# Patient Record
Sex: FEMALE | Race: WHITE | NOT HISPANIC OR LATINO | Employment: OTHER | URBAN - NONMETROPOLITAN AREA
[De-identification: names, ages, dates, MRNs, and addresses within clinical notes are randomized per-mention and may not be internally consistent; named-entity substitution may affect disease eponyms.]

---

## 2020-10-27 ENCOUNTER — HOSPITAL ENCOUNTER (OUTPATIENT)
Dept: GENERAL RADIOLOGY | Facility: HOSPITAL | Age: 85
Discharge: HOME | End: 2020-10-27

## 2020-10-27 ENCOUNTER — OFFICE VISIT (OUTPATIENT)
Dept: NEUROSURGERY | Facility: CLINIC | Age: 85
End: 2020-10-27

## 2020-10-27 ENCOUNTER — TELEPHONE (OUTPATIENT)
Dept: NEUROSURGERY | Facility: CLINIC | Age: 85
End: 2020-10-27

## 2020-10-27 VITALS
SYSTOLIC BLOOD PRESSURE: 144 MMHG | DIASTOLIC BLOOD PRESSURE: 72 MMHG | BODY MASS INDEX: 21.66 KG/M2 | HEIGHT: 65 IN | WEIGHT: 130 LBS

## 2020-10-27 DIAGNOSIS — G89.29 CHRONIC LOW BACK PAIN, UNSPECIFIED BACK PAIN LATERALITY, UNSPECIFIED WHETHER SCIATICA PRESENT: ICD-10-CM

## 2020-10-27 DIAGNOSIS — Z78.9 NON-SMOKER: ICD-10-CM

## 2020-10-27 DIAGNOSIS — G89.29 CHRONIC LOW BACK PAIN, UNSPECIFIED BACK PAIN LATERALITY, UNSPECIFIED WHETHER SCIATICA PRESENT: Primary | ICD-10-CM

## 2020-10-27 DIAGNOSIS — M54.6 PAIN IN THORACIC SPINE: ICD-10-CM

## 2020-10-27 DIAGNOSIS — M54.50 CHRONIC LOW BACK PAIN, UNSPECIFIED BACK PAIN LATERALITY, UNSPECIFIED WHETHER SCIATICA PRESENT: Primary | ICD-10-CM

## 2020-10-27 DIAGNOSIS — M54.50 CHRONIC LOW BACK PAIN, UNSPECIFIED BACK PAIN LATERALITY, UNSPECIFIED WHETHER SCIATICA PRESENT: ICD-10-CM

## 2020-10-27 PROCEDURE — 99204 OFFICE O/P NEW MOD 45 MIN: CPT | Performed by: NURSE PRACTITIONER

## 2020-10-27 PROCEDURE — 72110 X-RAY EXAM L-2 SPINE 4/>VWS: CPT

## 2020-10-27 PROCEDURE — 72070 X-RAY EXAM THORAC SPINE 2VWS: CPT

## 2020-10-27 RX ORDER — IBUPROFEN 200 MG
200 TABLET ORAL EVERY 6 HOURS PRN
COMMUNITY

## 2020-10-27 NOTE — PATIENT INSTRUCTIONS
Advance Care Planning and Advance Directives     You make decisions on a daily basis - decisions about where you want to live, your career, your home, your life. Perhaps one of the most important decisions you face is your choice for future medical care. Take time to talk with your family and your healthcare team and start planning today.  Advance Care Planning is a process that can help you:  · Understand possible future healthcare decisions in light of your own experiences  · Reflect on those decision in light of your goals and values  · Discuss your decisions with those closest to you and the healthcare professionals that care for you  · Make a plan by creating a document that reflects your wishes    Surrogate Decision Maker  In the event of a medical emergency, which has left you unable to communicate or to make your own decisions, you would need someone to make decisions for you.  It is important to discuss your preferences for medical treatment with this person while you are in good health.     Qualities of a surrogate decision maker:  • Willing to take on this role and responsibility  • Knows what you want for future medical care  • Willing to follow your wishes even if they don't agree with them  • Able to make difficult medical decisions under stressful circumstances    Advance Directives  These are legal documents you can create that will guide your healthcare team and decision maker(s) when needed. These documents can be stored in the electronic medical record.    · Living Will - a legal document to guide your care if you have a terminal condition or a serious illness and are unable to communicate. States vary by statute in document names/types, but most forms may include one or more of the following:        -  Directions regarding life-prolonging treatments        -  Directions regarding artificially provided nutrition/hydration        -  Choosing a healthcare decision maker        -  Direction  regarding organ/tissue donation    · Durable Power of  for Healthcare - this document names an -in-fact to make medical decisions for you, but it may also allow this person to make personal and financial decisions for you. Please seek the advice of an  if you need this type of document.    **Advance Directives are not required and no one may discriminate against you if you do not sign one.    Medical Orders  Many states allow specific forms/orders signed by your physician to record your wishes for medical treatment in your current state of health. This form, signed in personal communication with your physician, addresses resuscitation and other medical interventions that you may or may not want.      For more information or to schedule a time with a [facility] Advance Care Planning Facilitator contact: [email or web address] or call [phone] and someone will contact you directly.

## 2020-10-27 NOTE — TELEPHONE ENCOUNTER
It does look like there may be some compression fractures in her lumbar spine.  I am going to recommend her go for a CT scan of the thoracic and lumbar spine as well as a nuclear med bone scan.  They can all be done on the same day if she would like.  I will put the orders in if you will contact     Called and spoke with patient's son Phillip and gave him the x-ray results and explained that CT scans and a nuclear bone scan will be scheduled and that scheduling will be calling him, he understood.

## 2020-10-27 NOTE — PROGRESS NOTES
Chief complaint:   Chief Complaint   Patient presents with   • Back Pain     Deja has been referred here today for low back pain, her son is with her and they are thinking she may have a compression fracture but she has had no recent imaging of any kind.  She has had compression fractures in the past and was treated by Dr. Oral Baird.        Subjective     HPI: This is a 87-year-old female patient who comes in with a complaint of back pain.  She was referred by Phillip Elizabeth NP.  She says the pain in her back is been going on for several weeks but it has gotten worse in the last 3 weeks.  The pain in her back is constant it is better with laying down and worse with turning and walking.  She has no lower extremity pain.  Denies any bowel or bladder incontinence.  She has not done any recent physical therapy, chiropractic care, or pain management injections.  Rates her pain on a scale 0-10 at a 10.  She says it does interfere with actives of daily living.  She is right-hand dominant.  She is retired.  Is .  Denies any tobacco, alcohol, or illicit drug use.  She does take ibuprofen to help with the pain.  She does relate to having previous kyphoplasty done by Dr. Baird in 2009.  She does present in a wheelchair today.  She says that she does walk in her house with a walker    Review of Systems   Constitutional: Positive for activity change.   Musculoskeletal: Positive for back pain and gait problem.   All other systems reviewed and are negative.       Past Medical History:   Diagnosis Date   • Low back pain      Past Surgical History:   Procedure Laterality Date   • DILATATION AND CURETTAGE     • KYPHOPLASTY  2009    Dr. Oral Baird    • LEG SURGERY Right     fracture    • THYROID CYST EXCISION       Family History   Problem Relation Age of Onset   • Heart disease Mother    • Heart disease Father    • Cancer Brother    • Heart disease Brother      Social History     Tobacco Use   • Smoking status:  "Never Smoker   • Smokeless tobacco: Never Used   Substance Use Topics   • Alcohol use: Never     Frequency: Never   • Drug use: Never     (Not in a hospital admission)    Allergies:  Patient has no known allergies.    Objective      Vital Signs  /72 (BP Location: Right arm, Patient Position: Lying)   Ht 165.1 cm (65\")   Wt 59 kg (130 lb)   BMI 21.63 kg/m²     Physical Exam  Constitutional:       Appearance: Normal appearance. She is well-developed.   HENT:      Head: Normocephalic.   Eyes:      General: Lids are normal.      Conjunctiva/sclera: Conjunctivae normal.      Pupils: Pupils are equal, round, and reactive to light.   Neck:      Musculoskeletal: Normal range of motion.   Cardiovascular:      Rate and Rhythm: Normal rate and regular rhythm.   Pulmonary:      Effort: Pulmonary effort is normal.      Breath sounds: Normal breath sounds.   Musculoskeletal: Normal range of motion.      Lumbar back: She exhibits pain.   Skin:     General: Skin is warm.   Neurological:      Mental Status: She is alert and oriented to person, place, and time.      GCS: GCS eye subscore is 4. GCS verbal subscore is 5. GCS motor subscore is 6.      Cranial Nerves: No cranial nerve deficit.      Sensory: No sensory deficit.      Gait: Gait abnormal.      Deep Tendon Reflexes: Reflexes are normal and symmetric. Reflexes normal.      Comments: Able to ambulate the patient however she does require some assistance to stabilize her gait   Psychiatric:         Speech: Speech normal.         Behavior: Behavior normal.         Thought Content: Thought content normal.         Neurologic Exam     Mental Status   Oriented to person, place, and time.   Speech: speech is normal     Cranial Nerves     CN III, IV, VI   Pupils are equal, round, and reactive to light.      Results Review: No new imaging        Assessment/Plan: Going to send the patient for set of x-rays of the thoracic and lumbar spine to rule out any new compression " fracture.  The patient's complaint is across her low back so it does sound like a new compression fracture.  Depending on the results of the x-rays will determine if we need to have any further imaging to check the acuity of the fracture.  We will follow-up with her once the imaging is completed.  I will plan on seeing her back in the office in 4 weeks for reevaluation  I advised the patient to call and return sooner for new or worsening complaints of weakness, paresthesias, gait disturbances, or any additional concerns.  Treatment options discussed in detail with [place] and the patient voiced understanding.  Ms. Elizabeth agrees with this plan of care.     Patient is a nonsmoker  The patient's BMI is Body mass index is 21.63 kg/m². BMI is within normal parameters. No follow-up required.  Advance Care Planning   ACP discussion was held with the patient during this visit. Patient does not have an advance directive, information provided.      Diagnoses and all orders for this visit:    1. Chronic low back pain, unspecified back pain laterality, unspecified whether sciatica present (Primary)  -     XR Spine Lumbar Complete 4+VW; Future    2. Pain in thoracic spine  -     XR Spine Thoracic 2 View    3. Non-smoker    4. Body mass index (BMI) of 21.0 to 21.9 in adult          I discussed the patients findings and my recommendations with patient    Matty Elkins, ROGELIO  10/27/20  09:36 CDT

## 2020-11-05 ENCOUNTER — HOSPITAL ENCOUNTER (OUTPATIENT)
Dept: NUCLEAR MEDICINE | Facility: HOSPITAL | Age: 85
Discharge: HOME | End: 2020-11-05

## 2020-11-05 ENCOUNTER — HOSPITAL ENCOUNTER (OUTPATIENT)
Dept: CT IMAGING | Facility: HOSPITAL | Age: 85
Discharge: HOME | End: 2020-11-05
Admitting: NURSE PRACTITIONER

## 2020-11-05 DIAGNOSIS — M54.6 PAIN IN THORACIC SPINE: ICD-10-CM

## 2020-11-05 DIAGNOSIS — G89.29 CHRONIC LOW BACK PAIN, UNSPECIFIED BACK PAIN LATERALITY, UNSPECIFIED WHETHER SCIATICA PRESENT: ICD-10-CM

## 2020-11-05 DIAGNOSIS — M54.50 CHRONIC LOW BACK PAIN, UNSPECIFIED BACK PAIN LATERALITY, UNSPECIFIED WHETHER SCIATICA PRESENT: ICD-10-CM

## 2020-11-05 PROCEDURE — 72131 CT LUMBAR SPINE W/O DYE: CPT

## 2020-11-05 PROCEDURE — 0 TECHNETIUM OXIDRONATE KIT: Performed by: NURSE PRACTITIONER

## 2020-11-05 PROCEDURE — 78306 BONE IMAGING WHOLE BODY: CPT

## 2020-11-05 PROCEDURE — 72128 CT CHEST SPINE W/O DYE: CPT

## 2020-11-05 PROCEDURE — A9561 TC99M OXIDRONATE: HCPCS | Performed by: NURSE PRACTITIONER

## 2020-11-05 RX ADMIN — TECHNETIUM TC 99M OXIDRONATE 1 DOSE: 3.15 INJECTION, POWDER, LYOPHILIZED, FOR SOLUTION INTRAVENOUS at 09:01

## 2020-11-10 ENCOUNTER — OFFICE VISIT (OUTPATIENT)
Dept: NEUROSURGERY | Facility: CLINIC | Age: 85
End: 2020-11-10

## 2020-11-10 ENCOUNTER — PREP FOR SURGERY (OUTPATIENT)
Dept: OTHER | Facility: HOSPITAL | Age: 85
End: 2020-11-10

## 2020-11-10 VITALS
SYSTOLIC BLOOD PRESSURE: 138 MMHG | DIASTOLIC BLOOD PRESSURE: 82 MMHG | WEIGHT: 130 LBS | HEIGHT: 65 IN | BODY MASS INDEX: 21.66 KG/M2

## 2020-11-10 DIAGNOSIS — S32.010S CLOSED COMPRESSION FRACTURE OF L1 LUMBAR VERTEBRA, SEQUELA: Primary | ICD-10-CM

## 2020-11-10 DIAGNOSIS — Z78.9 NON-SMOKER: ICD-10-CM

## 2020-11-10 PROCEDURE — 99214 OFFICE O/P EST MOD 30 MIN: CPT | Performed by: NURSE PRACTITIONER

## 2020-11-10 RX ORDER — BUPIVACAINE HCL/0.9 % NACL/PF 0.1 %
2 PLASTIC BAG, INJECTION (ML) EPIDURAL ONCE
Status: CANCELLED | OUTPATIENT
Start: 2020-11-10 | End: 2020-11-10

## 2020-11-10 NOTE — PROGRESS NOTES
Chief complaint:   Chief Complaint   Patient presents with   • Back Pain     [place] returns for results and discussion of CT scans and bone scan.        Subjective     HPI: This is a 87-year-old female patient who comes in with a complaint of back pain.  She was referred by Phillip Elizabeth NP.  She says the pain in her back is been going on for several weeks but it has gotten worse in the last 3 weeks.  The pain in her back is constant it is better with laying down and worse with turning and walking.  She has no lower extremity pain.  Denies any bowel or bladder incontinence.  She has not done any recent physical therapy, chiropractic care, or pain management injections.  Rates her pain on a scale 0-10 at a 10.  She says it does interfere with actives of daily living.  She is right-hand dominant.  She is retired.  Is .  Denies any tobacco, alcohol, or illicit drug use.  She does take ibuprofen to help with the pain.  She does relate to having previous kyphoplasty done by Dr. Baird in 2009.  She does present in a wheelchair today.  She says that she does walk in her house with a walker.  She states that today she does think that she may be a little bit better but is still having a significant amount of pain and is still limited in her activities because of the pain    Review of Systems   Constitutional: Positive for activity change.   Musculoskeletal: Positive for back pain and gait problem.   All other systems reviewed and are negative.       Past Medical History:   Diagnosis Date   • Low back pain      Past Surgical History:   Procedure Laterality Date   • DILATATION AND CURETTAGE     • KYPHOPLASTY  2009    Dr. Oral Baird    • LEG SURGERY Right     fracture    • THYROID CYST EXCISION       Family History   Problem Relation Age of Onset   • Heart disease Mother    • Heart disease Father    • Cancer Brother    • Heart disease Brother      Social History     Tobacco Use   • Smoking status: Never Smoker   "  • Smokeless tobacco: Never Used   Substance Use Topics   • Alcohol use: Never     Frequency: Never   • Drug use: Never     (Not in a hospital admission)    Allergies:  Patient has no known allergies.    Objective      Vital Signs  /82 (BP Location: Right arm, Patient Position: Sitting)   Ht 165.1 cm (65\")   Wt 59 kg (130 lb)   BMI 21.63 kg/m²     Physical Exam  Constitutional:       Appearance: Normal appearance. She is well-developed.   HENT:      Head: Normocephalic.   Eyes:      General: Lids are normal.      Conjunctiva/sclera: Conjunctivae normal.      Pupils: Pupils are equal, round, and reactive to light.   Neck:      Musculoskeletal: Normal range of motion.   Cardiovascular:      Rate and Rhythm: Normal rate and regular rhythm.   Pulmonary:      Effort: Pulmonary effort is normal.      Breath sounds: Normal breath sounds.   Musculoskeletal: Normal range of motion.      Lumbar back: She exhibits pain.   Skin:     General: Skin is warm.   Neurological:      Mental Status: She is alert and oriented to person, place, and time.      GCS: GCS eye subscore is 4. GCS verbal subscore is 5. GCS motor subscore is 6.      Cranial Nerves: No cranial nerve deficit.      Sensory: No sensory deficit.      Gait: Gait abnormal.      Deep Tendon Reflexes: Reflexes are normal and symmetric. Reflexes normal.      Comments: Able to ambulate the patient however she does require some assistance to stabilize her gait   Psychiatric:         Speech: Speech normal.         Behavior: Behavior normal.         Thought Content: Thought content normal.         Neurologic Exam     Mental Status   Oriented to person, place, and time.   Speech: speech is normal     Cranial Nerves     CN III, IV, VI   Pupils are equal, round, and reactive to light.      Results Review: Nuclear med bone study shows the patient does have an acute fracture at L1.  Per the radiologist it is not felt that there is any fractures from L2-L5 based off the " "nuclear med bone scan              Assessment/Plan: Tanya did review the imaging and did come in and discussed this with the patient.  The patient does have an acute L1 fracture.  It is felt that the patient would benefit by going to the operating room for a kyphoplasty at L1.  Dr. Martínez did go over the risks and benefits of the procedure with the patient.  Their questions and concerns were addressed.  The patient will need to be cleared by her primary care doctor.  She will also have to undergo Covid-19 and if she did test positive this would delay her surgery.  The acknowledged understanding of this.  Their questions and concerns were addressed.Patient is a nonsmoker  The patient's Body mass index is 21.63 kg/m².. BMI is within normal parameters. No follow-up required.  Advance Care Planning   ACP discussion was held with the patient during this visit. Patient does not have an advance directive, information provided.      Diagnoses and all orders for this visit:    1. Closed compression fracture of L1 lumbar vertebra, sequela (Primary)  -     Ambulatory Referral to Family Practice    2. Non-smoker    3. Body mass index (BMI) of 21.0 to 21.9 in adult          I discussed the patients findings and my recommendations with patient    Matty NADER Elkins, APRN  11/10/20  09:46 CST  Attending addendum: The patient has what appears to be an acute compression fracture at L1 based on CAT scan and nuclear medicine imaging.  She also has a notable scoliotic deformity.  Think would be reasonable at this point to proceed with kyphoplasty at L1 was very clear with her that given the amount of degeneration and scoliosis that she has it is possible that she may continue to have back pain and to some degree after \"kyphoplasty although I do think that kyphoplasty will benefit her.  The risks and benefits were discussed at length which include but are not limited to infection, bleeding, paralysis, spinal fluid leak, bowel bladder " continence, extravasation of kyphoplasty cement resulting in neurocompression, pulmonary embolism, stroke, coma, and death.

## 2020-11-10 NOTE — PATIENT INSTRUCTIONS
Advance Care Planning and Advance Directives     You make decisions on a daily basis - decisions about where you want to live, your career, your home, your life. Perhaps one of the most important decisions you face is your choice for future medical care. Take time to talk with your family and your healthcare team and start planning today.  Advance Care Planning is a process that can help you:  · Understand possible future healthcare decisions in light of your own experiences  · Reflect on those decision in light of your goals and values  · Discuss your decisions with those closest to you and the healthcare professionals that care for you  · Make a plan by creating a document that reflects your wishes    Surrogate Decision Maker  In the event of a medical emergency, which has left you unable to communicate or to make your own decisions, you would need someone to make decisions for you.  It is important to discuss your preferences for medical treatment with this person while you are in good health.     Qualities of a surrogate decision maker:  • Willing to take on this role and responsibility  • Knows what you want for future medical care  • Willing to follow your wishes even if they don't agree with them  • Able to make difficult medical decisions under stressful circumstances    Advance Directives  These are legal documents you can create that will guide your healthcare team and decision maker(s) when needed. These documents can be stored in the electronic medical record.    · Living Will - a legal document to guide your care if you have a terminal condition or a serious illness and are unable to communicate. States vary by statute in document names/types, but most forms may include one or more of the following:        -  Directions regarding life-prolonging treatments        -  Directions regarding artificially provided nutrition/hydration        -  Choosing a healthcare decision maker        -  Direction  regarding organ/tissue donation    · Durable Power of  for Healthcare - this document names an -in-fact to make medical decisions for you, but it may also allow this person to make personal and financial decisions for you. Please seek the advice of an  if you need this type of document.    **Advance Directives are not required and no one may discriminate against you if you do not sign one.    Medical Orders  Many states allow specific forms/orders signed by your physician to record your wishes for medical treatment in your current state of health. This form, signed in personal communication with your physician, addresses resuscitation and other medical interventions that you may or may not want.      For more information or to schedule a time with a [facility] Advance Care Planning Facilitator contact: [email or web address] or call [phone] and someone will contact you directly.

## 2020-11-18 ENCOUNTER — TELEPHONE (OUTPATIENT)
Dept: NEUROSURGERY | Facility: CLINIC | Age: 85
End: 2020-11-18

## 2020-11-18 NOTE — TELEPHONE ENCOUNTER
LAURA FOR PT SON AJE.     PT SURGERY SCHEDULED FOR 12/4/20 ARRIVE @ 5AM- 7 AM START    PREWORK TESTING 11/20/20 @ 9:45AM @  [facility]    PCP CLEARANCE WITH PAN TORRES, ROGELIO 11/23/20 @ 9:15 AM     LEFT MY DIRECT NUMBER FOR JAE TO CALL BACK WITH QUESTIONS CONCERNS [phone]    MAILED LTR.

## 2020-11-18 NOTE — TELEPHONE ENCOUNTER
SURGERY: 12/4/20 @ 5AM - 7AM   PT SAW PCP FOR CLEARANCE AND PREWORK TESTING (SON SCHEDULED ON HIS OWN)     FAXED REQUEST TO [facility] OFFICE ASKING THEY FAX US LABS AND CLEARANCE NOTE.     NOTIFIED SON OF SURGERY DATE AND TIME. VOICED UNDERSTANDING

## 2020-11-20 ENCOUNTER — APPOINTMENT (OUTPATIENT)
Dept: PREADMISSION TESTING | Facility: HOSPITAL | Age: 85
End: 2020-11-20

## 2020-11-27 ENCOUNTER — TRANSCRIBE ORDERS (OUTPATIENT)
Dept: ADMINISTRATIVE | Facility: HOSPITAL | Age: 85
End: 2020-11-27

## 2020-11-27 DIAGNOSIS — Z11.59 SCREENING FOR VIRAL DISEASE: Primary | ICD-10-CM

## 2020-11-30 ENCOUNTER — TELEPHONE (OUTPATIENT)
Dept: NEUROSURGERY | Facility: CLINIC | Age: 85
End: 2020-11-30

## 2020-11-30 NOTE — TELEPHONE ENCOUNTER
PATIENT'S SON IS CALLING TO MAKE SURE ALL OF PATIENT'S CHECK MARKS ARE COMPLETED PRIOR TO SURGERY.  HE WOULD LIKE SOMEONE TO CALL HIM BACK TODAY TO MAKE SURE THEY ARE COMPLETING ALL TEST AND REQUEST PRIOR TO SURGERY.  PLEASE CALL BACK TODAY. PATIENT IS COMING FROM [facility] AND HAS TO LAY FLAT IN THE VAN WHEN TRAVELING.  SON DOES NOT WANT TO HAVE TO MAKE ANOTHER TRIP.      [phone]

## 2020-11-30 NOTE — TELEPHONE ENCOUNTER
I have contacted the patient's son and he just wanted to make sure there was nothing else for his mom to do before her surgery on Friday.  She is scheduled for COVID testing tomorrow which is all that is left except we need copies of her prework testing and clearance note from [facility] office.  He is going to contact them today and have them fax it to me.  He states they were told she is cleared to proceed but will make sure we get that documentation so we can put it in her chart.    christi lee CMA

## 2020-12-01 ENCOUNTER — LAB (OUTPATIENT)
Dept: LAB | Facility: HOSPITAL | Age: 85
End: 2020-12-01

## 2020-12-01 PROCEDURE — U0003 INFECTIOUS AGENT DETECTION BY NUCLEIC ACID (DNA OR RNA); SEVERE ACUTE RESPIRATORY SYNDROME CORONAVIRUS 2 (SARS-COV-2) (CORONAVIRUS DISEASE [COVID-19]), AMPLIFIED PROBE TECHNIQUE, MAKING USE OF HIGH THROUGHPUT TECHNOLOGIES AS DESCRIBED BY CMS-2020-01-R: HCPCS | Performed by: NEUROLOGICAL SURGERY

## 2020-12-01 PROCEDURE — C9803 HOPD COVID-19 SPEC COLLECT: HCPCS | Performed by: NEUROLOGICAL SURGERY

## 2020-12-02 LAB
COVID LABCORP PRIORITY: NORMAL
SARS-COV-2 RNA RESP QL NAA+PROBE: NOT DETECTED

## 2020-12-03 RX ORDER — POTASSIUM CHLORIDE 750 MG/1
10 CAPSULE, EXTENDED RELEASE ORAL DAILY
COMMUNITY

## 2020-12-04 ENCOUNTER — ANESTHESIA (OUTPATIENT)
Dept: PERIOP | Facility: HOSPITAL | Age: 85
End: 2020-12-04

## 2020-12-04 ENCOUNTER — ANESTHESIA EVENT (OUTPATIENT)
Dept: PERIOP | Facility: HOSPITAL | Age: 85
End: 2020-12-04

## 2020-12-04 ENCOUNTER — HOSPITAL ENCOUNTER (OUTPATIENT)
Facility: HOSPITAL | Age: 85
Setting detail: HOSPITAL OUTPATIENT SURGERY
Discharge: HOME | End: 2020-12-04
Attending: NEUROLOGICAL SURGERY | Admitting: NEUROLOGICAL SURGERY

## 2020-12-04 ENCOUNTER — APPOINTMENT (OUTPATIENT)
Dept: GENERAL RADIOLOGY | Facility: HOSPITAL | Age: 85
End: 2020-12-04

## 2020-12-04 VITALS
BODY MASS INDEX: 21.94 KG/M2 | RESPIRATION RATE: 14 BRPM | HEIGHT: 64 IN | DIASTOLIC BLOOD PRESSURE: 87 MMHG | TEMPERATURE: 97.9 F | OXYGEN SATURATION: 99 % | SYSTOLIC BLOOD PRESSURE: 152 MMHG | HEART RATE: 53 BPM | WEIGHT: 128.53 LBS

## 2020-12-04 DIAGNOSIS — S32.010S CLOSED COMPRESSION FRACTURE OF L1 LUMBAR VERTEBRA, SEQUELA: ICD-10-CM

## 2020-12-04 LAB
ABO GROUP BLD: NORMAL
BLD GP AB SCN SERPL QL: NEGATIVE
QT INTERVAL: 472 MS
QTC INTERVAL: 463 MS
RH BLD: POSITIVE
T&S EXPIRATION DATE: NORMAL

## 2020-12-04 PROCEDURE — 93005 ELECTROCARDIOGRAM TRACING: CPT | Performed by: NEUROLOGICAL SURGERY

## 2020-12-04 PROCEDURE — 72100 X-RAY EXAM L-S SPINE 2/3 VWS: CPT

## 2020-12-04 PROCEDURE — 22514 PERQ VERTEBRAL AUGMENTATION: CPT | Performed by: NEUROLOGICAL SURGERY

## 2020-12-04 PROCEDURE — 76000 FLUOROSCOPY <1 HR PHYS/QHP: CPT

## 2020-12-04 PROCEDURE — 25010000002 FENTANYL CITRATE (PF) 100 MCG/2ML SOLUTION: Performed by: ANESTHESIOLOGY

## 2020-12-04 PROCEDURE — 86900 BLOOD TYPING SEROLOGIC ABO: CPT | Performed by: NURSE PRACTITIONER

## 2020-12-04 PROCEDURE — 86850 RBC ANTIBODY SCREEN: CPT | Performed by: NURSE PRACTITIONER

## 2020-12-04 PROCEDURE — 25010000002 ONDANSETRON PER 1 MG: Performed by: NURSE ANESTHETIST, CERTIFIED REGISTERED

## 2020-12-04 PROCEDURE — 25010000002 ONDANSETRON PER 1 MG: Performed by: ANESTHESIOLOGY

## 2020-12-04 PROCEDURE — 86901 BLOOD TYPING SEROLOGIC RH(D): CPT | Performed by: NURSE PRACTITIONER

## 2020-12-04 PROCEDURE — 25010000002 FENTANYL CITRATE (PF) 100 MCG/2ML SOLUTION: Performed by: NURSE ANESTHETIST, CERTIFIED REGISTERED

## 2020-12-04 PROCEDURE — C1713 ANCHOR/SCREW BN/BN,TIS/BN: HCPCS | Performed by: NEUROLOGICAL SURGERY

## 2020-12-04 PROCEDURE — 88311 DECALCIFY TISSUE: CPT | Performed by: NEUROLOGICAL SURGERY

## 2020-12-04 PROCEDURE — 25010000002 PROPOFOL 10 MG/ML EMULSION: Performed by: NURSE ANESTHETIST, CERTIFIED REGISTERED

## 2020-12-04 PROCEDURE — 25010000002 DEXAMETHASONE PER 1 MG: Performed by: ANESTHESIOLOGY

## 2020-12-04 PROCEDURE — 0 IOPAMIDOL 41 % SOLUTION: Performed by: NEUROLOGICAL SURGERY

## 2020-12-04 PROCEDURE — 25010000002 CEFAZOLIN PER 500 MG: Performed by: NURSE PRACTITIONER

## 2020-12-04 PROCEDURE — 93010 ELECTROCARDIOGRAM REPORT: CPT | Performed by: INTERNAL MEDICINE

## 2020-12-04 PROCEDURE — 88307 TISSUE EXAM BY PATHOLOGIST: CPT | Performed by: NEUROLOGICAL SURGERY

## 2020-12-04 DEVICE — BONE CEMENT C01B HV-R WITH MIXER  US
Type: IMPLANTABLE DEVICE | Status: FUNCTIONAL
Brand: KYPHON® HV-R® BONE CEMENT AND KYPHON® MIXER PACK

## 2020-12-04 RX ORDER — SODIUM CHLORIDE, SODIUM LACTATE, POTASSIUM CHLORIDE, CALCIUM CHLORIDE 600; 310; 30; 20 MG/100ML; MG/100ML; MG/100ML; MG/100ML
100 INJECTION, SOLUTION INTRAVENOUS CONTINUOUS
Status: DISCONTINUED | OUTPATIENT
Start: 2020-12-04 | End: 2020-12-04 | Stop reason: HOSPADM

## 2020-12-04 RX ORDER — NALOXONE HCL 0.4 MG/ML
0.4 VIAL (ML) INJECTION AS NEEDED
Status: DISCONTINUED | OUTPATIENT
Start: 2020-12-04 | End: 2020-12-04 | Stop reason: HOSPADM

## 2020-12-04 RX ORDER — LIDOCAINE HYDROCHLORIDE 10 MG/ML
0.5 INJECTION, SOLUTION EPIDURAL; INFILTRATION; INTRACAUDAL; PERINEURAL ONCE AS NEEDED
Status: DISCONTINUED | OUTPATIENT
Start: 2020-12-04 | End: 2020-12-04 | Stop reason: HOSPADM

## 2020-12-04 RX ORDER — ACETAMINOPHEN 500 MG
1000 TABLET ORAL ONCE
Status: COMPLETED | OUTPATIENT
Start: 2020-12-04 | End: 2020-12-04

## 2020-12-04 RX ORDER — OXYCODONE AND ACETAMINOPHEN 10; 325 MG/1; MG/1
1 TABLET ORAL ONCE AS NEEDED
Status: DISCONTINUED | OUTPATIENT
Start: 2020-12-04 | End: 2020-12-04 | Stop reason: HOSPADM

## 2020-12-04 RX ORDER — MAGNESIUM HYDROXIDE 1200 MG/15ML
LIQUID ORAL AS NEEDED
Status: DISCONTINUED | OUTPATIENT
Start: 2020-12-04 | End: 2020-12-04 | Stop reason: HOSPADM

## 2020-12-04 RX ORDER — LABETALOL HYDROCHLORIDE 5 MG/ML
5 INJECTION, SOLUTION INTRAVENOUS
Status: DISCONTINUED | OUTPATIENT
Start: 2020-12-04 | End: 2020-12-04 | Stop reason: HOSPADM

## 2020-12-04 RX ORDER — BUPIVACAINE HCL/0.9 % NACL/PF 0.1 %
2 PLASTIC BAG, INJECTION (ML) EPIDURAL ONCE
Status: COMPLETED | OUTPATIENT
Start: 2020-12-04 | End: 2020-12-04

## 2020-12-04 RX ORDER — PROPOFOL 10 MG/ML
VIAL (ML) INTRAVENOUS AS NEEDED
Status: DISCONTINUED | OUTPATIENT
Start: 2020-12-04 | End: 2020-12-04 | Stop reason: SURG

## 2020-12-04 RX ORDER — SODIUM CHLORIDE 0.9 % (FLUSH) 0.9 %
3-10 SYRINGE (ML) INJECTION AS NEEDED
Status: DISCONTINUED | OUTPATIENT
Start: 2020-12-04 | End: 2020-12-04 | Stop reason: HOSPADM

## 2020-12-04 RX ORDER — ONDANSETRON 2 MG/ML
4 INJECTION INTRAMUSCULAR; INTRAVENOUS ONCE AS NEEDED
Status: COMPLETED | OUTPATIENT
Start: 2020-12-04 | End: 2020-12-04

## 2020-12-04 RX ORDER — ROCURONIUM BROMIDE 10 MG/ML
INJECTION, SOLUTION INTRAVENOUS AS NEEDED
Status: DISCONTINUED | OUTPATIENT
Start: 2020-12-04 | End: 2020-12-04 | Stop reason: SURG

## 2020-12-04 RX ORDER — FENTANYL CITRATE 50 UG/ML
25 INJECTION, SOLUTION INTRAMUSCULAR; INTRAVENOUS
Status: COMPLETED | OUTPATIENT
Start: 2020-12-04 | End: 2020-12-04

## 2020-12-04 RX ORDER — DEXAMETHASONE SODIUM PHOSPHATE 4 MG/ML
4 INJECTION, SOLUTION INTRA-ARTICULAR; INTRALESIONAL; INTRAMUSCULAR; INTRAVENOUS; SOFT TISSUE ONCE AS NEEDED
Status: COMPLETED | OUTPATIENT
Start: 2020-12-04 | End: 2020-12-04

## 2020-12-04 RX ORDER — SODIUM CHLORIDE 0.9 % (FLUSH) 0.9 %
3 SYRINGE (ML) INJECTION EVERY 12 HOURS SCHEDULED
Status: DISCONTINUED | OUTPATIENT
Start: 2020-12-04 | End: 2020-12-04 | Stop reason: HOSPADM

## 2020-12-04 RX ORDER — FENTANYL CITRATE 50 UG/ML
INJECTION, SOLUTION INTRAMUSCULAR; INTRAVENOUS AS NEEDED
Status: DISCONTINUED | OUTPATIENT
Start: 2020-12-04 | End: 2020-12-04 | Stop reason: SURG

## 2020-12-04 RX ORDER — SODIUM CHLORIDE 0.9 % (FLUSH) 0.9 %
3 SYRINGE (ML) INJECTION AS NEEDED
Status: DISCONTINUED | OUTPATIENT
Start: 2020-12-04 | End: 2020-12-04 | Stop reason: HOSPADM

## 2020-12-04 RX ORDER — SODIUM CHLORIDE, SODIUM LACTATE, POTASSIUM CHLORIDE, CALCIUM CHLORIDE 600; 310; 30; 20 MG/100ML; MG/100ML; MG/100ML; MG/100ML
1000 INJECTION, SOLUTION INTRAVENOUS CONTINUOUS
Status: DISCONTINUED | OUTPATIENT
Start: 2020-12-04 | End: 2020-12-04 | Stop reason: HOSPADM

## 2020-12-04 RX ORDER — FLUMAZENIL 0.1 MG/ML
0.2 INJECTION INTRAVENOUS AS NEEDED
Status: DISCONTINUED | OUTPATIENT
Start: 2020-12-04 | End: 2020-12-04 | Stop reason: HOSPADM

## 2020-12-04 RX ORDER — ONDANSETRON 2 MG/ML
INJECTION INTRAMUSCULAR; INTRAVENOUS AS NEEDED
Status: DISCONTINUED | OUTPATIENT
Start: 2020-12-04 | End: 2020-12-04 | Stop reason: SURG

## 2020-12-04 RX ORDER — DIPHENOXYLATE HYDROCHLORIDE AND ATROPINE SULFATE 2.5; .025 MG/1; MG/1
1 TABLET ORAL DAILY
COMMUNITY

## 2020-12-04 RX ORDER — NEOSTIGMINE METHYLSULFATE 5 MG/5 ML
SYRINGE (ML) INTRAVENOUS AS NEEDED
Status: DISCONTINUED | OUTPATIENT
Start: 2020-12-04 | End: 2020-12-04 | Stop reason: SURG

## 2020-12-04 RX ADMIN — ONDANSETRON HYDROCHLORIDE 4 MG: 2 SOLUTION INTRAMUSCULAR; INTRAVENOUS at 07:46

## 2020-12-04 RX ADMIN — SODIUM CHLORIDE, POTASSIUM CHLORIDE, SODIUM LACTATE AND CALCIUM CHLORIDE 100 ML/HR: 600; 310; 30; 20 INJECTION, SOLUTION INTRAVENOUS at 08:53

## 2020-12-04 RX ADMIN — ACETAMINOPHEN 1000 MG: 500 TABLET, FILM COATED ORAL at 07:02

## 2020-12-04 RX ADMIN — SODIUM CHLORIDE, POTASSIUM CHLORIDE, SODIUM LACTATE AND CALCIUM CHLORIDE 1000 ML: 600; 310; 30; 20 INJECTION, SOLUTION INTRAVENOUS at 06:14

## 2020-12-04 RX ADMIN — Medication 2 G: at 07:14

## 2020-12-04 RX ADMIN — PROPOFOL 100 MG: 10 INJECTION, EMULSION INTRAVENOUS at 07:07

## 2020-12-04 RX ADMIN — FENTANYL CITRATE 25 MCG: 50 INJECTION INTRAMUSCULAR; INTRAVENOUS at 08:20

## 2020-12-04 RX ADMIN — Medication 3 MG: at 07:46

## 2020-12-04 RX ADMIN — FENTANYL CITRATE 25 MCG: 50 INJECTION INTRAMUSCULAR; INTRAVENOUS at 08:30

## 2020-12-04 RX ADMIN — FENTANYL CITRATE 25 MCG: 50 INJECTION INTRAMUSCULAR; INTRAVENOUS at 08:15

## 2020-12-04 RX ADMIN — FENTANYL CITRATE 25 MCG: 50 INJECTION INTRAMUSCULAR; INTRAVENOUS at 08:45

## 2020-12-04 RX ADMIN — DEXAMETHASONE SODIUM PHOSPHATE 4 MG: 4 INJECTION, SOLUTION INTRA-ARTICULAR; INTRALESIONAL; INTRAMUSCULAR; INTRAVENOUS; SOFT TISSUE at 07:02

## 2020-12-04 RX ADMIN — GLYCOPYRROLATE 0.4 MG: 0.2 INJECTION, SOLUTION INTRAMUSCULAR; INTRAVENOUS at 07:46

## 2020-12-04 RX ADMIN — ROCURONIUM BROMIDE 20 MG: 10 INJECTION INTRAVENOUS at 07:07

## 2020-12-04 RX ADMIN — FENTANYL CITRATE 50 MCG: 50 INJECTION, SOLUTION INTRAMUSCULAR; INTRAVENOUS at 07:07

## 2020-12-04 RX ADMIN — LIDOCAINE HYDROCHLORIDE 60 MG: 20 INJECTION, SOLUTION INTRAVENOUS at 07:07

## 2020-12-04 RX ADMIN — ONDANSETRON HYDROCHLORIDE 4 MG: 2 SOLUTION INTRAMUSCULAR; INTRAVENOUS at 08:20

## 2020-12-04 NOTE — ANESTHESIA POSTPROCEDURE EVALUATION
"Patient: Deja Elizabeth    Procedure Summary     Date: 12/04/20 Room / Location:  [facility] OR [address] [place] [address] [facility] OR    Anesthesia Start: 0704 Anesthesia Stop: 0758    Procedure: KYPHOPLASTY WITH BIOPSY of L1 with 2 c-arms (N/A Spine Lumbar) Diagnosis:       Closed compression fracture of L1 lumbar vertebra, sequela      (Closed compression fracture of L1 lumbar vertebra, sequela [S32.010S])    Surgeon: Albino Martínez MD Provider: Mike Alcantara CRNA    Anesthesia Type: general ASA Status: 2          Anesthesia Type: general    Vitals  Vitals Value Taken Time   /70 12/04/20 0915   Temp 97.9 °F (36.6 °C) 12/04/20 0915   Pulse 47 12/04/20 0915   Resp 14 12/04/20 0915   SpO2 94 % 12/04/20 0915           Post Anesthesia Care and Evaluation    Patient location during evaluation: PACU  Patient participation: complete - patient participated  Level of consciousness: awake and alert  Pain management: adequate  Airway patency: patent  Anesthetic complications: No anesthetic complications    Cardiovascular status: acceptable  Respiratory status: acceptable  Hydration status: acceptable    Comments: Blood pressure 146/79, pulse 50, temperature 97.9 °F (36.6 °C), temperature source Temporal, resp. rate 14, height 162 cm (63.78\"), weight 58.3 kg (128 lb 8.5 oz), SpO2 96 %.    Pt discharged from PACU based on brady score >8  No anesthesia care post op    "

## 2020-12-04 NOTE — DISCHARGE INSTRUCTIONS

## 2020-12-04 NOTE — ANESTHESIA PREPROCEDURE EVALUATION
Anesthesia Evaluation     Patient summary reviewed   history of anesthetic complications: PONV prolonged sedation  NPO Solid Status: > 8 hours  NPO Liquid Status: > 8 hours           Airway   Mallampati: I  TM distance: >3 FB  Neck ROM: full  Dental          Pulmonary    (-) asthma, sleep apnea, not a smoker  Cardiovascular     ECG reviewed    (-) hypertension, past MI, CAD, dysrhythmias, angina, cardiac stents, hyperlipidemia      Neuro/Psych  (+) TIA,     (-) seizures, CVA  GI/Hepatic/Renal/Endo    (-) liver disease, no renal disease, diabetes    Musculoskeletal     (+) back pain,   Abdominal    Substance History      OB/GYN          Other   arthritis,                      Anesthesia Plan    ASA 2     general     intravenous induction     Anesthetic plan, all risks, benefits, and alternatives have been provided, discussed and informed consent has been obtained with: patient.

## 2020-12-04 NOTE — OP NOTE
Procedure Note  Preop Diagnosis: Closed compression fracture of L1 lumbar vertebra, sequela [S32.010S]    Post-Op Diagnosis Codes:     * Closed compression fracture of L1 lumbar vertebra, sequela [S32.010S]     Procedure Name: L1 Kyphoplasty and vertebral body biopsy    Indications:  A CT of the L1 revealed findings of compression fracture of L1.  The patient now presents for L1 kyphoplasty and vertebral biopsy after discussing therapeutic alternatives.          Surgeon: Albino Martínez MD     Assistants: none    Anesthesia: General endotracheal anesthesia    ASA Class: 3    Procedure Details   After obtaining informed consent, having the risks and benefits of the procedure explained including but not limited to infection, bleeding, paralysis, spinal fluid leak, bowel bladder incontinence, extravasation of kyphoplasty cement resulting in neurocompression, pulmonary embolism, stroke, coma, and death, the patient was brought to the operating room.  The patient was given general anesthesia via an endotracheal tube. The patient was flipped prone onto a Guy axis table.  Portable fluoroscopy was used to localize level of L1 . Preplanned incisions of the left and right of midline were then marked with an indelible marker.  The patient was then prepped and draped in a standard sterile fashion.  The preplanned incisions were infiltrated with Marcaine and epinephrine.  A 10 blade scalpel was used to make an incision at the dermis and epidermis.  Jamshidi needles were then advanced to the pedicles at the identified levels on the left and the right under direct fluoroscopic guidance.  The inner cannula was removed.  Hand drill was then used to drill channel through the fractured vertebral bodies from the left and the right under direct fluoroscopic guidance.  Kyphoplasty balloons were then inserted from the left side and the right side under direct fluoroscopic guidance into the vertebral bodies.  The balloons were  inflated and deflated and then removed.  And then several syringes of kyphoplasty cement were injected into the fractured vertebral bodies under direct fluoroscopic guidance from the left and the right.  The cement was allowed to harden.  The Jamshidi needles were removed.  The wounds were then irrigated with an antibiotic solution and closed with Mastisol and Steri-Strips.  All sponge, needle and instrument counts were correct at the end of the procedure.  The patient was extubated in stable condition and returned to recovery room with about 10 mL of blood loss.        Findings:  Pathologic osteoporotic compression fracture L1        Estimated Blood Loss:  10           Drains: none           Total IV Fluids: ml           Specimens:            Implants:   Implant Name Type Inv. Item Serial No.  Lot No. LRB No. Used Action   KT MIXER KYPHON W/CMT BONE KYPHX HV R - [id number] Implant KT MIXER KYPHON W/CMT BONE KYPHX HV R  [employer] [id number] N/A 1 Implanted              Complications:  none           Disposition: PACU - hemodynamically stable.           Condition: stable        Albino Martínez MD

## 2020-12-04 NOTE — ANESTHESIA PROCEDURE NOTES
Airway  Urgency: elective    Date/Time: 12/4/2020 7:08 AM  Airway not difficult    General Information and Staff    Patient location during procedure: OR    Indications and Patient Condition  Indications for airway management: airway protection    Preoxygenated: yes  MILS maintained throughout  Mask difficulty assessment: 1 - vent by mask    Final Airway Details  Final airway type: endotracheal airway      Successful airway: ETT  Cuffed: yes   Successful intubation technique: direct laryngoscopy  Facilitating devices/methods: intubating stylet  Endotracheal tube insertion site: oral  Blade: Winkler  Blade size: 3  ETT size (mm): 7.0  Cormack-Lehane Classification: grade I - full view of glottis  Placement verified by: chest auscultation   Measured from: lips  ETT/EBT  to lips (cm): 21  Number of attempts at approach: 1  Assessment: lips, teeth, and gum same as pre-op and atraumatic intubation

## 2020-12-07 LAB
LAB AP CASE REPORT: NORMAL
PATH REPORT.FINAL DX SPEC: NORMAL
PATH REPORT.GROSS SPEC: NORMAL

## 2020-12-16 ENCOUNTER — TELEPHONE (OUTPATIENT)
Dept: NEUROSURGERY | Facility: CLINIC | Age: 85
End: 2020-12-16

## 2020-12-16 NOTE — TELEPHONE ENCOUNTER
Patient's son called stating his mom continues to have increasing pain even after her kyphoplasty on 12/4/2020.  She wants to be seen sooner b/c she wants to have another surgery to fix more broken bones (she has not had any imaging to verify whether or not she even has any new fractures).    I explained to the son that I would have to see what Matty or Dr Martínez suggested.  He states she has an appt w/Matty on 12/28/20 but doesn't think she can wait that long.    christi lee CMA    Will forward to Matty to review and give recommendation

## 2020-12-17 ENCOUNTER — HOSPITAL ENCOUNTER (OUTPATIENT)
Dept: GENERAL RADIOLOGY | Facility: HOSPITAL | Age: 85
Discharge: HOME | End: 2020-12-17

## 2020-12-17 ENCOUNTER — TELEPHONE (OUTPATIENT)
Dept: NEUROSURGERY | Facility: CLINIC | Age: 85
End: 2020-12-17

## 2020-12-17 DIAGNOSIS — M54.50 ACUTE MIDLINE LOW BACK PAIN WITHOUT SCIATICA: ICD-10-CM

## 2020-12-17 DIAGNOSIS — M54.6 PAIN IN THORACIC SPINE: ICD-10-CM

## 2020-12-17 DIAGNOSIS — M54.6 PAIN IN THORACIC SPINE: Primary | ICD-10-CM

## 2020-12-17 PROCEDURE — 72070 X-RAY EXAM THORAC SPINE 2VWS: CPT

## 2020-12-17 PROCEDURE — 72100 X-RAY EXAM L-S SPINE 2/3 VWS: CPT

## 2020-12-17 NOTE — TELEPHONE ENCOUNTER
Zhen Starr: needs to have lumbar & thoracic spine xrays today to rule out a new fracture.    I have notified the patient's son and he is going to bring her to the [facility] for those this morning.  He is going to call the office and let us know when she has had those completed so they will know if they need to do anything else before they head back home.      christi lee CMA

## 2020-12-17 NOTE — TELEPHONE ENCOUNTER
Patient's son had called yesterday stating patient was having increasing pain and thought she might have another fracture.  Per Matty: patient needs to have xrays     I notified the patient's son, joana, and he was to bring her to [facility] for xrays today.  They didn't get here until 330 and then Matty had already gone for the day.  The patient and her son came up to the office and I explained that per the report it didn't say anything about a fracture; however, Matty would have to look at the images tomorrow & said he would call them.  Patient's son expressed understanding.      christi lee CMA

## 2020-12-18 ENCOUNTER — TELEPHONE (OUTPATIENT)
Dept: NEUROSURGERY | Facility: CLINIC | Age: 85
End: 2020-12-18

## 2020-12-18 DIAGNOSIS — M54.50 ACUTE MIDLINE LOW BACK PAIN WITHOUT SCIATICA: ICD-10-CM

## 2020-12-18 DIAGNOSIS — M54.6 PAIN IN THORACIC SPINE: Primary | ICD-10-CM

## 2020-12-18 NOTE — TELEPHONE ENCOUNTER
Called and spoke to patient's son Phillip.  He states that his mother is still having quite a bit of pain after the kyphoplasty procedure.  He says that she may have had some improvement but in general she is still having a lot of pain.  X-rays did not show a new compression fracture however her bone quality is very poor and there was difficulty in fully evaluating her spine.  I am to send her for a CT scan of her thoracic and lumbar spine for better evaluation and we will follow-up with the patient after that is complete

## 2020-12-28 ENCOUNTER — HOSPITAL ENCOUNTER (OUTPATIENT)
Dept: CT IMAGING | Facility: HOSPITAL | Age: 85
Discharge: HOME | End: 2020-12-28
Admitting: NURSE PRACTITIONER

## 2020-12-28 ENCOUNTER — OFFICE VISIT (OUTPATIENT)
Dept: NEUROSURGERY | Facility: CLINIC | Age: 85
End: 2020-12-28

## 2020-12-28 VITALS — BODY MASS INDEX: 21.85 KG/M2 | WEIGHT: 128 LBS | HEIGHT: 64 IN

## 2020-12-28 DIAGNOSIS — M54.6 PAIN IN THORACIC SPINE: ICD-10-CM

## 2020-12-28 DIAGNOSIS — Z78.9 NON-SMOKER: ICD-10-CM

## 2020-12-28 DIAGNOSIS — S22.080G COMPRESSION FRACTURE OF T11 VERTEBRA WITH DELAYED HEALING: Primary | ICD-10-CM

## 2020-12-28 DIAGNOSIS — M54.50 ACUTE MIDLINE LOW BACK PAIN WITHOUT SCIATICA: ICD-10-CM

## 2020-12-28 PROCEDURE — 72131 CT LUMBAR SPINE W/O DYE: CPT

## 2020-12-28 PROCEDURE — 99213 OFFICE O/P EST LOW 20 MIN: CPT | Performed by: NURSE PRACTITIONER

## 2020-12-28 PROCEDURE — 72128 CT CHEST SPINE W/O DYE: CPT

## 2020-12-28 RX ORDER — CYCLOBENZAPRINE HCL 5 MG
5 TABLET ORAL 2 TIMES DAILY PRN
Qty: 60 TABLET | Refills: 1 | Status: SHIPPED | OUTPATIENT
Start: 2020-12-28

## 2020-12-28 NOTE — PROGRESS NOTES
"  Chief complaint:   Chief Complaint   Patient presents with   • Back Pain     patient is here for follow up for continued back pain after a kyhoplasty (L1 on 12/5/2020); patient had xrays that were negative so she had a CT scan today @ [facility].   • Left Ankle Pain/swelling     patient complains of left ankle pain with weight wearing; she is worried she has an infection in the ankle.        Subjective     HPI: This is a 87 y.o. female patient who went to the operating room on 12/4/2020 for a KYPHOPLASTY WITH BIOPSY of L1 with 2 c-arms. The patient is here in follow up today for postoperative visit.  Patient says that she continues to have pain in her back.  She did have imaging of her spine and is here in follow-up today.  She says the pain is across in her low back and feels like a spasm.  She says that she has not noticed any significant improvement from the pain that she was having prior to her surgery.  She does relate that she does have chronic low back pain.  Denies any pain radiating into her legs.  Denies any bowel or bladder incontinence.  Rates her pain on a scale of 0-10 at a 7.  She says it does interfere with actives of daily living.  She presents in a wheelchair today.        Review of Systems   Musculoskeletal: Positive for arthralgias and back pain.   Neurological: Negative.          Objective      Vital Signs  Ht 162 cm (63.78\")   Wt 58.1 kg (128 lb)   LMP  (LMP Unknown)   BMI 22.12 kg/m²     Physical Exam  Constitutional:       Appearance: She is well-developed.   HENT:      Head: Normocephalic.   Eyes:      Pupils: Pupils are equal, round, and reactive to light.   Neck:      Musculoskeletal: Normal range of motion.   Pulmonary:      Effort: Pulmonary effort is normal.   Musculoskeletal: Normal range of motion.      Lumbar back: She exhibits pain and spasm.   Skin:     General: Skin is warm.      Comments: Scab noted on left lateral ankle   Neurological:      Mental Status: She is alert and oriented " to person, place, and time.      GCS: GCS eye subscore is 4. GCS verbal subscore is 5. GCS motor subscore is 6.      Cranial Nerves: No cranial nerve deficit.      Sensory: No sensory deficit.      Gait: Gait normal.      Deep Tendon Reflexes: Reflexes are normal and symmetric.   Psychiatric:         Speech: Speech normal.         Behavior: Behavior normal.         Thought Content: Thought content normal.       Incisions clean dry and intact    Neurologic Exam     Mental Status   Oriented to person, place, and time.   Speech: speech is normal     Cranial Nerves     CN III, IV, VI   Pupils are equal, round, and reactive to light.      Results Review: CT scan of the thoracic spine done here at [facility] on December 28, 2020 shows the patient does have an acute T11 compression deformity in comparison to previous imaging.  No new fracture in the lumbar spine .          Assessment/Plan: The patient does have a fracture of T11 however her pain complaints seem to be below her L1 incisions and across her low back.  The patient also does deal with chronic low back pain.  I Maegan start her on Flexeril and see if this will help with the spasm pain that she is describing and then we will follow-up with her in 4 weeks to see how she is doing if she is not making any progress we can consider getting a new nuclear med bone scan as well as possibly having home health go out to the patient's house.  She was told to call us if she had any further problems or concerns.  Patient also does have a scab on her left ankle on the lateral side.  This does appear to be healing without any significant erythema.  No drainage.  I did tell her that she would need to follow-up with her primary care doctor in regards to this.  There did not appear to be any concern to get her to wound care at this time    Patient is a nonsmoker  The patient's Body mass index is 22.12 kg/m².. BMI is above normal parameters. Recommendations include:  educational material and nutrition counseling    Diagnoses and all orders for this visit:    1. Compression fracture of T11 vertebra with delayed healing (Primary)    2. BMI 22.0-22.9, adult    3. Non-smoker    Other orders  -     cyclobenzaprine (FLEXERIL) 5 MG tablet; Take 1 tablet by mouth 2 (Two) Times a Day As Needed for Muscle Spasms.  Dispense: 60 tablet; Refill: 1        I discussed the patients findings and my recommendations with patient  Matty Elkins, ROGELIO  12/28/20  10:36 CST

## 2021-11-06 ENCOUNTER — HOSPITAL ENCOUNTER (INPATIENT)
Age: 86
LOS: 2 days | Discharge: NURSING, REHAB OR LONG-TERM CARE | DRG: 065 | End: 2021-11-08
Attending: EMERGENCY MEDICINE | Admitting: INTERNAL MEDICINE
Payer: MEDICARE

## 2021-11-06 ENCOUNTER — APPOINTMENT (OUTPATIENT)
Dept: CT IMAGING | Age: 86
DRG: 065 | End: 2021-11-06
Payer: MEDICARE

## 2021-11-06 ENCOUNTER — APPOINTMENT (OUTPATIENT)
Dept: MRI IMAGING | Age: 86
DRG: 065 | End: 2021-11-06
Payer: MEDICARE

## 2021-11-06 ENCOUNTER — APPOINTMENT (OUTPATIENT)
Dept: GENERAL RADIOLOGY | Age: 86
DRG: 065 | End: 2021-11-06
Payer: MEDICARE

## 2021-11-06 DIAGNOSIS — M62.81 ACUTE RIGHT-SIDED MUSCLE WEAKNESS: ICD-10-CM

## 2021-11-06 DIAGNOSIS — I63.9 ACUTE CVA (CEREBROVASCULAR ACCIDENT) (HCC): Primary | ICD-10-CM

## 2021-11-06 DIAGNOSIS — Z86.73 HISTORY OF CEREBROVASCULAR ACCIDENT (CVA) IN ADULTHOOD: ICD-10-CM

## 2021-11-06 PROBLEM — R47.01 APHASIA: Status: ACTIVE | Noted: 2021-11-06

## 2021-11-06 PROBLEM — R41.82 ALTERED MENTAL STATUS: Status: ACTIVE | Noted: 2021-11-06

## 2021-11-06 PROBLEM — Z66 DNR (DO NOT RESUSCITATE): Status: ACTIVE | Noted: 2021-11-06

## 2021-11-06 PROBLEM — E78.49 OTHER HYPERLIPIDEMIA: Status: ACTIVE | Noted: 2021-11-06

## 2021-11-06 PROBLEM — G81.90 HEMIPLEGIA AFFECTING DOMINANT SIDE (HCC): Status: ACTIVE | Noted: 2021-11-06

## 2021-11-06 LAB
ALBUMIN SERPL-MCNC: 3.5 G/DL (ref 3.5–5.2)
ALP BLD-CCNC: 102 U/L (ref 35–104)
ALT SERPL-CCNC: 20 U/L (ref 5–33)
ANION GAP SERPL CALCULATED.3IONS-SCNC: 4 MMOL/L (ref 7–19)
AST SERPL-CCNC: 26 U/L (ref 5–32)
BASOPHILS ABSOLUTE: 0 K/UL (ref 0–0.2)
BASOPHILS RELATIVE PERCENT: 0.8 % (ref 0–1)
BILIRUB SERPL-MCNC: 0.8 MG/DL (ref 0.2–1.2)
BUN BLDV-MCNC: 18 MG/DL (ref 8–23)
CALCIUM SERPL-MCNC: 10.6 MG/DL (ref 8.8–10.2)
CHLORIDE BLD-SCNC: 106 MMOL/L (ref 98–111)
CHOLESTEROL, TOTAL: 98 MG/DL (ref 160–199)
CO2: 29 MMOL/L (ref 22–29)
CREAT SERPL-MCNC: 0.5 MG/DL (ref 0.5–0.9)
EOSINOPHILS ABSOLUTE: 0.2 K/UL (ref 0–0.6)
EOSINOPHILS RELATIVE PERCENT: 3.6 % (ref 0–5)
GFR AFRICAN AMERICAN: >59
GFR NON-AFRICAN AMERICAN: >60
GLUCOSE BLD-MCNC: 70 MG/DL (ref 70–99)
GLUCOSE BLD-MCNC: 77 MG/DL (ref 70–99)
GLUCOSE BLD-MCNC: 80 MG/DL (ref 70–99)
GLUCOSE BLD-MCNC: 88 MG/DL (ref 74–109)
HBA1C MFR BLD: 5.1 % (ref 4–6)
HCT VFR BLD CALC: 44.1 % (ref 37–47)
HDLC SERPL-MCNC: 46 MG/DL (ref 65–121)
HEMOGLOBIN: 13.8 G/DL (ref 12–16)
IMMATURE GRANULOCYTES #: 0 K/UL
INR BLD: 1.01 (ref 0.88–1.18)
LDL CHOLESTEROL CALCULATED: 36 MG/DL
LV EF: 58 %
LVEF MODALITY: NORMAL
LYMPHOCYTES ABSOLUTE: 1.5 K/UL (ref 1.1–4.5)
LYMPHOCYTES RELATIVE PERCENT: 29.7 % (ref 20–40)
MCH RBC QN AUTO: 29.6 PG (ref 27–31)
MCHC RBC AUTO-ENTMCNC: 31.3 G/DL (ref 33–37)
MCV RBC AUTO: 94.6 FL (ref 81–99)
MONOCYTES ABSOLUTE: 0.4 K/UL (ref 0–0.9)
MONOCYTES RELATIVE PERCENT: 7.4 % (ref 0–10)
NEUTROPHILS ABSOLUTE: 2.9 K/UL (ref 1.5–7.5)
NEUTROPHILS RELATIVE PERCENT: 58.1 % (ref 50–65)
PDW BLD-RTO: 14.4 % (ref 11.5–14.5)
PERFORMED ON: NORMAL
PLATELET # BLD: 149 K/UL (ref 130–400)
PMV BLD AUTO: 9.5 FL (ref 9.4–12.3)
POTASSIUM REFLEX MAGNESIUM: 4.3 MMOL/L (ref 3.5–5)
PROTHROMBIN TIME: 13.5 SEC (ref 12–14.6)
RBC # BLD: 4.66 M/UL (ref 4.2–5.4)
REASON FOR REJECTION: NORMAL
REJECTED TEST: NORMAL
SARS-COV-2, NAAT: NOT DETECTED
SODIUM BLD-SCNC: 139 MMOL/L (ref 136–145)
T4 FREE: 1.15 NG/DL (ref 0.93–1.7)
TOTAL CK: 42 U/L (ref 26–192)
TOTAL PROTEIN: 5.8 G/DL (ref 6.6–8.7)
TRIGL SERPL-MCNC: 78 MG/DL (ref 0–149)
TROPONIN: <0.01 NG/ML (ref 0–0.03)
TSH REFLEX FT4: 5.55 UIU/ML (ref 0.35–5.5)
WBC # BLD: 5 K/UL (ref 4.8–10.8)

## 2021-11-06 PROCEDURE — 70498 CT ANGIOGRAPHY NECK: CPT

## 2021-11-06 PROCEDURE — 70450 CT HEAD/BRAIN W/O DYE: CPT

## 2021-11-06 PROCEDURE — 92522 EVALUATE SPEECH PRODUCTION: CPT

## 2021-11-06 PROCEDURE — 6370000000 HC RX 637 (ALT 250 FOR IP): Performed by: PSYCHIATRY & NEUROLOGY

## 2021-11-06 PROCEDURE — 83036 HEMOGLOBIN GLYCOSYLATED A1C: CPT

## 2021-11-06 PROCEDURE — 92610 EVALUATE SWALLOWING FUNCTION: CPT

## 2021-11-06 PROCEDURE — 93005 ELECTROCARDIOGRAM TRACING: CPT | Performed by: EMERGENCY MEDICINE

## 2021-11-06 PROCEDURE — 85610 PROTHROMBIN TIME: CPT

## 2021-11-06 PROCEDURE — 99284 EMERGENCY DEPT VISIT MOD MDM: CPT

## 2021-11-06 PROCEDURE — 71045 X-RAY EXAM CHEST 1 VIEW: CPT

## 2021-11-06 PROCEDURE — 80053 COMPREHEN METABOLIC PANEL: CPT

## 2021-11-06 PROCEDURE — 6360000004 HC RX CONTRAST MEDICATION: Performed by: EMERGENCY MEDICINE

## 2021-11-06 PROCEDURE — 84439 ASSAY OF FREE THYROXINE: CPT

## 2021-11-06 PROCEDURE — 1210000000 HC MED SURG R&B

## 2021-11-06 PROCEDURE — 93306 TTE W/DOPPLER COMPLETE: CPT

## 2021-11-06 PROCEDURE — 70551 MRI BRAIN STEM W/O DYE: CPT

## 2021-11-06 PROCEDURE — 6360000002 HC RX W HCPCS: Performed by: INTERNAL MEDICINE

## 2021-11-06 PROCEDURE — 2500000003 HC RX 250 WO HCPCS: Performed by: EMERGENCY MEDICINE

## 2021-11-06 PROCEDURE — 82947 ASSAY GLUCOSE BLOOD QUANT: CPT

## 2021-11-06 PROCEDURE — 87635 SARS-COV-2 COVID-19 AMP PRB: CPT

## 2021-11-06 PROCEDURE — 82550 ASSAY OF CK (CPK): CPT

## 2021-11-06 PROCEDURE — 93005 ELECTROCARDIOGRAM TRACING: CPT | Performed by: INTERNAL MEDICINE

## 2021-11-06 PROCEDURE — 85025 COMPLETE CBC W/AUTO DIFF WBC: CPT

## 2021-11-06 PROCEDURE — 70496 CT ANGIOGRAPHY HEAD: CPT

## 2021-11-06 PROCEDURE — 84484 ASSAY OF TROPONIN QUANT: CPT

## 2021-11-06 PROCEDURE — 36415 COLL VENOUS BLD VENIPUNCTURE: CPT

## 2021-11-06 PROCEDURE — 99223 1ST HOSP IP/OBS HIGH 75: CPT | Performed by: PSYCHIATRY & NEUROLOGY

## 2021-11-06 PROCEDURE — 84443 ASSAY THYROID STIM HORMONE: CPT

## 2021-11-06 PROCEDURE — 80061 LIPID PANEL: CPT

## 2021-11-06 RX ORDER — CLOPIDOGREL BISULFATE 75 MG/1
75 TABLET ORAL DAILY
Status: DISCONTINUED | OUTPATIENT
Start: 2021-11-06 | End: 2021-11-06

## 2021-11-06 RX ORDER — POTASSIUM CHLORIDE 750 MG/1
20 CAPSULE, EXTENDED RELEASE ORAL DAILY
Status: ON HOLD | COMMUNITY
End: 2021-11-29 | Stop reason: HOSPADM

## 2021-11-06 RX ORDER — ASPIRIN 300 MG/1
300 SUPPOSITORY RECTAL DAILY
Status: DISCONTINUED | OUTPATIENT
Start: 2021-11-06 | End: 2021-11-07

## 2021-11-06 RX ORDER — ACETAMINOPHEN 650 MG/1
650 SUPPOSITORY RECTAL EVERY 6 HOURS PRN
Status: DISCONTINUED | OUTPATIENT
Start: 2021-11-06 | End: 2021-11-08 | Stop reason: HOSPADM

## 2021-11-06 RX ORDER — DEXTROSE MONOHYDRATE 25 G/50ML
12.5 INJECTION, SOLUTION INTRAVENOUS ONCE
Status: COMPLETED | OUTPATIENT
Start: 2021-11-06 | End: 2021-11-06

## 2021-11-06 RX ORDER — ATORVASTATIN CALCIUM 40 MG/1
20 TABLET, FILM COATED ORAL DAILY
Status: DISCONTINUED | OUTPATIENT
Start: 2021-11-06 | End: 2021-11-08 | Stop reason: HOSPADM

## 2021-11-06 RX ORDER — CLOPIDOGREL BISULFATE 75 MG/1
75 TABLET ORAL DAILY
Status: ON HOLD | COMMUNITY
End: 2021-11-29 | Stop reason: HOSPADM

## 2021-11-06 RX ORDER — NIACINAMIDE 500 MG
220 TABLET, EXTENDED RELEASE ORAL DAILY
COMMUNITY

## 2021-11-06 RX ORDER — ATORVASTATIN CALCIUM 20 MG/1
20 TABLET, FILM COATED ORAL DAILY
COMMUNITY

## 2021-11-06 RX ORDER — ACETAMINOPHEN 325 MG/1
650 TABLET ORAL EVERY 6 HOURS PRN
Status: DISCONTINUED | OUTPATIENT
Start: 2021-11-06 | End: 2021-11-08 | Stop reason: HOSPADM

## 2021-11-06 RX ORDER — BACILLUS COAGULANS/INULIN 1B-250 MG
1 CAPSULE ORAL
COMMUNITY

## 2021-11-06 RX ADMIN — ENOXAPARIN SODIUM 40 MG: 40 INJECTION SUBCUTANEOUS at 13:16

## 2021-11-06 RX ADMIN — DEXTROSE MONOHYDRATE 12.5 G: 25 INJECTION, SOLUTION INTRAVENOUS at 04:49

## 2021-11-06 RX ADMIN — IOPAMIDOL 90 ML: 755 INJECTION, SOLUTION INTRAVENOUS at 04:31

## 2021-11-06 RX ADMIN — ASPIRIN 300 MG: 300 SUPPOSITORY RECTAL at 18:03

## 2021-11-06 NOTE — ED NOTES
Bed: 09  Expected date:   Expected time:   Means of arrival: [facility]  Comments:  Ems: stroke alert     John Phillips RN  11/06/21 1085

## 2021-11-06 NOTE — ED PROVIDER NOTES
[facility] EMERGENCY DEPT  EMERGENCY DEPARTMENT ENCOUNTER      Pt Name: Vane Tena  MRN: [id number]  [place] 9/1/1933  Date of evaluation: 11/6/2021  Provider: Zach Pittman MD    CHIEF COMPLAINT       Chief Complaint   Patient presents with    Extremity Weakness         HISTORY OF PRESENT ILLNESS   (Location/Symptom, Timing/Onset,Context/Setting, Quality, Duration, Modifying Factors, Severity)  Note limiting factors. Vane Tena is a 80 y.o. female who presents to the emergency department as a stroke alert from [facility]. They report patient got up and went to the restroom at [address] Around 6 patient's son heard a thud and found her laying On the bed. He assisted her back into the bed and went to check on her again just before EMS was called. At that time she was found to be confused with abnormal speech and with right-sided weakness. They report patient was hospitalized at [facility] in September of this year with concern for stroke and ultimately discharged possibly with a TIA. Patient noted to have significant right-sided weakness by EMS. HPI    NursingNotes were reviewed. REVIEW OF SYSTEMS    (2-9 systems for level 4, 10 or more for level 5)     Review of Systems   Unable to perform ROS: Mental status change       A complete review of systems was performed and is negative except as noted above in the HPI. PAST MEDICAL HISTORY     Past Medical History:   Diagnosis Date    Hyperlipidemia     Stroke [facility])          SURGICAL HISTORY       Past Surgical History:   Procedure Laterality Date    LEG SURGERY           CURRENT MEDICATIONS       Previous Medications    ATORVASTATIN (LIPITOR) 20 MG TABLET    Take 20 mg by mouth daily    CLOPIDOGREL (PLAVIX) 75 MG TABLET    Take 75 mg by mouth daily       ALLERGIES     Penicillins    FAMILY HISTORY     No family history on file.        SOCIAL HISTORY       Social History     Socioeconomic History    Marital status: question correctly  LOC Commands (1c. ): Performs neither task correctly  Best Gaze (2. ): Normal  Visual (3. ): No visual loss  Facial Palsy (4. ): (!) Complete paralysis of one or both sides  Motor Arm, Left (5a. ): Some effort against gravity  Motor Arm, Right (5b. ): No effort against gravity, limb falls  Motor Leg, Left (6a. ): Some effort against gravity  Motor Leg, Right (6b. ): No effort against gravity, limb falls  Limb Ataxia (7. ): Absent  Sensory (8. ): Normal  Best Language (9. ): Mute  Dysarthria (10. ): Severe, unintelligible slurring or mute  Extinction and Inattention (11): No abnormality  Total: 24Glasgow Coma Scale  Eye Opening: To pain  Best Verbal Response: None  Best Motor Response: Flexion to pain  Debbie Coma Scale Score: 6        PHYSICAL EXAM    (up to 7 for level 4, 8 or more for level 5)     ED Triage Vitals [11/06/21 0404]   BP Temp Temp src Pulse Resp SpO2 Height Weight   (!) 143/90 98 °F (36.7 °C) -- 66 22 95 % 5' 3\" (1.6 m) 120 lb (54.4 kg)       Physical Exam  Vitals and nursing note reviewed. Constitutional:       General: She is not in acute distress. Appearance: She is well-developed. She is ill-appearing. She is not toxic-appearing or diaphoretic. Comments: Thin, frail   HENT:      Head: Normocephalic and atraumatic. Eyes:      General: No scleral icterus. Right eye: No discharge. Left eye: No discharge. Pupils: Pupils are equal, round, and reactive to light. Cardiovascular:      Rate and Rhythm: Normal rate and regular rhythm. Pulses: Normal pulses. Heart sounds: Normal heart sounds. Pulmonary:      Effort: Pulmonary effort is normal. No respiratory distress. Breath sounds: Normal breath sounds. No stridor. No wheezing or rhonchi. Abdominal:      General: There is no distension. Musculoskeletal:         General: No deformity. Normal range of motion. Cervical back: Normal range of motion.       Right lower leg: No (90 mLs IntraVENous Given 11/6/21 0431)       EMERGENCY DEPARTMENT COURSE and DIFFERENTIALDIAGNOSIS/MDM:   Vitals:    Vitals:    11/06/21 0404 11/06/21 0500   BP: (!) 143/90 (!) 142/72   Pulse: 66 62   Resp: 22 20   Temp: 98 °F (36.7 °C)    SpO2: 95% 97%   Weight: 120 lb (54.4 kg)    Height: 5' 3\" (1.6 m)        MDM  Number of Diagnoses or Management Options  Acute CVA (cerebrovascular accident) [facility]): new and requires workup  Acute right-sided muscle weakness: new and requires workup  History of cerebrovascular accident (CVA) in adulthood: established and worsening     Amount and/or Complexity of Data Reviewed  Clinical lab tests: ordered and reviewed  Tests in the radiology section of CPT®: ordered and reviewed  Tests in the medicine section of CPT®: reviewed and ordered  Obtain history from someone other than the patient: yes  Review and summarize past medical records: yes  Discuss the patient with other providers: yes  Independent visualization of images, tracings, or specimens: yes    Risk of Complications, Morbidity, and/or Mortality  Presenting problems: high  Diagnostic procedures: moderate  Management options: high    Critical Care  Total time providing critical care: 30-74 minutes      ED Course as of 11/06/21 0538   Sat Nov 06, 2021   0427 EKG shows rate of 52. Appears to represent a sinus rhythm though there are P waves for most QRS complexes, possibly with PACs. There is a left anterior fascicular block. No findings of acute ischemia or infarction. [SUSANNE]   6000 Nurse discussed with patient's son who reports she has had 4 prior strokes. She takes Plavix and atorvastatin. He reports patient is a DNR/DNI but would want TPA if criteria were met. Patient unfortunately outside of window for TPA. [SUSANNE]   3723 CT HEAD:    No acute hemorrhage, hydrocephalus, or mass effect. No large acute territorial infarct. No hyperdense vessels. Multifocal old infarcts.     Radiologist: Cristian Gibson MD [SUSANNE]   9135

## 2021-11-06 NOTE — PROGRESS NOTES
Post midnight admission    71-year-old female with history of 3 previous CVAs, hyperlipidemia presented to hospital for right-sided weakness found to have another acute CVA. Neurology on board. Patient failed swallow evaluation. DNR/DNI. Palliative care. Consideration for hospice. We will have further discussion with family. Supportive management.

## 2021-11-06 NOTE — H&P
[address]    Patient: Ld Ng   : 1933   MRN: [id number]  Code Status: DNR-CC  PCP: Thony Connell  Date of Service: 2021    Chief Complaint:   Extremity weakness    History of Present Illness:   45-year-old female with past medical history as listed below presented to ER as a stroke alert. Patient is mildly alert during my interview and examination however she is unable to provide any history. History therefore provided per medical record. Patient's son states she got up to use the restroom at 2230 last night. At around [address] [facility], son heard a thud and found patient on the floor. He assisted patient back into bed and called EMS. He reports her being confused with abnormal speech and right-sided weakness. History of multiple prior strokes. No further history available. Review of Systems:   Review of systems not obtained due to patient factors. Past Medical History:     Past Medical History:   Diagnosis Date    Hyperlipidemia     Stroke [facility])          Past Surgical History:     Past Surgical History:   Procedure Laterality Date    LEG SURGERY          Family History:   No family history on file. Social History:     Social History     Socioeconomic History    Marital status:       Spouse name: Not on file    Number of children: Not on file    Years of education: Not on file    Highest education level: Not on file   Occupational History    Not on file   Tobacco Use    Smoking status: Not on file    Smokeless tobacco: Not on file   Substance and Sexual Activity    Alcohol use: Not on file    Drug use: Not on file    Sexual activity: Not on file   Other Topics Concern    Not on file   Social History Narrative    Not on file     Social Determinants of Health     Financial Resource Strain:     Difficulty of Paying Living Expenses: Not on file   Food Insecurity:     Worried About Running Out of Food in the Last Year: Not on file    [address] in the Last Year: Not on file   Transportation Needs:     Lack of Transportation (Medical): Not on file    Lack of Transportation (Non-Medical): Not on file   Physical Activity:     Days of Exercise per Week: Not on file    Minutes of Exercise per Session: Not on file   Stress:     Feeling of Stress : Not on file   Social Connections:     Frequency of Communication with Friends and Family: Not on file    Frequency of Social Gatherings with Friends and Family: Not on file    Attends Denominational Services: Not on file    Active Member of [address] [place] [address] [employer] or Organizations: Not on file    Attends Club or Organization Meetings: Not on file    Marital Status: Not on file   Intimate Partner Violence:     Fear of Current or Ex-Partner: Not on file    Emotionally Abused: Not on file    Physically Abused: Not on file    Sexually Abused: Not on file   Housing Stability:     Unable to Pay for Housing in the Last Year: Not on file    Number of [place] in the Last Year: Not on file    Unstable Housing in the Last Year: Not on file       Prior to Admission Medications:   Not in a hospital admission. Allergies:      Allergies   Allergen Reactions    Penicillins          Physical Exam:   BP (!) 150/76   Pulse (!) 32   Temp 98 °F (36.7 °C)   Resp 18   Ht 5' 3\" (1.6 m)   Wt 120 lb (54.4 kg)   SpO2 98%   BMI 21.26 kg/m²     General: no acute distress  HEENT: normocephalic, 3 mm pupils reactive  Neck: supple, symmetrical, trachea midline   Lungs: clear to auscultation bilaterally   Cardiovascular: s1 and s2 normal  Abdomen: soft, positive bowel sounds, nondistended, nontender  Extremities: no edema  Neuro: mildly alert, unable to cooperate with exam     Recent Results (from the past 72 hour(s))   CBC Auto Differential    Collection Time: 11/06/21  4:05 AM   Result Value Ref Range    WBC 5.0 4.8 - 10.8 K/uL    RBC 4.66 4.20 - 5.40 M/uL    Hemoglobin 13.8 12.0 - 16.0 g/dL    Hematocrit 44.1 37.0 - 47.0 %    MCV 94.6 81.0 - 99.0 fL    MCH 29.6 27.0 - 31.0 pg    MCHC 31.3 (L) 33.0 - 37.0 g/dL    RDW 14.4 11.5 - 14.5 %    Platelets 041 430 - 839 K/uL    MPV 9.5 9.4 - 12.3 fL    Neutrophils % 58.1 50.0 - 65.0 %    Lymphocytes % 29.7 20.0 - 40.0 %    Monocytes % 7.4 0.0 - 10.0 %    Eosinophils % 3.6 0.0 - 5.0 %    Basophils % 0.8 0.0 - 1.0 %    Neutrophils Absolute 2.9 1.5 - 7.5 K/uL    Immature Granulocytes # 0.0 K/uL    Lymphocytes Absolute 1.5 1.1 - 4.5 K/uL    Monocytes Absolute 0.40 0.00 - 0.90 K/uL    Eosinophils Absolute 0.20 0.00 - 0.60 K/uL    Basophils Absolute 0.00 0.00 - 0.20 K/uL   Comprehensive Metabolic Panel w/ Reflex to MG    Collection Time: 11/06/21  4:05 AM   Result Value Ref Range    Sodium 139 136 - 145 mmol/L    Potassium reflex Magnesium 4.3 3.5 - 5.0 mmol/L    Chloride 106 98 - 111 mmol/L    CO2 29 22 - 29 mmol/L    Anion Gap 4 (L) 7 - 19 mmol/L    Glucose 88 74 - 109 mg/dL    BUN 18 8 - 23 mg/dL    CREATININE 0.5 0.5 - 0.9 mg/dL    GFR Non-African American >60 >60    GFR African American >59 >59    Calcium 10.6 (H) 8.8 - 10.2 mg/dL    Total Protein 5.8 (L) 6.6 - 8.7 g/dL    Albumin 3.5 3.5 - 5.2 g/dL    Total Bilirubin 0.8 0.2 - 1.2 mg/dL    Alkaline Phosphatase 102 35 - 104 U/L    ALT 20 5 - 33 U/L    AST 26 5 - 32 U/L   Troponin    Collection Time: 11/06/21  4:05 AM   Result Value Ref Range    Troponin <0.01 0.00 - 0.03 ng/mL   POCT Glucose    Collection Time: 11/06/21  4:07 AM   Result Value Ref Range    POC Glucose 70 70 - 99 mg/dl    Performed on [employer]    SPECIMEN REJECTION    Collection Time: 11/06/21  4:18 AM   Result Value Ref Range    Rejected Test pt     Reason for Rejection see below    Protime-INR    Collection Time: 11/06/21  4:24 AM   Result Value Ref Range    Protime 13.5 12.0 - 14.6 sec    INR 1.01 0.88 - 1.18   COVID-19, Rapid    Collection Time: 11/06/21  4:52 AM    Specimen: Nasopharyngeal Swab   Result Value Ref Range    SARS-CoV-2, NAAT Not Detected Not Detected   POCT Glucose    Collection Time: 11/06/21  6:11 AM   Result Value Ref Range    POC Glucose 80 70 - 99 mg/dl    Performed on [employer]        No intake/output data recorded. No results found.     Assessment and Plan:   Acute ischemic CVA  Multiple prior strokes  Neurology following  Deemed not a candidate for TPA  CTA head/neck pending  Further imaging per neurology  TTE with bubble study  FLP/HbA1c/TSH  Neurochecks  PT/OT/SLP  Meds per neurology    DVT prophylaxis  Lovenox    ERP states patient is DNR status    Joslyn Weaver MD  11/6/2021 6:25 AM

## 2021-11-06 NOTE — CONSULTS
[id number] [facility] Neurology Consult        Patient:   Isrrael Valencia  MR#:    [id number]  Account Number:                   [id number]      Room:    [address] [place] [address]   YOB: 1933  Date of Progress Note: 11/6/2021  Time of Note                           3:05 PM  Attending Physician:  Caro Crespo MD  Consulting Physician:   Karlie More M.D.        [address]:  [facility]        HISTORY OF PRESENT ILLNESS:   This 80 y.o. female with a past medical history significant for 3 strokes since March 2021 is seen for evaluation of right-sided weakness and aphasia. The patient's son indicates that in March 2021 she had an episode where she had difficulty with her speech she did not seek medical attention. She had another episode where in April 2021 she had difficulty with the right leg. Both of these improved. In September 2021 she had issues with walking and using her hand such as entering numbers on the phone. She had difficulty understanding the digits. Her son made her go to the hospital and apparently there she was told that she had several strokes previously in addition to the stroke she was admitted with. She was placed on Plavix and discharged. The patient's son indicates that she was in her usual state of health when she went to bed around 11 PM the night prior to admission. He lives next door but around 2:30 AM he had her walk to the bathroom but did not hear return to the bedroom. He came in and found her on the floor with difficulty speaking and right-sided weakness. He indicates that at she was quite upset with her previous stroke and described is as being in \"hell\".     REVIEW OF SYSTEMS:  Unable to obtain due to mental status    Past Medical History:      Diagnosis Date    Arthritis     Hx of blood clots     Hyperlipidemia     Stroke [facility])        Past Surgical History:      Procedure Laterality Date    LEG SURGERY         Medications in Hospital:      Current Facility-Administered Medications:     atorvastatin (LIPITOR) tablet 20 mg, 20 mg, Oral, Daily, Nohemi Calle MD    clopidogrel (PLAVIX) tablet 75 mg, 75 mg, Oral, Daily, Nohemi Calle MD    enoxaparin (LOVENOX) injection 40 mg, 40 mg, SubCUTAneous, Daily, Nohemi Calle MD, 40 mg at 11/06/21 1316    acetaminophen (TYLENOL) tablet 650 mg, 650 mg, Oral, Q6H PRN **OR** acetaminophen (TYLENOL) suppository 650 mg, 650 mg, Rectal, Q6H PRN, Nohemi Calle MD    influenza quadrivalent split vaccine (FLUZONE;FLUARIX;FLULAVAL;AFLURIA) injection 0.5 mL, 0.5 mL, IntraMUSCular, Prior to discharge, Nohemi Calle MD    Allergies:  Penicillins    Social History:   TOBACCO:   reports that she has never smoked. She has never used smokeless tobacco.  ETOH:   has no history on file for alcohol use. Family History:   No family history on file. Physical Exam:    Vitals: /74   Pulse (!) 45   Temp 98.1 °F (36.7 °C)   Resp 16   Ht 5' 3\" (1.6 m)   Wt 120 lb (54.4 kg)   SpO2 98%   BMI 21.26 kg/m²     Constitutional - well developed, well nourished. Eyes - conjunctiva normal.  Pupils not tested  Ear, nose, throat -hearing unable to testfinger rub No scars, masses, or lesions over external nose or ears, no atrophy of tongue  Neck-symmetric, no masses noted, no jugular vein distension  Respiration- chest wall appears symmetric, good expansion,   normal effort without use of accessory muscles  Musculoskeletal - no significant wasting of muscles noted, no bony deformities  Extremities-no clubbing, cyanosis or edema  Skin - warm, dry, and intact. No rash, erythema, or pallor.   Psychiatric - mood, affect, and behavior unable to test    Neurological exam  Eyes closed moving lips, not responding, nonverbal, not following commands  Attention and concentration impaired  Recent and remote memory unable to test  Speech nonverbal  Unable to test language of fund of knowledge    Cranial Nerve Exam   CN II- Visual fields unable to test  CN III, IV,VI-EOMI, eyes closed conjugate eye movements  CN V-sensation unreliable  CN VII-right lower facial droop  CN VIII-Hearing unable to test  CN IX and X- Palate not tested  CN XI-not test shoulder shrug  CN XII-Tongue unable to test    Motor Exam  When moving left arm passively against gravity she is able to move it  When moving the right arm passively it drops quickly  Unable to assess movement of legs    Sensory Exam  Sensation unreliable    Reflexes   Not tested    Tremors- no tremors in hands or head noted    Gait  Not tested    Coordination  Finger to nose-unable to perform        CBC:   Recent Labs     11/06/21 0405   WBC 5.0   HGB 13.8          BMP:    Recent Labs     11/06/21 0405      K 4.3      CO2 29   BUN 18   CREATININE 0.5   GLUCOSE 88       Hepatic:   Recent Labs     11/06/21 0405   AST 26   ALT 20   BILITOT 0.8   ALKPHOS 102       Lipids:   Recent Labs     11/06/21 0405   CHOL 98*   HDL 46*       INR:   Recent Labs     11/06/21  0424   INR 1.01           Assessment and Plan     Acute ischemic stroke in the setting of probably 3 previous strokes the last 1 year-suspect embolic event  Left MCA infarct+/-multifocal infarcts  Had been placed on Plavix and statin following this  Not a candidate for TPA due to being out of the 3-hour window for TPA at the time of evaluation -no evidence of thrombus noted on CTA of the head and neck for extraction    Exam  Eyes closed nonverbal moving lips  Not able to follow commands  Right lower facial droop  When moving left arm passively against gravity she is able to move it antigravity  When moving the right arm passively it drops quickly    CT of the head no acute changes-Multiple focal regions of   encephalomalacia from old infarction-right inferior cerebellar hemisphere, right frontal parietal region, left parietal occipital lobe, right posterior parietal region.-Suggest cardioembolic events most likely    CTA of the head and neck unremarkable    Currently n.p.o. Check MRI of the brain/2D echocardiogram  DC Plavix-aspirin per rectum    DVT prophylaxis-Lovenox    DNR  Patient's son does not think she would want a feeding tube    Will need to determine how aggressive son wants to be  If plan is to proceed with more aggressive care,would need full anticoagulation at some point  Will reassess after imaging    Poor prognosis-consider hospice    PT/OT/speech    Supportive care      Please feel free to call with any questions   [phone] (cell phone)    Dr Bob Amos certified in Neurology  Board Certified in Clinical Neurophysiology  Fellowship Trained in [facility] Neurophysiology    EMR Dragon/transcription disclaimer:Significant part of this  encounter note is electronic transcription/translation of spoken language to printed text. The electronic translation of spoken language may be erroneous, or at times, nonsensical words or phrases may be inadvertently transcribed.  Although I have reviewed the note for such errors, some may still exist.

## 2021-11-06 NOTE — PROGRESS NOTES
Speech Language Pathology  Facility/Department: [facility] SURG SERVICES   CLINICAL BEDSIDE SWALLOW EVALUATION  SPEECH PRODUCTION EVALUATION   NAME: Pa Murillo  : 1933  MRN: [id number]    ADMISSION DATE: 2021  ADMITTING DIAGNOSIS: has Acute ischemic stroke [facility]) on their problem list.    Date of Eval: 2021  Evaluating Therapist: Miguel Ángel Novoa SLP    Current Diet level:  NPO    Reason for Referral  Pa Murillo was referred for a bedside swallow evaluation to assess the efficiency of her swallow function, identify signs and symptoms of aspiration and make recommendations regarding safe dietary consistencies, effective compensatory strategies, and safe eating environment. Impression  Assessed patient's swallowing function. Patient exhibited sluggish, moderately decreased laryngeal elevation for swallow airway protection. No outward S/S penetration/aspiration was noted with any ice chip trial or puree consistency trial presented during evaluation this date. Did not observe swallow function with any additional consistency secondary to noted lethargy within short period of time. At this time, would recommend continuation NPO status. Essential meds crushed in pudding/applesauce. If patient receives mouth care prior to intake, okay for ice chips and swabs thin H2O for comfort. Will continue to follow. Thank you for this consult. Treatment Plan  Requires SLP Intervention: Yes     Recommended Diet and Intervention  Diet Solids Recommendation: NPO  Liquid Consistency Recommendation: NPO  Recommended Form of Meds: Meds crushed in puree as able  Therapeutic Interventions: Patient/Family education; Therapeutic PO trials with SLP     Treatment/Goals  Timeframe for Short-term Goals: 1x/day for 3 days   Goal 1: Patient NPO. Goal 2: Patient staff will follow swallow safety recommendations. Goal 3: Patient will receive daily oral care, via staff, to decrease bacteria from the oral cavity.    Goal 4: Re-assessment of swallow function for potential PO intake. Goal 5: Monitor speech production. Goal 6: Cognitive-linguistic eval      General  Chart Reviewed: Yes  Behavior/Cognition: Patient appeared lethargic within short period of time. O2 Device: None (Room air)  Communication Observation: (Assessed patient's speech production. Patient exhibited decreased volume of speech, decreased labial movements, and slow, decreased lingual movements during verbalizations. SLP ranked functional intelligibility of speech for unfamiliar listeners at 80-90% in utterances without background noise present.)  Follows Directions: Simple   Patient Positioning: Upright in bed  Consistencies Administered: Ice chips;Dysphagia Pureed (Dysphagia I)     Oral Motor Examination   Labial ROM: (Decreased, bilaterally, during labial retraction trials and labial protrusion trials.)  Labial Strength: (Decreased during labial compression trials.)  Labial Coordination: (Slowed movements were noted.)  Lingual ROM: (Decreased during lingual extension trials with no full point achieved; decreased during lingual elevation trials without use of accessory jaw movement; decreased movements noted bilaterally.)  Lingual Strength: (Decreased)  Lingual Coordination: (Slowed movements were noted.)     Assessed patient's swallowing function with the following observations noted:     Oral Phase  Mastication: Ice chips (Patient exhibited decreased vertical jaw movement at the front of the mouth during oral prep of ice chip trials presented by SLP.)  Suspected Premature Bolus Loss: Ice chips;Puree (Patient exhibited slow oral transit of ice chip trials and puree consistency trials all presented by SLP.)  Oral Phase - Comment: Min puree consistency residue was noted post swallows; residue cleared from the mouth with additional dry swallows.     Pharyngeal Phase  Suspected Swallow Delay: Ice chips;Puree (Suspect secondary to oral transit times.)  Laryngeal Elevation: (Patient exhibited sluggish, moderately decreased laryngeal elevation for swallow airway protection.)  Pharyngeal Phase - Comment: No outward S/S penetration/aspiration was noted with any ice chip trial or puree consistency trial presented during assessment this date. Did not observe swallow function with any additional consistency secondary to noted lethargy within short period of time. At this time, would recommend NPO status. Essential meds crushed in pudding/applesauce. If patient receives mouth care prior to intake, okay for ice chips and swabs thin H2O for comfort. Will continue to follow.     Electronically signed by ULISSES Saab on 11/6/2021 at 1:55 PM

## 2021-11-06 NOTE — ED NOTES
Spoke to pts son on the phone. Pt son states that pt had a stroke in 2008. Pt had Stroke on 9/28/2021, March 2021, and April 2021. Pts son states she had a fall on Thursday. Per son pt wants to be a DNR and does not want to be intubated.       Rosa Maria Guerin RN  11/06/21 4039

## 2021-11-06 NOTE — ED NOTES
DR Shabbir Magana for code stroke @ [facility]  11/06/21 5169

## 2021-11-06 NOTE — ED NOTES
Son called back stated that he thinks the s/x started around 3am nurse notified     Berenice Hernandez  11/06/21 5363

## 2021-11-07 LAB
ALBUMIN SERPL-MCNC: 3.5 G/DL (ref 3.5–5.2)
ALP BLD-CCNC: 103 U/L (ref 35–104)
ALT SERPL-CCNC: 16 U/L (ref 5–33)
ANION GAP SERPL CALCULATED.3IONS-SCNC: 12 MMOL/L (ref 7–19)
AST SERPL-CCNC: 24 U/L (ref 5–32)
BASOPHILS ABSOLUTE: 0.1 K/UL (ref 0–0.2)
BASOPHILS RELATIVE PERCENT: 1 % (ref 0–1)
BILIRUB SERPL-MCNC: 1.2 MG/DL (ref 0.2–1.2)
BUN BLDV-MCNC: 17 MG/DL (ref 8–23)
CALCIUM SERPL-MCNC: 10 MG/DL (ref 8.8–10.2)
CHLORIDE BLD-SCNC: 109 MMOL/L (ref 98–111)
CO2: 20 MMOL/L (ref 22–29)
CREAT SERPL-MCNC: 0.4 MG/DL (ref 0.5–0.9)
EKG P AXIS: NORMAL DEGREES
EKG P-R INTERVAL: NORMAL MS
EKG Q-T INTERVAL: 490 MS
EKG QRS DURATION: 94 MS
EKG QTC CALCULATION (BAZETT): 482 MS
EKG T AXIS: 21 DEGREES
EOSINOPHILS ABSOLUTE: 0.2 K/UL (ref 0–0.6)
EOSINOPHILS RELATIVE PERCENT: 3.4 % (ref 0–5)
GFR AFRICAN AMERICAN: >59
GFR NON-AFRICAN AMERICAN: >60
GLUCOSE BLD-MCNC: 60 MG/DL (ref 70–99)
GLUCOSE BLD-MCNC: 68 MG/DL (ref 70–99)
GLUCOSE BLD-MCNC: 69 MG/DL (ref 74–109)
GLUCOSE BLD-MCNC: 80 MG/DL (ref 70–99)
GLUCOSE BLD-MCNC: 83 MG/DL (ref 70–99)
GLUCOSE BLD-MCNC: 86 MG/DL (ref 70–99)
HCT VFR BLD CALC: 49.4 % (ref 37–47)
HEMOGLOBIN: 14.3 G/DL (ref 12–16)
HYPOCHROMIA: ABNORMAL
IMMATURE GRANULOCYTES #: 0 K/UL
LYMPHOCYTES ABSOLUTE: 1.6 K/UL (ref 1.1–4.5)
LYMPHOCYTES RELATIVE PERCENT: 32.5 % (ref 20–40)
MACROCYTES: ABNORMAL
MAGNESIUM: 2.3 MG/DL (ref 1.6–2.4)
MCH RBC QN AUTO: 28.9 PG (ref 27–31)
MCHC RBC AUTO-ENTMCNC: 28.9 G/DL (ref 33–37)
MCV RBC AUTO: 99.8 FL (ref 81–99)
MONOCYTES ABSOLUTE: 0.5 K/UL (ref 0–0.9)
MONOCYTES RELATIVE PERCENT: 9.5 % (ref 0–10)
NEUTROPHILS ABSOLUTE: 2.7 K/UL (ref 1.5–7.5)
NEUTROPHILS RELATIVE PERCENT: 53.4 % (ref 50–65)
OVALOCYTES: ABNORMAL
PDW BLD-RTO: 14.5 % (ref 11.5–14.5)
PERFORMED ON: ABNORMAL
PERFORMED ON: ABNORMAL
PERFORMED ON: NORMAL
PLATELET # BLD: 135 K/UL (ref 130–400)
PLATELET SLIDE REVIEW: ABNORMAL
PMV BLD AUTO: 9.7 FL (ref 9.4–12.3)
POLYCHROMASIA: ABNORMAL
POTASSIUM SERPL-SCNC: 4.3 MMOL/L (ref 3.5–5)
RBC # BLD: 4.95 M/UL (ref 4.2–5.4)
SODIUM BLD-SCNC: 141 MMOL/L (ref 136–145)
TOTAL PROTEIN: 5.3 G/DL (ref 6.6–8.7)
WBC # BLD: 5 K/UL (ref 4.8–10.8)

## 2021-11-07 PROCEDURE — 83735 ASSAY OF MAGNESIUM: CPT

## 2021-11-07 PROCEDURE — 6370000000 HC RX 637 (ALT 250 FOR IP): Performed by: PSYCHIATRY & NEUROLOGY

## 2021-11-07 PROCEDURE — 1210000000 HC MED SURG R&B

## 2021-11-07 PROCEDURE — 93010 ELECTROCARDIOGRAM REPORT: CPT | Performed by: INTERNAL MEDICINE

## 2021-11-07 PROCEDURE — 80053 COMPREHEN METABOLIC PANEL: CPT

## 2021-11-07 PROCEDURE — 2580000003 HC RX 258: Performed by: INTERNAL MEDICINE

## 2021-11-07 PROCEDURE — 99233 SBSQ HOSP IP/OBS HIGH 50: CPT | Performed by: PSYCHIATRY & NEUROLOGY

## 2021-11-07 PROCEDURE — 82947 ASSAY GLUCOSE BLOOD QUANT: CPT

## 2021-11-07 PROCEDURE — 36415 COLL VENOUS BLD VENIPUNCTURE: CPT

## 2021-11-07 PROCEDURE — 6360000002 HC RX W HCPCS: Performed by: PSYCHIATRY & NEUROLOGY

## 2021-11-07 PROCEDURE — 99222 1ST HOSP IP/OBS MODERATE 55: CPT | Performed by: INTERNAL MEDICINE

## 2021-11-07 PROCEDURE — 97162 PT EVAL MOD COMPLEX 30 MIN: CPT

## 2021-11-07 PROCEDURE — 6360000002 HC RX W HCPCS: Performed by: INTERNAL MEDICINE

## 2021-11-07 PROCEDURE — 97530 THERAPEUTIC ACTIVITIES: CPT

## 2021-11-07 PROCEDURE — 2500000003 HC RX 250 WO HCPCS: Performed by: INTERNAL MEDICINE

## 2021-11-07 PROCEDURE — 85025 COMPLETE CBC W/AUTO DIFF WBC: CPT

## 2021-11-07 RX ORDER — WARFARIN SODIUM 5 MG/1
5 TABLET ORAL EVERY EVENING
Status: DISCONTINUED | OUTPATIENT
Start: 2021-11-07 | End: 2021-11-08

## 2021-11-07 RX ORDER — DEXTROSE MONOHYDRATE 25 G/50ML
12.5 INJECTION, SOLUTION INTRAVENOUS PRN
Status: DISCONTINUED | OUTPATIENT
Start: 2021-11-07 | End: 2021-11-08 | Stop reason: HOSPADM

## 2021-11-07 RX ORDER — NICOTINE POLACRILEX 4 MG
15 LOZENGE BUCCAL PRN
Status: DISCONTINUED | OUTPATIENT
Start: 2021-11-07 | End: 2021-11-08 | Stop reason: HOSPADM

## 2021-11-07 RX ORDER — DEXTROSE MONOHYDRATE 50 MG/ML
100 INJECTION, SOLUTION INTRAVENOUS PRN
Status: DISCONTINUED | OUTPATIENT
Start: 2021-11-07 | End: 2021-11-08 | Stop reason: HOSPADM

## 2021-11-07 RX ADMIN — ENOXAPARIN SODIUM 50 MG: 60 INJECTION SUBCUTANEOUS at 20:44

## 2021-11-07 RX ADMIN — ASPIRIN 300 MG: 300 SUPPOSITORY RECTAL at 09:09

## 2021-11-07 RX ADMIN — DEXTROSE MONOHYDRATE 12.5 G: 25 INJECTION, SOLUTION INTRAVENOUS at 16:51

## 2021-11-07 RX ADMIN — WARFARIN SODIUM 5 MG: 5 TABLET ORAL at 18:07

## 2021-11-07 RX ADMIN — DEXTROSE MONOHYDRATE 12.5 G: 25 INJECTION, SOLUTION INTRAVENOUS at 09:10

## 2021-11-07 RX ADMIN — DEXTROSE MONOHYDRATE 100 ML/HR: 50 INJECTION, SOLUTION INTRAVENOUS at 16:52

## 2021-11-07 RX ADMIN — DEXTROSE MONOHYDRATE 100 ML/HR: 50 INJECTION, SOLUTION INTRAVENOUS at 09:20

## 2021-11-07 RX ADMIN — ENOXAPARIN SODIUM 40 MG: 40 INJECTION SUBCUTANEOUS at 09:09

## 2021-11-07 ASSESSMENT — ENCOUNTER SYMPTOMS
EYE DISCHARGE: 0
SHORTNESS OF BREATH: 0
COUGH: 0
BLOOD IN STOOL: 0
CONSTIPATION: 0
VOMITING: 0
DIARRHEA: 0
BACK PAIN: 0
ABDOMINAL DISTENTION: 0
WHEEZING: 0

## 2021-11-07 NOTE — PROGRESS NOTES
Physical Therapy    Facility/Department: [facility] SURG SERVICES  Initial Assessment    NAME: Antonio Calles  : 1933  MRN: [id number]    Date of Service: 2021    Discharge Recommendations:  Continue to assess pending progress, 24 hour supervision or assist, Patient would benefit from continued therapy after discharge        Assessment   Body structures, Functions, Activity limitations: Decreased functional mobility ; Decreased ROM; Decreased strength; Decreased safe awareness; Decreased cognition; Decreased endurance; Decreased posture; Decreased balance; Decreased coordination  Assessment: Pt. will benefit from cont. PT during acute care stay to decrease impairments. Pt. a fall risk due to falls prior to admission and now with heavy R sided lean in sitting and standing with poor coordination RU/LE. Pt. unsafe to sit in chair at this time due to poor static balance, confusion and poor safety awareness. Pt. will need 24 hr care upon d/c from [facility]. Safest way to begin transfering pt is with SS. Pt. needs to work on static sitting balance initially. Treatment Diagnosis: impaired gait and mobility  Prognosis: Guarded  Decision Making: Medium Complexity  PT Education: Goals; PT Role; Transfer Training; Functional Mobility Training; General Safety; Equipment  Patient Education: pt needs reinforcement in all areas due to confusion; pt instructed in use of call light for A  Barriers to Learning: confusion  REQUIRES PT FOLLOW UP: Yes  Activity Tolerance  Activity Tolerance: Patient limited by fatigue; Patient limited by cognitive status; Patient limited by endurance       Patient Diagnosis(es): The primary encounter diagnosis was Acute CVA (cerebrovascular accident) ([facility] [address] Diagnoses of Acute right-sided muscle weakness and History of cerebrovascular accident (CVA) in adulthood were also pertinent to this visit. has a past medical history of Arthritis, Hx of blood clots, Hyperlipidemia, and Stroke ([address]    has a past surgical history that includes Leg Surgery. Restrictions  Restrictions/Precautions  Restrictions/Precautions: Fall Risk  Required Braces or Orthoses?: No  Vision/Hearing  Vision: Impaired (pt seems to have difficulty making eye contact and focusing)  Hearing: Within functional limits     Subjective  General  Chart Reviewed: Yes  Patient assessed for rehabilitation services?: Yes  Additional Pertinent Hx: pt with fall at home getting back into bed after toileting, hx of CVAs  Response To Previous Treatment: Not applicable  Family / Caregiver Present: Yes  Referring Practitioner: Jaida Munoz MD  Referral Date : 11/06/21  Diagnosis: Acute ischemic stroke, L MCA infarct  Follows Commands: Impaired  Other (Comment): needs simple v. cues  General Comment  Comments: RN, Annia Rojas, mau PT. Subjective  Subjective: Pt. states she just does not feel good. Does not give specifics. Asking me if she can eat breakfast, but was notified that she was still having her swallowing tested. Pain Screening  Patient Currently in Pain: No  Vital Signs  Patient Currently in Pain: No  Oxygen Therapy  O2 Device: None (Room air)  Pre Treatment Pain Screening  Intervention List: Patient able to continue with treatment    Orientation  Orientation  Overall Orientation Status: Impaired  Orientation Level: Oriented to person; Disoriented to situation; Disoriented to time; Disoriented to place  Social/Functional History  Social/Functional History  Lives With: Son  Type of Home: House  Home Equipment: Rolling walker  Additional Comments: pt is a poor historian, confused and unable to give reliable prior level of function  Cognition   Cognition  Overall Cognitive Status: Exceptions  Arousal/Alertness: Appropriate responses to stimuli  Following Commands: Follows one step commands with repetition;  Follows one step commands with increased time  Attention Span: Difficulty attending to directions  Memory: Decreased recall of precautions; Decreased recall of recent events  Safety Judgement: Decreased awareness of need for assistance; Decreased awareness of need for safety  Problem Solving: Decreased awareness of errors; Assistance required to identify errors made; Assistance required to generate solutions; Assistance required to implement solutions; Assistance required to correct errors made  Insights: Decreased awareness of deficits  Initiation: Requires cues for all  Sequencing: Requires cues for all    Objective     Observation/Palpation  Posture: Poor  Observation: pt seated on EOB between rails upon PT entry to room    AROM RLE (degrees)  RLE AROM: WFL  AROM LLE (degrees)  LLE AROM : WFL  Strength RLE  Strength RLE: Exception  Comment: grossly 3+/5  Strength LLE  Strength LLE: Exception  Comment: grossly 4-/5  Motor Control  Gross Motor?: Exceptions  Comments: poor coordination RU/LE     Bed mobility  Supine to Sit: Unable to assess (pt already seated EOB, but likely performed transfer indep)  Sit to Supine: Moderate assistance  Scooting: Minimal assistance  Transfers  Sit to Stand: Moderate Assistance (pt stood twice at EOB using B rails, heavy R sided lean)  Stand to sit: Minimal Assistance  Comment: SS brought to pt's room. Pt. Mod A to stand, but again, heavy R lateral lean. Pt. needed multiple v. cues for hand and foot placement on SS and would reposition herself to an unsafe hand or foot placement requiring A to reposition by PT multiple times. Pt. unsafe to sit in chair at this time due to poor static balance, confusion and poor safety awareness.   Ambulation  Ambulation?: No (too unsteady with static sitting and standing balance to attempt walking)     Balance  Posture: Poor  Sitting - Static: Poor; +  Sitting - Dynamic: Poor; +  Standing - Static: Poor; -  Standing - Dynamic: Poor; -  Exercises  Hip Flexion: x10  Knee Long Arc Quad: x10  Ankle Pumps: x10     Plan   Plan  Times per week: 3-7  Times per day: Daily  Plan weeks: 2  Current

## 2021-11-07 NOTE — PROGRESS NOTES
[facility]        Hospitalist Progress Note  11/7/2021 3:11 PM  Subjective:   Admit Date: 11/6/2021  PCP: Zina Douglass    Chief Complaint: Right-sided weakness    Subjective: Patient seen and examined at bedside. More awake than yesterday. Wants to rest.  Denies chest pain or shortness of breath. Cumulative Hospital History:   22-year-old female with history of 3 previous CVAs, hyperlipidemia presented to hospital for right-sided weakness found to have acute CVA. Neurology on board. Patient failed initial swallow evaluation. DNR/DNI. Patient noted to have bilateral CVAs and PFO on TTE with cardiology consulted. Patient placed on Lovenox with plan to bridge to Coumadin. ROS: Unable to obtain based on mental status.     Diet NPO    Intake/Output Summary (Last 24 hours) at 11/7/2021 1511  Last data filed at 11/7/2021 1351  Gross per 24 hour   Intake 0 ml   Output --   Net 0 ml     Medications:   dextrose 100 mL/hr (11/07/21 0920)     Current Facility-Administered Medications   Medication Dose Route Frequency Provider Last Rate Last Admin    glucose (GLUTOSE) 40 % oral gel 15 g  15 g Oral PRN Karon Garcia MD        dextrose 50 % IV solution  12.5 g IntraVENous PRN Karon Garcia MD   12.5 g at 11/07/21 0910    glucagon (rDNA) injection 1 mg  1 mg IntraMUSCular PRN Karon Garcia MD        dextrose 5 % solution  100 mL/hr IntraVENous PRN Karon Garcia  mL/hr at 11/07/21 0920 100 mL/hr at 11/07/21 0920    warfarin (COUMADIN) tablet 5 mg  5 mg Oral QPM Terry Lange MD        enoxaparin (LOVENOX) injection 50 mg  1 mg/kg SubCUTAneous Q12H Terry Lange MD        warfarin (COUMADIN) daily dosing (placeholder)   Other RX Liliana Pierre MD        atorvastatin (LIPITOR) tablet 20 mg  20 mg Oral Daily Gilbert Slaughter MD        acetaminophen (TYLENOL) tablet 650 mg  650 mg Oral Q6H PRN Gilbert Slaughter MD        Or    acetaminophen (TYLENOL) suppository 650 mg  650 mg Rectal Q6H for possible KOURTNEY/PFO closure. Telemetry. Pharmacy dose Coumadin. Bridged with Lovenox. PT/OT/SLP as tolerated. Currently n.p.o. after failing swallow evaluation.     Advance Directive: DNR-CC    DVT prophylaxis: Lovenox bridge to Coumadin    Discharge planning: TBD      Signed:  Best Parry MD 11/7/2021 3:11 PM  Rounding Hospitalist

## 2021-11-07 NOTE — PROGRESS NOTES
Clinical Pharmacy Note    Vane Tena is a 80 y.o. female for whom pharmacy has been asked to manage warfarin therapy. Reason for Admission: stroke    Consulting Physician: Jarett  Warfarin dose prior to admission: new  Warfarin indication: stroke  Target INR range: 2-3   Outpatient warfarin provider    Past Medical History:   Diagnosis Date    Arthritis     Hx of blood clots     Hyperlipidemia     Stroke [facility])                 Recent Labs     11/06/21  0424   INR 1.01     Recent Labs     11/06/21  0405 11/07/21  0236   HGB 13.8 14.3   HCT 44.1 49.4*    135       Current warfarin drug-drug interactions: atorvastatin, apap, enoxaparin        Date INR Warfarin Dose   11/7/21 1.01 5 mg                                     Daily PT/INR until stable within therapeutic range. Thank you for the consult.      Electronically signed by Kolton [facility] on 11/7/2021 at 2:32 PM

## 2021-11-07 NOTE — PROGRESS NOTES
Patient:   Antonio Calles  MR#:    [id number]   Room:    7560/157-24   YOB: 1933  Date of Progress Note: 11/7/2021  Time of Note                           12:29 PM  Consulting Physician:   April Chavira M.D. Attending Physician:  Zahira Isaacs MD     Chief complaint Stroke/AMS/aphasia    S:This 80 y.o. female with a past medical history significant for 3 strokes since March 2021 is seen for evaluation of right-sided weakness and aphasia. The patient's son indicates that in March 2021 she had an episode where she had difficulty with her speech she did not seek medical attention. She had another episode where in April 2021 she had difficulty with the right leg. Both of these improved. In September 2021 she had issues with walking and using her hand such as entering numbers on the phone. She had difficulty understanding the digits. Her son made her go to the hospital and apparently there she was told that she had several strokes previously in addition to the stroke she was admitted with. She was placed on Plavix and discharged. The patient's son indicates that she was in her usual state of health when she went to bed around 11 PM the night prior to admission. He lives next door but around 2:30 AM he had her walk to the bathroom but did not hear return to the bedroom. He came in and found her on the floor with difficulty speaking and right-sided weakness. Much more awake this and able to communicate.     REVIEW OF SYSTEMS:  Limited due to mental status    Past Medical History:      Diagnosis Date    Arthritis     Hx of blood clots     Hyperlipidemia     Stroke [facility])        Past Surgical History:      Procedure Laterality Date    LEG SURGERY         Medications in Hospital:      Current Facility-Administered Medications:     glucose (GLUTOSE) 40 % oral gel 15 g, 15 g, Oral, PRN, Zahira Isaacs MD    dextrose 50 % IV solution, 12.5 g, IntraVENous, PRN, Zahira Isaacs MD, 12.5 g at 11/07/21 0910    glucagon (rDNA) injection 1 mg, 1 mg, IntraMUSCular, PRN, Ebony Juarez MD    dextrose 5 % solution, 100 mL/hr, IntraVENous, PRN, Ebony Juarez MD, Last Rate: 100 mL/hr at 11/07/21 0920, 100 mL/hr at 11/07/21 0920    warfarin (COUMADIN) tablet 5 mg, 5 mg, Oral, QPM, Nasima Chaudhari MD    enoxaparin (LOVENOX) injection 50 mg, 1 mg/kg, SubCUTAneous, Q12H, Nasima Chaudhari MD    atorvastatin (LIPITOR) tablet 20 mg, 20 mg, Oral, Daily, Lisette Ridley MD    acetaminophen (TYLENOL) tablet 650 mg, 650 mg, Oral, Q6H PRN **OR** acetaminophen (TYLENOL) suppository 650 mg, 650 mg, Rectal, Q6H PRN, Lisette Ridley MD    influenza quadrivalent split vaccine (FLUZONE;FLUARIX;FLULAVAL;AFLURIA) injection 0.5 mL, 0.5 mL, IntraMUSCular, Prior to discharge, Lisette Ridley MD    Allergies:  Penicillins    Social History:   TOBACCO:   reports that she has never smoked. She has never used smokeless tobacco.  ETOH:   has no history on file for alcohol use. Family History:   No family history on file. PHYSICAL EXAM:  BP (!) 153/76   Pulse (!) 49   Temp 97.2 °F (36.2 °C) (Temporal)   Resp 16   Ht 5' 3\" (1.6 m)   Wt 120 lb (54.4 kg)   SpO2 91%   BMI 21.26 kg/m²     Constitutional - well developed, well nourished. Eyes - conjunctiva normal.   Ear, nose, throat - No scars, masses, or lesions over external nose or ears, no atrophy of tongue  Neck-symmetric, no masses noted, no jugular vein distension  Respiration- chest wall appears symmetric, good expansion,   normal effort without use of accessory muscles  Musculoskeletal - no significant wasting of muscles noted, no bony deformities  Extremities-no clubbing, cyanosis or edema  Skin - warm, dry, and intact. No rash, erythema, or pallor.   Psychiatric - mood, affect, and behavior appear slow     Neurological exam  Awake, lethargic, fluent slow not speaking much oriented to self follows some basic commands  Attention and concentration appear impaired  Recent and remote memory impaired  Speech with dysarthria       Cranial Nerve Exam     CN III, IV,VI-EOMI, No nystagmus, conjugate eye movements, no ptosis    CN VII- + facial assymetry       Motor Exam  antigravity throughout upper extremities bilaterally      Tremors- no tremors in hands or head noted     Gait  Not tested      Nursing/pcp notes, imaging,labs and vitals reviewed. PT,OT and/or speech notes reviewed    Lab Results   Component Value Date    WBC 5.0 11/07/2021    HGB 14.3 11/07/2021    HCT 49.4 (H) 11/07/2021    MCV 99.8 (H) 11/07/2021     11/07/2021     Lab Results   Component Value Date     11/07/2021    K 4.3 11/07/2021     11/07/2021    CO2 20 (L) 11/07/2021    BUN 17 11/07/2021    CREATININE 0.4 (L) 11/07/2021    GLUCOSE 69 (L) 11/07/2021    CALCIUM 10.0 11/07/2021    PROT 5.3 (L) 11/07/2021    LABALBU 3.5 11/07/2021    BILITOT 1.2 11/07/2021    ALKPHOS 103 11/07/2021    AST 24 11/07/2021    ALT 16 11/07/2021    LABGLOM >60 11/07/2021    GFRAA >59 11/07/2021     Lab Results   Component Value Date    INR 1.01 11/06/2021    PROTIME 13.5 11/06/2021       MRI BRAIN WO CONTRAST [[id number]]    Resulted: 11/06/21 1740    Updated: 11/06/21 1742    Narrative:     MRI BRAIN WO CONTRAST   11/6/2021 6:36 PM   History: Stroke symptoms. Right-sided weakness and aphasia. Moderate diffuse atrophy. Prominent small vessel disease disease throughout the periventricular   white matter. Old ischemic changes in the posterior right frontal lobe, right   parietal lobe, left occipital lobe, and right cerebellum. There is an acute 12 x 7 mm lacunar infarct within the posterior   medial left thalamus. No hemorrhage. Clear paranasal sinuses. Impression:     1. Moderate atrophy and diffuse small vessel disease. 2. Multifocal old ischemic change in both cerebral hemispheres and   within the right side of the cerebellum. 3. Probable by 7 mm acute lacunar infarct within the left thalamus.  No hemorrhage. Signed by Dr Raza Chacko         ECHO Complete 2D W Doppler W Color [[id number]]    Collected: 11/06/21 0828    Updated: 11/06/21 1619    Narrative:     Transthoracic Echocardiography Report (TTE)      Demographics        Patient Name   Wilver Sanchez of Study           11/06/2021        MRN            [id number]        Gender                  Female        Date of Birth  09/01/1933    Room Number             [id number]        Age            88 year(s)        Height:        63 inches     Referring Physician     Will Lyle        Weight:        120 pounds    Sonographer             Anil Webb RDCS Cam Lava        BSA:           1.56 m^2      Interpreting Physician  Killian Horvath MD        BMI:           21.26 kg/m^2       Procedure     Type of Study        TTE procedure:ECHO NO CONTRAST WITH DOP/COLR.       Study Location: Echo Lab   Technical Quality: Limited visualization due to poor acoustical window. Patient Status: Inpatient     Contrast Medium: Bubble Study. BP: 150/76 mmHg     Indications:CVA.      Conclusions        Summary    LV is normal in size with normal systolic function. LV ejection fraction    estimated 55 to 60%.  Diastolic function indeterminant.    RV appears normal in size with preserved RV systolic function.    Moderate to severe left atrial enlargement.    Moderate right atrial enlargement.    Bubble study with evidence of small right-to-left shunting with Valsalva    consistent with possible ASD/PFO.    Aortic valve is mildly thickened and trileaflet with normal leaflet    mobility. No stenosis. Mild aortic regurgitation.    Mitral valve is mildly thickened with normal leaflet mobility. Trace to    mild mitral regurgitation. No stenosis.    Trace to mild tricuspid regurgitation.     RVSP estimated at 35 to 40 mmHg.    No significant pericardial effusion.        Signature        ----------------------------------------------------------------    Electronically signed by Killian Horvath MD(Interpreting physician)   Sergei Miles 11/06/2021 04:19 PM    ----------------------------------------------------------------        RECORD REVIEW: Previous medical records, medications were reviewed at today's visit    IMPRESSION:   Acute ischemic stroke in the setting of probably 3 previous strokes the last 1 year-Much initial improved    MRI with acute infarct left thalamus-chronic infarcts in right frontal, right parietal, left occipital and right cerebellum-personally reviewed    Multifocal infarcts in bilateral hemispheres in anterior and posterior circulation consistent with cardioembolic events  Patient son indicates patient's coverage for medications is poor  Therefore, will start Coumadin bridge with  Lovenox  Pharmacy to dose Coumadin  Daily PT/INR    Had been placed on Plavix and statin following initial stroke  Not a candidate for TPA due to being out of the 3-hour window for TPA at the time of evaluation -no evidence of thrombus noted on CTA of the head and neck for extraction     Initial exam  Eyes closed nonverbal moving lips  Not able to follow commands  Right lower facial droop  When moving left arm passively against gravity she is able to move it antigravity  When moving the right arm passively it drops quickly        CTA of the head and neck unremarkable     Currently n.p.o.     2D echocardiogram-normal left ventricular function-ejection fraction 55 to 60%, moderate to severe left atrial enlargement-positive bubble study cardiology consult for possible PFO closure  EKG-possible atrial flutter     DVT prophylaxis-Lovenox/Coumadin     DNR  Patient's son does not think she would want a feeding tube      PT/OT/speech    Rehab consult     Supportive care        CALL WITH ANY QUESTIONS  [phone] CELL  Dr Wilson Serrato

## 2021-11-08 ENCOUNTER — HOSPITAL ENCOUNTER (INPATIENT)
Age: 86
LOS: 21 days | Discharge: NURSING, REHAB OR LONG-TERM CARE | DRG: 056 | End: 2021-11-29
Attending: PSYCHIATRY & NEUROLOGY | Admitting: PSYCHIATRY & NEUROLOGY
Payer: MEDICARE

## 2021-11-08 VITALS
HEART RATE: 50 BPM | WEIGHT: 120 LBS | TEMPERATURE: 97.7 F | SYSTOLIC BLOOD PRESSURE: 136 MMHG | BODY MASS INDEX: 21.26 KG/M2 | HEIGHT: 63 IN | RESPIRATION RATE: 18 BRPM | DIASTOLIC BLOOD PRESSURE: 71 MMHG | OXYGEN SATURATION: 99 %

## 2021-11-08 DIAGNOSIS — I63.9 ACUTE ISCHEMIC STROKE (HCC): Primary | ICD-10-CM

## 2021-11-08 PROBLEM — Z51.5 PALLIATIVE CARE PATIENT: Status: ACTIVE | Noted: 2021-11-08

## 2021-11-08 LAB
ALBUMIN SERPL-MCNC: 3.5 G/DL (ref 3.5–5.2)
ALP BLD-CCNC: 104 U/L (ref 35–104)
ALT SERPL-CCNC: 15 U/L (ref 5–33)
ANION GAP SERPL CALCULATED.3IONS-SCNC: 10 MMOL/L (ref 7–19)
AST SERPL-CCNC: 19 U/L (ref 5–32)
BASOPHILS ABSOLUTE: 0 K/UL (ref 0–0.2)
BASOPHILS RELATIVE PERCENT: 0.7 % (ref 0–1)
BILIRUB SERPL-MCNC: 1.6 MG/DL (ref 0.2–1.2)
BUN BLDV-MCNC: 18 MG/DL (ref 8–23)
CALCIUM SERPL-MCNC: 10.3 MG/DL (ref 8.8–10.2)
CHLORIDE BLD-SCNC: 106 MMOL/L (ref 98–111)
CO2: 23 MMOL/L (ref 22–29)
CREAT SERPL-MCNC: 0.5 MG/DL (ref 0.5–0.9)
EKG P AXIS: NORMAL DEGREES
EKG P-R INTERVAL: NORMAL MS
EKG Q-T INTERVAL: 530 MS
EKG QRS DURATION: 100 MS
EKG QTC CALCULATION (BAZETT): 504 MS
EKG T AXIS: 50 DEGREES
EOSINOPHILS ABSOLUTE: 0.1 K/UL (ref 0–0.6)
EOSINOPHILS RELATIVE PERCENT: 2.7 % (ref 0–5)
GFR AFRICAN AMERICAN: >59
GFR NON-AFRICAN AMERICAN: >60
GLUCOSE BLD-MCNC: 133 MG/DL (ref 70–99)
GLUCOSE BLD-MCNC: 156 MG/DL (ref 70–99)
GLUCOSE BLD-MCNC: 90 MG/DL (ref 70–99)
GLUCOSE BLD-MCNC: 91 MG/DL (ref 74–109)
GLUCOSE BLD-MCNC: 99 MG/DL (ref 70–99)
HCT VFR BLD CALC: 46.8 % (ref 37–47)
HEMOGLOBIN: 14.3 G/DL (ref 12–16)
IMMATURE GRANULOCYTES #: 0 K/UL
INR BLD: 1.04 (ref 0.88–1.18)
LYMPHOCYTES ABSOLUTE: 1.4 K/UL (ref 1.1–4.5)
LYMPHOCYTES RELATIVE PERCENT: 31.1 % (ref 20–40)
MAGNESIUM: 2.1 MG/DL (ref 1.6–2.4)
MCH RBC QN AUTO: 29.4 PG (ref 27–31)
MCHC RBC AUTO-ENTMCNC: 30.6 G/DL (ref 33–37)
MCV RBC AUTO: 96.1 FL (ref 81–99)
MONOCYTES ABSOLUTE: 0.3 K/UL (ref 0–0.9)
MONOCYTES RELATIVE PERCENT: 6.8 % (ref 0–10)
NEUTROPHILS ABSOLUTE: 2.6 K/UL (ref 1.5–7.5)
NEUTROPHILS RELATIVE PERCENT: 58.5 % (ref 50–65)
PDW BLD-RTO: 14.5 % (ref 11.5–14.5)
PERFORMED ON: ABNORMAL
PERFORMED ON: ABNORMAL
PERFORMED ON: NORMAL
PERFORMED ON: NORMAL
PLATELET # BLD: 156 K/UL (ref 130–400)
PMV BLD AUTO: 9.6 FL (ref 9.4–12.3)
POTASSIUM SERPL-SCNC: 3.9 MMOL/L (ref 3.5–5)
PROTHROMBIN TIME: 13.8 SEC (ref 12–14.6)
RBC # BLD: 4.87 M/UL (ref 4.2–5.4)
SODIUM BLD-SCNC: 139 MMOL/L (ref 136–145)
TOTAL PROTEIN: 5.6 G/DL (ref 6.6–8.7)
WBC # BLD: 4.4 K/UL (ref 4.8–10.8)

## 2021-11-08 PROCEDURE — 80053 COMPREHEN METABOLIC PANEL: CPT

## 2021-11-08 PROCEDURE — 6370000000 HC RX 637 (ALT 250 FOR IP): Performed by: PSYCHIATRY & NEUROLOGY

## 2021-11-08 PROCEDURE — 92526 ORAL FUNCTION THERAPY: CPT

## 2021-11-08 PROCEDURE — 85610 PROTHROMBIN TIME: CPT

## 2021-11-08 PROCEDURE — 83735 ASSAY OF MAGNESIUM: CPT

## 2021-11-08 PROCEDURE — 85025 COMPLETE CBC W/AUTO DIFF WBC: CPT

## 2021-11-08 PROCEDURE — 82947 ASSAY GLUCOSE BLOOD QUANT: CPT

## 2021-11-08 PROCEDURE — 97530 THERAPEUTIC ACTIVITIES: CPT

## 2021-11-08 PROCEDURE — 6360000002 HC RX W HCPCS: Performed by: PSYCHIATRY & NEUROLOGY

## 2021-11-08 PROCEDURE — 6370000000 HC RX 637 (ALT 250 FOR IP): Performed by: INTERNAL MEDICINE

## 2021-11-08 PROCEDURE — 6360000002 HC RX W HCPCS: Performed by: NURSE PRACTITIONER

## 2021-11-08 PROCEDURE — 99222 1ST HOSP IP/OBS MODERATE 55: CPT | Performed by: PHYSICIAN ASSISTANT

## 2021-11-08 PROCEDURE — 6370000000 HC RX 637 (ALT 250 FOR IP): Performed by: PHYSICIAN ASSISTANT

## 2021-11-08 PROCEDURE — 97535 SELF CARE MNGMENT TRAINING: CPT

## 2021-11-08 PROCEDURE — 93010 ELECTROCARDIOGRAM REPORT: CPT | Performed by: INTERNAL MEDICINE

## 2021-11-08 PROCEDURE — 92507 TX SP LANG VOICE COMM INDIV: CPT

## 2021-11-08 PROCEDURE — 99233 SBSQ HOSP IP/OBS HIGH 50: CPT | Performed by: PSYCHIATRY & NEUROLOGY

## 2021-11-08 PROCEDURE — 1180000000 HC REHAB R&B

## 2021-11-08 PROCEDURE — 97166 OT EVAL MOD COMPLEX 45 MIN: CPT

## 2021-11-08 RX ORDER — POLYETHYLENE GLYCOL 3350 17 G/17G
17 POWDER, FOR SOLUTION ORAL DAILY PRN
Status: DISCONTINUED | OUTPATIENT
Start: 2021-11-08 | End: 2021-11-29 | Stop reason: HOSPADM

## 2021-11-08 RX ORDER — DOCUSATE SODIUM 100 MG/1
100 CAPSULE, LIQUID FILLED ORAL DAILY
Status: CANCELLED | OUTPATIENT
Start: 2021-11-09

## 2021-11-08 RX ORDER — DEXTROSE MONOHYDRATE 50 MG/ML
100 INJECTION, SOLUTION INTRAVENOUS PRN
Status: CANCELLED | OUTPATIENT
Start: 2021-11-08

## 2021-11-08 RX ORDER — ATORVASTATIN CALCIUM 20 MG/1
20 TABLET, FILM COATED ORAL DAILY
Status: DISCONTINUED | OUTPATIENT
Start: 2021-11-09 | End: 2021-11-29 | Stop reason: HOSPADM

## 2021-11-08 RX ORDER — ACETAMINOPHEN 325 MG/1
650 TABLET ORAL EVERY 6 HOURS PRN
Status: CANCELLED | OUTPATIENT
Start: 2021-11-08

## 2021-11-08 RX ORDER — WARFARIN SODIUM 5 MG/1
5 TABLET ORAL
Status: CANCELLED | OUTPATIENT
Start: 2021-11-08 | End: 2021-11-09

## 2021-11-08 RX ORDER — DOCUSATE SODIUM 100 MG/1
100 CAPSULE, LIQUID FILLED ORAL DAILY
Status: DISCONTINUED | OUTPATIENT
Start: 2021-11-09 | End: 2021-11-29 | Stop reason: HOSPADM

## 2021-11-08 RX ORDER — DEXTROSE MONOHYDRATE 25 G/50ML
12.5 INJECTION, SOLUTION INTRAVENOUS PRN
Status: DISCONTINUED | OUTPATIENT
Start: 2021-11-08 | End: 2021-11-29 | Stop reason: HOSPADM

## 2021-11-08 RX ORDER — NICOTINE POLACRILEX 4 MG
15 LOZENGE BUCCAL PRN
Status: CANCELLED | OUTPATIENT
Start: 2021-11-08

## 2021-11-08 RX ORDER — POLYETHYLENE GLYCOL 3350 17 G/17G
17 POWDER, FOR SOLUTION ORAL DAILY PRN
Status: DISCONTINUED | OUTPATIENT
Start: 2021-11-08 | End: 2021-11-08 | Stop reason: HOSPADM

## 2021-11-08 RX ORDER — ACETAMINOPHEN 325 MG/1
650 TABLET ORAL EVERY 6 HOURS PRN
Status: DISCONTINUED | OUTPATIENT
Start: 2021-11-08 | End: 2021-11-29 | Stop reason: HOSPADM

## 2021-11-08 RX ORDER — DEXTROSE MONOHYDRATE 25 G/50ML
12.5 INJECTION, SOLUTION INTRAVENOUS PRN
Status: CANCELLED | OUTPATIENT
Start: 2021-11-08

## 2021-11-08 RX ORDER — NICOTINE POLACRILEX 4 MG
15 LOZENGE BUCCAL PRN
Status: DISCONTINUED | OUTPATIENT
Start: 2021-11-08 | End: 2021-11-29 | Stop reason: HOSPADM

## 2021-11-08 RX ORDER — ACETAMINOPHEN 650 MG/1
650 SUPPOSITORY RECTAL EVERY 6 HOURS PRN
Status: CANCELLED | OUTPATIENT
Start: 2021-11-08

## 2021-11-08 RX ORDER — WARFARIN SODIUM 5 MG/1
5 TABLET ORAL
Status: DISCONTINUED | OUTPATIENT
Start: 2021-11-08 | End: 2021-11-08

## 2021-11-08 RX ORDER — WARFARIN SODIUM 5 MG/1
5 TABLET ORAL
Status: DISCONTINUED | OUTPATIENT
Start: 2021-11-08 | End: 2021-11-08 | Stop reason: ALTCHOICE

## 2021-11-08 RX ORDER — DEXTROSE MONOHYDRATE 50 MG/ML
100 INJECTION, SOLUTION INTRAVENOUS PRN
Status: DISCONTINUED | OUTPATIENT
Start: 2021-11-08 | End: 2021-11-29 | Stop reason: HOSPADM

## 2021-11-08 RX ORDER — ATORVASTATIN CALCIUM 20 MG/1
20 TABLET, FILM COATED ORAL DAILY
Status: CANCELLED | OUTPATIENT
Start: 2021-11-08

## 2021-11-08 RX ORDER — DOCUSATE SODIUM 100 MG/1
100 CAPSULE, LIQUID FILLED ORAL DAILY
Status: DISCONTINUED | OUTPATIENT
Start: 2021-11-08 | End: 2021-11-08 | Stop reason: HOSPADM

## 2021-11-08 RX ORDER — ACETAMINOPHEN 650 MG/1
650 SUPPOSITORY RECTAL EVERY 6 HOURS PRN
Status: DISCONTINUED | OUTPATIENT
Start: 2021-11-08 | End: 2021-11-29 | Stop reason: HOSPADM

## 2021-11-08 RX ORDER — POLYETHYLENE GLYCOL 3350 17 G/17G
17 POWDER, FOR SOLUTION ORAL DAILY PRN
Status: CANCELLED | OUTPATIENT
Start: 2021-11-08

## 2021-11-08 RX ADMIN — ENOXAPARIN SODIUM 50 MG: 60 INJECTION SUBCUTANEOUS at 07:40

## 2021-11-08 RX ADMIN — ENOXAPARIN SODIUM 60 MG: 100 INJECTION SUBCUTANEOUS at 22:31

## 2021-11-08 RX ADMIN — ATORVASTATIN CALCIUM 20 MG: 40 TABLET, FILM COATED ORAL at 07:41

## 2021-11-08 RX ADMIN — WARFARIN SODIUM 5 MG: 5 TABLET ORAL at 17:12

## 2021-11-08 RX ADMIN — DOCUSATE SODIUM 100 MG: 100 CAPSULE ORAL at 12:39

## 2021-11-08 ASSESSMENT — PAIN SCALES - WONG BAKER: WONGBAKER_NUMERICALRESPONSE: 0

## 2021-11-08 NOTE — PROGRESS NOTES
Patient:   Christiano Aguilar  MR#:    [id number]   Room:    006/440-41   YOB: 1933  Date of Progress Note: 11/8/2021  Time of Note                           12:16 PM  Consulting Physician:   Erma Abdi M.D. Attending Physician:  Christa Avalos MD     Chief complaint Stroke/AMS/aphasia    S:This 80 y.o. female with a past medical history significant for 3 strokes since March 2021 is seen for evaluation of right-sided weakness and aphasia. The patient's son indicates that in March 2021 she had an episode where she had difficulty with her speech she did not seek medical attention. She had another episode where in April 2021 she had difficulty with the right leg. Both of these improved. In September 2021 she had issues with walking and using her hand such as entering numbers on the phone. She had difficulty understanding the digits. Her son made her go to the hospital and apparently there she was told that she had several strokes previously in addition to the stroke she was admitted with. She was placed on Plavix and discharged. The patient's son indicates that she was in her usual state of health when she went to bed around 11 PM the night prior to admission. He lives next door but around 2:30 AM he had her walk to the bathroom but did not hear return to the bedroom. He came in and found her on the floor with difficulty speaking and right-sided weakness. Much more alert. Feels better.      REVIEW OF SYSTEMS:  Limited due to mental status    Past Medical History:      Diagnosis Date    Arthritis     Hx of blood clots     Hyperlipidemia     Palliative care patient 11/08/2021    Stroke [facility])        Past Surgical History:      Procedure Laterality Date    LEG SURGERY         Medications in Hospital:      Current Facility-Administered Medications:     docusate sodium (COLACE) capsule 100 mg, 100 mg, Oral, Daily, Tamra Mccarthy PA-C    polyethylene glycol (GLYCOLAX) packet 17 g, 17 g, Oral, Daily PRN, Kerry Moses PA-C    glucose (GLUTOSE) 40 % oral gel 15 g, 15 g, Oral, PRN, Manolo Fuentes MD    dextrose 50 % IV solution, 12.5 g, IntraVENous, PRN, Manolo Fuentes MD, 12.5 g at 11/07/21 1651    glucagon (rDNA) injection 1 mg, 1 mg, IntraMUSCular, PRN, Manolo Fuentes MD    dextrose 5 % solution, 100 mL/hr, IntraVENous, PRN, Manolo Fuentes MD, Last Rate: 100 mL/hr at 11/07/21 1652, 100 mL/hr at 11/07/21 1652    warfarin (COUMADIN) tablet 5 mg, 5 mg, Oral, QPM, Ameya Hastings MD, 5 mg at 11/07/21 1807    enoxaparin (LOVENOX) injection 50 mg, 1 mg/kg, SubCUTAneous, Q12H, Ameya aHstings MD, 50 mg at 11/08/21 0740    warfarin (COUMADIN) daily dosing (placeholder), , Other, RX Placeholder, Ameya Hastings MD    atorvastatin (LIPITOR) tablet 20 mg, 20 mg, Oral, Daily, Virginia Mcgrath MD, 20 mg at 11/08/21 0741    acetaminophen (TYLENOL) tablet 650 mg, 650 mg, Oral, Q6H PRN **OR** acetaminophen (TYLENOL) suppository 650 mg, 650 mg, Rectal, Q6H PRN, Virginia Mcgrath MD    influenza quadrivalent split vaccine (FLUZONE;FLUARIX;FLULAVAL;AFLURIA) injection 0.5 mL, 0.5 mL, IntraMUSCular, Prior to discharge, Virginia Mcgrath MD    Allergies:  Penicillins    Social History:   TOBACCO:   reports that she has never smoked. She has never used smokeless tobacco.  ETOH:   has no history on file for alcohol use. Family History:   No family history on file. PHYSICAL EXAM:  BP (!) 143/77   Pulse 52   Temp 97.5 °F (36.4 °C) (Temporal)   Resp 16   Ht 5' 3\" (1.6 m)   Wt 120 lb (54.4 kg)   SpO2 90%   BMI 21.26 kg/m²     Constitutional - well developed, well nourished.    Eyes - conjunctiva normal.   Ear, nose, throat - No scars, masses, or lesions over external nose or ears, no atrophy of tongue  Neck-symmetric, no masses noted, no jugular vein distension  Respiration- chest wall appears symmetric, good expansion,   normal effort without use of accessory muscles  Musculoskeletal - no significant wasting of muscles noted, no bony deformities  Extremities-no clubbing, cyanosis or edema  Skin - warm, dry, and intact. No rash, erythema, or pallor. Psychiatric - mood, affect, and behavior appear slow     Neurological exam  Awake, lethargic, fluent slow not speaking much oriented to self follows some basic commands  Attention and concentration appear impaired  Recent and remote memory impaired  Speech with dysarthria       Cranial Nerve Exam     CN III, IV,VI-EOMI, No nystagmus, conjugate eye movements, no ptosis    CN VII- + facial assymetry       Motor Exam  antigravity throughout upper extremities bilaterally      Tremors- no tremors in hands or head noted     Gait  Not tested      Nursing/pcp notes, imaging,labs and vitals reviewed. PT,OT and/or speech notes reviewed    Lab Results   Component Value Date    WBC 4.4 (L) 11/08/2021    HGB 14.3 11/08/2021    HCT 46.8 11/08/2021    MCV 96.1 11/08/2021     11/08/2021     Lab Results   Component Value Date     11/08/2021    K 3.9 11/08/2021     11/08/2021    CO2 23 11/08/2021    BUN 18 11/08/2021    CREATININE 0.5 11/08/2021    GLUCOSE 91 11/08/2021    CALCIUM 10.3 (H) 11/08/2021    PROT 5.6 (L) 11/08/2021    LABALBU 3.5 11/08/2021    BILITOT 1.6 (H) 11/08/2021    ALKPHOS 104 11/08/2021    AST 19 11/08/2021    ALT 15 11/08/2021    LABGLOM >60 11/08/2021    GFRAA >59 11/08/2021     Lab Results   Component Value Date    INR 1.04 11/08/2021    INR 1.01 11/06/2021    PROTIME 13.8 11/08/2021    PROTIME 13.5 11/06/2021       MRI BRAIN WO CONTRAST [[id number]]    Resulted: 11/06/21 1740    Updated: 11/06/21 1742    Narrative:     MRI BRAIN WO CONTRAST   11/6/2021 6:36 PM   History: Stroke symptoms. Right-sided weakness and aphasia. Moderate diffuse atrophy. Prominent small vessel disease disease throughout the periventricular   white matter.    Old ischemic changes in the posterior right frontal lobe, right   parietal lobe, left occipital lobe, and right cerebellum. There is an acute 12 x 7 mm lacunar infarct within the posterior   medial left thalamus. No hemorrhage. Clear paranasal sinuses. Impression:     1. Moderate atrophy and diffuse small vessel disease. 2. Multifocal old ischemic change in both cerebral hemispheres and   within the right side of the cerebellum. 3. Probable by 7 mm acute lacunar infarct within the left thalamus. No   hemorrhage. Signed by Dr Linn Rhodes         ECHO Complete 2D W Doppler W Color [[id number]]    Collected: 11/06/21 0828    Updated: 11/06/21 1619    Narrative:     Transthoracic Echocardiography Report (TTE)      Demographics        Patient Name   Nidia Arenas of Study           11/06/2021        MRN            [id number]        Gender                  Female        Date of Birth  09/01/1933    Room Number             [facility]0534        Age            88 year(s)        Height:        63 inches     Referring Physician     Miranda Hugo        Weight:        120 pounds    Sonographer             Lanre Mccormack [facility] Lisa Carpenter        BSA:           1.56 m^2      Interpreting Physician  Killian Horvath MD        BMI:           21.26 kg/m^2       Procedure     Type of Study        TTE procedure:ECHO NO CONTRAST WITH DOP/COLR.       Study Location: Echo Lab   Technical Quality: Limited visualization due to poor acoustical window. Patient Status: Inpatient     Contrast Medium: Bubble Study. BP: 150/76 mmHg     Indications:CVA.      Conclusions        Summary    LV is normal in size with normal systolic function. LV ejection fraction    estimated 55 to 60%.     Diastolic function indeterminant.    RV appears normal in size with preserved RV systolic function.    Moderate to severe left atrial enlargement.    Moderate right atrial enlargement.    Bubble study with evidence of small right-to-left shunting with Valsalva    consistent with possible ASD/PFO.    Aortic valve is mildly thickened and trileaflet with normal leaflet  mobility. No stenosis. Mild aortic regurgitation.    Mitral valve is mildly thickened with normal leaflet mobility. Trace to    mild mitral regurgitation. No stenosis.    Trace to mild tricuspid regurgitation.     RVSP estimated at 35 to 40 mmHg.    No significant pericardial effusion.        Signature        ----------------------------------------------------------------    Electronically signed by Milo Horvath MD(Interpreting physician)   Shahbaz Jara 11/06/2021 04:19 PM    ----------------------------------------------------------------        RECORD REVIEW: Previous medical records, medications were reviewed at today's visit    IMPRESSION:   Acute ischemic stroke in the setting of probably 3 previous strokes the last 1 year-Much improved    MRI with acute infarct left thalamus-chronic infarcts in right frontal, right parietal, left occipital and right cerebellum-personally reviewed    Multifocal infarcts in bilateral hemispheres in anterior and posterior circulation consistent with cardioembolic events  Patient son indicates patient's coverage for medications is poor   to check on cost of Eliquis/Xarelto  Will start Coumadin bridge with  [employer]  Pharmacy to dose Coumadin  Daily PT/INR    Had been placed on Plavix and statin following initial stroke  Not a candidate for TPA due to being out of the 3-hour window for TPA at the time of evaluation -no evidence of thrombus noted on CTA of the head and neck for extraction     Initial exam  Eyes closed nonverbal moving lips  Not able to follow commands  Right lower facial droop  When moving left arm passively against gravity she is able to move it antigravity  When moving the right arm passively it drops quickly        CTA of the head and neck unremarkable       2D echocardiogram-normal left ventricular function-ejection fraction 55 to 60%, moderate to severe left atrial enlargement-positive bubble study cardiology consult for possible PFO closure-however at this time the son does not wish to proceed with any aggressive measures   Appreciate Dr. Rohit Jane help     DVT prophylaxis-Lovenox/Coumadin     DNR  Patient's son does not think she would want a feeding tube      PT/OT/speech    Accepted by inpatient rehab    Supportive care    Okay to DC from neuro standpoint      [id number]

## 2021-11-08 NOTE — CONSULTS
[facility]  Cardiology Consult      Requesting MD:  Payton Van MD   Admit Status:  Inpatient [101]       History obtained from:   ? Patient  ? Other (specify):     PROBLEM LIST:    Patient Active Problem List    Diagnosis Date Noted    Acute ischemic stroke ([facility] [address] 11/06/2021     Priority: Low    History of stroke 11/06/2021     Priority: Low    Aphasia 11/06/2021     Priority: Low    Altered mental status 11/06/2021     Priority: Low    Hemiplegia affecting dominant side ([facility] [address] 11/06/2021     Priority: Low    Other hyperlipidemia 11/06/2021     Priority: Low    DNR (do not resuscitate) 11/06/2021     Priority: Low     1. Multiple bilateral CVAs with encephalomalacia, moderate atrophy with possible new left thalamic lacunar infarct, possibly cardioembolic in etiology. 2.  Normal LV dysfunction with possible PFO/ASD with positive bubble study. 3.  Frail status with limited mobility, right-sided weakness, DNR/DNI. PRESENTATION: Deidra Cortes is a 80y.o. year old female who presents with right-sided weakness with multiple recent CVAs March 2021 in April 2021 documented. Was on Plavix at that time. Now being started on Coumadin with Lovenox bridging. Neurology asked for cardiology evaluation for possible consideration of PFO closure. REVIEW OF SYSTEMS:  Review of Systems   Constitutional: Negative for activity change, fatigue and fever. HENT: Negative for ear pain, hearing loss and tinnitus. Eyes: Negative for discharge and visual disturbance. Respiratory: Negative for cough, shortness of breath and wheezing. Cardiovascular: Negative for chest pain, palpitations and leg swelling. Gastrointestinal: Negative for abdominal distention, blood in stool, constipation, diarrhea and vomiting. Endocrine: Negative for cold intolerance, heat intolerance, polydipsia and polyuria. Genitourinary: Negative for dysuria and hematuria.    Musculoskeletal: Negative for arthralgias, back pain and myalgias. Skin: Negative for pallor and rash. Neurological: Positive for weakness. Negative for seizures, syncope and headaches. Psychiatric/Behavioral: Negative for behavioral problems and dysphoric mood. Past Medical History:      Diagnosis Date    Arthritis     Hx of blood clots     Hyperlipidemia     Stroke [facility])        Past Surgical History:      Procedure Laterality Date    LEG SURGERY         Allergies:  Penicillins    Past Social History:  Social History     Socioeconomic History    Marital status:      Spouse name: Not on file    Number of children: Not on file    Years of education: Not on file    Highest education level: Not on file   Occupational History    Not on file   Tobacco Use    Smoking status: Never Smoker    Smokeless tobacco: Never Used   Substance and Sexual Activity    Alcohol use: Not on file    Drug use: Not on file    Sexual activity: Not on file   Other Topics Concern    Not on file   Social History Narrative    Not on file     Social Determinants of Health     Financial Resource Strain:     Difficulty of Paying Living Expenses: Not on file   Food Insecurity:     Worried About Running Out of Food in the Last Year: Not on file    Magali of Food in the Last Year: Not on file   Transportation Needs:     Lack of Transportation (Medical): Not on file    Lack of Transportation (Non-Medical):  Not on file   Physical Activity:     Days of Exercise per Week: Not on file    Minutes of Exercise per Session: Not on file   Stress:     Feeling of Stress : Not on file   Social Connections:     Frequency of Communication with Friends and Family: Not on file    Frequency of Social Gatherings with Friends and Family: Not on file    Attends [employer] Services: Not on file    Active Member of Clubs or Organizations: Not on file    Attends Club or Organization Meetings: Not on file    Marital Status: Not on file   Intimate Partner frail but able to converse. Oriented. HENT:      Mouth/Throat:      Pharynx: No oropharyngeal exudate. Eyes:      General: No scleral icterus. Right eye: No discharge. Left eye: No discharge. Neck:      Thyroid: No thyromegaly. Vascular: No JVD. Cardiovascular:      Rate and Rhythm: Normal rate and regular rhythm. No extrasystoles are present. Heart sounds: Normal heart sounds, S1 normal and S2 normal. No murmur heard. No systolic murmur is present. No diastolic murmur is present. No friction rub. No gallop. No S3 or S4 sounds. Comments: No JVD  No edema  No systolic or diastolic murmurs appreciated  Pulmonary:      Effort: Pulmonary effort is normal. No respiratory distress. Breath sounds: Normal breath sounds. No wheezing or rales. Chest:      Chest wall: No tenderness. Abdominal:      General: Bowel sounds are normal. There is no distension. Palpations: Abdomen is soft. There is no mass. Tenderness: There is no abdominal tenderness. There is no guarding or rebound. Hernia: No hernia is present. Comments: No palpable organomegaly   Musculoskeletal:         General: Normal range of motion. Skin:     General: Skin is warm. Coloration: Skin is not pale. Findings: No rash. Neurological:      Mental Status: She is alert. Comments: Slight right-sided neglect with weakness more of right lower extremity  Oriented to place and person.            Labs:  Recent Labs     11/06/21  0405 11/07/21  0236   WBC 5.0 5.0   HGB 13.8 14.3    135       Recent Labs     11/06/21  0405 11/07/21  0236    141   K 4.3 4.3    109   CO2 29 20*   BUN 18 17   CREATININE 0.5 0.4*   LABGLOM >60 >60   MG  --  2.3   CALCIUM 10.6* 10.0       CK, CKMB, Troponin: @LABRCNT (CKTOTAL:3, CKMB:3, TROPONINI:3)@    Last 3 BNP:          IMAGING:  ECHO Complete 2D W Doppler W Color    Result Date: 11/6/2021  Transthoracic Echocardiography Report (TTE) Demographics   Patient Name   Ratna Phipps  Date of Study           11/06/2021   MRN            [id number]        Gender                  Female   Date of Birth  09/01/1933    Room Number             [facility]-7650   Age            80 year(s)   Height:        63 inches     Referring Physician     Last Colón   Weight:        120 pounds    Sonographer             Marina Velarde, SUSAN Matute   BSA:           1.56 m^2      Interpreting Physician  Jase Horvath MD   BMI:           21.26 kg/m^2  Procedure Type of Study   TTE procedure:ECHO NO CONTRAST WITH DOP/COLR. Study Location: Echo Lab Technical Quality: Limited visualization due to poor acoustical window. Patient Status: Inpatient Contrast Medium: Bubble Study. BP: 150/76 mmHg Indications:CVA. Conclusions   Summary  LV is normal in size with normal systolic function. LV ejection fraction  estimated 55 to 60%. Diastolic function indeterminant. RV appears normal in size with preserved RV systolic function. Moderate to severe left atrial enlargement. Moderate right atrial enlargement. Bubble study with evidence of small right-to-left shunting with Valsalva  consistent with possible ASD/PFO. Aortic valve is mildly thickened and trileaflet with normal leaflet  mobility. No stenosis. Mild aortic regurgitation. Mitral valve is mildly thickened with normal leaflet mobility. Trace to  mild mitral regurgitation. No stenosis. Trace to mild tricuspid regurgitation. RVSP estimated at 35 to 40 mmHg. No significant pericardial effusion. Signature   ----------------------------------------------------------------  Electronically signed by Jase Horvath MD(Interpreting physician)  on 11/06/2021 04:19 PM  ----------------------------------------------------------------  M-Mode Measurements (cm)   LVIDd: 4.18 cm  IVSd: 0.99 cm                          AO Root Dimension: 1.9 cm  LVPWd: 0.89 cm                         LA: 4.5 cm  Rt. Vent.  Dimension: 3.56 cm           LVOT: 2.2 cm  % Ejection Fraction: 55 %  Doppler Measurements:   AV Peak Velocity:101 cm/s            MV Peak E-Wave: 94.7 cm/s  AV Peak Gradient: 4.08 mmHg          MV Peak A-Wave: 42.4 cm/s  AV Mean Gradient: 3 mmHg             MV E/A Ratio: 2.23 %  AV Area (Continuity):3.24 cm^2       MV Peak Gradient: 3.59 mmHg  TR Velocity:273 cm/s  TR Gradient:29.81 mmHg  Estimated RAP:8 mmHg  RVSP:38 mmHg      CT HEAD WO CONTRAST    Result Date: 11/6/2021  EXAMINATION: CT HEAD WO CONTRAST 11/6/2021 8:20 AM HISTORY: CT BRAIN without contrast 11/6/2021 4:28 AM HISTORY: Code stroke COMPARISON: None DLP: 828 mGy cm TECHNIQUE: Serial axial tomographic images of the brain were obtained without the use of intravenous contrast. FINDINGS: The midline structures are nondisplaced. There is moderate cerebral and cerebellar volume loss, with an associated increase in the prominence of the ventricles and sulci. The basilar cisterns are normal in size and configuration. There is no evidence of intracranial hemorrhage or mass-effect. There are multiple focal regions of encephalomalacia from old infarction. There is old infarction noted in the right inferior cerebellar hemisphere, right frontal parietal region. Left parietal occipital lobe. Right posterior parietal region. There is no associated hemorrhage. . There are no abnormal extra-axial fluid collections. There is no evidence of tonsillar herniation. The included orbits and their contents are unremarkable. The visualized paranasal sinuses, mastoid air cells and middle ear cavities are clear. The visualized osseous structures and overlying soft tissues of the skull and face are intact. Multiple regions of encephalomalacia from old infarctions. No acute intracranial abnormalities identified. Changes of aging are noted.  Signed by Dr Brandy Woodward    XR CHEST PORTABLE    Result Date: 11/6/2021  EXAMINATION: XR CHEST PORTABLE 11/6/2021 8:01 AM HISTORY: XR CHEST PORTABLE 11/6/2021 4:17 AM HISTORY: Code stroke carotid artery or vertebral arteries. Signed by Dr Indira Casiano    Result Date: 11/6/2021  EXAMINATION: CTA HEAD W CONTRAST 11/6/2021 8:33 AM HISTORY: CTA HEAD W CONTRAST 11/6/2021 4:30 AM HISTORY: Code stroke COMPARISON: None DLP: 288 mGy cm TECHNIQUE: Precontrast tomographic images of the brain were obtained. Following the uneventful administration of Isovue contrast, serial helical tomographic images of the brain were obtained following angiogram protocol. Multiplanar MIP and 3D reconstructions were also obtained. FINDINGS: Angiogram: . Bilateral intracranial portions of the ICA's demonstrate no evidence of aneurysm, dissection, vessel cutoff, or flow-limiting stenosis. Posteriorly, the visualized vertebral arteries and basilar arteries demonstrate no aneurysm, dissection, vessel cutoff, or flow-limiting stenosis. . Other findings: The osseous calvarium is intact. The surrounding soft tissues are unremarkable. The paranasal sinuses and bilateral mastoid air cells are clear. The imaged portions of the bilateral globes and orbits are unremarkable. Impression: 1. Negative CT angiogram at the level of the [place]. These images initially reviewed by stat read at 4:44 AM. Signed by Dr Sherry Treviño    [facility] [address] [facility].    Result Date: 11/6/2021  MRI BRAIN WO CONTRAST 11/6/2021 6:36 PM History: Stroke symptoms. Right-sided weakness and aphasia. Moderate diffuse atrophy. Prominent small vessel disease disease throughout the periventricular white matter. Old ischemic changes in the posterior right frontal lobe, right parietal lobe, left occipital lobe, and right cerebellum. There is an acute 12 x 7 mm lacunar infarct within the posterior medial left thalamus. No hemorrhage. Clear paranasal sinuses. 1. Moderate atrophy and diffuse small vessel disease. 2. Multifocal old ischemic change in both cerebral hemispheres and within the right side of the cerebellum.  3. Probable by 7 mm

## 2021-11-08 NOTE — CARE COORDINATION
The [address] at [facility]  Notification of Admission Decision      [x] Patient has been accepted for admit to [facility] on : 11/8/21 Room 826      Please write discharge orders and summary prior to discharge. [] Patient acceptance to Rehab pending the following :    [] Eval in progress       [] Patient determined to be ineligible for services at [facility] because : We recommend you consider        Thank you for your referral, we appreciate you. If you have any questions, please feel   free to contact me at [phone].     Electronically Signed by Cara Reyna, Admissions Coordinator 11/8/2021 11:26 AM

## 2021-11-08 NOTE — CONSULTS
Palliative Care Consult Note    11/8/2021 11:26 AM  Subjective:  Admit Date: 11/6/2021  PCP: Vista Rhymes    Date of Service: 11/8/2021    Reason for Consultation:  Goals of Care, Code Status, Family Support     History Obtained From: EMR/Patient and their Family    History Of Present Illness: The patient is a 80 y.o. female with PMH HLD, hx blood clots, concern for CVA/TIA 2008, 03/2021, 04/2021 and 09/2021 who presented to [address] [place] [address] ED on 11/06/2021 for concern of stroke. The patient went to the restroom around 1030 pm, her son heard a sound and found patient laying on the ground. He assisted her to bed and called EMS. She was then noted to have abnormal speech and right sided weakness. Patient was admitted to Hospitalist service for acute ischemic CVA outside the window for TPA. Neurology was consulted. CTA head/neck unremarkable. MRI with acute infarct left thalamus with chronic infarcts right frontal, right parietal, left occipital and right cerebellum. Concern for cardioembolic event. Lovenox/coumadin recommended. Echocardiogram revealed EF 55-60% w/ moderate to severe left atrial enlargement with positive bubble study. Cardiology consult placed for consideration of PFO closure, however, patient and family do not wish to pursue any invasive intervention. Patient's family feels she would not want a feeding tube and she has been placed on a dysphagia diet. Palliative care was consulted for goals of care, code status discussion, family support and symptom management. Past Medical History:        Diagnosis Date    Arthritis     Hx of blood clots     Hyperlipidemia     Palliative care patient 11/08/2021    Stroke [facility])      Past Surgical History:        Procedure Laterality Date    LEG SURGERY       Home Medications:  Prior to Admission medications    Medication Sig Start Date End Date Taking?  Authorizing Provider   atorvastatin (LIPITOR) 20 MG tablet Take 20 mg by mouth daily   Yes Historical Provider, MD clopidogrel (PLAVIX) 75 MG tablet Take 75 mg by mouth daily   Yes Historical Provider, MD   potassium chloride (MICRO-K) 10 MEQ extended release capsule Take 20 mEq by mouth daily   Yes Historical Provider, MD   Digestive Enzymes CAPS Take 220 mg by mouth daily   Yes Historical Provider, MD   Bacillus Coagulans-Inulin (PROBIOTIC) 1-250 BILLION-MG CAPS Take 1 capsule by mouth   Yes Historical Provider, MD     Allergies:    Penicillins    Social History:    The patient currently lives at home  Tobacco:   reports that she has never smoked. She has never used smokeless tobacco.  Alcohol:   has no history on file for alcohol use. Illicit Drugs: none    Family History:  No family history on file. Review of Systems:   Constitutional / general:  Denies fever / chills / sweats +fatigue  Head:  Denies headache / neck stiffness / trauma / visual change  Eyes:  Denies blurry vision / acute visual change or loss / itching / redness  ENT: Denies sore throat / hoarseness / nasal drainage / ear pain  CV:  Denies chest pain / palpitations/ orthopnea   Respiratory:  Denies cough / shortness of breath / sputum / hemoptysis  GI: Denies nausea / vomiting / abdominal pain / diarrhea /+constipation  :  Denies dysuria / hesitancy / urgency / hematuria +urinary incontinence  Neuro: Denies paralysis / syncope / seizure / dysphagia / headache / paresthesias  Musculoskeletal: +muscle weakness /joint stiffness / pain  Vascular: Denies edema / claudication / varicosities  Heme / endocrine: Denies easy bruising / bleeding / excessive sweating / heat or cold intolerance  Psychiatric: Denies depression / anxiety / insomnia / mood changes  Skin:  Denies new rashes / lesions / skin hair or nail changes    14 point review of systems is negative except as specifically addressed above.     Physical Examination:  BP (!) 143/77   Pulse 52   Temp 97.5 °F (36.4 °C) (Temporal)   Resp 16   Ht 5' 3\" (1.6 m)   Wt 120 lb (54.4 kg)   SpO2 90%   BMI 21.26 kg/m²   General appearance: alert, appears stated age, cooperative and no distress  Head: Normocephalic, without obvious abnormality, atraumatic  Eyes: conjunctivae/corneas clear. PERRL, EOM's intact. Ears: normal external ears and nose, throat without exudate  Neck: no adenopathy, no carotid bruit, no JVD, supple, symmetrical, trachea midline  Lungs: clear to auscultation bilaterally,no rales or wheezes   Heart: regular rate and rhythm, S1, S2 normal, no murmur  Abdomen:soft, non-tender; non-distended, normal bowel sounds no masses, no organomegaly  Extremities:No lower extremity edema,  No erythema, no tenderness to palpation  Skin: Pale, warm, dry  Lymphatic: No palpable lymph node enlargment  Neurologic: Alert and oriented X 3, generalized weakness-worse on right side. Answers questions appropriately and follows simple commands   Psychiatric: Alert and oriented, thought content appropriate, normal insight, mood appropriate    Diagnostic Data:  CBC:  Recent Labs     11/06/21 0405 11/07/21 0236 11/08/21 0315   WBC 5.0 5.0 4.4*   HGB 13.8 14.3 14.3   HCT 44.1 49.4* 46.8    135 156     BMP:  Recent Labs     11/06/21  0405 11/07/21 0236 11/08/21 0315    141 139   K 4.3 4.3 3.9    109 106   CO2 29 20* 23   BUN 18 17 18   CREATININE 0.5 0.4* 0.5   CALCIUM 10.6* 10.0 10.3*     Recent Labs     11/06/21 0405 11/07/21 0236 11/08/21 0315   AST 26 24 19   ALT 20 16 15   BILITOT 0.8 1.2 1.6*   ALKPHOS 102 103 104     Coag Panel:   Recent Labs     11/06/21  0424 11/08/21 0315   INR 1.01 1.04   PROTIME 13.5 13.8     Cardiac Enzymes:   Recent Labs     11/06/21 0405   CKTOTAL 43   TROPONINI <0.01   A1C:   Recent Labs     11/06/21 0405   LABA1C 5.1     ECHO Complete 2D W Doppler W Color  Summary  LV is normal in size with normal systolic function. LV ejection fraction  estimated 55 to 60%. Diastolic function indeterminant. RV appears normal in size with preserved RV systolic function. Moderate to severe left atrial enlargement. Moderate right atrial enlargement. Bubble study with evidence of small right-to-left shunting with Valsalva  consistent with possible ASD/PFO. Aortic valve is mildly thickened and trileaflet with normal leaflet  mobility. No stenosis. Mild aortic regurgitation. Mitral valve is mildly thickened with normal leaflet mobility. Trace to  mild mitral regurgitation. No stenosis. Trace to mild tricuspid regurgitation. RVSP estimated at 35 to 40 mmHg. No significant pericardial effusion. Signature  Electronically signed by Basilio Horvath MD(Interpreting physician)  on 2021 04:19 PM     CT HEAD WO CONTRAST  Multiple regions of encephalomalacia from old infarctions. No acute intracranial abnormalities identified. Changes of aging are noted. Signed by Dr Dali Hinds  1. No radiographic evidence of acute cardiopulmonary process. Signed by Dr Dangelo Ryder criteria was utilized to evaluate the stenosis in the internal carotid arteries. There is no significant stenosis or dissection in the right internal carotid artery, left internal carotid artery or vertebral arteries. Signed by Dr Mueller Poa. Negative CT angiogram at the level of the [place]. These images initially reviewed by stat read at 4:44 AM. Signed by Dr Macy Griffin  1. Moderate atrophy and diffuse small vessel disease. 2. Multifocal old ischemic change in both cerebral hemispheres and within the right side of the cerebellum. 3. Probable by 7 mm acute lacunar infarct within the left thalamus. No hemorrhage.  Signed by Dr Ayla Rosenberg    Palliative Performance Scale:  [x] 40% Mainly in bed  Extensive disease  Mainly assistance  Normal/reduced intake  LOC full/confusion    Palliative Review of Advance Directives:     Surrogate Decision Maker:Yes-Angel Herrmann of :Yes-Angel Archer    Advanced Directives/Living Waqar Lafleur: Yes      Information Sharing:  Patient's awareness of illness:  [] Terminal [] Life-Threatening [x] Serious [] Non life-threatening [] Not serious   [] Not discussed    Family awareness of illness:   [] Terminal [] Life-Threatening [x] Serious [] Nonlife-threatening [] Not serious   [] Not discussed    Assessment/Plan:  Principal Problem:    Acute ischemic stroke ([facility] [address]  Active Problems:    History of stroke    Aphasia    Altered mental status    Hemiplegia affecting dominant side ([address]    Other hyperlipidemia    DNR (do not resuscitate)    Palliative care patient  Resolved Problems:    * No resolved hospital problems. *     Visit Summary:  Chart reviewed, patient discussed with nursing staff. Reviewed health issues, work up and treatment plan as well as factors that lead to hospitalization. I saw patient at bedside with her son, Lauren Ignacio, present in room. This morning she is awake, able to participate in conversation and follow commands. She remains weak but her son feels this is overall improved. They have declined further invasive work up. She has been initiated on anticoagulation. SLP has followed and dysphagia diet recommended. Patient did tolerate applesauce this morning. Patient and son confirm she would not ever want a feeding tube. They are hopeful for rehab placement following this hospitalization. Will continue to follow. Support/comfort offered    Recommendations:     1. Palliative Care-GOC DC to SNF for rehab. Code status: DNR. Patient/son would not want alternative means of nutrition if dysphagia were to worsen  2. Acute ischemic stroke-Neurology following, Coumadin/Lovenox bridge  3. PFO-Cardiology consulted, patient/family decline invasive intervention  4.  Dysphagia-SLP following, diet ordered, patient/family decline tube feeding in event of worsening dysphagia    Thank you for consulting palliative care and allowing us to participate in the care of the patient.       CounselingTopics: Goals of care, Code Status, Disease process education, pt/family support    Time Spent Counseling > 50%:  YES                                   Total Time Spent with patient/family counseling, workup/treatment review, counseling and placement of orders/preparation of this note: 55 minutes    Electronically signed by Ramone Cisneros PA-C on 11/8/2021 at 11:26 AM    (Please note that portions of this note were completed with a voice recognition program.  Efforts were made to edit the dictations but occasionally words are mis-transcribed.)

## 2021-11-08 NOTE — PROGRESS NOTES
Occupational Therapy Initial Assessment  Date: 2021   Patient Name: Anthony Reaves  MRN: [id number]     : 1933    Date of Service: 2021    Discharge Recommendations:  Patient would benefit from continued therapy after discharge, 24 hour supervision or assist (Pt's son states he would like for her to return to SNF she was at for prior-CVA. )       Assessment   Assessment: OT evaluation completed and tx initiated. Pt may benefit from continued skilled services to address functional deficits presented from new and prior neurological injury. Pt may progress with further tx. Currently, pt is at a high risk for falls and would require assistance for safety and personal care. OT agrees with family's desired D/C location. OT Education: Precautions; Family Education; Equipment; ADL Adaptive Strategies; Transfer Training; Orientation  Patient Education: Son verbalized understanding;  REQUIRES OT FOLLOW UP: Yes  Activity Tolerance  Activity Tolerance: Patient Tolerated treatment well  Activity Tolerance: Pt has deficits related to cognition, but is fully participatory to tx. Safety Devices  Safety Devices in place: Yes  Type of devices: Nurse notified; Left in bed; Call light within reach; Bed alarm in place (nsg notified about PureWick placement)           Patient Diagnosis(es): The primary encounter diagnosis was Acute CVA (cerebrovascular accident) ([address] Diagnoses of Acute right-sided muscle weakness and History of cerebrovascular accident (CVA) in adulthood were also pertinent to this visit. has a past medical history of Arthritis, Hx of blood clots, Hyperlipidemia, Palliative care patient, and Stroke ([address] has a past surgical history that includes Leg Surgery.            Restrictions  Restrictions/Precautions  Restrictions/Precautions: Fall Risk  Required Braces or Orthoses?: No    Subjective   General  Chart Reviewed: Yes  Patient assessed for rehabilitation services?: Yes  Additional Pertinent Hx: CVA; palliative care pt  Family / Caregiver Present: Yes (Son)  Diagnosis: Acute ischemic CVA; aphasia; AMS; hemiplegia affecting dominant side  Patient Currently in Pain: No  Pain Assessment  Pain Assessment: Faces  Nguyen-Benoit Pain Rating: No hurt  Vital Signs  Temp: 97.5 °F (36.4 °C)  Temp Source: Temporal  Pulse: 52  Heart Rate Source: Monitor  Resp: 16  BP: (!) 143/77  BP Location: Right upper arm  MAP (mmHg): 99  Patient Position: Supine  Level of Consciousness: Responds to Voice (1)  Patient Currently in Pain: No  Oxygen Therapy  SpO2: 90 %  O2 Device: None (Room air)    Social/Functional History  Social/Functional History  Lives With:  (Son lives next door, not with pt)  Type of Home: House  Home Equipment: Rolling walker  ADL Assistance: Needs assistance (Son stated he helped with dressing)  Ambulation Assistance: Independent (used r/w)  Transfer Assistance: Independent  Additional Comments: PLOF/environmental info collected from son in room; he states he would like for her to D/C to a facility for rehab at D/C. Pt has [facility] at [facility] for past CVA. Objective   Vision: Impaired (pt seems to have difficulty making eye contact and focusing. (Still observed on 11/08/21))  Hearing: Within functional limits    Orientation  Overall Orientation Status: Impaired  Orientation Level: Disoriented to place; Disoriented to time; Disoriented to situation (Difficulty with communication; would nod \"yes\" or \"no\" occasionally.)     Balance  Sitting Balance: Supervision (supervision - CGA (CGA during dynamic sitting balance); slight R-sided lateral lean)  Standing Balance: Contact guard assistance (CGA - min A (in JAMIE STEDY))  Standing Balance  Activity: CGA with safety bar on JAMIE JEROMEDY; intermittent desire to stay standing (2 min). Slight R-sided lean noticed. Functional Mobility  Functional Mobility Comments: Not attempted; recommending JAMIE JEROMEDY at this time.   Toilet Transfers  Toilet Transfers Comments: Currently bedlevel for toileting; incontinent episode in standing (pt unaware); suggesting use of JAMIE STEDY for BSC use (and min A for sitting balance on it)  ADL  Feeding: Independent; Setup; Verbal cueing  Grooming: Minimal assistance  UE Bathing: Minimal assistance; Moderate assistance  LE Bathing: Maximum assistance  UE Dressing: Moderate assistance  LE Dressing: Maximum assistance  Toileting: Dependent/Total        Bed mobility  Supine to Sit: Minimal assistance  Sit to Supine: Moderate assistance  Scooting: Minimal assistance (used pad to help pull pt)  Transfers  Sit to stand: Minimal assistance; 2 Person assistance  Stand to sit: Minimal assistance; 2 Person assistance  Transfer Comments: Utilized JAMIE STEDY; min A x 2 vs mod A x1     Cognition  Overall Cognitive Status: Exceptions  Arousal/Alertness: Delayed responses to stimuli  Following Commands: Follows one step commands with repetition;  Follows one step commands with increased time  Attention Span: Difficulty attending to directions  Memory: Decreased recall of precautions; Decreased recall of recent events  Safety Judgement: Decreased awareness of need for assistance; Decreased awareness of need for safety  Problem Solving: Decreased awareness of errors; Assistance required to identify errors made; Assistance required to correct errors made  Insights: Not aware of deficits  Initiation: Requires cues for all  Sequencing: Requires cues for all  Perception  Overall Perceptual Status:  (Unable to fully determine if some CVA-sided issues are due to cognition or underlying neuro deficit)              LUE AROM (degrees)  LUE AROM : WFL  RUE AROM (degrees)  RUE AROM : WFL  LUE Strength  L Hand General: 4+/5  LUE Strength Comment: Grossly 4/5  RUE Strength  R Hand General: 4+/5  RUE Strength Comment: Grossly 3+/5              Included Treatment  Tx consisted of: bed mobility; dressing; family education; pt re-orientation; transfer training (with DME use); activity tolerance/balance challenges; and toileting skills. (Treatment time: 30 min)        Plan   Plan  Times per week: 3-5  Current Treatment Recommendations: Strengthening, Pain Management, Positioning, ROM, Safety Education & Training, Balance Training, Patient/Caregiver Education & Training, Self-Care / ADL, Cognitive/Perceptual Training, Functional Mobility Training, Neuromuscular Re-education, Equipment Evaluation, Education, & procurement, Home Management Training, Endurance Training, Cognitive Reorientation    Goals  Short term goals  Time Frame for Short term goals: 1 week  Short term goal 1: Perform ADL task with min A seated at EOB for 5 min. Short term goal 2: Complete transfers with CGA and DME. Short term goal 3: Maintain balance during dynamic standing components of ADLs with CGA. Short term goal 4: Pt/family will verbalize/demo: AE/DME options; recommended therapeutic activities; energy conservation techniques; and fall prevention strategies. Long term goals  Long term goal 1: Progress as tolerated.                PALAK Davis/L  Electronically signed by Dacia CID/L on 11/8/2021 at 11:06 AM.

## 2021-11-08 NOTE — PROGRESS NOTES
Clinical Pharmacy Note    Warfarin consult follow-up    Recent Labs     11/08/21  0315   INR 1.04     Recent Labs     11/06/21  0405 11/07/21  0236 11/08/21  0315   HGB 13.8 14.3 14.3   HCT 44.1 49.4* 46.8    135 156       Significant Drug-Drug Interactions:  New warfarin drug-drug interactions: none  Discontinued drug-drug interactions: none    Date INR Warfarin Dose   11/06/21 1.01 ---   11/07/21 --- 5 mg    11/08/21 1.04  5 mg                                              Notes: Will give warfarin 5 mg once this evening. Continue LMWH bridge. Daily PT/INR until stable within therapeutic range.      Electronically signed by DINORA Zuluaga [facility] on 11/8/2021 at 1:16 PM

## 2021-11-08 NOTE — CARE COORDINATION
Date / Time of Evaluation:   11/8/2021    2:31 PM  Assessment Completed by:   Lydia Alexis RN, BSN      Patient Name:   Giorgio Andersen  MRN:   [id number]  YOB: 1933    Patient Admission Status:   Inpatient [101]    Patient Contact Information:    [place] [address]  [phone] (home)   Telephone Information:   Mobile [phone]     Above information verified? [x]   Yes  []   No    (Best Practice:   Have patient/caregiver verify above address and phone number by stating out loud their current address and reachable phone number. Initial Assessment Completed at bedside with:      []   Patient  []   Family/Caregiver/Guardian   [x]   Other:      Current PCP:    Ck Neither    PCP verified? [x]   Yes  []   No    Emergency Contacts:    Extended Emergency Contact Information  Primary Emergency Contact: Kristen Garcia  Address: [address] [place] [address] Phone: [phone]  Relation: Child  Secondary Emergency Contact: DULCECLARA  [place] Phone: [phone]  Relation: Grandchild    Advance Directives:    Does Ms. Addison Hagen have an advance directive in her electronic medical record? [x]   Yes  []   No    Code Status:   DNR-CC      Have you been vaccinated for COVID-19 (SARS-CoV-2)? []   Yes  [x]   No                   If so, when?     Which :         []   Pfizer-BioNTConnesta  []   Moderna  []   [employer]  []   Other:       Do you have any of the following unmet social needs that would keep you from returning home safely:    []   Yes  [x]   No                    Unmet Social Needs:           []   Living Situation/Housing  []   Food  []   Stroke Education   []   Utilities  []   Personal Safety  []   Financial Strain  []   Employment  []   Mental Health  []   Substance Abuse  []   Transportation Barriers    Additional Unmet Social Needs Notes:    NA    Financial:    Payor: MEDICARE / Plan: MEDICARE PART A AND B / Product Type: *No Product type* /     Pre-Cert required for SNF:     []   Yes  [x]   No    Have Long Term Care Insurance:      []   Yes  [x]   No      Pharmacy:  No Pharmacies Listed  Potential assistance purchasing medications? []   Yes  []   No   Unclear at this time. Awaiting to speak with family    ADLS:       Support System:   family    Current Home Environment:       Steps:       []   Yes  [x]   No    If yes, how many? Plans to RETURN to current housing:     []   Yes  [x]   No    Barriers to RETURNING to current housing:  R sided weakness, working with PT/OT     Currently ACTIVE with Home Health CARE:      []   Yes  [x]   No    Home Health Care Agency:   [facility] Provider:   TOD    Transition Plan:  Inpatient Acute Rehab    Transportation PLAN for Discharge:  Staff    Patient Deficits:    [x]   Yes   []   No    If yes:    [x]   Confusion/Memory  [x]   Visual  []   Motor/Sensory         [x]   Right arm         [x]   Right leg         []   Left arm         []   Left leg  []   Language/Speech         []   Aphasia         []   Dysarthria         []   Swallow    NIH Stroke Scale  Interval: Reassessment  Level of Consciousness (1a. ): Not alert, requires repeated/painful stimulation or obtunded  LOC Questions (1b. ): Answers both correctly  LOC Commands (1c. ): Performs one task correctly  Best Gaze (2. ): Normal  Visual (3. ): No visual loss  Facial Palsy (4. ): (!) Minor paralysis  Motor Arm, Left (5a. ): Some effort against gravity  Motor Arm, Right (5b. ): Some effort against gravity  Motor Leg, Left (6a. ): Some effort against gravity  Motor Leg, Right (6b. ): Some effort against gravity  Limb Ataxia (7. ): Absent  Sensory (8. ): Normal  Best Language (9. ): No aphasia  Dysarthria (10. ): Normal  Extinction and Inattention (11): No abnormality  Total: 11    Debbie Coma Scale  Eye Opening:  To speech  Best Verbal Response: Oriented  Best Motor Response: Obeys commands  [place] Coma Scale Score: 14    Patient Deficit Notes:     R sided weakness, working with PT/OT, Inpatient acute rehab upon DC    Additional CM/SW Notes:   Patient/family has agreed for Inpatient Acute Rehab when medically stable. Will continue to follow. Gave the Stroke booklet w/pamphlets to patient, at bedside. No family present. Will view with family. Chante Hernandez and/or her family were provided with choice of provider:    [x]   Yes   []   No        [x]   Stroke education booklet reviewed and given to patient/family/caregiver/guardian. All questions answered all questions answered appropriately and efficiently per family.       Bev Figueroa RN, BSN  [address] Management  Phone:  [phone]    Fax: [phone]    Electronically signed by Bev Figueroa RN, BSN on 11/8/2021 at 2:59 PM

## 2021-11-08 NOTE — PROGRESS NOTES
Physical Therapy  Name: Tamia Natarajan  MRN:  [id number]  Date of service:  11/8/2021 11/08/21 1008   Restrictions/Precautions   Restrictions/Precautions Fall Risk   Required Braces or Orthoses? No   General   Chart Reviewed Yes   Response To Previous Treatment Not applicable   Family / Caregiver Present Yes   Subjective   Subjective Pt. in bed when arrived with purewick out of place, bedding and gown soiled. Pt caregiver states he would like to have her seen by speech again because she has not had any food by mouth in 3 days due to failing the swallow test.    Pain Screening   Patient Currently in Pain No   Intervention List Patient able to continue with treatment   Bed Mobility   Rolling Contact guard assistance   Supine to Sit Moderate assistance   Sit to Supine Moderate assistance   Scooting Minimal assistance  (used pad to help pull pt)   Transfers   Sit to Stand Minimal Assistance   Stand to sit Minimal Assistance  (patient stood in [place] )   Ambulation   Ambulation? No   Balance   Posture Fair   Sitting - Static Fair   Sitting - Dynamic Poor; +  (patient slight LOB to the left and back after prolonged sit)   Standing - Static Fair  (in [place] )   Patient Goals    Patient goals  not stated   Short term goals   Time Frame for Short term goals 2 wks   Short term goal 1 supine to sit indep   Short term goal 2 sit to stand indep   Short term goal 3 amb. 100' with RW SBA   Short term goal 4 bed to chair SBA   Conditions Requiring Skilled Therapeutic Intervention   Body structures, Functions, Activity limitations Decreased functional mobility ; Decreased ROM; Decreased strength; Decreased safe awareness; Decreased cognition; Decreased endurance; Decreased posture; Decreased balance; Decreased coordination   Assessment Treatment performed by Justen Amin, [employer] student with supervision by Gavi Almanza PTA.  Pt. will benefit from continued physical therapy to increase strength, balance, and coordination to

## 2021-11-08 NOTE — DISCHARGE SUMMARY
[place], [place], [address]    DEPARTMENT OF HOSPITALIST MEDICINE      DISCHARGE SUMMARY:      PATIENT NAME:  Basilio Monge  :  1933  MRN:  [id number]    Admission Date:   2021  4:03 AM Attending: Adry Alonzo MD   Discharge Date:   2021              PCP: Jia Dominguez  Length of Stay: 2 days     Chief Complaint on Admission:   Chief Complaint   Patient presents with    Extremity Weakness       Consultants:     IP CONSULT TO PHARMACY  PHARMACY TO CHANGE BASE FLUIDS  IP CONSULT TO NEUROLOGY  IP CONSULT TO PALLIATIVE CARE  PHARMACY TO DOSE WARFARIN  IP CONSULT TO REHAB/TCU ADMISSION COORDINATOR  IP CONSULT TO CARDIOLOGY  IP CONSULT TO SOCIAL WORK  IP CONSULT TO SOCIAL WORK  IP CONSULT TO PHARMACY  PHARMACY TO CHANGE BASE FLUIDS  PHARMACY TO DOSE WARFARIN       Discharge Problem List:   Principal Problem:    Acute ischemic stroke ([facility]  Active Problems:    History of stroke    Aphasia    Altered mental status    Hemiplegia affecting dominant side ([facility]    Other hyperlipidemia    DNR (do not resuscitate)    Palliative care patient  Resolved Problems:    * No resolved hospital problems. *         Last dated Assessment and Plan . .. 2021:      [address]  COURSE  AND  TREATMENT:  80-year-old female with history of 3 previous CVAs, hyperlipidemia presented to hospital for right-sided weakness found to have acute CVA.  Neurology on board. Librado Trinidad failed initial swallow evaluation.  DNR/DNI. Patient noted to have bilateral CVAs and PFO on TTE with cardiology consulted. Patient placed on Lovenox with plan to bridge to Coumadin.  She will be discharged and readmitted to rehab today    OBJECTIVE:  /71   Pulse 50   Temp 97.7 °F (36.5 °C) (Temporal)   Resp 18   Ht 5' 3\" (1.6 m)   Wt 120 lb (54.4 kg)   SpO2 99%   BMI 21.26 kg/m²       Heart: RRR   Lungs: Bilateral fair air entry   Abdomen: Soft, non-tender   Extremities: No edema   Neurologic: Alert and oriented   Skin: Warm and dry          Laboratory Data:  Recent Labs     11/06/21 0405 11/07/21 0236 11/08/21 0315   WBC 5.0 5.0 4.4*   HGB 13.8 14.3 14.3    135 156     Recent Labs     11/06/21 0405 11/07/21 0236 11/08/21 0315    141 139   K 4.3 4.3 3.9    109 106   CO2 29 20* 23   BUN 18 17 18   CREATININE 0.5 0.4* 0.5   GLUCOSE 88 69* 91     Recent Labs     11/06/21 0405 11/07/21 0236 11/08/21 0315   AST 26 24 19   ALT 20 16 15   BILITOT 0.8 1.2 1.6*   ALKPHOS 102 103 104     Troponin T:   Recent Labs     11/06/21 0405   TROPONINI <0.01     Pro-BNP: No results for input(s): BNP in the last 72 hours. INR:   Recent Labs     11/06/21 0424 11/08/21 0315   INR 1.01 1.04     UA:No results for input(s): NITRITE, COLORU, PHUR, LABCAST, WBCUA, RBCUA, MUCUS, TRICHOMONAS, YEAST, BACTERIA, CLARITYU, SPECGRAV, LEUKOCYTESUR, UROBILINOGEN, BILIRUBINUR, BLOODU, GLUCOSEU, AMORPHOUS in the last 72 hours. Invalid input(s): Jennyfermanas Ortiz  A1C:   Recent Labs     11/06/21 0405   LABA1C 5.1     ABG:No results for input(s): PHART, FIH3FGU, PO2ART, QXR1RFB, BEART, HGBAE, Q0QTPYEK, CARBOXHGBART in the last 72 hours. Impressions of imaging performed in 48 hours before discharge:    MRI BRAIN WO CONTRAST    Result Date: 11/6/2021  1. Moderate atrophy and diffuse small vessel disease. 2. Multifocal old ischemic change in both cerebral hemispheres and within the right side of the cerebellum. 3. Probable by 7 mm acute lacunar infarct within the left thalamus. No hemorrhage. Signed by Dr Mi College:  To be addressed by the accepting attending physician. Further medical management to be taken over and patient medications to be addressed by the accepting attending physician.       Condition on Discharge: stable  Discharge Disposition: Acute Rehab    Recommended Follow Up:  Terry Lange MD  [address]  [phone]    Call  As needed    Followup Appointments Scheduled at Time of Discharge: Follow up with PCP after released from Rehab    Discharge Instructions:   Please see the discharge paperwork. Patient was seen at bedside today, and the examination shows improvement since yesterday. Detailed discharge directions delivered to the patient by myself and our nursing staff, who verbalizes understanding and is very happy and satisfied with the plan. Patient has been advised to continue all medications as prescribed and advised, and f/u with PCP within 1 week. Patient is stable from medical standpoint to be discharged. Total time spent during patient evaluation and assessment, discussion with the nurse/family, addressing discharge medications/scripts and coordination of care for safe discharge was in excess of 35 minutes.       Signed Electronically:    WILSON Woodard CNP  3:13 PM 11/8/2021

## 2021-11-08 NOTE — PROGRESS NOTES
Speech Language Pathology  Facility/Department: [facility] SURG SERVICES  SWALLOW THERAPY  SPEECH THERAPY     NAME: Kan Patel  : 1933  MRN: [id number]    ADMISSION DATE: 2021  ADMITTING DIAGNOSIS: has Acute ischemic stroke ([facility] [address] History of stroke; Aphasia; Altered mental status; Hemiplegia affecting dominant side ([address] Other hyperlipidemia; DNR (do not resuscitate); and Palliative care patient on their problem list.    Date of Treat: 2021  Evaluating Therapist: ULISSES Villa    Current Diet level:  NPO    Reason for Referral  Kan Patel was referred for a bedside swallow evaluation to assess the efficiency of her swallow function, identify signs and symptoms of aspiration and make recommendations regarding safe dietary consistencies, effective compensatory strategies, and safe eating environment. Impression  Re-assessed patient's swallowing function. Patient exhibited decreased oral prep of more solid consistencies, inconsistently fast oral transit and suspected swallow delay with thin liquids, and sluggish, mild-moderately decreased laryngeal elevation for swallow airway protection. Even so, no outward S/S penetration/aspiration was noted with any ice chip trial, puree consistency trial, regular solid consistency trial, mildly thick/nectar thick liquid trial, or thin H2O trial presented during treatment session this date. At this time, would trial soft and bite sized consistency with mildly thick/nectar thick liquids. Recommend meds whole in pudding/applesauce. If patient receives mouth care prior to intake, okay for ice chips and small sips thin H2O IN BETWEEN MEALS for comfort.     Treatment Plan  Requires SLP Intervention:  Yes     Recommended Diet and Intervention  Diet Solids Recommendation: Soft and bite sized  Liquid Consistency Recommendation: Mildly thick (nectar)  Recommended Form of Meds: Meds whole in puree as able  Therapeutic Interventions: Patient/Family education; Therapeutic PO trials with SLP     Treatment/Goals  Timeframe for Short-term Goals: 1x/day for 3 days   Goal 1: Patient will tolerate soft and bite sized consistency and mildly thick/nectar thick liquids with min S/S penetration/aspiration during PO intake. Goal 2: Patient staff will follow swallow safety recommendations to decrease risk of penetration/aspiration during PO intake. Goal 3: Patient will receive daily oral care, via staff, to decrease bacteria from the oral cavity. Goal 4: Re-assessment of swallow function for potential diet upgrade. Goal 5: Monitor speech production. Goal 6: Cognitive-linguistic eval      General  Chart Reviewed: Yes  Behavior/Cognition: Alert; Cooperative  O2 Device: None (Room air)  Communication Observation: (Re-assessed patient's speech production. Patient exhibited decreased volume of speech and slow, decreased lingual movements during verbalizations. SLP ranked functional intelligibility of speech for unfamiliar listeners at % in utterances with background noise present.)  Follows Directions: Simple   Dentition: Poor  Patient Positioning: Upright in bed  Consistencies Administered: Ice chips;Dysphagia Pureed (Dysphagia I); Regular solid; Nectar - cup; Thin - cup      Oral Motor Examination   Labial ROM: (Decreased, bilaterally, during labial retraction trials and labial protrusion trials.)  Labial Strength: (Adequate during labial compression trials.)  Labial Coordination: (Slowed movements were noted.)  Lingual ROM: (Adequate during lingual extension trials with full point achieved; decreased during lingual elevation trials without use of accessory jaw movement; adequate movements noted bilaterally.)  Lingual Strength: (Decreased)  Lingual Coordination: (Slowed movements were noted.)     Re-assessed patient's swallowing function with the following observations noted:     Oral Phase  Mastication: Ice chips;Regular solid (Patient exhibited decreased rotary jaw movement during oral prep of ice chip trials and regular solid consistency trials all presented by SLP.)  Suspected Premature Bolus Loss: Puree;Regular solid; Thin - cup (Patient exhibited slow oral transit of puree consistency trials and regular solid consistency trials all presented by SLP. Patient exhibited inconsistently fast oral transit of thin H2O trials presented via cup by SLP.)  Oral Phase - Comment: No puree consistency residue was noted post swallows. Min regular consistency residue was observed post swallows; residue cleared from the mouth with additional dry swallows. Oral transit of nectar thick liquid trials, presented via cup by SLP, primarily measured 1-2 seconds in length.      Pharyngeal Phase  Suspected Swallow Delay: Puree;Regular solid; Thin - cup (Suspect secondary to oral transit times.)  Laryngeal Elevation: (Patient exhibited sluggish, mild-moderately decreased laryngeal elevation for swallow airway protection.)  Pharyngeal Phase - Comment: No outward S/S penetration/aspiration was noted with any ice chip trial, puree consistency trial, regular solid consistency trial, mildly thick/nectar thick liquid trial, or thin H2O trial presented during treatment session this date. At this time, would trial soft and bite sized consistency with mildly thick/nectar thick liquids. Recommend meds whole in pudding/applesauce. If patient receives mouth care prior to intake, okay for ice chips and small sips thin H2O IN BETWEEN MEALS for comfort.     Electronically signed by ULISSES Temple on 11/8/2021 at 11:55 AM

## 2021-11-09 LAB
ALBUMIN SERPL-MCNC: 3.4 G/DL (ref 3.5–5.2)
ALP BLD-CCNC: 99 U/L (ref 35–104)
ALT SERPL-CCNC: 15 U/L (ref 5–33)
ANION GAP SERPL CALCULATED.3IONS-SCNC: 9 MMOL/L (ref 7–19)
AST SERPL-CCNC: 18 U/L (ref 5–32)
BACTERIA: ABNORMAL /HPF
BASOPHILS ABSOLUTE: 0 K/UL (ref 0–0.2)
BASOPHILS RELATIVE PERCENT: 0.6 % (ref 0–1)
BILIRUB SERPL-MCNC: 1.3 MG/DL (ref 0.2–1.2)
BILIRUBIN URINE: NEGATIVE
BLOOD, URINE: NEGATIVE
BUN BLDV-MCNC: 16 MG/DL (ref 8–23)
CALCIUM SERPL-MCNC: 10.2 MG/DL (ref 8.8–10.2)
CHLORIDE BLD-SCNC: 109 MMOL/L (ref 98–111)
CLARITY: CLEAR
CO2: 23 MMOL/L (ref 22–29)
COLOR: YELLOW
CREAT SERPL-MCNC: 0.5 MG/DL (ref 0.5–0.9)
CRYSTALS, UA: ABNORMAL /HPF
EOSINOPHILS ABSOLUTE: 0.2 K/UL (ref 0–0.6)
EOSINOPHILS RELATIVE PERCENT: 3.1 % (ref 0–5)
EPITHELIAL CELLS, UA: 2 /HPF (ref 0–5)
GFR AFRICAN AMERICAN: >59
GFR NON-AFRICAN AMERICAN: >60
GLUCOSE BLD-MCNC: 121 MG/DL (ref 70–99)
GLUCOSE BLD-MCNC: 75 MG/DL (ref 70–99)
GLUCOSE BLD-MCNC: 79 MG/DL (ref 70–99)
GLUCOSE BLD-MCNC: 85 MG/DL (ref 74–109)
GLUCOSE BLD-MCNC: 90 MG/DL (ref 70–99)
GLUCOSE BLD-MCNC: 97 MG/DL (ref 70–99)
GLUCOSE URINE: NEGATIVE MG/DL
HCT VFR BLD CALC: 45.4 % (ref 37–47)
HEMOGLOBIN: 14.2 G/DL (ref 12–16)
HYALINE CASTS: 0 /HPF (ref 0–8)
IMMATURE GRANULOCYTES #: 0 K/UL
INR BLD: 1.3 (ref 0.88–1.18)
KETONES, URINE: NEGATIVE MG/DL
LEUKOCYTE ESTERASE, URINE: ABNORMAL
LYMPHOCYTES ABSOLUTE: 1.4 K/UL (ref 1.1–4.5)
LYMPHOCYTES RELATIVE PERCENT: 28.7 % (ref 20–40)
MCH RBC QN AUTO: 29.5 PG (ref 27–31)
MCHC RBC AUTO-ENTMCNC: 31.3 G/DL (ref 33–37)
MCV RBC AUTO: 94.4 FL (ref 81–99)
MONOCYTES ABSOLUTE: 0.3 K/UL (ref 0–0.9)
MONOCYTES RELATIVE PERCENT: 6.5 % (ref 0–10)
NEUTROPHILS ABSOLUTE: 3 K/UL (ref 1.5–7.5)
NEUTROPHILS RELATIVE PERCENT: 61.1 % (ref 50–65)
NITRITE, URINE: NEGATIVE
PDW BLD-RTO: 14.5 % (ref 11.5–14.5)
PERFORMED ON: ABNORMAL
PERFORMED ON: NORMAL
PH UA: 6 (ref 5–8)
PLATELET # BLD: 153 K/UL (ref 130–400)
PMV BLD AUTO: 9.8 FL (ref 9.4–12.3)
POTASSIUM SERPL-SCNC: 4.1 MMOL/L (ref 3.5–5)
PREALBUMIN: 14 MG/DL (ref 20–40)
PROTEIN UA: NEGATIVE MG/DL
PROTHROMBIN TIME: 16.3 SEC (ref 12–14.6)
RBC # BLD: 4.81 M/UL (ref 4.2–5.4)
RBC UA: 0 /HPF (ref 0–4)
SODIUM BLD-SCNC: 141 MMOL/L (ref 136–145)
SPECIFIC GRAVITY UA: 1.01 (ref 1–1.03)
TOTAL PROTEIN: 5 G/DL (ref 6.6–8.7)
UROBILINOGEN, URINE: 1 E.U./DL
WBC # BLD: 4.9 K/UL (ref 4.8–10.8)
WBC UA: 2 /HPF (ref 0–5)

## 2021-11-09 PROCEDURE — 2580000003 HC RX 258: Performed by: PSYCHIATRY & NEUROLOGY

## 2021-11-09 PROCEDURE — 6370000000 HC RX 637 (ALT 250 FOR IP): Performed by: NURSE PRACTITIONER

## 2021-11-09 PROCEDURE — 99223 1ST HOSP IP/OBS HIGH 75: CPT | Performed by: PSYCHIATRY & NEUROLOGY

## 2021-11-09 PROCEDURE — 6360000002 HC RX W HCPCS: Performed by: NURSE PRACTITIONER

## 2021-11-09 PROCEDURE — 36415 COLL VENOUS BLD VENIPUNCTURE: CPT

## 2021-11-09 PROCEDURE — 85025 COMPLETE CBC W/AUTO DIFF WBC: CPT

## 2021-11-09 PROCEDURE — 92610 EVALUATE SWALLOWING FUNCTION: CPT

## 2021-11-09 PROCEDURE — 92522 EVALUATE SPEECH PRODUCTION: CPT

## 2021-11-09 PROCEDURE — 97535 SELF CARE MNGMENT TRAINING: CPT

## 2021-11-09 PROCEDURE — 85610 PROTHROMBIN TIME: CPT

## 2021-11-09 PROCEDURE — 97116 GAIT TRAINING THERAPY: CPT

## 2021-11-09 PROCEDURE — 97162 PT EVAL MOD COMPLEX 30 MIN: CPT

## 2021-11-09 PROCEDURE — 80053 COMPREHEN METABOLIC PANEL: CPT

## 2021-11-09 PROCEDURE — 6370000000 HC RX 637 (ALT 250 FOR IP): Performed by: PSYCHIATRY & NEUROLOGY

## 2021-11-09 PROCEDURE — 82947 ASSAY GLUCOSE BLOOD QUANT: CPT

## 2021-11-09 PROCEDURE — 97165 OT EVAL LOW COMPLEX 30 MIN: CPT

## 2021-11-09 PROCEDURE — 81001 URINALYSIS AUTO W/SCOPE: CPT

## 2021-11-09 PROCEDURE — 1180000000 HC REHAB R&B

## 2021-11-09 PROCEDURE — 84134 ASSAY OF PREALBUMIN: CPT

## 2021-11-09 RX ORDER — CETIRIZINE HYDROCHLORIDE 10 MG/1
5 TABLET ORAL DAILY
Status: DISCONTINUED | OUTPATIENT
Start: 2021-11-09 | End: 2021-11-23

## 2021-11-09 RX ORDER — FLUTICASONE PROPIONATE 50 MCG
1 SPRAY, SUSPENSION (ML) NASAL DAILY
Status: DISCONTINUED | OUTPATIENT
Start: 2021-11-09 | End: 2021-11-29 | Stop reason: HOSPADM

## 2021-11-09 RX ORDER — BISACODYL 10 MG
10 SUPPOSITORY, RECTAL RECTAL DAILY PRN
Status: DISCONTINUED | OUTPATIENT
Start: 2021-11-09 | End: 2021-11-29 | Stop reason: HOSPADM

## 2021-11-09 RX ORDER — SODIUM CHLORIDE 0.9 % (FLUSH) 0.9 %
5-40 SYRINGE (ML) INJECTION 2 TIMES DAILY
Status: DISCONTINUED | OUTPATIENT
Start: 2021-11-09 | End: 2021-11-21

## 2021-11-09 RX ORDER — SENNA AND DOCUSATE SODIUM 50; 8.6 MG/1; MG/1
1 TABLET, FILM COATED ORAL 2 TIMES DAILY
Status: DISCONTINUED | OUTPATIENT
Start: 2021-11-09 | End: 2021-11-29 | Stop reason: HOSPADM

## 2021-11-09 RX ORDER — WARFARIN SODIUM 5 MG/1
5 TABLET ORAL
Status: COMPLETED | OUTPATIENT
Start: 2021-11-09 | End: 2021-11-09

## 2021-11-09 RX ORDER — CETIRIZINE HYDROCHLORIDE 10 MG/1
10 TABLET ORAL DAILY
Status: DISCONTINUED | OUTPATIENT
Start: 2021-11-09 | End: 2021-11-09

## 2021-11-09 RX ORDER — ACETAMINOPHEN 325 MG/1
650 TABLET ORAL EVERY 4 HOURS PRN
Status: DISCONTINUED | OUTPATIENT
Start: 2021-11-09 | End: 2021-11-29 | Stop reason: HOSPADM

## 2021-11-09 RX ADMIN — CETIRIZINE HYDROCHLORIDE 5 MG: 10 TABLET, FILM COATED ORAL at 12:07

## 2021-11-09 RX ADMIN — Medication 10 ML: at 21:35

## 2021-11-09 RX ADMIN — ATORVASTATIN CALCIUM 20 MG: 20 TABLET, FILM COATED ORAL at 07:58

## 2021-11-09 RX ADMIN — WARFARIN SODIUM 5 MG: 5 TABLET ORAL at 17:09

## 2021-11-09 RX ADMIN — FLUTICASONE PROPIONATE 1 SPRAY: 50 SPRAY, METERED NASAL at 12:07

## 2021-11-09 RX ADMIN — SENNOSIDES AND DOCUSATE SODIUM 1 TABLET: 50; 8.6 TABLET ORAL at 21:31

## 2021-11-09 RX ADMIN — SENNOSIDES AND DOCUSATE SODIUM 1 TABLET: 50; 8.6 TABLET ORAL at 07:57

## 2021-11-09 RX ADMIN — ENOXAPARIN SODIUM 60 MG: 100 INJECTION SUBCUTANEOUS at 07:57

## 2021-11-09 RX ADMIN — DOCUSATE SODIUM 100 MG: 100 CAPSULE, LIQUID FILLED ORAL at 07:57

## 2021-11-09 RX ADMIN — ENOXAPARIN SODIUM 60 MG: 100 INJECTION SUBCUTANEOUS at 21:31

## 2021-11-09 ASSESSMENT — PAIN SCALES - WONG BAKER
WONGBAKER_NUMERICALRESPONSE: 0
WONGBAKER_NUMERICALRESPONSE: 0

## 2021-11-09 NOTE — PROGRESS NOTES
Occupational Therapy   Occupational Therapy Initial Assessment  Date: 2021   Patient Name: Kan Patel  MRN: [id number]     : 1933    Date of Service: 2021    Discharge Recommendations:  Continue to assess pending progress       Assessment   Performance deficits / Impairments: Decreased functional mobility ; Decreased ADL status; Decreased ROM; Decreased strength; Decreased safe awareness; Decreased cognition; Decreased endurance; Decreased high-level IADLs; Decreased balance; Decreased coordination  Assessment: Pt requires increased assistance with ADLs due to impaired cognition and decreased overall strength. She requires skilled OT to address the above deficits and return the pt home independently. Treatment Diagnosis: B CVAs/R hemiparesis  Prognosis: Good  Decision Making: Low Complexity  Activity Tolerance  Activity Tolerance: Patient Tolerated treatment well           Patient Diagnosis(es): There were no encounter diagnoses. has a past medical history of Arthritis, CAD (coronary artery disease), Hx of blood clots, Hyperlipidemia, Palliative care patient, and Stroke ([facility] [address] has a past surgical history that includes Leg Surgery.     Treatment Diagnosis: B CVAs/R hemiparesis      Restrictions  Restrictions/Precautions  Restrictions/Precautions: Fall Risk, Weight Bearing, Swallowing - Thickened Liquids, Modified Diet  Required Braces or Orthoses?: No  Lower Extremity Weight Bearing Restrictions  Right Lower Extremity Weight Bearing: Weight Bearing As Tolerated  Left Lower Extremity Weight Bearing: Weight Bearing As Tolerated  Position Activity Restriction  Other position/activity restrictions: Dysphagia-soft and bite sized, NTL, water in between meals    Subjective   General  Chart Reviewed: Yes  Patient assessed for rehabilitation services?: Yes  Diagnosis: Acute ischemic B CVA; aphasia; AMS; hemiplegia affecting dominant side     Social/Functional History  Social/Functional History  Lives With: Alone (Son lives next door and uses [employer] in different rooms to see and talk to her, Checks in on her.)  Type of Home: [address]: One level (ramps inside the house to get to different rooms )  Home Access: Stairs to enter with rails  Entrance Stairs - Number of Steps: 1  Entrance Stairs - Rails: Left (grab bar to enter)  Bathroom Shower/Tub: Tub/Shower unit (takes sponge baths, but son was interested on education about transfers to tub transfer bench)  Home Equipment: 4 wheeled walker, Grab bars, Wheelchair-manual (Has w/c, but doesn't use)  Receives Help From: Friend(s)  ADL Assistance: Needs assistance (Son assists with dressing.)  Homemaking Assistance: Needs assistance (Son places meals in refrigerator)  Ambulation Assistance: Independent (used rollator)  Transfer Assistance: Independent  Active : No  IADL Comments: Son completes homemaking tasks for her, including preparing meals that are placed in the refrigerator that the patient independently retrieves. Objective   Vision: Impaired (Son states that she has low vision adaptations prior to CVA, will need to further asses vision for any acute changes.)  Hearing: Within functional limits       Cognition  Overall Cognitive Status: Exceptions  Arousal/Alertness: Delayed responses to stimuli  Following Commands: Follows one step commands with repetition;  Follows one step commands with increased time  Attention Span: Difficulty attending to directions  Memory: Decreased recall of precautions; Decreased recall of recent events  Safety Judgement: Decreased awareness of need for assistance; Decreased awareness of need for safety  Problem Solving: Decreased awareness of errors; Assistance required to identify errors made; Assistance required to correct errors made  Insights: Not aware of deficits  Initiation: Requires cues for all  Sequencing: Requires cues for all                 LUE AROM (degrees)  LUE AROM : Exceptions  LUE General AROM: WFL distally  L Shoulder Flexion 0-180: 120 degrees  RUE AROM (degrees)  RUE AROM : Exceptions  RUE General AROM: WFL distally  R Shoulder Flexion 0-180: 100 degrees  LUE Strength  Gross LUE Strength: Exceptions to [facility]  L Hand General: 3+/5  LUE Strength Comment: Proximally 3-/5  RUE Strength  Gross RUE Strength: Exceptions to [facility]  R Hand General: 3/5  RUE Strength Comment: Proximally 2+/5                   Plan   Plan  Current Treatment Recommendations: Strengthening, Pain Management, Positioning, ROM, Safety Education & Training, Balance Training, Patient/Caregiver Education & Training, Self-Care / ADL, Cognitive/Perceptual Training, Functional Mobility Training, Neuromuscular Re-education, Equipment Evaluation, Education, & procurement, Home Management Training, Endurance Training, Cognitive Reorientation         OutComes Score    Eating  Assistance Needed: Partial/moderate assistance  CARE Score: 3  Discharge Goal: Independent    Oral Hygiene  Assistance Needed: Supervision or touching assistance  CARE Score: 4  Discharge Goal: Independent     Toileting Hygiene  Assistance Needed: Dependent  CARE Score: 1  Discharge Goal: Supervision or touching assistance     Shower/Bathe Self  Assistance Needed: Substantial/maximal assistance  CARE Score: 2  Discharge Goal: Supervision or touching assistance     Upper Body Dressing  Assistance Needed: Substantial/maximal assistance  CARE Score: 2  Discharge Goal: Supervision or touching assistance     Lower Body Dressing  Assistance Needed: Dependent  CARE Score: 1  Discharge Goal: Partial/moderate assistance     Putting On/Taking Off Footwear  Assistance Needed: Dependent  CARE Score: 1  Discharge Goal: Partial/moderate assistance     Toilet Transfer  Assistance Needed: Substantial/maximal assistance  CARE Score: 2  Discharge Goal: Supervision or touching assistance     Picking Up Object  Assistance Needed: Dependent  CARE Score: 1  Discharge Goal: Substantial/maximal assistance       Goals  Short term goals  Time Frame for Short term goals: 1 week  Short term goal 1: Mod A with clothing management/hygiene for toileting. Short term goal 2: Mod A with LB dressing, AE PRN. Short term goal 3: Mod A with donning/doffing socks and shoes, AE PRN. Short term goal 4: Min A with toilet transfers. Short term goal 5: Min A with bathing hygiene. Long term goals  Time Frame for Long term goals : 3-4 weeks  Long term goal 1: CGA with clothing management/hygiene for toileting. Long term goal 2: Min A with LB dressing, AE PRN. Long term goal 3: Min A with donning/doffing socks and shoes, AE PRN. Long term goal 4: CGA with toilet transfers. Long term goal 5: Supervision with bathing hygiene. Long term goals 6: Patient/family will verbalize DME needs. Patient Goals   Patient goals : Return home with assist from her son.        Therapy Time   Individual Concurrent Group Co-treatment   Time In 1000         Time Out 1100         Minutes 60         Timed Code Treatment Minutes: 75 Jonas Guerra OT

## 2021-11-09 NOTE — PLAN OF CARE
Problem: SAFETY  Goal: LTG - patient will adhere to hip precautions during ADL's and transfers  Outcome: Ongoing  Goal: LTG - Patient will demonstrate safety requirements appropriate to situation/environment  Outcome: Ongoing  Goal: LTG - patient will utilize safety techniques  Outcome: Ongoing  Goal: STG - patient locks brakes on wheelchair  Outcome: Ongoing  Goal: STG - Patient uses call light consistently to request assistance with transfers  Outcome: Ongoing  Goal: STG - patient uses gait belt during all transfers  Outcome: Ongoing  Goal: Free from accidental physical injury  Outcome: Ongoing  Goal: Free from intentional harm  Outcome: Ongoing     Problem: SKIN INTEGRITY  Goal: LTG - Patient will be free from infection  Outcome: Ongoing  Goal: LTG - patient will maintain/improve skin integrity through proper skin care techniques  Outcome: Ongoing  Goal: LTG - Patient will demonstrate appropriate pressure relief techniques  Outcome: Ongoing  Goal: LTG - patient will demonstrate appropriate skin care techniques  Outcome: Ongoing  Goal: LTG - Patient will be free from infection  Outcome: Ongoing  Goal: STG - Patient demonstrates skin care/treatment/dressing change  Outcome: Ongoing  Goal: STG - patient will maintain good skin integrity  Outcome: Ongoing  Goal: STG - Patient exhibits signs of wound healing.   Outcome: Ongoing  Goal: STG - patient demonstrates pressure reduction techniques  Outcome: Ongoing  Goal: STG - Patient demonstrates preventative skin care measures  Outcome: Ongoing  Goal: Skin integrity is maintained or improved  Outcome: Ongoing     Problem: PAIN  Goal: LTG - Patient will manage pain with the appropriate technique/Intervention  Outcome: Ongoing  Goal: LTG - Patient will demonstrate intervention for managing pain  Outcome: Ongoing  Goal: STG - Patient will reduce or eliminate use of analgesics  Outcome: Ongoing  Goal: STG - pain is manageable through therapies  Outcome: Ongoing  Goal: STG - Patient will verbalize an acceptable level of pain  Outcome: Ongoing  Goal: STG - patients pain is managed to allow active participation in daily activities  Outcome: Ongoing  Goal: STG - Patient will increase activity level  Outcome: Ongoing  Goal: STG - patient verbalizes a reduction in pain level  Outcome: Ongoing  Goal: Patient's pain/discomfort is manageable  Outcome: Ongoing     Problem: DAILY CARE  Goal: Daily care needs are met  Outcome: Ongoing     Problem: KNOWLEDGE DEFICIT  Goal: Patient/S.O. demonstrates understanding of disease process, treatment plan, medications, and discharge instructions.   Outcome: Ongoing     Problem: DISCHARGE BARRIERS  Goal: Patient's continuum of care needs are met  Outcome: Ongoing

## 2021-11-09 NOTE — PLAN OF CARE
[facility] TREATMENT PLAN      Aneesh Herrera    : 1933  Acct #: [de-identified]  MRN: [id number]   PHYSICIAN:  Jeannette Laughlin MD  Primary Problem    Patient Active Problem List   Diagnosis    Acute ischemic stroke [facility])    History of stroke    Aphasia    Altered mental status    Hemiplegia affecting dominant side ([facility] [address]    Other hyperlipidemia    DNR (do not resuscitate)    Palliative care patient       Rehabilitation Diagnosis:     Acute ischemic stroke ([facility] [address] [I63.9]       ADMIT DATE:2021   CARE PLAN     NURSING:  Aneesh Herrera while on this unit will:     [x] Be continent of bowel and bladder      [x] Have an adequate number of bowel movements   [x] Urinate with no urinary retention >300ml in bladder   [] Complete bladder protocol with france removal   [x] Maintain O2 SATs at _92__%   [] Have pain managed while on ARU        [] Be pain free by discharge    [x] Have no skin breakdown while on ARU   [] Have improved skin integrity via wound measurements   [] Have no signs/symptoms of infection at the wound site  [x] Be free from injury during hospitalization   [x] Complete education with patient/family with understanding demonstrated for:  [] Adjustment   [] Other:   Nursing interventions may include bowel/bladder training, education for medical assistive devices, medication education, O2 saturation management, energy conservation, stress management techniques, fall prevention, alarms protocol, seating and positioning, skin/wound care, pressure relief instruction,dressing changes,  infection protection, DVT prophylaxis, and/or assistance with in room safety with transfers to bed, toilet, wheelchair, shower as well as bathroom activities and hygiene.      Patient/caregiver education for:   [x] Disease/sustained injury/management      [x] Medication Use   [] Surgical intervention   [x] Safety   [x] Body mechanics and or joint protection   [x] Health maintenance       IRF-CORTES  Bladder and Bowel Continence  Bladder Continence: Incontinent daily  Bowel Continence: Always incontinent       PHYSICAL THERAPY:  Goals:                  Short term goals  Time Frame for Short term goals: 1-2 weeks  Short term goal 1: bed mobility Mod I   Short term goal 2: transfers with AD SBA  Short term goal 3: ambulation 50ft with AD Joshua  Short term goal 4: navigate 4 step with handrails Joshua  Short term goal 5: WC mobility 75ft CGA            Long term goals  Time Frame for Long term goals : 3-4 weeks  Long term goal 1: bed mobility Indp  Long term goal 2: transfers with AD Mod I  Long term goal 3: ambulation 150 ft with AD SBA  Long term goal 4: navigate 4 step with hand rail SBA  Long term goal 5: WC mobility 150 ft SBA  These goals were reviewed with this patient at the time of assessment and Vane Tena is in agreement.      Plan of Care: Frequency:  [] 5 days per week, 90 minutes per day                             [x]  5 days per week, 60 minutes per day               Current Treatment Recommendations: Strengthening, Balance Training, Functional Mobility Training, Transfer Training, Wheelchair Mobility Training, Gait Training, Stair training, Safety Education & Training, Patient/Caregiver Education & Training    [facility]  Roll Left and Right  Assistance Needed: Supervision or touching assistance  CARE Score: 4  Discharge Goal: Independent  Sit to Lying  Assistance Needed: Supervision or touching assistance  CARE Score: 4  Discharge Goal: Independent  Lying to Sitting on Side of Bed  Assistance Needed: Supervision or touching assistance  CARE Score: 4  Discharge Goal: Independent  Sit to Stand  Assistance Needed: Partial/moderate assistance  CARE Score: 3  Discharge Goal: Independent  Chair/Bed-to-Chair Transfer  Assistance Needed: Partial/moderate assistance  CARE Score: 3  Discharge Goal: Independent  Car Transfer  Reason if not Attempted: Not attempted due to medical condition or safety concerns  CARE Score: 88  Discharge Goal: Supervision or touching assistance  Walk 10 Feet  Reason if not Attempted: Not attempted due to medical condition or safety concerns  CARE Score: 88  Discharge Goal: Independent  Walk 50 Feet with Two Turns  Reason if not Attempted: Not attempted due to medical condition or safety concerns  CARE Score: 88  Discharge Goal: Independent  Walk 150 Feet  Reason if not Attempted: Not attempted due to medical condition or safety concerns  CARE Score: 88  Discharge Goal: Supervision or touching assistance  Walking 10 Feet on Uneven Surfaces  Reason if not Attempted: Not attempted due to medical condition or safety concerns  CARE Score: 88  Discharge Goal: Not Attempted  1 Step (Curb)  Reason if not Attempted: Not attempted due to medical condition or safety concerns  CARE Score: 88  Discharge Goal: Not Attempted  4 Steps  Reason if not Attempted: Not attempted due to medical condition or safety concerns  CARE Score: 88  Discharge Goal: Supervision or touching assistance  12 Steps  Reason if not Attempted: Not attempted due to medical condition or safety concerns  CARE Score: 88  Discharge Goal: Not Attempted  Wheel 50 Feet with Two Turns  Reason if not Attempted: Not attempted due to medical condition or safety concerns  CARE Score: 88  Discharge Goal: Independent  Wheel 150 Feet  Reason if not Attempted: Not attempted due to medical condition or safety concerns  CARE Score: 88  Discharge Goal: Independent    OCCUPATIONAL THERAPY:  Goals:             Short term goals  Time Frame for Short term goals: 1 week  Short term goal 1: Mod A with clothing management/hygiene for toileting. Short term goal 2: Mod A with LB dressing, AE PRN. Short term goal 3: Mod A with donning/doffing socks and shoes, AE PRN. Short term goal 4: Min A with toilet transfers.   Short term goal 5: Min A with bathing hygiene. :  Long term goals  Time Frame for Long term goals : 3-4 weeks  Long term goal 1: CGA with clothing management/hygiene for toileting. Long term goal 2: Min A with LB dressing, AE PRN. Long term goal 3: Min A with donning/doffing socks and shoes, AE PRN. Long term goal 4: CGA with toilet transfers. Long term goal 5: Supervision with bathing hygiene.   Long term goals 6: Patient/family will verbalize DME needs. :    These goals were reviewed with this patient at the time of assessment and Bladimir Lover is in agreement    Plan of Care: Frequency:   [] 5 days per week, 90 minutes per day     [x] 5 days per week, 60 minutes per day                Plan  Current Treatment Recommendations: Strengthening, Pain Management, Positioning, ROM, Safety Education & Training, Balance Training, Patient/Caregiver Education & Training, Self-Care / ADL, Cognitive/Perceptual Training, Functional Mobility Training, Neuromuscular Re-education, Equipment Evaluation, Education, & procurement, Home Management Training, Endurance Training, Cognitive Reorientation            IRF-[facility]ES  Eating  Assistance Needed: Partial/moderate assistance  CARE Score: 3  Discharge Goal: Independent  Oral Hygiene  Assistance Needed: Supervision or touching assistance  CARE Score: 4  Discharge Goal: [address] Needed: Dependent  CARE Score: 1  Discharge Goal: Supervision or touching assistance  Shower/Bathe Self  Assistance Needed: Substantial/maximal assistance  CARE Score: 2  Discharge Goal: Supervision or touching assistance  Upper Body Dressing  Assistance Needed: Substantial/maximal assistance  CARE Score: 2  Discharge Goal: Supervision or touching assistance  Lower Body Dressing  Assistance Needed: Dependent  CARE Score: 1  Discharge Goal: Partial/moderate assistance  Putting On/Taking Off Footwear  Assistance Needed: Dependent  CARE Score: 1  Discharge Goal: Partial/moderate assistance  Toilet Transfer  Assistance Needed: Substantial/maximal assistance  CARE Score: 2  Discharge Goal: Supervision or touching assistance  Picking Up Object  Assistance Needed: Dependent  CARE Score: 1  Discharge Goal: Substantial/maximal assistance  Treatments may include IADL retraining, strengthening, safety education and training, patient/caregiver education and training, equipment evaluation/ training/procurement, neuromuscular reeducation, wheelchair mobility training. SPEECH THERAPY:   Plan of Care and Goals:   LTG    FIM Goals: Comprehension: GOAL: Comprehension: 5  Expression: GOAL: Expression: 5  Social: GOAL: Social Interaction: 6  Problem Solving: GOAL: Problem Solvin  Memory: GOAL: Memory: 5                    Short-term Goals  Goal 1: The patient will follow two step commands with minimal cueing and 100% accuracy. Goal 2: The patient will complete naming tasks (picture, object) with minimal cueing and 100% accuracy. Goal 3: The patient will complete concrete divergent naming tasks with minimal cueing and 100% accuracy. Goal 4: The patient will complete oral motor exercises to improve lingual and labial strength and range of motion as well as improve speech intelligibility. Goal 5: The patient will utilize dysarthria strategies (slow rate, increased volume, over exaggeration of words, increased breath support) during structured tasks and during conversations with minimal cueing. Short-term Goals  Goal 1: The patient will complete the soco (tongue hold) technique to improve base of tongue retraction and base of tongue to pharyngeal wall approximation with 90% accuracy and minimal verbal cues. Goal 2: The patient will complete the effortful swallow maneuver to improve hyolaryngeal elevation, tongue base retraction, and vocal fold closure with 90% accuracy and minimal verbal cues. Goal 3: Staff/caregivers will complete daily oral hygiene to prevent aspiration of bacteria laden secretions.   Goal 4: The patient will tolerate a dysphagia soft and bite sized diet with nectar (mildly thick) liquids with minimal group therapy, and patient/family education. In addition, dietician/nutritionist may monitor calorie count as well as intake and collaboratively work with SLP on dietary upgrades. Neuropsychology/Psychology may evaluate and provide necessary support. Medical issues being managed closely and that require 24 hour availability of a physician:   [] Swallowing Precautions  [] Bowel/Bladder Fx  [] Weight bearing precautions   [] Wound Care    [x] Pain Mgmt   [] Infection Protection   [x] DVT Prophylaxis   [x] Fall Precautions  [] Fluid/Electrolyte/Nutrition Balance   [] Voice Protection   [] Respiratory  [] Other:    Medical Prognosis: [] Good  [x] Fair    [] Guarded   Total expected IRF days:  17  Anticipated discharge destination:    [] Home Independently   [x] Home with supervision    []SNF     [] Other                                           Physician anticipated functional outcomes:  Ambulate household distances independently with assistive device. IPOC brief synthesis: Acute inpatient rehabilitation with occupational   physical therapy and speech therapy, 180 minutes, 5 every 7   days will address basic and advancing mobility with self-care   instruction and adaptive equipment training. Caregiver education will   be offered. Expected length of stay prior to the supervised level of   functional discharge to home with home care is 17 days. Assessment and Plan:  1. 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 Suspected to be cardioembolic. Plavix changed to Coumadin with Lovenox bridge. PT/OT/SLP  2. DVT prolapsesLovenox/Coumadin. Coumadin to be dosed by pharmacy  3. Hyperlipidemiacontinue statin  4.   GIbowel regimen               Case Mgmt: Electronically signed by LOLITA Hunter on 11/9/2021 at 9:01 AM    OT: Electronically signed by Andrzej Taylor OT on 11/9/21 at 3:36 PM CST    PT:Electronically signed by Jorge Gao PT on 11/9/2021 at 4:30 PM    RN: Electronically signed by Rojelio Graves RN on 11/10/2021 at 8:01 AM    ST:   Electronically Signed By:  Orville Cardoza M.S., CCC-SLP  11/9/2021,12:11 PM.

## 2021-11-09 NOTE — PROGRESS NOTES
Physical Therapy Evaluation Note  DATE:  2021  NAME:  Diana Perry  :  1933  (88 y.o.,female)  MRN:  [id number]    HEIGHT:  Height: 5' 6\" (167.6 cm)  WEIGHT:  Weight: 126 lb (57.2 kg)    PATIENT DIAGNOSIS(ES):    Diagnosis: Acute ischemic stroke, L MCA infarct, R sided involvement     Additional Pertinent Hx: hx CVAs, stress incontinenc  RESTRICTIONS/PRECAUTIONS:    Restrictions/Precautions  Restrictions/Precautions: Fall Risk, Weight Bearing, Swallowing - Thickened Liquids, Modified Diet  Required Braces or Orthoses?: No  Position Activity Restriction  Other position/activity restrictions: Dysphagia-soft and bite sized, NTL, water in between meals  STRENGTH  Strength RLE  Comment: grossly assessed 3+/5  Strength LLE  Comment: grossly assessed 4/5  ROM  AROM RLE (degrees)  RLE AROM: WFL  AROM LLE (degrees)  LLE AROM : WFL  POSTURE/BALANCE  Balance  Posture: Fair  Sitting - Static: Fair (intermittent right lateral lean)  Sitting - Dynamic: Poor, +  Standing - Static: Poor       ACTIVITY TOLERANCE  Activity Tolerance  Activity Tolerance: Patient limited by fatigue, Patient Tolerated treatment well      BED MOBILITY  Bed Mobility  Rolling: Contact guard assistance  Supine to Sit: Stand by assistance, Contact guard assistance  Sit to Supine: Stand by assistance, Contact guard assistance  Scooting: Stand by assistance  Comment: pt able to get in/out of bed in a scot turn fashon from both right and left side of bed. pt is slow to move an requries many cues   TRANSFERS  Sit to Stand:  Moderate Assistance             GOALS:  Short term goals  Time Frame for Short term goals: 1-2 weeks  Short term goal 1: bed mobility Mod I   Short term goal 2: transfers with AD SBA  Short term goal 3: ambulation 50ft with AD Joshua  Short term goal 4: navigate 4 step with handrails Joshua  Short term goal 5: WC mobility 75ft CGA    Long term goals  Time Frame for Long term goals : 3-4 weeks  Long term goal 1: bed mobility Indp  Long term goal 2: transfers with AD Mod I  Long term goal 3: ambulation 150 ft with AD SBA  Long term goal 4: navigate 4 step with hand rail SBA  Long term goal 5: WC mobility 150 ft SBA  HOME LIVING:     Type of Home: House  Home Layout: One level (ramps inside the house to get to different rooms )  Home Access: Stairs to enter with rails  Entrance Stairs - Number of Steps: 1  Entrance Stairs - Rails: Left (grab bar to enter)  ASSESSMENT (IMPAIRMENTS/BARRIERS): Body structures, Functions, Activity limitations: Decreased functional mobility , Decreased strength, Decreased endurance, Decreased balance, Decreased coordination, Decreased posture  Assessment: pt presents to inital PT eval with decreased strength, coordination, endurance and limited functional mobility. pt was unsteady with transfers unable to come to a full stand and performed squat pivot tfs to/from bed when using commode or WC. pt requires many simple verbal cues on sequencing and perform tasks but performs them with good effort and some assist. pt would benefit from continued therapy to help address deficits to return to PLOF and improve functional mobility.     Activity Tolerance: Patient limited by fatigue, Patient Tolerated treatment well  Specific instructions for Next Treatment: tfs with or without AD and initiate ambulation   PLAN:  Plan  Times per week: 5-6x  Times per day:  (1-2x)  Plan weeks: 3--4 weeks  Specific instructions for Next Treatment: tfs with or without AD and initiate ambulation   Current Treatment Recommendations: Strengthening, Balance Training, Functional Mobility Training, Transfer Training, Wheelchair Mobility Training, Gait Training, Stair training, Safety Education & Training, Patient/Caregiver Education & Training     PATIENT REQUIRES AND IS REASONABLY EXPECTED TO ACTIVELY PARTICIPATE IN AT LEAST 3 HOURS OF INTENSIVE THERAPY PER DAY AT LEAST 5 DAYS PER WEEK, AND BE EXPECTED TO MAKE MEASURABLE IMPROVEMENT THAT WILL BE OF PRACTICAL VALUE TO IMPROVE THE PATIENT'S FUNCTIONAL CAPACITY OR ADAPTATION TO IMPAIRMENTS.    PATIENT GOAL FOR REHAB:  RETURN TO PRIOR LEVEL OF FUNCTION       IRF/[facility]ES  Roll Left and Right  Assistance Needed: Supervision or touching assistance  CARE Score: 4  Discharge Goal: Independent    Sit to Lying  Assistance Needed: Supervision or touching assistance  CARE Score: 4  Discharge Goal: Independent    Lying to Sitting on Side of Bed  Assistance Needed: Supervision or touching assistance  CARE Score: 4  Discharge Goal: Independent    Sit to Stand  Assistance Needed: Partial/moderate assistance  CARE Score: 3  Discharge Goal: Independent    Chair/Bed-to-Chair Transfer  Assistance Needed: Partial/moderate assistance  CARE Score: 3  Discharge Goal: Independent    Car Transfer  Reason if not Attempted: Not attempted due to medical condition or safety concerns  CARE Score: 88  Discharge Goal: Supervision or touching assistance    Walk 10 Feet  Reason if not Attempted: Not attempted due to medical condition or safety concerns  CARE Score: 88  Discharge Goal: Independent    Walk 50 Feet with Two Turns  Reason if not Attempted: Not attempted due to medical condition or safety concerns  CARE Score: 88  Discharge Goal: Independent    Walk 150 Feet  Reason if not Attempted: Not attempted due to medical condition or safety concerns  CARE Score: 88  Discharge Goal: Supervision or touching assistance    Walking 10 Feet on Uneven Surfaces  Reason if not Attempted: Not attempted due to medical condition or safety concerns  CARE Score: 88  Discharge Goal: Not Attempted    1 Step (Curb)  Reason if not Attempted: Not attempted due to medical condition or safety concerns  CARE Score: 88  Discharge Goal: Not Attempted    4 Steps  Reason if not Attempted: Not attempted due to medical condition or safety concerns  CARE Score: 88  Discharge Goal: Supervision or touching assistance    12 Steps  Reason if not Attempted: Not attempted due to medical condition or safety concerns  CARE Score: 88  Discharge Goal: Not Attempted    Wheel 50 Feet with Two Turns  Reason if not Attempted: Not attempted due to medical condition or safety concerns  CARE Score: 88  Discharge Goal: Independent    Wheel 150 Feet  Reason if not Attempted: Not attempted due to medical condition or safety concerns  CARE Score: 88  Discharge Goal: Independent      LAST TREATMENT TIME  PT Individual Minutes  Time In: 0900  Time Out: 1000  Minutes: 60

## 2021-11-09 NOTE — PLAN OF CARE
Problem: Falls - Risk of:  Goal: Will remain free from falls  Description: Will remain free from falls  Outcome: Ongoing  Goal: Absence of physical injury  Description: Absence of physical injury  Outcome: Ongoing     Problem: Confusion - Acute:  Goal: Absence of continued neurological deterioration signs and symptoms  Description: Absence of continued neurological deterioration signs and symptoms  Outcome: Ongoing  Goal: Mental status will be restored to baseline  Description: Mental status will be restored to baseline  Outcome: Ongoing     Problem: Discharge Planning:  Goal: Ability to perform activities of daily living will improve  Description: Ability to perform activities of daily living will improve  Outcome: Ongoing  Goal: Participates in care planning  Description: Participates in care planning  Outcome: Ongoing     Problem: Injury - Risk of, Physical Injury:  Goal: Will remain free from falls  Description: Will remain free from falls  Outcome: Ongoing  Goal: Absence of physical injury  Description: Absence of physical injury  Outcome: Ongoing     Problem: Mood - Altered:  Goal: Mood stable  Description: Mood stable  Outcome: Ongoing  Goal: Absence of abusive behavior  Description: Absence of abusive behavior  Outcome: Ongoing  Goal: Verbalizations of feeling emotionally comfortable while being cared for will increase  Description: Verbalizations of feeling emotionally comfortable while being cared for will increase  Outcome: Ongoing     Problem: Psychomotor Activity - Altered:  Goal: Absence of psychomotor disturbance signs and symptoms  Description: Absence of psychomotor disturbance signs and symptoms  Outcome: Ongoing     Problem: Sensory Perception - Impaired:  Goal: Demonstrations of improved sensory functioning will increase  Description: Demonstrations of improved sensory functioning will increase  Outcome: Ongoing  Goal: Decrease in sensory misperception frequency  Description: Decrease in sensory misperception frequency  Outcome: Ongoing  Goal: Able to refrain from responding to false sensory perceptions  Description: Able to refrain from responding to false sensory perceptions  Outcome: Ongoing  Goal: Demonstrates accurate environmental perceptions  Description: Demonstrates accurate environmental perceptions  Outcome: Ongoing  Goal: Able to distinguish between reality-based and nonreality-based thinking  Description: Able to distinguish between reality-based and nonreality-based thinking  Outcome: Ongoing  Goal: Able to interrupt nonreality-based thinking  Description: Able to interrupt nonreality-based thinking  Outcome: Ongoing     Problem: Sleep Pattern Disturbance:  Goal: Appears well-rested  Description: Appears well-rested  Outcome: Ongoing     Problem: IP BLADDER/VOIDING  Goal: LTG - patient will complete bladder elimination  Outcome: Ongoing  Goal: LTG - Patient will utilize adaptive techniques/equipment to complete bladder elimination  Outcome: Ongoing  Goal: LTG - patient will achieve acceptable level of continence  Outcome: Ongoing  Goal: STG - Patient demonstrates ability to take care of indwelling catheter  Outcome: Ongoing  Goal: STG - patient demonstrates self-cath technique using clean technique and care of the catheter  Outcome: Ongoing  Goal: STG - Patient demonstrates no accidents  Outcome: Ongoing  Goal: STG - Patient will state signs and symptoms of UTI  Outcome: Ongoing  Goal: STG - patient will be able to empty bladder  Outcome: Ongoing  Goal: STG - Patient demonstrates understanding of self catheterization schedule by completing task on time  Outcome: Ongoing  Goal: STG - patient participates in bladder program by expressing need to void  Outcome: Ongoing  Goal: STG - Patient verbalizes understanding of catheter care indwelling/intermittent  Outcome: Ongoing  Goal: STG - patient participates in bladder program by adhering to implemented toileting schedule  Outcome: Ongoing Problem: IP BOWEL ELIMINATION  Goal: LTG - patient will utilize adaptive techniques/equipment to complete bowel elimination  Outcome: Ongoing  Goal: LTG - patient will have regular and routine bowel evacuation  Outcome: Ongoing  Goal: STG - patient will be accident free  Outcome: Ongoing  Goal: STG - Patient demonstrates care and management of ostomy bag  Outcome: Ongoing  Goal: STG - Patient participates in bowel management program  Outcome: Ongoing  Goal: STG - patient maintains skin integrity  Outcome: Ongoing  Goal: STG - Patient will verbalize signs and symptoms of constipation and how to prevent/alleviate  Outcome: Ongoing  Goal: STG - patient will be continent of stool  Outcome: Ongoing  Goal: STG - Patient completes digital stimulation technique  Outcome: Ongoing  Goal: STG - Patient verbalizes knowledge about relationship between diet, fluid intake, activity and medication on constipation  Outcome: Ongoing     Problem: IP BREATHING  Goal: LTG - patient will mobilize secretions and maintain airway  Outcome: Ongoing  Goal: LTG - Patient/caregiver will demonstrate/perform proper techniques to maintain patent airway  Outcome: Ongoing  Goal: LTG - patient/caregiver will demonstrate/perform improved or stable self care abilities within physical limitations  Outcome: Ongoing  Goal: STG - Patient/caregiver will maintain patent airway  Outcome: Ongoing  Goal: STG - respiratory rate and effort will be within normal limits for the patient  Outcome: Ongoing  Goal: STG - patient/caregiver will be able to verbalize oxygen safety precautions  Outcome: Ongoing  Goal: STG - Patient/caregiver demonstrates correct suctioning technique  Outcome: Ongoing  Goal: STG - patient will utilize incentive spirometer  Outcome: Ongoing  Goal: STG - Patient performs or directs assisted coughing  Outcome: Ongoing  Goal: STG - patient can administer MDI's  Outcome: Ongoing  Goal: STG - Patient can utilize incentive spirometer  Outcome: Ongoing  Goal: STG - family can complete suctioning  Outcome: Ongoing     Problem: NUTRITION  Goal: Patient maintains adequate hydration  Outcome: Ongoing  Goal: Patient maintains weight  Outcome: Ongoing  Goal: Patient/Family demonstrates understanding of diet  Outcome: Ongoing  Goal: Patient/Family independently completes tube feeding  Outcome: Ongoing  Goal: Patient will have no more than 5 lb weight change during LOS  Outcome: Ongoing  Goal: Patient will utilize adaptive techniques to administer nutrition  Outcome: Ongoing  Goal: Patient will verbalize dietary restrictions  Outcome: Ongoing     Problem: SAFETY  Goal: LTG - patient will adhere to hip precautions during ADL's and transfers  11/9/2021 1039 by Wilbert Jones LPN  Outcome: Ongoing  11/9/2021 0126 by Wolf Traore RN  Outcome: Ongoing  Goal: LTG - Patient will demonstrate safety requirements appropriate to situation/environment  11/9/2021 1039 by Wilbert Jones LPN  Outcome: Ongoing  11/9/2021 0126 by Wolf Traore RN  Outcome: Ongoing  Goal: LTG - patient will utilize safety techniques  11/9/2021 1039 by Wilbert Jones LPN  Outcome: Ongoing  11/9/2021 0126 by Wolf Traore RN  Outcome: Ongoing  Goal: STG - patient locks brakes on wheelchair  11/9/2021 1039 by Wilbert Jones LPN  Outcome: Ongoing  11/9/2021 0126 by Wolf Traore RN  Outcome: Ongoing  Goal: STG - Patient uses call light consistently to request assistance with transfers  11/9/2021 1039 by Wilbert Jones LPN  Outcome: Ongoing  11/9/2021 0126 by Wolf Traore RN  Outcome: Ongoing  Goal: STG - patient uses gait belt during all transfers  11/9/2021 1039 by Wilbert Jones LPN  Outcome: Ongoing  11/9/2021 0126 by Wolf Traore RN  Outcome: Ongoing  Goal: Free from accidental physical injury  11/9/2021 1039 by Wilbert Jones LPN  Outcome: Ongoing  11/9/2021 0126 by Wolf Traore RN  Outcome: Ongoing  Goal: Free from intentional harm  11/9/2021 1039 by Magi Arceo LPN  Outcome: Ongoing  11/9/2021 0126 by Don Villafuerte RN  Outcome: Ongoing     Problem: SKIN INTEGRITY  Goal: LTG - Patient will be free from infection  11/9/2021 1039 by Magi Arceo LPN  Outcome: Ongoing  11/9/2021 0126 by Don Villafuerte RN  Outcome: Ongoing  Goal: LTG - patient will maintain/improve skin integrity through proper skin care techniques  11/9/2021 1039 by Magi Arceo LPN  Outcome: Ongoing  11/9/2021 0126 by Don Villafuerte RN  Outcome: Ongoing  Goal: LTG - Patient will demonstrate appropriate pressure relief techniques  11/9/2021 1039 by Magi Arceo LPN  Outcome: Ongoing  11/9/2021 0126 by Don Villafuerte RN  Outcome: Ongoing  Goal: LTG - patient will demonstrate appropriate skin care techniques  11/9/2021 1039 by Magi Arceo LPN  Outcome: Ongoing  11/9/2021 0126 by Don Villafuerte RN  Outcome: Ongoing  Goal: LTG - Patient will be free from infection  11/9/2021 1039 by Magi Arceo LPN  Outcome: Ongoing  11/9/2021 0126 by Don Villafuerte RN  Outcome: Ongoing  Goal: STG - Patient demonstrates skin care/treatment/dressing change  11/9/2021 1039 by Magi Arceo LPN  Outcome: Ongoing  11/9/2021 0126 by Don Villafuerte RN  Outcome: Ongoing  Goal: STG - patient will maintain good skin integrity  11/9/2021 1039 by Magi Arceo LPN  Outcome: Ongoing  11/9/2021 0126 by Don Villafuerte RN  Outcome: Ongoing  Goal: STG - Patient exhibits signs of wound healing.   11/9/2021 1039 by Magi Arceo LPN  Outcome: Ongoing  11/9/2021 0126 by Don Villafuerte RN  Outcome: Ongoing  Goal: STG - patient demonstrates pressure reduction techniques  11/9/2021 1039 by Magi Arceo LPN  Outcome: Ongoing  11/9/2021 0126 by Don Villafuerte RN  Outcome: Ongoing  Goal: STG - Patient demonstrates preventative skin care measures  11/9/2021 1039 by Magi Arceo LPN  Outcome: Ongoing  11/9/2021 0126 by Don Villafuerte RN  Outcome: Ongoing  Goal: Skin integrity is maintained or improved  11/9/2021 1039 by Kirit Adamson LPN  Outcome: Ongoing  11/9/2021 0126 by Rena Coffey RN  Outcome: Ongoing     Problem: PAIN  Goal: LTG - Patient will manage pain with the appropriate technique/Intervention  11/9/2021 1039 by Kirit Adamson LPN  Outcome: Ongoing  11/9/2021 0126 by Rena Coffey RN  Outcome: Ongoing  Goal: LTG - Patient will demonstrate intervention for managing pain  11/9/2021 1039 by Kirit Adamson LPN  Outcome: Ongoing  11/9/2021 0126 by Rena Coffey RN  Outcome: Ongoing  Goal: STG - Patient will reduce or eliminate use of analgesics  11/9/2021 1039 by Kirit Adamson LPN  Outcome: Ongoing  11/9/2021 0126 by Rena Coffey RN  Outcome: Ongoing  Goal: STG - pain is manageable through therapies  11/9/2021 1039 by Kirit Adamson LPN  Outcome: Ongoing  11/9/2021 0126 by Rena Coffey RN  Outcome: Ongoing  Goal: STG - Patient will verbalize an acceptable level of pain  11/9/2021 1039 by Kirit Adamson LPN  Outcome: Ongoing  11/9/2021 0126 by Rena Coffey RN  Outcome: Ongoing  Goal: STG - patients pain is managed to allow active participation in daily activities  11/9/2021 1039 by Kirit Adamson LPN  Outcome: Ongoing  11/9/2021 0126 by Rena Coffey RN  Outcome: Ongoing  Goal: STG - Patient will increase activity level  11/9/2021 1039 by Kirit Adamson LPN  Outcome: Ongoing  11/9/2021 0126 by Rena Coffey RN  Outcome: Ongoing  Goal: STG - patient verbalizes a reduction in pain level  11/9/2021 1039 by Kirit Adamson LPN  Outcome: Ongoing  11/9/2021 0126 by Rena Coffey RN  Outcome: Ongoing  Goal: Patient's pain/discomfort is manageable  11/9/2021 1039 by Kirit Adamson LPN  Outcome: Ongoing  11/9/2021 0126 by Rena Coffey RN  Outcome: Ongoing     Problem: DAILY CARE  Goal: Daily care needs are met  11/9/2021 1039 by Kirit Adamson LPN  Outcome: Ongoing  11/9/2021 0126 by Rena Coffey RN  Outcome: Ongoing     Problem: KNOWLEDGE DEFICIT  Goal: Patient/S.O. demonstrates understanding of disease process, treatment plan, medications, and discharge instructions.   11/9/2021 1039 by William Garcia LPN  Outcome: Ongoing  11/9/2021 0126 by Kathrin Jean RN  Outcome: Ongoing     Problem: DISCHARGE BARRIERS  Goal: Patient's continuum of care needs are met  11/9/2021 1039 by William Garcia LPN  Outcome: Ongoing  11/9/2021 0126 by Kathrin Jean RN  Outcome: Ongoing     Problem: Skin Integrity:  Goal: Will show no infection signs and symptoms  Description: Will show no infection signs and symptoms  Outcome: Ongoing  Goal: Absence of new skin breakdown  Description: Absence of new skin breakdown  Outcome: Ongoing     Problem: HEMODYNAMIC STATUS  Goal: Patient has stable vital signs and fluid balance  Outcome: Ongoing     Problem: ACTIVITY INTOLERANCE/IMPAIRED MOBILITY  Goal: Mobility/activity is maintained at optimum level for patient  Outcome: Ongoing     Problem: COMMUNICATION IMPAIRMENT  Goal: Ability to express needs and understand communication  Outcome: Ongoing

## 2021-11-09 NOTE — PROGRESS NOTES
Clinical Pharmacy Note    Warfarin consult follow-up    Recent Labs     11/09/21  0800   INR 1.30*     Recent Labs     11/07/21  0236 11/08/21  0315 11/09/21  0323   HGB 14.3 14.3 14.2   HCT 49.4* 46.8 45.4    156 153       Significant Drug-Drug Interactions:  Current warfarin drug-drug interactions: Bridging with Warfarin  Discontinued drug-drug interactions: None    Date INR Warfarin Dose   11/06/21 1.01 ---   11/07/21 --- 5 mg   11/08/21 1.04  5 mg    11/09/21  1.30 5 mg                                      Notes:  Give Wafarin 5 mg po x 1 tonight. Daily PT/INR until stable within therapeutic range.      Electronically signed by Christine Rm [facility] on 11/9/2021 at 1:51 PM

## 2021-11-09 NOTE — H&P
[facility]   History and Physical        Patient:   Diana Perry  MR#:    [id number]  Account Number:                   [id number]      Room:    [address] [place] [address]   YOB: 1933  Date of Progress Note: 11/9/2021  Time of Note                           7:13 AM  Attending Physician:  Maty Yepez MD        Admitting diagnosis: Bilateral ischemic stroke including left thalamus    Secondary diagnoses: Hyperlipidemia, GERD    CHIEF COMPLAINT: Right-sided weakness with dysphagia      HISTORY OF PRESENT ILLNESS:   This 80 y.o. female  with history of 3 strokes since March 2021, stress incontinence, arthritis, blood clots and hyperlipidemia. She presented to [facility] ER on 11/6/21 after being found on the floor at home by her son. She was confused, had abnormal speech and right sided weakness. CT of head was negative for acute changes. CTA was also negative. She was admitted to the Hospitalist service with consult for neurology. MRI done revealed an acute lacunar infarct within the left thalamus and multiple Multifocal old ischemic change in both cerebral hemispheres and within the right side of the cerebellum. She had been placed on Plavix and statin after her last stroke. She was seen by Dr. Chela Muniz and not felt to be a candidate for TPA d/t being out of the time frame and no evidence of thrombus on CTA. Dr. Chela Muniz felt strokes were most likely cardioembolic. Plavix was discontinued and she was started on Coumadin with Lovenox for bridging. Echo done showed Bubble study with evidence of small right-to-left shunting with Valsalva consistent with possible ASD/PFO. Cardiology was consulted for possible PFO closure. Patient/family do not wish to have any invasive measure. Prefer to continue with medical management with anticoagulation. Patient is a DNR. She was evaluated by SPT for swallow and aphasia. She was initially made NPO but has now been started on a mechanical soft diet with nectar thick liquids.  She continues to have right sided weakness and is participating in both PT/OT. She is felt to need a stay on Rehab for continued medical management and therapy to work towards her goal of returning home. She is now felt ready to start the Rehab program.    REVIEW OF SYSTEMS:  Constitutional  No fever or chills. No diaphoresis or significant fatigue. HENT   No tinnitus or significant hearing loss. Eyes  no sudden vision change or eye pain  Respiratory  no significant shortness of breath or cough  Cardiovascular  no chest pain No palpitations or significant leg swelling  Gastrointestinal  no abdominal swelling or pain. Genitourinary  No difficulty urinating, dysuria  Musculoskeletal  no back pain or myalgia. Skin  no color change or rash  Neurologic  No seizures. No lateralizing weakness. Hematologic  no easy bruising or excessive bleeding. Psychiatric  no severe anxiety or nervousness. All other review of systems are negative. Past Medical History:      Diagnosis Date    Arthritis     CAD (coronary artery disease)     Mild heart attack ?  when No surgery or stents    Hx of blood clots     Hyperlipidemia     Palliative care patient 11/08/2021    Stroke [facility])        Past Surgical History:      Procedure Laterality Date    LEG SURGERY         Medications in Hospital:      Current Facility-Administered Medications:     acetaminophen (TYLENOL) tablet 650 mg, 650 mg, Oral, Q4H PRN, Chema Campbell MD    bisacodyl (DULCOLAX) suppository 10 mg, 10 mg, Rectal, Daily PRN, Chema Campbell MD    sennosides-docusate sodium (SENOKOT-S) 8.6-50 MG tablet 1 tablet, 1 tablet, Oral, BID, Chema Campbell MD    magnesium hydroxide (MILK OF MAGNESIA) 400 MG/5ML suspension 30 mL, 30 mL, Oral, Daily PRN, Chema Campbell MD    acetaminophen (TYLENOL) tablet 650 mg, 650 mg, Oral, Q6H PRN **OR** acetaminophen (TYLENOL) suppository 650 mg, 650 mg, Rectal, Q6H PRN, WILSON Meléndez - CNP    enoxaparin (LOVENOX) injection 60 mg, 1 mg/kg, SubCUTAneous, Q12H, WILSON Hutchins - CNP, 60 mg at 11/08/21 2231    atorvastatin (LIPITOR) tablet 20 mg, 20 mg, Oral, Daily, Ragini Mcgarry APRN - CNP    dextrose 5 % solution, 100 mL/hr, IntraVENous, PRN, Ragini Mcgarry, APRN - CNP    dextrose 50 % IV solution, 12.5 g, IntraVENous, PRN, Ragini Mcgarry APRN - CNP    docusate sodium (COLACE) capsule 100 mg, 100 mg, Oral, Daily, Ragini Mcgarry APRN - CNP    glucagon (rDNA) injection 1 mg, 1 mg, IntraMUSCular, PRN, Ragini Mcgarry, APRN - CNP    glucose (GLUTOSE) 40 % oral gel 15 g, 15 g, Oral, PRN, Ragini Mcgarry APRN - CNP    polyethylene glycol (GLYCOLAX) packet 17 g, 17 g, Oral, Daily PRN, Ragini Mcgarry APRN - CNP  Shawna Swan ON 11/7/2022] warfarin (COUMADIN) daily dosing (placeholder), , Other, RX Placeholder, Ragini Mcgarry APRN - CNP    Allergies:  Penicillins    Social History:   TOBACCO:   reports that she has never smoked. She has never used smokeless tobacco.  ETOH:   reports no history of alcohol use. Family History:       Problem Relation Age of Onset    Heart Attack Father            Physical Exam:    Vitals: /63   Pulse 97   Temp 96.6 °F (35.9 °C)   Resp 16   Ht 5' 6\" (1.676 m)   Wt 126 lb 1.6 oz (57.2 kg)   SpO2 93%   BMI 20.35 kg/m²     Constitutional  well developed, well nourished. Eyes  conjunctiva normal.  Pupils react to light  Ear, nose, throat -hearing intact to finger rub No scars, masses, or lesions over external nose or ears, no atrophy of tongue  Neck-symmetric, no masses noted, no jugular vein distension  Respiration- chest wall appears symmetric, good expansion,   normal effort without use of accessory muscles  Cardiovascular- RRR without m,r,g  Musculoskeletal  no significant wasting of muscles noted, no bony deformities  Extremities-no clubbing, cyanosis or edema  Skin  warm, dry, and intact. No rash, erythema, or pallor.   Psychiatric  mood, affect, and behavior appear normal. Neurological exam  Awake, alert, fluent oriented to [facility].  Attention and concentration appear appropriate  Recent and remote memory appears unremarkable  Speech shows dysarthria  No clear issues with language of fund of knowledge    Cranial Nerve Exam   CN II- Visual fields show an apparent left homonymous hemianopsia  CN III, IV,VI-EOMI, No nystagmus, conjugate eye movements, no ptosis  CN VII-right facial droop  CN VIII-Hearing intact   CN IX and X- Palate elevates in midline  CN XI-good shoulder shrug  CN XII-Tongue midline with no fasciculations or fibrillations    Motor Exam  Relatively normal throughout with significant atrophy in the hands      Reflexes   Absent throughout    Tremors- no tremors in hands or head noted    Gait  Not tested    Coordination  Clumsiness in the bilateral upper extremities        CBC:   Recent Labs     11/07/21 0236 11/08/21 0315 11/09/21  0323   WBC 5.0 4.4* 4.9   HGB 14.3 14.3 14.2    156 153       BMP:    Recent Labs     11/07/21 0236 11/08/21 0315 11/09/21  0323    139 141   K 4.3 3.9 4.1    106 109   CO2 20* 23 23   BUN 17 18 16   CREATININE 0.4* 0.5 0.5   GLUCOSE 69* 91 85       Hepatic:   Recent Labs     11/07/21 0236 11/08/21 0315 11/09/21  0323   AST 24 19 18   ALT 16 15 15   BILITOT 1.2 1.6* 1.3*   ALKPHOS 103 104 99       Lipids: No results for input(s): CHOL, HDL in the last 72 hours. Invalid input(s): LDLCALCU    INR:   Recent Labs     11/08/21 0315   INR 1.04           Assessment and Plan     1. 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 Suspected to be cardioembolic. Plavix changed to Coumadin with Lovenox bridge. PT/OT/SLP  2. DVT prolapsesLovenox/Coumadin. Coumadin to be dosed by pharmacy  3. Hyperlipidemiacontinue statin  4.   GIbowel regimen      Patient's functional assessment  Prior to hospitalization the patient was continent of bowel and bladder    Functional assessment  [facility] demonstrates the need for an interdisciplinary team for active management of the following medical issues including ataxia, motor planning, balance, disease management, elimination, endurance, family training, education, independent ADLs, pain management, precautions, range of motion, safety, strength, and transfers    I have reviewed the preadmission screening documents and concur with the findings. I believe the patient meets criteria and is sufficiently stable to allow participation in the program. This requires an intensive level of therapy, close medical supervision, and an interdisciplinary team approach provided through an individualized plan of care. I approve admitting this patient for an intensive inpatient rehabilitation program.      Angella Wallis.  Alma Lizama MD

## 2021-11-09 NOTE — PROGRESS NOTES
Speech Language Pathology  Facility/Department: [facility] 8 REHAB UNIT  Initial Speech/Language/Cognitive Assessment    NAME: Vane Tena  : 1933   MRN: [id number]  ADMISSION DATE: 2021   Date of Eval: 2021   Evaluating Therapist: Jatinder Beckett MS CCC-SLP      ADMITTING DIAGNOSIS:   has Acute ischemic stroke ([address] History of stroke; Aphasia; Altered mental status; Hemiplegia affecting dominant side ([address] Other hyperlipidemia; DNR (do not resuscitate); and Palliative care patient on their problem list.      HISTORY:   Per Neurology: This 80 y.o. female  with history of 3 strokes since 2021, stress incontinence, arthritis, blood clots and hyperlipidemia. She presented to [facility] ER on 21 after being found on the floor at home by her son. She was confused, had abnormal speech and right sided weakness. CT of head was negative for acute changes. CTA was also negative. She was admitted to the Hospitalist service with consult for neurology. MRI done revealed an acute lacunar infarct within the left thalamus and multiple Multifocal old ischemic change in both cerebral hemispheres and within the right side of the cerebellum. She had been placed on Plavix and statin after her last stroke. She was seen by Dr. Ellyn Canavan and not felt to be a candidate for TPA d/t being out of the time frame and no evidence of thrombus on CTA. Dr. Ellyn Canavan felt strokes were most likely cardioembolic. Plavix was discontinued and she was started on Coumadin with Lovenox for bridging. Echo done showed Bubble study with evidence of small right-to-left shunting with Valsalva consistent with possible ASD/PFO. Cardiology was consulted for possible PFO closure. Patient/family do not wish to have any invasive measure. Prefer to continue with medical management with anticoagulation. Patient is a DNR. She was evaluated by SPT for swallow and aphasia.  She was initially made NPO but has now been started on a mechanical soft diet with nectar thick liquids. She continues to have right sided weakness and is participating in both PT/OT. She is felt to need a stay on Rehab for continued medical management and therapy to work towards her goal of returning home. She is now felt ready to start the Rehab program.       RECENT RESULTS  CT OF HEAD/MRI:  Impression   1. Moderate atrophy and diffuse small vessel disease. 2. Multifocal old ischemic change in both cerebral hemispheres and   within the right side of the cerebellum. 3. Probable by 7 mm acute lacunar infarct within the left thalamus. No   hemorrhage. Signed by Dr Ade Lopez         Pain:  Pain Assessment  Pain Assessment: Faces  Nguyen-Benoit Pain Rating: No hurt  Patient's Stated Pain Goal: No pain    Assessment:  The patient exhibits mild dysarthria and mild to moderate receptive language deficits. She also exhibits moderate expressive language deficits. She is recommended to receive speech therapy services to address her current deficits. Recommendations:  Requires SLP Intervention: Yes  Duration/Frequency of Treatment: 1x/day, 5x/week for 2-3 weeks  D/C Recommendations: To be determined       Plan:   Goals:  Short-term Goals  Goal 1: The patient will follow two step commands with minimal cueing and 100% accuracy. Goal 2: The patient will complete naming tasks (picture, object) with minimal cueing and 100% accuracy. Goal 3: The patient will complete concrete divergent naming tasks with minimal cueing and 100% accuracy. Goal 4: The patient will complete oral motor exercises to improve lingual and labial strength and range of motion as well as improve speech intelligibility. Goal 5: The patient will utilize dysarthria strategies (slow rate, increased volume, over exaggeration of words, increased breath support) during structured tasks and during conversations with minimal cueing.    Patient/family involved in developing goals and treatment plan: yes    Subjective:  General  Chart Reviewed: Yes  Patient assessed for rehabilitation services?: Yes  Family / Caregiver Present: No          Objective:  Oral/Motor  Oral Motor:  (Natural dentition in poor condition. Right labial asymmetry. Right lingual deviation. She did deny decreased oral and facial sensation.)    Auditory Comprehension  Comprehension: Exceptions  Yes/No Questions:  [facility])  Basic Questions: Moderate  One Step Basic Commands: Mild  Two Step Basic Commands: Severe  Common Objects: Mild  Pictures: Mild  L/R Discrimination: Severe  Conversation: Moderate    Reading Comprehension  Reading Status:  (Will assess during therapy)    Expression  Primary Mode of Expression: Verbal    Verbal Expression  Initiation: Moderate  Repetition: Moderate  Automatic Speech: Mild (Perseverations noted)  Confrontation: Moderate  Convergent: Severe  Divergent: Severe  Conversation: Moderate    Written Expression  Dominant Hand: Right    Motor Speech  Motor Speech: Exceptions to [facility]  Intelligibility: Mild  Dysarthria : Mild        Cognition:   Memory  Memory:  (Will assess further during therapy sessions)  Numeric Reasoning  Numeric Reasoning:  (Will assess during therapy sessions)    Additional Assessments:  Portions of the RIPA and WAB were completed this date.         Prognosis:  Good    Education:  Patient Education Response: Needs reinforcement              11/9/2021 3:27 PM     Electronically Signed By:  Adalid Mar  11/9/2021,3:29 PM.

## 2021-11-09 NOTE — PROGRESS NOTES
[facility] arrived to room # 18  Presented with: CVA, Right sided weakness  Mental Status: Patient is oriented and alert. Vitals:    11/08/21 1740   BP: 105/85   Pulse: 89   Resp: 16   Temp: 97.1 °F (36.2 °C)   SpO2: 98%     Patient safety contract and falls prevention contract reviewed with patient No.  Oriented Patient to room. Call light within reach. Yes.   Needs, issues or concerns expressed at this time: no.      Electronically signed by Nicklas Lefort, RN on 11/9/2021 at 1:41 AM

## 2021-11-09 NOTE — PROGRESS NOTES
Speech Language Pathology  Facility/Department: [facility] 8 REHAB UNIT   CLINICAL BEDSIDE SWALLOW EVALUATION    NAME: Clarisse Dumont  : 1933  MRN: [id number]  ADMISSION DATE: 2021   Date of Eval: 2021  Evaluating Therapist: Gilbert Caballero MS CCC-SLP      ADMITTING DIAGNOSIS:   has Acute ischemic stroke ([address] History of stroke; Aphasia; Altered mental status; Hemiplegia affecting dominant side ([address] Other hyperlipidemia; DNR (do not resuscitate); and Palliative care patient on their problem list.      HISTORY:  Per physician: This 80 y.o. female  with history of 3 strokes since 2021, stress incontinence, arthritis, blood clots and hyperlipidemia. She presented to [facility] ER on 21 after being found on the floor at home by her son. She was confused, had abnormal speech and right sided weakness. CT of head was negative for acute changes. CTA was also negative. She was admitted to the Hospitalist service with consult for neurology. MRI done revealed an acute lacunar infarct within the left thalamus and multiple Multifocal old ischemic change in both cerebral hemispheres and within the right side of the cerebellum. She had been placed on Plavix and statin after her last stroke. She was seen by Dr. Shy Ram and not felt to be a candidate for TPA d/t being out of the time frame and no evidence of thrombus on CTA. Dr. Shy Ram felt strokes were most likely cardioembolic. Plavix was discontinued and she was started on Coumadin with Lovenox for bridging. Echo done showed Bubble study with evidence of small right-to-left shunting with Valsalva consistent with possible ASD/PFO. Cardiology was consulted for possible PFO closure. Patient/family do not wish to have any invasive measure. Prefer to continue with medical management with anticoagulation. Patient is a DNR. She was evaluated by SPT for swallow and aphasia.  She was initially made NPO but has now been started on a mechanical soft diet with nectar thick liquids. She continues to have right sided weakness and is participating in both PT/OT. She is felt to need a stay on Rehab for continued medical management and therapy to work towards her goal of returning home. She is now felt ready to start the Rehab program.         Recent Chest Xray/CT of Chest:   Narrative   EXAMINATION: XR CHEST PORTABLE 11/6/2021 8:01 AM   HISTORY: XR CHEST PORTABLE 11/6/2021 4:17 AM   HISTORY: Code stroke   COMPARISON: None. FINDINGS:    The lungs are clear. Cardiac silhouette is normal. Cardiac monitoring   leads are present. .    The osseous structures and surrounding soft tissues demonstrate no   acute abnormality.       Impression   1. No radiographic evidence of acute cardiopulmonary process. Signed by Dr Cyndy Amaya           Current Diet level:  Current Diet : Dysphagia Soft and Bite-Sized (Dysphagia III)  Current Liquid Diet : Mildly Thick (Nectar)        Pain:  Pain Assessment  Pain Assessment: (P) Faces  Nguyen-Baker Pain Rating: (P) No hurt  Patient's Stated Pain Goal: (P) No pain    Reason for Referral  [facility] was referred for a bedside swallow evaluation to assess the efficiency of her swallow function, identify signs and symptoms of aspiration and make recommendations regarding safe dietary consistencies, effective compensatory strategies, and safe eating environment. Impression:  Mild oral phase dysphagia due to right lingual residue. Mild buccal cavity pocketing noted. Delayed oral transit was observed with most presentations. Mild to moderate pharyngeal dysphagia is suspected due to delayed pharyngeal initiation of the swallow and decreased hyolaryngeal elevation per palpation. No overt s/s of aspiration was observed with thin liquids, mildly thick nectar liquids via cup, puree or solids. She was able to pass the 3oz water swallow test. If she continues to tolerate thin liquid trials, will upgrade her tomorrow.  Recommend she continue a dysphagia soft and bite sized diet with nectar (mildly) thickened liquids. She may have small sips of water and/or ice chips between meals and following oral hygiene. Dysphagia Diagnosis: Mild oral stage dysphagia; Mild to moderate pharyngeal stage dysphagia  Dysphagia Outcome Severity Scale: Level 4: Mild moderate dysphagia- Intermittent supervision/cueing. One - two diet consistencies restricted     Treatment Plan  Requires SLP Intervention: Yes  Duration/Frequency of Treatment: 1x/day, 5x/week for 2-3 weeks  D/C Recommendations: To be determined       Recommended Diet and Intervention  Diet Solids Recommendation: Dysphagia Soft and Bite-Sized (Dysphagia III)  Liquid Consistency Recommendation: Mildly Thick (Nectar)  Recommended Form of Meds: Crushed in puree as able     Therapeutic Interventions: Mendelsohn; Diet tolerance monitoring; Oral care; Therapeutic PO trials with SLP; Oral motor exercises; Tongue base strengthening; Patient/Family education; Effortful swallow; Pharyngeal exercises; Yanet    Compensatory Swallowing Strategies  Compensatory Swallowing Strategies: Upright as possible for all oral intake; Alternate solids and liquids; Remain upright for 30-45 minutes after meals; Check for pocketing of food on the Right; Swallow 2 times per bite/sip; Small bites/sips; External pacing    Treatment/Goals  Short-term Goals  Goal 1: The patient will complete the yanet (tongue hold) technique to improve base of tongue retraction and base of tongue to pharyngeal wall approximation with 90% accuracy and minimal verbal cues. Goal 2: The patient will complete the effortful swallow maneuver to improve hyolaryngeal elevation, tongue base retraction, and vocal fold closure with 90% accuracy and minimal verbal cues. Goal 3: Staff/caregivers will complete daily oral hygiene to prevent aspiration of bacteria laden secretions.   Goal 4: The patient will tolerate a dysphagia soft and bite sized diet with nectar (mildly thick) liquids with minimal overt s/s of aspiration. Long-term Goals  Goal 1: The patient will maintain adequate hydration/nutrition with optimum safety and efficiency of swallowing function with P.O. intake without overt signs and symptoms of aspiration for the highest appropriate diet level. General  Chart Reviewed: Yes  Behavior/Cognition: Alert; Cooperative; Pleasant mood  Communication Observation: Aphasia; Dysarthria  Follows Directions: Simple  Dentition: Poor dental/oral hygeine (Natural dentition in poor condition)  Patient Positioning: Upright in chair  Baseline Vocal Quality: Weak  Volitional Cough:  (Functional)  Volitional Swallow:  (Delayed)  Consistencies Administered: Reg solid; Dysphagia Soft and Bite-Sized (Dysphagia III); Dysphagia Pureed (Dysphagia I); Pudding - teaspoon; Thin - cup; Thin - straw; Nectar - cup  WBC: 4.9  Temp: 96.6, no recent elevation  02: she is currently on room air        Oral Motor Deficits  Oral/Motor  Oral Motor:  (Natural dentition in poor condition. Right labial asymmetry. Right lingual deviation. She did deny decreased oral and facial sensation.)    Oral Phase Dysfunction  Oral Phase  Oral Phase - Comment: Mild oral phase dysphagia due to right lingual residue. Mild buccal cavity pocketing noted. No labial spillage noted this date. Delayed oral transit was observed with most presentations. Indicators of Pharyngeal Phase Dysfunction   Pharyngeal Phase   Pharyngeal: Mild to moderate pharyngeal dysphagia is suspected due to delayed pharyngeal initiation of the swallow and decreased hyolaryngeal elevation per palpation. No overt s/s of aspiration was observed with thin liquids (tsp, cup or straw), mildly thick nectar liquids via cup, puree or solids. She was able to pass the 3oz water swallow test. Clear voicing was noted following all sips and bites.     Prognosis  Good      Education  Patient Education Response: Needs reinforcement             Therapy Time  SLP Individual

## 2021-11-09 NOTE — PROGRESS NOTES
Comprehensive Nutrition Assessment    Type and Reason for Visit:  Initial, Positive Nutrition Screen    Nutrition Recommendations/Plan:   Monitor po intakes, continue current diet. Nutrition Assessment:  Following for new admission to inpatient rehab. Pt meets criteria for severe malnutrition AEB muscle/fat wasting of orbital, buccal, temporal, and clavical regions observed. Pt wt stable X 1 year. Po intake of 1 meal adequate (>75%). Will monitor po intake and need of additional nutrition intervention. Malnutrition Assessment:  Malnutrition Status:  Severe malnutrition    Context:  Chronic Illness     Findings of the 6 clinical characteristics of malnutrition:  Energy Intake:  No significant decrease in energy intake  Weight Loss:  No significant weight loss     Body Fat Loss:  7 - Severe body fat loss Buccal region, Orbital   Muscle Mass Loss:  7 - Severe muscle mass loss Clavicles (pectoralis & deltoids), Temples (temporalis)  Fluid Accumulation:  No significant fluid accumulation     Strength:  Not Performed    Estimated Daily Nutrient Needs:  Energy (kcal):  1660-5980 kcals/day; Weight Used for Energy Requirements:  Current (30-35 kcals/kg)     Protein (g):  57-68 g/pro/day; Weight Used for Protein Requirements:  Current (1.0-1.2 g/kg)        Fluid (ml/day):  2597-0932 mL/fluid/day; Method Used for Fluid Requirements:  1 ml/kcal      Nutrition Related Findings:  severe orbital, temporal, buccal, and clavical wasting observed. Current Nutrition Therapies:    ADULT DIET;  Dysphagia - Soft and Bite Sized; Mildly Thick (Nectar)    Anthropometric Measures:  · Height: 5' 6\" (167.6 cm)  · Current Body Weight: 126 lb (57.2 kg)   · Admission Body Weight: 126 lb (57.2 kg)    · Usual Body Weight: 130 lb (59 kg) (11/2020)     · Ideal Body Weight: 130 lbs  · BMI: 20.3   · BMI Categories: Underweight (BMI less than 22) age over 72       Nutrition Diagnosis:   · Severe malnutrition, In context of chronic, non-illness related related to cognitive or neurological impairment as evidenced by severe muscle loss, severe loss of subcutaneous fat    Nutrition Interventions:   Food and/or Nutrient Delivery:  Continue Current Diet  Nutrition Education/Counseling:  Education not indicated   Coordination of Nutrition Care:  Continue to monitor while inpatient    Goals:  Po intake >50% meals. wt stable. Nutrition Monitoring and Evaluation:   Food/Nutrient Intake Outcomes:  Food and Nutrient Intake, Diet Advancement/Tolerance  Physical Signs/Symptoms Outcomes:  Biochemical Data, Chewing or Swallowing, Nutrition Focused Physical Findings, Weight     Discharge Planning:     Too soon to determine     Electronically signed by Evelin Beckham, MS, RD, LD on 11/9/21 at 1:38 PM CST    Contact: [phone]

## 2021-11-09 NOTE — PROGRESS NOTES
[facility] TREATMENT PLAN      Colletta Gallery    : 1933  Acct #: [de-identified]  MRN: [id number]   PHYSICIAN:  Edil Butler MD  Primary Problem    Patient Active Problem List   Diagnosis    Acute ischemic stroke ([facility]    History of stroke    Aphasia    Altered mental status    Hemiplegia affecting dominant side ([facility]    Other hyperlipidemia    DNR (do not resuscitate)    Palliative care patient       Rehabilitation Diagnosis:     cva       ADMIT DATE:2021   CARE PLAN     NURSING:  Colletta Gallery while on this unit will:     [x] Be continent of bowel and bladder      [x] Have an adequate number of bowel movements   [x] Urinate with no urinary retention >300ml in bladder   [] Complete bladder protocol with france removal   [x] Maintain O2 SATs at _92__%   [] Have pain managed while on ARU        [] Be pain free by discharge    [x] Have no skin breakdown while on ARU   [x] Have improved skin integrity via wound measurements   [] Have no signs/symptoms of infection at the wound site  [x] Be free from injury during hospitalization   [x] Complete education with patient/family with understanding demonstrated for:  [] Adjustment   [] Other:   Nursing interventions may include bowel/bladder training, education for medical assistive devices, medication education, O2 saturation management, energy conservation, stress management techniques, fall prevention, alarms protocol, seating and positioning, skin/wound care, pressure relief instruction,dressing changes,  infection protection, DVT prophylaxis, and/or assistance with in room safety with transfers to bed, toilet, wheelchair, shower as well as bathroom activities and hygiene.      Patient/caregiver education for:   [x] Disease/sustained injury/management      [x] Medication Use   [] Surgical intervention   [x] Safety   [] Body mechanics and or joint protection   [x] Health maintenance       IRF-CORTES  Bladder and Bowel Continence  Bladder Continence: Incontinent daily  Bowel Continence: Always incontinent       PHYSICAL THERAPY:  Goals: These goals were reviewed with this patient at the time of assessment and Prisca Comer is in agreement. Plan of Care: Frequency:  [] 5 days per week, 90 minutes per day                             []  5 days per week, 60 minutes per day                    IRF-CORTES                                                 OCCUPATIONAL THERAPY:  Goals:               :    :    These goals were reviewed with this patient at the time of assessment and Prisca Comer is in agreement    Plan of Care: Frequency:   [] 5 days per week, 90 minutes per day     [] 5 days per week, 60 minutes per day                             IRF-CORTES                             Treatments may include IADL retraining, strengthening, safety education and training, patient/caregiver education and training, equipment evaluation/ training/procurement, neuromuscular reeducation, wheelchair mobility training. SPEECH THERAPY:   Plan of Care and Goals:   LTG    FIM Goals: Comprehension:    Expression:    Social:    Problem Solving:    Memory:                                                  IRF-CORTES  Hearing, Speech, and Vision  Expression of Ideas and Wants: Some difficulty  Understanding Verbal and Non-Verbal Content: Sometimes understands             Plan of Care:  Frequency:   [] 5 days per week, 60 minutes per day                            [] Not appropriate for Speech Therapy  Treatments may include speech/language/communication therapy, cognitive training, group therapy, education, and/or dysphagia therapy based on the above goals. These goals were reviewed with this patient at the time of assessment and Prisca Comer is in agreement. CASE MANAGEMENT:  Goals:   Assist patient/family with discharge planning, patient/family counseling,  and coordination with insurance during ARU stay.   Patient Goals: *** Activities Prior to Admit:                         Domenico Coyle will be seen a minimum of 3 hours of therapy per day/a minimum of 5 out of 7 days per week. [] In this rare instance due to the nature of this patient's medical involvement, this patient will be seen 15 hours per week (900 minutes within a 7 day period). Treatments may include therapeutic exercises, gait training, neuromuscular re-ed, transfer training, community reintegration, bed mobility, w/c mobility and training, self care, home mgmt, cognitive training, energy conservation,dysphagia tx, speech/language/communication therapy, group therapy, and patient/family education. In addition, dietician/nutritionist may monitor calorie count as well as intake and collaboratively work with SLP on dietary upgrades. Neuropsychology/Psychology may evaluate and provide necessary support. Medical issues being managed closely and that require 24 hour availability of a physician:   [] Swallowing Precautions  [] Bowel/Bladder Fx  [] Weight bearing precautions   [] Wound Care    [] Pain Mgmt   [] Infection Protection   [] DVT Prophylaxis   [] Fall Precautions  [] Fluid/Electrolyte/Nutrition Balance   [] Voice Protection   [] Respiratory  [] Other:    Medical Prognosis: [] Good  [] Fair    [] Guarded   Total expected IRF days:  ***  Anticipated discharge destination:    [] Home Independently   [] Home with supervision    []SNF     [] Other                                           Physician anticipated functional outcomes:  ***  IPOC brief synthesis: Acute inpatient rehabilitation with occupational   physical therapy and ***, 180 minutes, 5 every 7   days will address basic and advancing mobility with self-care   instruction and adaptive equipment training. Caregiver education will   be offered. Expected length of stay prior to the supervised level of   functional discharge to *** is *** days.     Assessment and Plan:  ***             Case Mgmt:    OT:     PT:    RN:     ST:

## 2021-11-09 NOTE — PLAN OF CARE
Nutrition Problem #1: Severe malnutrition, In context of chronic, non-illness related  Intervention: Food and/or Nutrient Delivery: Continue Current Diet  Nutritional Goals: Po intake >50% meals. wt stable.     Problem: Nutrition  Goal: Optimal nutrition therapy  Outcome: Ongoing

## 2021-11-10 LAB
ALBUMIN SERPL-MCNC: 3.3 G/DL (ref 3.5–5.2)
ALP BLD-CCNC: 96 U/L (ref 35–104)
ALT SERPL-CCNC: 18 U/L (ref 5–33)
ANION GAP SERPL CALCULATED.3IONS-SCNC: 11 MMOL/L (ref 7–19)
AST SERPL-CCNC: 24 U/L (ref 5–32)
BASOPHILS ABSOLUTE: 0 K/UL (ref 0–0.2)
BASOPHILS RELATIVE PERCENT: 0.6 % (ref 0–1)
BILIRUB SERPL-MCNC: 0.8 MG/DL (ref 0.2–1.2)
BUN BLDV-MCNC: 17 MG/DL (ref 8–23)
CALCIUM SERPL-MCNC: 9.9 MG/DL (ref 8.8–10.2)
CHLORIDE BLD-SCNC: 108 MMOL/L (ref 98–111)
CO2: 21 MMOL/L (ref 22–29)
CREAT SERPL-MCNC: 0.5 MG/DL (ref 0.5–0.9)
EOSINOPHILS ABSOLUTE: 0.2 K/UL (ref 0–0.6)
EOSINOPHILS RELATIVE PERCENT: 3.1 % (ref 0–5)
GFR AFRICAN AMERICAN: >59
GFR NON-AFRICAN AMERICAN: >60
GLUCOSE BLD-MCNC: 54 MG/DL (ref 70–99)
GLUCOSE BLD-MCNC: 59 MG/DL (ref 70–99)
GLUCOSE BLD-MCNC: 64 MG/DL (ref 70–99)
GLUCOSE BLD-MCNC: 67 MG/DL (ref 70–99)
GLUCOSE BLD-MCNC: 70 MG/DL (ref 70–99)
GLUCOSE BLD-MCNC: 81 MG/DL (ref 74–109)
GLUCOSE BLD-MCNC: 83 MG/DL (ref 70–99)
GLUCOSE BLD-MCNC: 95 MG/DL (ref 70–99)
HCT VFR BLD CALC: 48 % (ref 37–47)
HEMOGLOBIN: 14.8 G/DL (ref 12–16)
IMMATURE GRANULOCYTES #: 0 K/UL
INR BLD: 1.74 (ref 0.88–1.18)
LYMPHOCYTES ABSOLUTE: 1.6 K/UL (ref 1.1–4.5)
LYMPHOCYTES RELATIVE PERCENT: 34 % (ref 20–40)
MCH RBC QN AUTO: 29.6 PG (ref 27–31)
MCHC RBC AUTO-ENTMCNC: 30.8 G/DL (ref 33–37)
MCV RBC AUTO: 96 FL (ref 81–99)
MONOCYTES ABSOLUTE: 0.4 K/UL (ref 0–0.9)
MONOCYTES RELATIVE PERCENT: 7.8 % (ref 0–10)
NEUTROPHILS ABSOLUTE: 2.6 K/UL (ref 1.5–7.5)
NEUTROPHILS RELATIVE PERCENT: 54.3 % (ref 50–65)
PDW BLD-RTO: 14.6 % (ref 11.5–14.5)
PERFORMED ON: ABNORMAL
PERFORMED ON: NORMAL
PLATELET # BLD: 155 K/UL (ref 130–400)
PMV BLD AUTO: 9.9 FL (ref 9.4–12.3)
POTASSIUM SERPL-SCNC: 4.2 MMOL/L (ref 3.5–5)
PROTHROMBIN TIME: 20.3 SEC (ref 12–14.6)
RBC # BLD: 5 M/UL (ref 4.2–5.4)
SODIUM BLD-SCNC: 140 MMOL/L (ref 136–145)
TOTAL PROTEIN: 5.6 G/DL (ref 6.6–8.7)
WBC # BLD: 4.8 K/UL (ref 4.8–10.8)

## 2021-11-10 PROCEDURE — 36415 COLL VENOUS BLD VENIPUNCTURE: CPT

## 2021-11-10 PROCEDURE — 1180000000 HC REHAB R&B

## 2021-11-10 PROCEDURE — 85025 COMPLETE CBC W/AUTO DIFF WBC: CPT

## 2021-11-10 PROCEDURE — 92526 ORAL FUNCTION THERAPY: CPT

## 2021-11-10 PROCEDURE — 80053 COMPREHEN METABOLIC PANEL: CPT

## 2021-11-10 PROCEDURE — 97130 THER IVNTJ EA ADDL 15 MIN: CPT

## 2021-11-10 PROCEDURE — 97110 THERAPEUTIC EXERCISES: CPT

## 2021-11-10 PROCEDURE — 85610 PROTHROMBIN TIME: CPT

## 2021-11-10 PROCEDURE — 97535 SELF CARE MNGMENT TRAINING: CPT

## 2021-11-10 PROCEDURE — 82947 ASSAY GLUCOSE BLOOD QUANT: CPT

## 2021-11-10 PROCEDURE — 97129 THER IVNTJ 1ST 15 MIN: CPT

## 2021-11-10 PROCEDURE — 99233 SBSQ HOSP IP/OBS HIGH 50: CPT | Performed by: PSYCHIATRY & NEUROLOGY

## 2021-11-10 PROCEDURE — 6370000000 HC RX 637 (ALT 250 FOR IP): Performed by: NURSE PRACTITIONER

## 2021-11-10 PROCEDURE — 6370000000 HC RX 637 (ALT 250 FOR IP): Performed by: PSYCHIATRY & NEUROLOGY

## 2021-11-10 PROCEDURE — 97116 GAIT TRAINING THERAPY: CPT

## 2021-11-10 PROCEDURE — 2580000003 HC RX 258: Performed by: PSYCHIATRY & NEUROLOGY

## 2021-11-10 PROCEDURE — 6360000002 HC RX W HCPCS: Performed by: NURSE PRACTITIONER

## 2021-11-10 RX ORDER — WARFARIN SODIUM 5 MG/1
5 TABLET ORAL
Status: COMPLETED | OUTPATIENT
Start: 2021-11-10 | End: 2021-11-10

## 2021-11-10 RX ADMIN — SENNOSIDES AND DOCUSATE SODIUM 1 TABLET: 50; 8.6 TABLET ORAL at 08:14

## 2021-11-10 RX ADMIN — ENOXAPARIN SODIUM 60 MG: 100 INJECTION SUBCUTANEOUS at 20:58

## 2021-11-10 RX ADMIN — ATORVASTATIN CALCIUM 20 MG: 20 TABLET, FILM COATED ORAL at 08:14

## 2021-11-10 RX ADMIN — Medication 10 ML: at 20:58

## 2021-11-10 RX ADMIN — ENOXAPARIN SODIUM 60 MG: 100 INJECTION SUBCUTANEOUS at 08:14

## 2021-11-10 RX ADMIN — WARFARIN SODIUM 5 MG: 5 TABLET ORAL at 17:36

## 2021-11-10 RX ADMIN — CETIRIZINE HYDROCHLORIDE 5 MG: 10 TABLET, FILM COATED ORAL at 08:14

## 2021-11-10 RX ADMIN — FLUTICASONE PROPIONATE 1 SPRAY: 50 SPRAY, METERED NASAL at 08:14

## 2021-11-10 RX ADMIN — DOCUSATE SODIUM 100 MG: 100 CAPSULE, LIQUID FILLED ORAL at 08:14

## 2021-11-10 RX ADMIN — Medication 10 ML: at 08:22

## 2021-11-10 RX ADMIN — SENNOSIDES AND DOCUSATE SODIUM 1 TABLET: 50; 8.6 TABLET ORAL at 20:58

## 2021-11-10 ASSESSMENT — PAIN SCALES - WONG BAKER: WONGBAKER_NUMERICALRESPONSE: 0

## 2021-11-10 NOTE — PROGRESS NOTES
11/10/21 1300   Restrictions/Precautions   Restrictions/Precautions Fall Risk; Weight Bearing; Swallowing - Thickened Liquids; Modified Diet   Required Braces or Orthoses? No   Lower Extremity Weight Bearing Restrictions   Right Lower Extremity Weight Bearing Weight Bearing As Tolerated   Left Lower Extremity Weight Bearing Weight Bearing As Tolerated   Pain Screening   Patient Currently in Pain No   Pain Assessment   Pain Assessment Faces   Nguyen-Baker Pain Rating 0   Bed Mobility   Supine to Sit Stand by assistance   Sit to Supine Stand by assistance   Comment to the right    Transfers   Sit to Stand Minimal Assistance   Stand to sit Minimal Assistance   Bed to Chair Moderate assistance   Stand Pivot Transfers Moderate Assistance   Comment pt has right sided lean with sit/stand and with tfs. pt able to perform partial stand tfs to/from bedside commode or     Ambulation   Ambulation? Yes   WB Status WBAT   More Ambulation? Yes   Ambulation 1   Surface level tile   Device Rolling Walker   Quality of Gait DECREASED STEP LENGTH AND HEIGHT, MODERATE RIGHT SIDE LEAN AND FLEXED FORWARD POSTURE    Gait Deviations Decreased step length; Decreased step height; Shuffles   Distance 10 ft    Comments PT AMBULATES TO  TURNS TO THE RGHT TO SIT CUES TO KEEP WALKER CLOSE. REQUIRES CUES ON HAND PLACEMENT WITH FWW, ASSIT TO MANAGE FWW WHILE AMBULATING. Ambulation 2   Surface - 2 level tile   Device 2 Rolling Walker   Other Apparatus 2 Wheelchair follow   Assistance 2 Moderate assistance   Quality of Gait 2 reciprical steps that are short, has slight circumduction of right leg in swing, flex forward posture and leans to the rght     Distance 20 ft    Comments pt requries intermittent management of FWW with gait. Stairs/Curb   Stairs?  No   Exercises   Heelslides x10 BLE supine   Gluteal Sets x10 supine   Hip Flexion 2x10 BLE seated   Hip Abduction x10 BLe supine   Ankle Pumps x10 seated    Comments max cues to perform exercise    Conditions Requiring Skilled Therapeutic Intervention   Assessment pt fatigues very easily with tfs and gait, pt has difficulty understanding cues to address deficits during ambulation. pt performed bed<>WC and commode > bed tfs using FWW or w/o AD with assist to steady.  pt shows decrased safety awareness with tfs and requires guidance on where to put hands throughout tf.

## 2021-11-10 NOTE — FLOWSHEET NOTE
11/10/21 1618   Encounter Summary   Services provided to: Patient   Referral/Consult From: Other   (the son requested)   Jonas Miles; Family members   Length of Encounter 15 minutes   Spiritual/Muslim   Type Spiritual support   Assessment Sleeping     Received referral from Gwendolyn isaacs. Attempted the visit. Ms. Kashmir Garcia was sleeping and not aroused to voice. Her PCA said pt worked hard with therapy and was tried. Will follow-up later.    Electronically signed by Jacqueline Baer on 11/10/2021 at 4:22 PM

## 2021-11-10 NOTE — PROGRESS NOTES
Occupational Therapy  Facility/Department: [facility] 8 REHAB UNIT  Daily Treatment Note  NAME: Pa Murillo  : 1933  MRN: [id number]    Date of Service: 11/10/2021    Discharge Recommendations:  Continue to assess pending progress       Assessment     Decreased functional mobility ; Decreased ADL status; Decreased ROM; Decreased strength; Decreased safe awareness; Decreased cognition; Decreased endurance; Decreased high-level IADLs; Decreased balance; Decreased coordination; Decreased vision/visual deficit      Impaired vision impedes on independence with self feeding and other ADLs. Will need to further assess on BITS. Patient Diagnosis(es): There were no encounter diagnoses. has a past medical history of Arthritis, CAD (coronary artery disease), Hx of blood clots, Hyperlipidemia, Palliative care patient, and Stroke ([facility] [address] has a past surgical history that includes Leg Surgery. Restrictions  Restrictions/Precautions  Restrictions/Precautions: Fall Risk, Weight Bearing, Swallowing - Thickened Liquids, Modified Diet  Required Braces or Orthoses?: No  Lower Extremity Weight Bearing Restrictions  Right Lower Extremity Weight Bearing: Weight Bearing As Tolerated  Left Lower Extremity Weight Bearing: Weight Bearing As Tolerated  Position Activity Restriction  Other position/activity restrictions: Dysphagia-soft and bite sized, NTL, water in between meals  Subjective   General  Chart Reviewed: Yes  Patient assessed for rehabilitation services?: Yes  Diagnosis: Acute ischemic B CVA; aphasia; AMS; hemiplegia affecting dominant side      Objective             Balance  Sitting Balance: Stand by assistance  Standing Balance: Minimal assistance     Transfers  Stand Step Transfers: Moderate assistance  Sit to stand:  Moderate assistance  Stand to sit: Moderate assistance              Plan   Plan  Specific instructions for Next Treatment: BITS  Current Treatment Recommendations: Strengthening, Pain Management, goals : 3-4 weeks  Long term goal 1: CGA with clothing management/hygiene for toileting. Long term goal 2: Min A with LB dressing, AE PRN. Long term goal 3: Min A with donning/doffing socks and shoes, AE PRN. Long term goal 4: CGA with toilet transfers. Long term goal 5: Supervision with bathing hygiene. Long term goals 6: Patient/family will verbalize DME needs. Patient Goals   Patient goals : Return home with assist from her son.        Therapy Time     11/10/21 0730   OT Individual Minutes   Time In 0730   Time Out 0800   Minutes 30   Time Code Minutes    Timed Code Treatment Minutes 30 Minutes        Individual Concurrent Group Co-treatment   Time In 0830         Time Out 0900         Minutes 30         Timed Code Treatment Minutes: Carol Hartman 95, OT

## 2021-11-10 NOTE — PROGRESS NOTES
[facility]  INPATIENT SPEECH THERAPY  [facility] 8 REHAB UNIT  TIME   Time In: 1100  Time Out: 1200  Minutes: 60       [x]Daily Note  []Progress Note    Date: 11/10/2021  Patient Name: Rolando Reinoso        MRN: [id number]    Account #: [de-identified]  : 1933  (80 y.o.)  Gender: female   Primary Provider: Isadora Peguero MD  Swallowing Status/Diet: Dysphagia soft diet, thin liquids      PATIENT DIAGNOSIS(ES):    Diagnosis: Acute ischemic stroke, L MCA infarct, R sided involvement      Additional Pertinent Hx: hx CVAs, stress incontinence    RESTRICTIONS/PRECAUTIONS:    Restrictions/Precautions  Restrictions/Precautions: Fall Risk, Weight Bearing, Swallowing - Thickened Liquids, Modified Diet  Required Braces or Orthoses?: No  Position Activity Restriction  Other position/activity restrictions: Dysphagia-soft and bite sized, NTL, water in between meals          Subjective: The patient was upright in her recliner. She denied pain this morning. No caregivers were present this date. Objective:  She was unable to state the year. She was able to state her location. She was unable to state the month. She was unable to answer demographic information questions this date. A naming task utilizing objects was completed at 20% this date. She was too fatigued to feed herself therefore the therapist fed her lunch. She needed constant cues to finish masticating before asking for another bite. She continues to state she is hungry even after she has completed a meal. She did exhibit some minimal right lingual residue. No significant buccal cavity pocketing noted. She was able to pass the 3oz water swallow test again this date. Clear voicing was noted after all sips of thin liquids via cup and straw. She was upgraded to thin liquids. Recommended Diet and Intervention  Diet Solids Recommendation: Dysphagia Soft and Bite-Sized (Dysphagia III)  Liquid Consistency Recommendation:  Thin liquids  Recommended Form of Meds: Crushed in puree as able  Therapeutic Interventions: Mendelsohn; Diet tolerance monitoring; Oral care; Therapeutic PO trials with SLP; Oral motor exercises; Tongue base strengthening; Patient/Family education; Effortful swallow; Pharyngeal exercises; Yanet     Compensatory Swallowing Strategies  Compensatory Swallowing Strategies: Upright as possible for all oral intake; Alternate solids and liquids; Remain upright for 30-45 minutes after meals; Check for pocketing of food on the Right; Swallow 2 times per bite/sip; Small bites/sips; External pacing           SHORT TERM GOAL #1:  Goal 1: The patient will follow two step commands with minimal cueing and 100% accuracy. SHORT TERM GOAL #2:  Goal 2: The patient will complete naming tasks (picture, object) with minimal cueing and 100% accuracy. SHORT TERM GOAL #3:  Goal 3: The patient will complete concrete divergent naming tasks with minimal cueing and 100% accuracy. SHORT TERM GOAL #4:  Goal 4: The patient will complete oral motor exercises to improve lingual and labial strength and range of motion as well as improve speech intelligibility. SHORT TERM GOAL #5:  Goal 5: The patient will utilize dysarthria strategies (slow rate, increased volume, over exaggeration of words, increased breath support) during structured tasks and during conversations with minimal cueing. Swallowing Short Term Goals  Short-term Goals  Goal 1: The patient will complete the yanet (tongue hold) technique to improve base of tongue retraction and base of tongue to pharyngeal wall approximation with 90% accuracy and minimal verbal cues. Goal 2: The patient will complete the effortful swallow maneuver to improve hyolaryngeal elevation, tongue base retraction, and vocal fold closure with 90% accuracy and minimal verbal cues. Goal 3: Staff/caregivers will complete daily oral hygiene to prevent aspiration of bacteria laden secretions.   Goal 4: The patient will tolerate a dysphagia soft and bite sized diet with nectar (mildly thick) liquids with minimal overt s/s of aspiration. Comprehension: 3 - Patient understands basic needs 50-74% of the time  Expression: 3 - Expresses basic ideas/needs 50-74% of the time  Social Interaction: 4 - Patient appropriate 75-90%+ of the time  Problem Solving: 3 - Patient solves simple/routine tasks 50%-74%  Memory: 3 - Patient remembers 50%-74% of the time    ASSESSMENT:  Assessment: [x]Progressing towards goals          []Not Progressing towards goals    Patient Tolerance of Treatment:   [x]Tolerated well []Tolerated fair []Required rest breaks []Fatigued    Education:  Learner:  [x]Patient          []Significant Other          []Other  Education provided regarding:  [x]Goals and POC   []Diet and swallowing precautions    []Home Exercise Program  []Progress and/or discharge information  Method of Education:  [x]Discussion          []Demonstration          []Handout          []Other  Evaluation of Education:   [x]Verbalized understanding   []Demonstrates without assistance  []Demonstrates with assistance  []Needs further instruction     []No evidence of learning                  []No family present      Plan: [x]Continue with current plan of care    []Modify current plan of care as follows:    []Discharge patient    Discharge Location:    Services/Supervision Recommended:      [x]Patient continues to require treatment by a licensed therapist to address functional deficits as outlined in the established plan of care.                                 Electronically Signed By:  Dilia Rogers M.S., CCC-SLP  11/10/2021,11:10 AM.

## 2021-11-10 NOTE — PROGRESS NOTES
Clinical Pharmacy Note    Warfarin consult follow-up    Recent Labs     11/10/21  0603   INR 1.74*     Recent Labs     11/08/21  0315 11/09/21  0323 11/10/21  0603   HGB 14.3 14.2 14.8   HCT 46.8 45.4 48.0*    153 155       Significant Drug-Drug Interactions:  New warfarin drug-drug interactions: none  Discontinued drug-drug interactions: none    Date INR Warfarin Dose   11/06/21 1.01 ---   11/07/21 --- 5 mg   11/08/21 1.04  5 mg    11/09/21  1.30 5 mg    11/10/21 1.74 5 mg                              Notes: Will give warfarin 5 mg once this evening. Daily PT/INR until stable within therapeutic range.      Electronically signed by Lay Avila, [address] [place] [address] on 11/10/2021 at 10:13 AM

## 2021-11-10 NOTE — PLAN OF CARE
Problem: Falls - Risk of:  Goal: Will remain free from falls  Description: Will remain free from falls  Outcome: Ongoing  Goal: Absence of physical injury  Description: Absence of physical injury  Outcome: Ongoing     Problem: Discharge Planning:  Goal: Ability to perform activities of daily living will improve  Description: Ability to perform activities of daily living will improve  Outcome: Ongoing  Goal: Participates in care planning  Description: Participates in care planning  Outcome: Ongoing     Problem: Confusion - Acute:  Goal: Absence of continued neurological deterioration signs and symptoms  Description: Absence of continued neurological deterioration signs and symptoms  Outcome: Ongoing  Goal: Mental status will be restored to baseline  Description: Mental status will be restored to baseline  Outcome: Ongoing     Problem: Mood - Altered:  Goal: Mood stable  Description: Mood stable  Outcome: Ongoing  Goal: Absence of abusive behavior  Description: Absence of abusive behavior  Outcome: Ongoing  Goal: Verbalizations of feeling emotionally comfortable while being cared for will increase  Description: Verbalizations of feeling emotionally comfortable while being cared for will increase  Outcome: Ongoing

## 2021-11-10 NOTE — PROGRESS NOTES
After patient completed eating meal rechecked accu check and was 76 and then rechecked was 77. Patient states she feels fine.

## 2021-11-10 NOTE — PATIENT CARE CONFERENCE
[facility] ACUTE INPATIENT REHABILITATION  TEAM CONFERENCE NOTE    Date: 11/10/2021  Patient Name: Sarwat Jackson        MRN: [id number]    : 1933  (80 y.o.)  Gender: female      Diagnosis: Acute ischemic stroke, L MCA infarct, R sided involvement       PHYSICAL THERAPY  STRENGTH  Strength RLE  Comment: grossly assessed 3+/5  Strength LLE  Comment: grossly assessed 4/5  ROM  AROM RLE (degrees)  RLE AROM: WFL  AROM LLE (degrees)  LLE AROM : WFL  BED MOBILITY  Bed Mobility  Rolling: Contact guard assistance  Supine to Sit: Stand by assistance, Contact guard assistance  Sit to Supine: Stand by assistance, Contact guard assistance  Scooting: Stand by assistance  Comment: pt able to get in/out of bed in a scot turn fashon from both right and left side of bed. pt is slow to move an requries many cues   TRANSFERS  Transfers  Sit to Stand: Moderate Assistance  Stand to sit: Moderate Assistance  Bed to Chair: Moderate assistance  Squat Pivot Transfers: Moderate Assistance  Comment: pt sit/stand well with cues on hand placement when tfing to bedside commode and has a slight ride lateral lean. pt has difficulty getting to full upright posutre at inital sit/stand.  pt squat pivot tf to the bedisde cammode pt went to her left had difficulty with hand placement and required hip guidance to tf. the squat pivot tf WC> bed pt tf to her right d/t right lean and required cues on reaching for the armrest and requred asssit to steady while standing during pericare   WHEELCHAIR PROPULSION     AMBULATION     STAIRS     GOALS:  Short term goals  Time Frame for Short term goals: 1-2 weeks  Short term goal 1: bed mobility Mod I   Short term goal 2: transfers with AD SBA  Short term goal 3: ambulation 50ft with AD Joshua  Short term goal 4: navigate 4 step with handrails Joshua  Short term goal 5: WC mobility 75ft CGA    Long term goals  Time Frame for Long term goals : 3-4 weeks  Long term goal 1: bed mobility Indp  Long term goal 2: Impairments: Decreased functional mobility , Decreased ADL status, Decreased ROM, Decreased strength, Decreased safe awareness, Decreased cognition, Decreased endurance, Decreased high-level IADLs, Decreased balance, Decreased coordination                  NUTRITION  Current Wt: Weight: 126 lb (57.2 kg) / Body mass index is 20.34 kg/m². Admission Wt: Admission Body Weight: 126 lb (57.2 kg)  Oral Diet Orders: Soft/Bite-sized; NTL    Pt meets criteria for severe malnutrition AEB muscle/fat wasting of orbital, buccal, temporal, and clavical regions observed. Pt wt stable X 1 year. Po intake of 1 meal adequate (>75%). Will monitor po intake and need of additional nutrition intervention. Please see nutrition note for details. NURSING    Wounds/Incisions/Ulcers: Patient has scattered bruising, redness to buttocks and sheer to left buttocks with Mepilex. Shane Scale Score: 16    Pain: No voiced c/o pain    Consultations/Labs/X-rays:   Routine labs on Monday and Thursdays with Daily PT/INR    Family Education:  Need to make contact with family to initiate education prior to discharge. Fall Risk:  Mckeon Score: 100    Fall in the last week? No fall this hospital stay      Other Nursing Issues:   Patient is Alert to person and place. Incontinent of bladder at times and uses pure wick at hs. Assist x 2 for transfers and weak on left with left field cut. Patient is on Lovenox, Coumadin and SCD's. IID to left forearm. Accu checks ac & hs. Patient is on Dysphagia soft diet with nectar thick liquids. SOCIAL WORK/CASE MANAGEMENT  Assessment: Previously treated for CVAs this year, she lived in supported environment-in her home, next to son, with assistance and electronic surveilance for safety. Son, Laura Hall, appears to be very supportive.     Discharge Plan   Estimated Length of Stay: to be determined  Destination: undetermined at this time, goal is return to home    Pass: No    Services at Discharge: Other continued rehabilitative services    Equipment at Discharge: Will determine closer to discharge. Progress made in the prior week:  1. No prior staffing  2.  3.  4.  5.      Goals for following week:  1. SBA transfers  2. CGA with toileting. 3.   4.   5.     Factors facilitating achievement of predicted outcomes: Family support, Cooperative and Pleasant    Barriers to the achievement of predicted outcomes: visual impairment, response time, low endurance    Team Members Present at Conference:  : Madalyn Staton [facility]   Occupational Therapist: Vinayak Sparks OTR/L  Physical Therapist: Margi Lucero PT  Speech Therapist: Sandra Sauceda [address], [id number] [facility]  Nurse: Alcides Cates, RN,BSN   Nurse Manager:  Alcides Cates, RN, BSN  Dietitian:  Charline Guzman, MS, RD, LD  Rehab Director:  Ita Zavala approve the established interdisciplinary plan of care as documented within the medical record of Kan Patel.

## 2021-11-10 NOTE — PROGRESS NOTES
Patient:   Rad Ross  MR#:    [id number]   Room:    0826/826-01   YOB: 1933  Date of Progress Note: 11/10/2021  Time of Note                           8:21 AM  Consulting Physician:   Gabriel Dang M.D. Attending Physician:  Gabriel Dang MD       CHIEF COMPLAINT: Right-sided weakness with dysphagia     subjective  This 80 y.o. female  with history of 3 strokes since March 2021, stress incontinence, arthritis, blood clots and hyperlipidemia. She presented to [facility] ER on 11/6/21 after being found on the floor at home by her son. She was confused, had abnormal speech and right sided weakness. CT of head was negative for acute changes. CTA was also negative. She was admitted to the Hospitalist service with consult for neurology. MRI done revealed an acute lacunar infarct within the left thalamus and multiple Multifocal old ischemic change in both cerebral hemispheres and within the right side of the cerebellum. She had been placed on Plavix and statin after her last stroke. She was seen by Dr. Grace Jarquin and not felt to be a candidate for TPA d/t being out of the time frame and no evidence of thrombus on CTA. Dr. Grace Jarquin felt strokes were most likely cardioembolic. Plavix was discontinued and she was started on Coumadin with Lovenox for bridging. Echo done showed Bubble study with evidence of small right-to-left shunting with Valsalva consistent with possible ASD/PFO. Cardiology was consulted for possible PFO closure. Patient/family do not wish to have any invasive measure. Prefer to continue with medical management with anticoagulation. Patient is a DNR. She was evaluated by SPT for swallow and aphasia. She was initially made NPO but has now been started on a mechanical soft diet with nectar thick liquids. She continues to have right sided weakness and is participating in both PT/OT.     REVIEW OF SYSTEMS:  Constitutional: No fevers No chills  Neck:No stiffness  Respiratory: No shortness of breath  Cardiovascular: No chest pain No palpitations  Gastrointestinal: No abdominal pain    Genitourinary: No Dysuria  Neurological: No headache, no confusion      PHYSICAL EXAM:  BP (!) 154/74   Pulse 74   Temp 96.8 °F (36 °C) (Temporal)   Resp 16   Ht 5' 6\" (1.676 m)   Wt 126 lb 8 oz (57.4 kg)   SpO2 94%   BMI 20.42 kg/m²     Constitutional: she appears well-developed and well-nourished. Eyes  conjunctiva normal.  Pupils react to light  Ear, nose, throat -hearing intact to voice. No scars, masses, or lesions over external nose or ears, no atrophy of tongue  Neck-symmetric, no masses noted, no jugular vein distension  Respiration- chest wall appears symmetric, good expansion,   normal effort without use of accessory muscles  Cardiovascular- RRR  Musculoskeletal  no significant wasting of muscles noted, no bony deformities, gait no gross ataxia  Extremities-no clubbing, cyanosis or edema  Skin  warm, dry, and intact. No rash, erythema, or pallor. Psychiatric  mood, affect, and behavior appear normal.      Neurology  NEUROLOGICAL EXAM:  Awake, alert, fluent oriented to [facility].  Attention and concentration appear appropriate  Speech shows dysarthria       Cranial Nerve Exam   CN II- Visual fields show an apparent left homonymous hemianopsia  CN III, IV,VI-EOMI, No nystagmus, conjugate eye movements, no ptosis  CN VII-right facial droop  CN VIII-Hearing intact        Motor Exam  Relatively normal throughout with significant atrophy in the hands       Tremors- no tremors in hands or head noted    Coordination  Clumsiness in the bilateral upper extremities           Nursing/pcp notes, imaging,labs and vitals reviewed.      PT,OT and/or speech notes reviewed    Lab Results   Component Value Date    WBC 4.8 11/10/2021    HGB 14.8 11/10/2021    HCT 48.0 (H) 11/10/2021    MCV 96.0 11/10/2021     11/10/2021     Lab Results   Component Value Date     11/10/2021    K 4.2 11/10/2021    CL 108 11/10/2021    CO2 21 (L) 11/10/2021    BUN 17 11/10/2021    CREATININE 0.5 11/10/2021    GLUCOSE 81 11/10/2021    CALCIUM 9.9 11/10/2021    PROT 5.6 (L) 11/10/2021    LABALBU 3.3 (L) 11/10/2021    BILITOT 0.8 11/10/2021    ALKPHOS 96 11/10/2021    AST 24 11/10/2021    ALT 18 11/10/2021    LABGLOM >60 11/10/2021    GFRAA >59 11/10/2021     Lab Results   Component Value Date    INR 1.74 (H) 11/10/2021    INR 1.30 (H) 11/09/2021    INR 1.04 11/08/2021   No results found for: PHENYTOIN, ESR, CRP      PHYSICAL THERAPY  STRENGTH  Strength RLE  Comment: grossly assessed 3+/5  Strength LLE  Comment: grossly assessed 4/5  ROM  AROM RLE (degrees)  RLE AROM: WFL  AROM LLE (degrees)  LLE AROM : WFL  BED MOBILITY  Bed Mobility  Rolling: Contact guard assistance  Supine to Sit: Stand by assistance, Contact guard assistance  Sit to Supine: Stand by assistance, Contact guard assistance  Scooting: Stand by assistance  Comment: pt able to get in/out of bed in a scot turn fashon from both right and left side of bed. pt is slow to move an requries many cues   TRANSFERS  Transfers  Sit to Stand: Moderate Assistance  Stand to sit: Moderate Assistance  Bed to Chair: Moderate assistance  Squat Pivot Transfers: Moderate Assistance  Comment: pt sit/stand well with cues on hand placement when tfing to bedside commode and has a slight ride lateral lean. pt has difficulty getting to full upright posutre at inital sit/stand.  pt squat pivot tf to the bedisde cammode pt went to her left had difficulty with hand placement and required hip guidance to tf. the squat pivot tf WC> bed pt tf to her right d/t right lean and required cues on reaching for the armrest and requred asssit to steady while standing during pericare   WHEELCHAIR PROPULSION  AMBULATION  STAIRS  GOALS:  Short term goals  Time Frame for Short term goals: 1-2 weeks  Short term goal 1: bed mobility Mod I   Short term goal 2: transfers with AD SBA  Short term goal 3: ambulation 50ft with AD Joshua  Short term goal 4: navigate 4 step with handrails Joshua  Short term goal 5: WC mobility 75ft CGA     Long term goals  Time Frame for Long term goals : 3-4 weeks  Long term goal 1: bed mobility Indp  Long term goal 2: transfers with AD Mod I  Long term goal 3: ambulation 150 ft with AD SBA  Long term goal 4: navigate 4 step with hand rail SBA  Long term goal 5: WC mobility 150 ft SBA     ASSESSMENT:  Assessment: pt presents to inital PT eval with decreased strength, coordination, endurance and limited functional mobility. pt was unsteady with transfers unable to come to a full stand and performed squat pivot tfs to/from bed when using commode or WC. pt requires many simple verbal cues on sequencing and perform tasks but performs them with good effort and some assist. pt would benefit from continued therapy to help address deficits to return to PLOF and improve functional mobility.       SPEECH THERAPY        OCCUPATIONAL THERAPY        NUTRITION  Current Wt: Weight: 126 lb (57.2 kg) / Body mass index is 20.34 kg/m². Admission Wt: Admission Body Weight: 126 lb (57.2 kg)  Oral Diet Orders: Soft/Bite-sized; NTL     Pt meets criteria for severe malnutrition AEB muscle/fat wasting of orbital, buccal, temporal, and clavical regions observed. Pt wt stable X 1 year. Po intake of 1 meal adequate (>75%). Will monitor po intake and need of additional nutrition intervention. Please see nutrition note for details.         RECORD REVIEW: Previous medical records, medications were reviewed at today's visit    IMPRESSION:   1. 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 Suspected to be cardioembolic. Plavix changed to Coumadin with Lovenox bridge. PT/OT/SLP  2. DVT prolapsesLovenox/Coumadin. Coumadin to be dosed by pharmacy  3. Hyperlipidemiacontinue statin  4. GIbowel regimen  5. Bacteruria with unremarkable u/a. Suspect contaminant.  No Rx for now.       Staffing this date , mutlidisciplinary with entire team with complex decision making and planning for discharge  LEXXOS:maria ines

## 2021-11-11 LAB
ALBUMIN SERPL-MCNC: 3.7 G/DL (ref 3.5–5.2)
ALP BLD-CCNC: 101 U/L (ref 35–104)
ALT SERPL-CCNC: 25 U/L (ref 5–33)
ANION GAP SERPL CALCULATED.3IONS-SCNC: 9 MMOL/L (ref 7–19)
AST SERPL-CCNC: 27 U/L (ref 5–32)
BASOPHILS ABSOLUTE: 0 K/UL (ref 0–0.2)
BASOPHILS RELATIVE PERCENT: 0.6 % (ref 0–1)
BILIRUB SERPL-MCNC: 0.6 MG/DL (ref 0.2–1.2)
BUN BLDV-MCNC: 20 MG/DL (ref 8–23)
CALCIUM SERPL-MCNC: 10.8 MG/DL (ref 8.8–10.2)
CHLORIDE BLD-SCNC: 107 MMOL/L (ref 98–111)
CO2: 26 MMOL/L (ref 22–29)
CREAT SERPL-MCNC: 0.6 MG/DL (ref 0.5–0.9)
EOSINOPHILS ABSOLUTE: 0.2 K/UL (ref 0–0.6)
EOSINOPHILS RELATIVE PERCENT: 3.2 % (ref 0–5)
GFR AFRICAN AMERICAN: >59
GFR NON-AFRICAN AMERICAN: >60
GLUCOSE BLD-MCNC: 75 MG/DL (ref 70–99)
GLUCOSE BLD-MCNC: 86 MG/DL (ref 70–99)
GLUCOSE BLD-MCNC: 90 MG/DL (ref 74–109)
HCT VFR BLD CALC: 48.1 % (ref 37–47)
HEMOGLOBIN: 15.1 G/DL (ref 12–16)
IMMATURE GRANULOCYTES #: 0 K/UL
INR BLD: 2.44 (ref 0.88–1.18)
LYMPHOCYTES ABSOLUTE: 1.6 K/UL (ref 1.1–4.5)
LYMPHOCYTES RELATIVE PERCENT: 29.8 % (ref 20–40)
MCH RBC QN AUTO: 29.6 PG (ref 27–31)
MCHC RBC AUTO-ENTMCNC: 31.4 G/DL (ref 33–37)
MCV RBC AUTO: 94.3 FL (ref 81–99)
MONOCYTES ABSOLUTE: 0.4 K/UL (ref 0–0.9)
MONOCYTES RELATIVE PERCENT: 6.8 % (ref 0–10)
NEUTROPHILS ABSOLUTE: 3.2 K/UL (ref 1.5–7.5)
NEUTROPHILS RELATIVE PERCENT: 59.4 % (ref 50–65)
PDW BLD-RTO: 14.6 % (ref 11.5–14.5)
PERFORMED ON: NORMAL
PERFORMED ON: NORMAL
PLATELET # BLD: 149 K/UL (ref 130–400)
PMV BLD AUTO: 9.9 FL (ref 9.4–12.3)
POTASSIUM SERPL-SCNC: 4.8 MMOL/L (ref 3.5–5)
PROTHROMBIN TIME: 26.1 SEC (ref 12–14.6)
RBC # BLD: 5.1 M/UL (ref 4.2–5.4)
SODIUM BLD-SCNC: 142 MMOL/L (ref 136–145)
TOTAL PROTEIN: 5.4 G/DL (ref 6.6–8.7)
WBC # BLD: 5.3 K/UL (ref 4.8–10.8)

## 2021-11-11 PROCEDURE — 80053 COMPREHEN METABOLIC PANEL: CPT

## 2021-11-11 PROCEDURE — 85025 COMPLETE CBC W/AUTO DIFF WBC: CPT

## 2021-11-11 PROCEDURE — 85610 PROTHROMBIN TIME: CPT

## 2021-11-11 PROCEDURE — 92526 ORAL FUNCTION THERAPY: CPT

## 2021-11-11 PROCEDURE — 36415 COLL VENOUS BLD VENIPUNCTURE: CPT

## 2021-11-11 PROCEDURE — 6370000000 HC RX 637 (ALT 250 FOR IP): Performed by: PSYCHIATRY & NEUROLOGY

## 2021-11-11 PROCEDURE — 97130 THER IVNTJ EA ADDL 15 MIN: CPT

## 2021-11-11 PROCEDURE — 6370000000 HC RX 637 (ALT 250 FOR IP): Performed by: NURSE PRACTITIONER

## 2021-11-11 PROCEDURE — 2580000003 HC RX 258: Performed by: PSYCHIATRY & NEUROLOGY

## 2021-11-11 PROCEDURE — 82947 ASSAY GLUCOSE BLOOD QUANT: CPT

## 2021-11-11 PROCEDURE — 99232 SBSQ HOSP IP/OBS MODERATE 35: CPT | Performed by: PSYCHIATRY & NEUROLOGY

## 2021-11-11 PROCEDURE — 97110 THERAPEUTIC EXERCISES: CPT

## 2021-11-11 PROCEDURE — 97535 SELF CARE MNGMENT TRAINING: CPT

## 2021-11-11 PROCEDURE — 97116 GAIT TRAINING THERAPY: CPT

## 2021-11-11 PROCEDURE — 1180000000 HC REHAB R&B

## 2021-11-11 RX ORDER — WARFARIN SODIUM 2.5 MG/1
2.5 TABLET ORAL
Status: COMPLETED | OUTPATIENT
Start: 2021-11-11 | End: 2021-11-11

## 2021-11-11 RX ADMIN — SENNOSIDES AND DOCUSATE SODIUM 1 TABLET: 50; 8.6 TABLET ORAL at 08:45

## 2021-11-11 RX ADMIN — ATORVASTATIN CALCIUM 20 MG: 20 TABLET, FILM COATED ORAL at 08:45

## 2021-11-11 RX ADMIN — WARFARIN SODIUM 2.5 MG: 2.5 TABLET ORAL at 17:00

## 2021-11-11 RX ADMIN — CETIRIZINE HYDROCHLORIDE 5 MG: 10 TABLET, FILM COATED ORAL at 08:45

## 2021-11-11 RX ADMIN — Medication 10 ML: at 21:11

## 2021-11-11 RX ADMIN — FLUTICASONE PROPIONATE 1 SPRAY: 50 SPRAY, METERED NASAL at 08:45

## 2021-11-11 RX ADMIN — DOCUSATE SODIUM 100 MG: 100 CAPSULE, LIQUID FILLED ORAL at 08:46

## 2021-11-11 RX ADMIN — Medication 10 ML: at 08:46

## 2021-11-11 RX ADMIN — SENNOSIDES AND DOCUSATE SODIUM 1 TABLET: 50; 8.6 TABLET ORAL at 21:11

## 2021-11-11 ASSESSMENT — PAIN SCALES - WONG BAKER: WONGBAKER_NUMERICALRESPONSE: 0

## 2021-11-11 NOTE — PROGRESS NOTES
[facility]  INPATIENT SPEECH THERAPY  [facility] [address] [facility] UNIT  TIME   [id number]    [x]Daily Note  []Progress Note    Date: 2021  Patient Name: Domenico Coyle        MRN: [id number]    Account #: [de-identified]  : 1933  (80 y.o.)  Gender: female   Primary Provider: Brandi Hanley MD  Precautions: fall/aspiration   Swallowing Status/Diet: Soft and bite sized with thin liquids      Subjective: Patient was very fatigued and lethargic during the session. SLP and OT co-treated to address safety with functional ADL's. Objective:   Patient completed dressing, bathing, and toileting during the session. Patient required max assistance for all, secondary to decreased cognition. Patient required hand over hand at times due to not being able to follow even simple commands. Patient presenting with left inattention as noted when she was washing her face. She did not wash the left side. She also did not use her left arm until prompted to. Patient required cues for how to use her wash cloth today as well as her fork when eating. Decreased proprioception was also noted during ADL tasks. Patient unable to recall recent/daily activities she had completed. Swallow function was monitored briefly and then SLP had PCA monitor while she ate lunch. Patient required assistance for using utensils. When SLP was not monitoring patient tried to eat without utensils. SLP noted patient having difficulty finding things on her tray as well. Patient was impulsive with intake. She required cues to slow down. She also required SLP to feed her as she was lethargic. Moderate residue was noted post swallow which required liquid wash and additional swallows to clear. Do continue to have concerns for pocketing as patient fatigues very easily. SHORT TERM GOAL #1:  Goal 1: The patient will follow two step commands with minimal cueing and 100% accuracy.     SHORT TERM GOAL #2:  Goal 2: The patient will complete naming tasks (picture, object) with minimal cueing and 100% accuracy. SHORT TERM GOAL #3:  Goal 3: The patient will complete concrete divergent naming tasks with minimal cueing and 100% accuracy. SHORT TERM GOAL #4:  Goal 4: The patient will complete oral motor exercises to improve lingual and labial strength and range of motion as well as improve speech intelligibility. SHORT TERM GOAL #5:  Goal 5: The patient will utilize dysarthria strategies (slow rate, increased volume, over exaggeration of words, increased breath support) during structured tasks and during conversations with minimal cueing. Swallowing Short Term Goals  Short-term Goals  Goal 1: The patient will complete the soco (tongue hold) technique to improve base of tongue retraction and base of tongue to pharyngeal wall approximation with 90% accuracy and minimal verbal cues. Goal 2: The patient will complete the effortful swallow maneuver to improve hyolaryngeal elevation, tongue base retraction, and vocal fold closure with 90% accuracy and minimal verbal cues. Goal 3: Staff/caregivers will complete daily oral hygiene to prevent aspiration of bacteria laden secretions. Goal 4: The patient will tolerate a dysphagia soft and bite sized diet with nectar (mildly thick) liquids with minimal overt s/s of aspiration.     Comprehension: 3 - Patient understands basic needs 50-74% of the time  Expression: 3 - Expresses basic ideas/needs 50-74% of the time  Social Interaction: 4 - Patient appropriate 75-90%+ of the time  Problem Solving: 3 - Patient solves simple/routine tasks 50%-74%  Memory: 3 - Patient remembers 50%-74% of the time    ASSESSMENT:  Assessment: [x]Progressing towards goals          []Not Progressing towards goals    Patient Tolerance of Treatment:   []Tolerated well [x]Tolerated fair []Required rest breaks [x]Fatigued    Education:  Learner:  [x]Patient          []Significant Other          []Other  Education provided regarding:  [x]Goals and POC   [x]Diet and swallowing precautions    []Home Exercise Program  []Progress and/or discharge information  Method of Education:  []Discussion          []Demonstration          []Handout          []Other  Evaluation of Education:   []Verbalized understanding   []Demonstrates without assistance  []Demonstrates with assistance  [x]Needs further instruction     [x]No evidence of learning                  []No family present      Plan: [x]Continue with current plan of care    []Modify current plan of care as follows:    []Discharge patient    Discharge Location:    Services/Supervision Recommended:      [x]Patient continues to require treatment by a licensed therapist to address functional deficits as outlined in the established plan of care.     Electronically signed by ULISSES Mace on 11/11/2021 at 3:13 PM

## 2021-11-11 NOTE — PLAN OF CARE
Problem: Falls - Risk of:  Goal: Will remain free from falls  Description: Will remain free from falls  11/11/2021 1014 by Ajay Gabriel RN  Outcome: Ongoing  11/10/2021 2323 by Yenny Jacob RN  Outcome: Ongoing  Goal: Absence of physical injury  Description: Absence of physical injury  11/11/2021 1014 by Ajay Gabriel RN  Outcome: Ongoing  11/10/2021 2323 by Yenny Jacob RN  Outcome: Ongoing     Problem: Confusion - Acute:  Goal: Absence of continued neurological deterioration signs and symptoms  Description: Absence of continued neurological deterioration signs and symptoms  11/11/2021 1014 by Ajay Gabriel RN  Outcome: Ongoing  11/10/2021 2323 by Yenny Jacob RN  Outcome: Ongoing  Goal: Mental status will be restored to baseline  Description: Mental status will be restored to baseline  11/11/2021 1014 by Ajay Gabriel RN  Outcome: Ongoing  11/10/2021 2323 by Yenny Jacob RN  Outcome: Ongoing     Problem: Discharge Planning:  Goal: Ability to perform activities of daily living will improve  Description: Ability to perform activities of daily living will improve  11/11/2021 1014 by Ajay Gabriel RN  Outcome: Ongoing  11/10/2021 2323 by Yenny Jacob RN  Outcome: Ongoing  Goal: Participates in care planning  Description: Participates in care planning  11/11/2021 1014 by Ajay Gabriel RN  Outcome: Ongoing  11/10/2021 2323 by Yenny Jacob RN  Outcome: Ongoing     Problem: Injury - Risk of, Physical Injury:  Goal: Will remain free from falls  Description: Will remain free from falls  11/11/2021 1014 by Ajay Gabriel RN  Outcome: Ongoing  11/10/2021 2323 by Yenny Jacob RN  Outcome: Ongoing  Goal: Absence of physical injury  Description: Absence of physical injury  11/11/2021 1014 by Ajay Gabriel RN  Outcome: Ongoing  11/10/2021 2323 by Yenny Jacob RN  Outcome: Ongoing     Problem: Mood - Altered:  Goal: Mood stable  Description: Mood stable  11/11/2021 1014 by Ajay Gabriel RN  Outcome: Ongoing  11/10/2021 2323 by Shanel Friedman RN  Outcome: Ongoing  Goal: Absence of abusive behavior  Description: Absence of abusive behavior  11/11/2021 1014 by Rani Keys RN  Outcome: Ongoing  11/10/2021 2323 by Shanel Friedman RN  Outcome: Ongoing  Goal: Verbalizations of feeling emotionally comfortable while being cared for will increase  Description: Verbalizations of feeling emotionally comfortable while being cared for will increase  11/11/2021 1014 by Rani Keys RN  Outcome: Ongoing  11/10/2021 2323 by Shanel Friedman RN  Outcome: Ongoing     Problem: Psychomotor Activity - Altered:  Goal: Absence of psychomotor disturbance signs and symptoms  Description: Absence of psychomotor disturbance signs and symptoms  11/11/2021 1014 by Rani Keys RN  Outcome: Ongoing  11/10/2021 2323 by Shanel Friedman RN  Outcome: Ongoing     Problem: Sensory Perception - Impaired:  Goal: Demonstrations of improved sensory functioning will increase  Description: Demonstrations of improved sensory functioning will increase  11/11/2021 1014 by Rani Keys RN  Outcome: Ongoing  11/10/2021 2323 by Shanel Friedman RN  Outcome: Ongoing  Goal: Decrease in sensory misperception frequency  Description: Decrease in sensory misperception frequency  11/11/2021 1014 by Rani Keys RN  Outcome: Ongoing  11/10/2021 2323 by Shanel Friedman RN  Outcome: Ongoing  Goal: Able to refrain from responding to false sensory perceptions  Description: Able to refrain from responding to false sensory perceptions  11/11/2021 1014 by Rani Keys RN  Outcome: Ongoing  11/10/2021 2323 by Shanel Friedman RN  Outcome: Ongoing  Goal: Demonstrates accurate environmental perceptions  Description: Demonstrates accurate environmental perceptions  11/11/2021 1014 by Rani Keys RN  Outcome: Ongoing  11/10/2021 2323 by Shanel Friedman RN  Outcome: Ongoing  Goal: Able to distinguish between reality-based and nonreality-based thinking  Description: Able to distinguish between reality-based and nonreality-based thinking  11/11/2021 1014 by Maksim Leija RN  Outcome: Ongoing  11/10/2021 2323 by Pushpa Solis RN  Outcome: Ongoing  Goal: Able to interrupt nonreality-based thinking  Description: Able to interrupt nonreality-based thinking  11/11/2021 1014 by Maksim Leija RN  Outcome: Ongoing  11/10/2021 2323 by Pushpa Solis RN  Outcome: Ongoing     Problem: Sleep Pattern Disturbance:  Goal: Appears well-rested  Description: Appears well-rested  11/11/2021 1014 by Maksim Leija RN  Outcome: Ongoing  11/10/2021 2323 by Pushpa Solis RN  Outcome: Ongoing     Problem: IP BLADDER/VOIDING  Goal: LTG - patient will complete bladder elimination  11/11/2021 1014 by Maksim Leija RN  Outcome: Ongoing  11/10/2021 2323 by Pushpa Solis RN  Outcome: Ongoing  Goal: LTG - Patient will utilize adaptive techniques/equipment to complete bladder elimination  11/11/2021 1014 by Maksim Leija RN  Outcome: Ongoing  11/10/2021 2323 by Pushpa Solis RN  Outcome: Ongoing  Goal: LTG - patient will achieve acceptable level of continence  11/11/2021 1014 by Maksim Leija RN  Outcome: Ongoing  11/10/2021 2323 by Pushpa Solis RN  Outcome: Ongoing  Goal: STG - Patient demonstrates ability to take care of indwelling catheter  11/11/2021 1014 by Maksim Leija RN  Outcome: Ongoing  11/10/2021 2323 by Pushpa Solis RN  Outcome: Ongoing  Goal: STG - patient demonstrates self-cath technique using clean technique and care of the catheter  11/11/2021 1014 by Maksim Leija RN  Outcome: Ongoing  11/10/2021 2323 by Pushpa Solis RN  Outcome: Ongoing  Goal: STG - Patient demonstrates no accidents  11/11/2021 1014 by Maksim Leija RN  Outcome: Ongoing  11/10/2021 2323 by Pushpa Solis RN  Outcome: Ongoing  Goal: STG - Patient will state signs and symptoms of UTI  11/11/2021 1014 by Maksim Leija RN  Outcome: Ongoing  11/10/2021 2323 by Pushpa Solis RN  Outcome: Ongoing  Goal: STG - patient will be able to empty bladder  11/11/2021 1014 by Johnathan Morales RN  Outcome: Ongoing  11/10/2021 2323 by Ector Gao RN  Outcome: Ongoing  Goal: STG - Patient demonstrates understanding of self catheterization schedule by completing task on time  11/11/2021 1014 by Johnathan Morales RN  Outcome: Ongoing  11/10/2021 2323 by Ector Gao RN  Outcome: Ongoing  Goal: STG - patient participates in bladder program by expressing need to void  11/11/2021 1014 by Johnathan Morales RN  Outcome: Ongoing  11/10/2021 2323 by Ector Gao RN  Outcome: Ongoing  Goal: STG - Patient verbalizes understanding of catheter care indwelling/intermittent  11/11/2021 1014 by Johnathan Morales RN  Outcome: Ongoing  11/10/2021 2323 by Ector Gao RN  Outcome: Ongoing  Goal: STG - patient participates in bladder program by adhering to implemented toileting schedule  11/11/2021 1014 by Johnathan Morales RN  Outcome: Ongoing  11/10/2021 2323 by Ector Gao RN  Outcome: Ongoing     Problem: IP BOWEL ELIMINATION  Goal: LTG - patient will utilize adaptive techniques/equipment to complete bowel elimination  11/11/2021 1014 by Johnathan Morales RN  Outcome: Ongoing  11/10/2021 2323 by Ector Gao RN  Outcome: Ongoing  Goal: LTG - patient will have regular and routine bowel evacuation  11/11/2021 1014 by Johnathan Morales RN  Outcome: Ongoing  11/10/2021 2323 by Ector Gao RN  Outcome: Ongoing  Goal: STG - patient will be accident free  11/11/2021 1014 by Johnathan Morales RN  Outcome: Ongoing  11/10/2021 2323 by Ector Gao RN  Outcome: Ongoing  Goal: STG - Patient demonstrates care and management of ostomy bag  11/11/2021 1014 by Johnathan Morales RN  Outcome: Ongoing  11/10/2021 2323 by Ector Gao RN  Outcome: Ongoing  Goal: STG - Patient participates in bowel management program  11/11/2021 1014 by Johnathan Morales RN  Outcome: Ongoing  11/10/2021 2323 by Ector Gao RN  Outcome: Ongoing  Goal: STG - patient maintains skin integrity  11/11/2021 1014 by Prosper Estes Minoo Ye RN  Outcome: Ongoing  11/10/2021 2323 by Felisa Alvarado RN  Outcome: Ongoing  Goal: STG - Patient will verbalize signs and symptoms of constipation and how to prevent/alleviate  11/11/2021 1014 by Blas Perez RN  Outcome: Ongoing  11/10/2021 2323 by Felisa Alvarado RN  Outcome: Ongoing  Goal: STG - patient will be continent of stool  11/11/2021 1014 by Blas Perez RN  Outcome: Ongoing  11/10/2021 2323 by Felisa Alvarado RN  Outcome: Ongoing  Goal: STG - Patient completes digital stimulation technique  11/11/2021 1014 by Blas Perez RN  Outcome: Ongoing  11/10/2021 2323 by Felisa Alvarado RN  Outcome: Ongoing  Goal: STG - Patient verbalizes knowledge about relationship between diet, fluid intake, activity and medication on constipation  11/11/2021 1014 by Blas Perez RN  Outcome: Ongoing  11/10/2021 2323 by Felisa Alvarado RN  Outcome: Ongoing     Problem: IP BREATHING  Goal: LTG - patient will mobilize secretions and maintain airway  11/11/2021 1014 by Blas Perez RN  Outcome: Ongoing  11/10/2021 2323 by Felisa Alvarado RN  Outcome: Ongoing  Goal: LTG - Patient/caregiver will demonstrate/perform proper techniques to maintain patent airway  11/11/2021 1014 by Blas Perez RN  Outcome: Ongoing  11/10/2021 2323 by Felisa Alvarado RN  Outcome: Ongoing  Goal: LTG - patient/caregiver will demonstrate/perform improved or stable self care abilities within physical limitations  11/11/2021 1014 by Blas Perez RN  Outcome: Ongoing  11/10/2021 2323 by Felisa Alvarado RN  Outcome: Ongoing  Goal: STG - Patient/caregiver will maintain patent airway  11/11/2021 1014 by Blas Perez RN  Outcome: Ongoing  11/10/2021 2323 by Felisa Alvarado RN  Outcome: Ongoing  Goal: STG - respiratory rate and effort will be within normal limits for the patient  11/11/2021 1014 by Blas Perez RN  Outcome: Ongoing  11/10/2021 2323 by Felisa Alvarado RN  Outcome: Ongoing  Goal: STG - patient/caregiver will be able to verbalize oxygen RN  Outcome: Ongoing  Goal: Patient will utilize adaptive techniques to administer nutrition  11/11/2021 1014 by Ellen Mesa RN  Outcome: Ongoing  11/10/2021 2323 by Eugene Ohara RN  Outcome: Ongoing  Goal: Patient will verbalize dietary restrictions  11/11/2021 1014 by Ellen Mesa RN  Outcome: Ongoing  11/10/2021 2323 by Eugene Ohara RN  Outcome: Ongoing     Problem: SAFETY  Goal: LTG - patient will adhere to hip precautions during ADL's and transfers  11/11/2021 1014 by Ellen Mesa RN  Outcome: Ongoing  11/10/2021 2323 by Eugene Ohara RN  Outcome: Ongoing  Goal: LTG - Patient will demonstrate safety requirements appropriate to situation/environment  11/11/2021 1014 by Ellen Mesa RN  Outcome: Ongoing  11/10/2021 2323 by Eugene Ohara RN  Outcome: Ongoing  Goal: LTG - patient will utilize safety techniques  11/11/2021 1014 by Ellen Mesa RN  Outcome: Ongoing  11/10/2021 2323 by Eugene Ohara RN  Outcome: Ongoing  Goal: STG - patient locks brakes on wheelchair  11/11/2021 1014 by Ellen Mesa RN  Outcome: Ongoing  11/10/2021 2323 by Eugene Ohara RN  Outcome: Ongoing  Goal: STG - Patient uses call light consistently to request assistance with transfers  11/11/2021 1014 by Ellen Mesa RN  Outcome: Ongoing  11/10/2021 2323 by Eugene Ohara RN  Outcome: Ongoing  Goal: STG - patient uses gait belt during all transfers  11/11/2021 1014 by Ellen Mesa RN  Outcome: Ongoing  11/10/2021 2323 by Eugene Ohara RN  Outcome: Ongoing  Goal: Free from accidental physical injury  11/11/2021 1014 by Ellen Mesa RN  Outcome: Ongoing  11/10/2021 2323 by Eugene Ohara RN  Outcome: Ongoing  Goal: Free from intentional harm  11/11/2021 1014 by Ellen Mesa RN  Outcome: Ongoing  11/10/2021 2323 by Eugene Ohara RN  Outcome: Ongoing     Problem: SKIN INTEGRITY  Goal: LTG - Patient will be free from infection  11/11/2021 1014 by Ellen Mesa RN  Outcome: Ongoing  11/10/2021 2323 by Eugene Ohara RN  Outcome: Ongoing  Goal: LTG - patient will maintain/improve skin integrity through proper skin care techniques  11/11/2021 1014 by Chloe Frausto RN  Outcome: Ongoing  11/10/2021 2323 by Leo Sotomayor RN  Outcome: Ongoing  Goal: LTG - Patient will demonstrate appropriate pressure relief techniques  11/11/2021 1014 by Chloe Frausto RN  Outcome: Ongoing  11/10/2021 2323 by Leo Sotomayor RN  Outcome: Ongoing  Goal: LTG - patient will demonstrate appropriate skin care techniques  11/11/2021 1014 by Chloe Frausto RN  Outcome: Ongoing  11/10/2021 2323 by Leo Sotomayor RN  Outcome: Ongoing  Goal: LTG - Patient will be free from infection  11/11/2021 1014 by Chloe Frausto RN  Outcome: Ongoing  11/10/2021 2323 by Leo Sotomayor RN  Outcome: Ongoing  Goal: STG - Patient demonstrates skin care/treatment/dressing change  11/11/2021 1014 by Chloe Frausto RN  Outcome: Ongoing  11/10/2021 2323 by Leo Sotomayor RN  Outcome: Ongoing  Goal: STG - patient will maintain good skin integrity  11/11/2021 1014 by Chloe Frausto RN  Outcome: Ongoing  11/10/2021 2323 by Leo Sotomayor RN  Outcome: Ongoing  Goal: STG - Patient exhibits signs of wound healing.   11/11/2021 1014 by Chloe Frausto RN  Outcome: Ongoing  11/10/2021 2323 by Leo Sotomayor RN  Outcome: Ongoing  Goal: STG - patient demonstrates pressure reduction techniques  11/11/2021 1014 by Chloe Frausto RN  Outcome: Ongoing  11/10/2021 2323 by Leo Sotomayor RN  Outcome: Ongoing  Goal: STG - Patient demonstrates preventative skin care measures  11/11/2021 1014 by Chloe Frausto RN  Outcome: Ongoing  11/10/2021 2323 by Leo Sotomayor RN  Outcome: Ongoing  Goal: Skin integrity is maintained or improved  11/11/2021 1014 by Chloe Frausto RN  Outcome: Ongoing  11/10/2021 2323 by Leo Sotomayor RN  Outcome: Ongoing     Problem: PAIN  Goal: LTG - Patient will manage pain with the appropriate technique/Intervention  11/11/2021 1014 by Chloe Frausto RN  Outcome: Ongoing  11/10/2021 2323 by [facility] KYLE Barber  Outcome: Ongoing  Goal: LTG - Patient will demonstrate intervention for managing pain  11/11/2021 1014 by Ghanshyam Hunter RN  Outcome: Ongoing  11/10/2021 2323 by Talat Massey RN  Outcome: Ongoing  Goal: STG - Patient will reduce or eliminate use of analgesics  11/11/2021 1014 by Ghanshyam Hunter RN  Outcome: Ongoing  11/10/2021 2323 by Talat Massey RN  Outcome: Ongoing  Goal: STG - pain is manageable through therapies  11/11/2021 1014 by Ghanshyam Hunter RN  Outcome: Ongoing  11/10/2021 2323 by Talat Massey RN  Outcome: Ongoing  Goal: STG - Patient will verbalize an acceptable level of pain  11/11/2021 1014 by Ghanshyam Hunter RN  Outcome: Ongoing  11/10/2021 2323 by Talat Massey RN  Outcome: Ongoing  Goal: STG - patients pain is managed to allow active participation in daily activities  11/11/2021 1014 by Ghanshyam Hunter RN  Outcome: Ongoing  11/10/2021 2323 by Talat Massey RN  Outcome: Ongoing  Goal: STG - Patient will increase activity level  11/11/2021 1014 by Ghanshyam Hunter RN  Outcome: Ongoing  11/10/2021 2323 by Talat Massey RN  Outcome: Ongoing  Goal: STG - patient verbalizes a reduction in pain level  11/11/2021 1014 by Ghanshyam Hunter RN  Outcome: Ongoing  11/10/2021 2323 by Talat Massey RN  Outcome: Ongoing  Goal: Patient's pain/discomfort is manageable  11/11/2021 1014 by Ghanshyam Hunter RN  Outcome: Ongoing  11/10/2021 2323 by Talat Massey RN  Outcome: Ongoing

## 2021-11-11 NOTE — PROGRESS NOTES
11/11/21 1300   Restrictions/Precautions   Restrictions/Precautions Fall Risk; Weight Bearing; Swallowing - Thickened Liquids; Modified Diet   Required Braces or Orthoses? No   Lower Extremity Weight Bearing Restrictions   Right Lower Extremity Weight Bearing Weight Bearing As Tolerated   Left Lower Extremity Weight Bearing Weight Bearing As Tolerated   Position Activity Restriction   Other position/activity restrictions Dysphagia-soft and bite sized, NTL, water in between meals   Pain Screening   Patient Currently in Pain No   Pain Assessment   Pain Assessment Faces   Nguyen-Baker Pain Rating 0   Patient's Stated Pain Goal No pain   Bed Mobility   Rolling Contact guard assistance   Sit to Supine Contact guard assistance   Scooting Contact guard assistance   Comment very slow to initiate movement able to get in bed without assistance    Transfers   Sit to Stand Moderate Assistance   Stand to sit Moderate Assistance   Bed to Chair Moderate assistance   Stand Pivot Transfers Moderate Assistance   Comment very strong right lean throughout tf despite cues, requires full FWW management with tfs and does not come u to fullstand, very fleed forward posture   Ambulation   Ambulation? Yes   WB Status WBAT   More Ambulation? No   Ambulation 1   Surface level tile   Device Parallel Bars   Other Apparatus Wheelchair follow   Assistance Moderate assistance   Quality of Gait severe right lean, in turnign of right foot with inital contact needing advancement assist, barely reciprical steps    Distance 6 ft    Comments pt only able to take a few steps before fatigue and right sided lean became too severe. Stairs/Curb   Stairs? No   Conditions Requiring Skilled Therapeutic Intervention   Assessment pt seemed very fatigued this pm, pt spent a good amount of time onthe bedside commode prior to session. pt presents with severe right sided lean and tended to freeze midway through tf requiring hip guidance.  pt was not able to manage FWW by herself and needed several cues on safe hand placement and walker management. ambulation was dc'd d/t pts severe right lean and difficulty with instruction, pt was very fatiggued after this. pt placed in bed for another perricare.

## 2021-11-11 NOTE — PLAN OF CARE
Problem: Falls - Risk of:  Goal: Will remain free from falls  Description: Will remain free from falls  11/10/2021 2323 by Yenny Jacob RN  Outcome: Ongoing  11/10/2021 1200 by Ajay Gabriel RN  Outcome: Ongoing  Goal: Absence of physical injury  Description: Absence of physical injury  11/10/2021 2323 by Yenny Jacob RN  Outcome: Ongoing  11/10/2021 1200 by Ajay Gabriel RN  Outcome: Ongoing     Problem: Confusion - Acute:  Goal: Absence of continued neurological deterioration signs and symptoms  Description: Absence of continued neurological deterioration signs and symptoms  11/10/2021 2323 by Yenny Jacob RN  Outcome: Ongoing  11/10/2021 1200 by Ajay Gabriel RN  Outcome: Ongoing  Goal: Mental status will be restored to baseline  Description: Mental status will be restored to baseline  11/10/2021 2323 by Yenny Jacob RN  Outcome: Ongoing  11/10/2021 1200 by Ajay Gabriel RN  Outcome: Ongoing     Problem: Discharge Planning:  Goal: Ability to perform activities of daily living will improve  Description: Ability to perform activities of daily living will improve  11/10/2021 2323 by Yenny Jacob RN  Outcome: Ongoing  11/10/2021 1200 by Ajay Gabriel RN  Outcome: Ongoing  Goal: Participates in care planning  Description: Participates in care planning  11/10/2021 2323 by Yenny Jacob RN  Outcome: Ongoing  11/10/2021 1200 by Ajay Gabriel RN  Outcome: Ongoing     Problem: Mood - Altered:  Goal: Mood stable  Description: Mood stable  11/10/2021 2323 by Yenny Jacob RN  Outcome: Ongoing  11/10/2021 1200 by Ajay Gabriel RN  Outcome: Ongoing  Goal: Absence of abusive behavior  Description: Absence of abusive behavior  11/10/2021 2323 by Yenny Jacob RN  Outcome: Ongoing  11/10/2021 1200 by Ajay Gabriel RN  Outcome: Ongoing  Goal: Verbalizations of feeling emotionally comfortable while being cared for will increase  Description: Verbalizations of feeling emotionally comfortable while being cared for will Ongoing  11/10/2021 1200 by Debi Corea RN  Outcome: Ongoing  Goal: STG - Patient verbalizes understanding of catheter care indwelling/intermittent  11/10/2021 2323 by Karon Henderson RN  Outcome: Ongoing  11/10/2021 1200 by Debi Corea RN  Outcome: Ongoing  Goal: STG - patient participates in bladder program by adhering to implemented toileting schedule  11/10/2021 2323 by Karon Henderson RN  Outcome: Ongoing  11/10/2021 1200 by Debi Corea RN  Outcome: Ongoing     Problem: IP BOWEL ELIMINATION  Goal: LTG - patient will utilize adaptive techniques/equipment to complete bowel elimination  11/10/2021 2323 by Karon Henderson RN  Outcome: Ongoing  11/10/2021 1200 by Debi Corea RN  Outcome: Ongoing  Goal: LTG - patient will have regular and routine bowel evacuation  11/10/2021 2323 by Karon Henderson RN  Outcome: Ongoing  11/10/2021 1200 by Debi Corea RN  Outcome: Ongoing  Goal: STG - patient will be accident free  11/10/2021 2323 by Karon Henderson RN  Outcome: Ongoing  11/10/2021 1200 by Debi Corea RN  Outcome: Ongoing  Goal: STG - Patient demonstrates care and management of ostomy bag  11/10/2021 2323 by Karon Henderson RN  Outcome: Ongoing  11/10/2021 1200 by Debi Corea RN  Outcome: Ongoing  Goal: STG - Patient participates in bowel management program  11/10/2021 2323 by Karon Henderson RN  Outcome: Ongoing  11/10/2021 1200 by Debi Corea RN  Outcome: Ongoing  Goal: STG - patient maintains skin integrity  11/10/2021 2323 by aKron Henderson RN  Outcome: Ongoing  11/10/2021 1200 by Debi Corea RN  Outcome: Ongoing  Goal: STG - Patient will verbalize signs and symptoms of constipation and how to prevent/alleviate  11/10/2021 2323 by Karon Hednerson RN  Outcome: Ongoing  11/10/2021 1200 by Debi Corea RN  Outcome: Ongoing  Goal: STG - patient will be continent of stool  11/10/2021 2323 by Karon Henderson RN  Outcome: Ongoing  11/10/2021 1200 by Debi Rook, RN  Outcome: Ongoing  Goal: STG - Patient completes digital stimulation technique  11/10/2021 2323 by Yates Hamman, RN  Outcome: Ongoing  11/10/2021 1200 by Lis Ulloa RN  Outcome: Ongoing  Goal: STG - Patient verbalizes knowledge about relationship between diet, fluid intake, activity and medication on constipation  11/10/2021 2323 by Yates Hamman, RN  Outcome: Ongoing  11/10/2021 1200 by Lis Ulloa RN  Outcome: Ongoing

## 2021-11-11 NOTE — PROGRESS NOTES
Patient:   Deangelo Granados  MR#:    [id number]   Room:    0826/826-01   YOB: 1933  Date of Progress Note: 11/11/2021  Time of Note                           8:09 AM  Consulting Physician:   Randell Camacho M.D. Attending Physician:  Randell Camacho MD       CHIEF COMPLAINT: Right-sided weakness with dysphagia     subjective  This 80 y.o. female  with history of 3 strokes since March 2021, stress incontinence, arthritis, blood clots and hyperlipidemia. She presented to [facility] ER on 11/6/21 after being found on the floor at home by her son. She was confused, had abnormal speech and right sided weakness. CT of head was negative for acute changes. CTA was also negative. She was admitted to the Hospitalist service with consult for neurology. MRI done revealed an acute lacunar infarct within the left thalamus and multiple Multifocal old ischemic change in both cerebral hemispheres and within the right side of the cerebellum. She had been placed on Plavix and statin after her last stroke. She was seen by Dr. Claire Mendez and not felt to be a candidate for TPA d/t being out of the time frame and no evidence of thrombus on CTA. Dr. Claire Mendez felt strokes were most likely cardioembolic. Plavix was discontinued and she was started on Coumadin with Lovenox for bridging. Echo done showed Bubble study with evidence of small right-to-left shunting with Valsalva consistent with possible ASD/PFO. Cardiology was consulted for possible PFO closure. Patient/family do not wish to have any invasive measure. Prefer to continue with medical management with anticoagulation. Patient is a DNR. She was evaluated by SPT for swallow and aphasia. She was initially made NPO but has now been started on a mechanical soft diet with nectar thick liquids. She continues to have right sided weakness and is participating in both PT/OT.   No complaints this morning  REVIEW OF SYSTEMS:  Constitutional: No fevers No chills  Neck:No stiffness  Respiratory: No shortness of breath  Cardiovascular: No chest pain No palpitations  Gastrointestinal: No abdominal pain    Genitourinary: No Dysuria  Neurological: No headache, no confusion      PHYSICAL EXAM:  BP (!) 153/92   Pulse 52   Temp 97.5 °F (36.4 °C)   Resp 16   Ht 5' 6\" (1.676 m)   Wt 127 lb 12.8 oz (58 kg)   SpO2 91%   BMI 20.63 kg/m²     Constitutional: she appears well-developed and well-nourished. Eyes  conjunctiva normal.  Pupils react to light  Ear, nose, throat -hearing intact to voice. No scars, masses, or lesions over external nose or ears, no atrophy of tongue  Neck-symmetric, no masses noted, no jugular vein distension  Respiration- chest wall appears symmetric, good expansion,   normal effort without use of accessory muscles  Cardiovascular- RRR  Musculoskeletal  no significant wasting of muscles noted, no bony deformities, gait no gross ataxia  Extremities-no clubbing, cyanosis or edema  Skin  warm, dry, and intact. No rash, erythema, or pallor. Psychiatric  mood, affect, and behavior appear normal.      Neurology  NEUROLOGICAL EXAM:  Awake, alert, fluent oriented to [facility] [place].  Attention and concentration appear appropriate  Speech shows dysarthria       Cranial Nerve Exam   CN II- Visual fields show an apparent left homonymous hemianopsia  CN III, IV,VI-EOMI, No nystagmus, conjugate eye movements, no ptosis  CN VII-right facial droop  CN VIII-Hearing intact        Motor Exam  Relatively normal throughout with significant atrophy in the hands       Tremors- no tremors in hands or head noted    Coordination  Clumsiness in the bilateral upper extremities           Nursing/pcp notes, imaging,labs and vitals reviewed.      PT,OT and/or speech notes reviewed    Lab Results   Component Value Date    WBC 5.3 11/11/2021    HGB 15.1 11/11/2021    HCT 48.1 (H) 11/11/2021    MCV 94.3 11/11/2021     11/11/2021     Lab Results   Component Value Date     11/11/2021    K 4.8 11/11/2021  11/11/2021    CO2 26 11/11/2021    BUN 20 11/11/2021    CREATININE 0.6 11/11/2021    GLUCOSE 90 11/11/2021    CALCIUM 10.8 (H) 11/11/2021    PROT 5.4 (L) 11/11/2021    LABALBU 3.7 11/11/2021    BILITOT 0.6 11/11/2021    ALKPHOS 101 11/11/2021    AST 27 11/11/2021    ALT 25 11/11/2021    LABGLOM >60 11/11/2021    GFRAA >59 11/11/2021     Lab Results   Component Value Date    INR 2.44 (H) 11/11/2021    INR 1.74 (H) 11/10/2021    INR 1.30 (H) 11/09/2021   No results found for: PHENYTOIN, ESR, CRP      PHYSICAL THERAPY  STRENGTH  Strength RLE  Comment: grossly assessed 3+/5  Strength LLE  Comment: grossly assessed 4/5  ROM  AROM RLE (degrees)  RLE AROM: WFL  AROM LLE (degrees)  LLE AROM : WFL  BED MOBILITY  Bed Mobility  Rolling: Contact guard assistance  Supine to Sit: Stand by assistance, Contact guard assistance  Sit to Supine: Stand by assistance, Contact guard assistance  Scooting: Stand by assistance  Comment: pt able to get in/out of bed in a scot turn fashon from both right and left side of bed. pt is slow to move an requries many cues   TRANSFERS  Transfers  Sit to Stand: Moderate Assistance  Stand to sit: Moderate Assistance  Bed to Chair: Moderate assistance  Squat Pivot Transfers: Moderate Assistance  Comment: pt sit/stand well with cues on hand placement when tfing to bedside commode and has a slight ride lateral lean. pt has difficulty getting to full upright posutre at inital sit/stand.  pt squat pivot tf to the bedisde cammode pt went to her left had difficulty with hand placement and required hip guidance to tf. the squat pivot tf WC> bed pt tf to her right d/t right lean and required cues on reaching for the armrest and requred asssit to steady while standing during pericare   WHEELCHAIR PROPULSION  AMBULATION  STAIRS  GOALS:  Short term goals  Time Frame for Short term goals: 1-2 weeks  Short term goal 1: bed mobility Mod I   Short term goal 2: transfers with AD SBA  Short term goal 3: ambulation 50ft with AD Joshua  Short term goal 4: navigate 4 step with handrails Joshua  Short term goal 5: WC mobility 75ft CGA     Long term goals  Time Frame for Long term goals : 3-4 weeks  Long term goal 1: bed mobility Indp  Long term goal 2: transfers with AD Mod I  Long term goal 3: ambulation 150 ft with AD SBA  Long term goal 4: navigate 4 step with hand rail SBA  Long term goal 5: WC mobility 150 ft SBA     ASSESSMENT:  Assessment: pt presents to inital PT eval with decreased strength, coordination, endurance and limited functional mobility. pt was unsteady with transfers unable to come to a full stand and performed squat pivot tfs to/from bed when using commode or WC. pt requires many simple verbal cues on sequencing and perform tasks but performs them with good effort and some assist. pt would benefit from continued therapy to help address deficits to return to PLOF and improve functional mobility.       SPEECH THERAPY        OCCUPATIONAL THERAPY        NUTRITION  Current Wt: Weight: 126 lb (57.2 kg) / Body mass index is 20.34 kg/m². Admission Wt: Admission Body Weight: 126 lb (57.2 kg)  Oral Diet Orders: Soft/Bite-sized; NTL     Pt meets criteria for severe malnutrition AEB muscle/fat wasting of orbital, buccal, temporal, and clavical regions observed. Pt wt stable X 1 year. Po intake of 1 meal adequate (>75%). Will monitor po intake and need of additional nutrition intervention. Please see nutrition note for details.         RECORD REVIEW: Previous medical records, medications were reviewed at today's visit    IMPRESSION:   1. 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 Suspected to be cardioembolic. Plavix changed to Coumadin. Lovenox discontinued due to therapeutic INR. PT/OT/SLP  2. DVT prophylaxisCoumadin  3. Hyperlipidemiacontinue statin  4. GIbowel regimen  5. Bacteruria with unremarkable u/a. Suspect contaminant.  No Rx for now.       Continue current treatment  ELOS:restaff

## 2021-11-11 NOTE — PROGRESS NOTES
Clinical Pharmacy Note    Warfarin consult follow-up    Recent Labs     11/11/21  0507   INR 2.44*     Recent Labs     11/09/21  0323 11/10/21  0603 11/11/21  0507   HGB 14.2 14.8 15.1   HCT 45.4 48.0* 48.1*    155 149       Significant Drug-Drug Interactions:  New warfarin drug-drug interactions: none  Discontinued drug-drug interactions: enoxaparin    Date INR Warfarin Dose   11/06/21 1.01 ---   11/07/21 --- 5 mg   11/08/21 1.04  5 mg    11/09/21  1.30 5 mg    11/10/21 1.74 5 mg    11/11/21   2.44 2.5 mg                        Notes:  Coumadin 2.5mg po X1 tonight. Daily PT/INR until stable within therapeutic range.      Electronically signed by Michelle Alva [facility] on 11/11/2021 at 8:50 AM

## 2021-11-12 LAB
ALBUMIN SERPL-MCNC: 3.9 G/DL (ref 3.5–5.2)
ALP BLD-CCNC: 108 U/L (ref 35–104)
ALT SERPL-CCNC: 28 U/L (ref 5–33)
ANION GAP SERPL CALCULATED.3IONS-SCNC: 11 MMOL/L (ref 7–19)
AST SERPL-CCNC: 30 U/L (ref 5–32)
BASOPHILS ABSOLUTE: 0 K/UL (ref 0–0.2)
BASOPHILS RELATIVE PERCENT: 0.6 % (ref 0–1)
BILIRUB SERPL-MCNC: 0.6 MG/DL (ref 0.2–1.2)
BUN BLDV-MCNC: 17 MG/DL (ref 8–23)
CALCIUM SERPL-MCNC: 10.7 MG/DL (ref 8.8–10.2)
CHLORIDE BLD-SCNC: 107 MMOL/L (ref 98–111)
CO2: 24 MMOL/L (ref 22–29)
CREAT SERPL-MCNC: 0.5 MG/DL (ref 0.5–0.9)
EOSINOPHILS ABSOLUTE: 0.2 K/UL (ref 0–0.6)
EOSINOPHILS RELATIVE PERCENT: 3.3 % (ref 0–5)
GFR AFRICAN AMERICAN: >59
GFR NON-AFRICAN AMERICAN: >60
GLUCOSE BLD-MCNC: 64 MG/DL (ref 70–99)
GLUCOSE BLD-MCNC: 68 MG/DL (ref 70–99)
GLUCOSE BLD-MCNC: 72 MG/DL (ref 70–99)
GLUCOSE BLD-MCNC: 83 MG/DL (ref 74–109)
HCT VFR BLD CALC: 49.5 % (ref 37–47)
HEMOGLOBIN: 15.1 G/DL (ref 12–16)
IMMATURE GRANULOCYTES #: 0 K/UL
INR BLD: 2.02 (ref 0.88–1.18)
LYMPHOCYTES ABSOLUTE: 1.6 K/UL (ref 1.1–4.5)
LYMPHOCYTES RELATIVE PERCENT: 31.6 % (ref 20–40)
MCH RBC QN AUTO: 29 PG (ref 27–31)
MCHC RBC AUTO-ENTMCNC: 30.5 G/DL (ref 33–37)
MCV RBC AUTO: 95.2 FL (ref 81–99)
MONOCYTES ABSOLUTE: 0.4 K/UL (ref 0–0.9)
MONOCYTES RELATIVE PERCENT: 7 % (ref 0–10)
NEUTROPHILS ABSOLUTE: 2.9 K/UL (ref 1.5–7.5)
NEUTROPHILS RELATIVE PERCENT: 57.1 % (ref 50–65)
PDW BLD-RTO: 14.5 % (ref 11.5–14.5)
PERFORMED ON: ABNORMAL
PERFORMED ON: ABNORMAL
PERFORMED ON: NORMAL
PLATELET # BLD: 143 K/UL (ref 130–400)
PMV BLD AUTO: 9.8 FL (ref 9.4–12.3)
POTASSIUM SERPL-SCNC: 4.6 MMOL/L (ref 3.5–5)
PROTHROMBIN TIME: 22.7 SEC (ref 12–14.6)
RBC # BLD: 5.2 M/UL (ref 4.2–5.4)
SODIUM BLD-SCNC: 142 MMOL/L (ref 136–145)
TOTAL PROTEIN: 5.7 G/DL (ref 6.6–8.7)
WBC # BLD: 5.1 K/UL (ref 4.8–10.8)

## 2021-11-12 PROCEDURE — 97110 THERAPEUTIC EXERCISES: CPT

## 2021-11-12 PROCEDURE — 80053 COMPREHEN METABOLIC PANEL: CPT

## 2021-11-12 PROCEDURE — 97535 SELF CARE MNGMENT TRAINING: CPT

## 2021-11-12 PROCEDURE — 2580000003 HC RX 258: Performed by: PSYCHIATRY & NEUROLOGY

## 2021-11-12 PROCEDURE — 85025 COMPLETE CBC W/AUTO DIFF WBC: CPT

## 2021-11-12 PROCEDURE — 92507 TX SP LANG VOICE COMM INDIV: CPT

## 2021-11-12 PROCEDURE — 36415 COLL VENOUS BLD VENIPUNCTURE: CPT

## 2021-11-12 PROCEDURE — 82947 ASSAY GLUCOSE BLOOD QUANT: CPT

## 2021-11-12 PROCEDURE — 92526 ORAL FUNCTION THERAPY: CPT

## 2021-11-12 PROCEDURE — 97116 GAIT TRAINING THERAPY: CPT

## 2021-11-12 PROCEDURE — 6370000000 HC RX 637 (ALT 250 FOR IP): Performed by: PSYCHIATRY & NEUROLOGY

## 2021-11-12 PROCEDURE — 6370000000 HC RX 637 (ALT 250 FOR IP): Performed by: NURSE PRACTITIONER

## 2021-11-12 PROCEDURE — 85610 PROTHROMBIN TIME: CPT

## 2021-11-12 PROCEDURE — 1180000000 HC REHAB R&B

## 2021-11-12 RX ORDER — WARFARIN SODIUM 5 MG/1
5 TABLET ORAL
Status: COMPLETED | OUTPATIENT
Start: 2021-11-12 | End: 2021-11-12

## 2021-11-12 RX ADMIN — Medication 10 ML: at 08:16

## 2021-11-12 RX ADMIN — ATORVASTATIN CALCIUM 20 MG: 20 TABLET, FILM COATED ORAL at 08:11

## 2021-11-12 RX ADMIN — WARFARIN SODIUM 5 MG: 5 TABLET ORAL at 17:13

## 2021-11-12 RX ADMIN — CETIRIZINE HYDROCHLORIDE 5 MG: 10 TABLET, FILM COATED ORAL at 08:11

## 2021-11-12 RX ADMIN — FLUTICASONE PROPIONATE 1 SPRAY: 50 SPRAY, METERED NASAL at 08:14

## 2021-11-12 ASSESSMENT — PAIN SCALES - WONG BAKER: WONGBAKER_NUMERICALRESPONSE: 0

## 2021-11-12 ASSESSMENT — PAIN SCALES - GENERAL: PAINLEVEL_OUTOF10: 0

## 2021-11-12 NOTE — PROGRESS NOTES
Nutrition Assessment     Type and Reason for Visit: Reassess    Nutrition Assessment:  Pt continues with adequate po intake (>75% most meals) with assistance. Unable to assess ONS intake, pt unable to remember. Continue current diet. Malnutrition Assessment:  Malnutrition Status: Severe malnutrition    Current Nutrition Therapies:    ADULT ORAL NUTRITION SUPPLEMENT; Breakfast, Dinner; Standard High Calorie/High Protein Oral Supplement  ADULT DIET; Dysphagia - Soft and Bite Sized    Anthropometric Measures:  · Height: 5' 6\" (167.6 cm)  · Current Body Wt: 126 lb (57.2 kg)   · BMI: 20.3    Nutrition Interventions:   Food and/or Nutrient Delivery:  Continue Current Diet   Coordination of Nutrition Care:  Continue to monitor while inpatient    Goals:  Po intake >50% meals. wt stable. Nutrition Monitoring and Evaluation:   Food/Nutrient Intake Outcomes:  Food and Nutrient Intake, Diet Advancement/Tolerance  Physical Signs/Symptoms Outcomes:  Biochemical Data, Chewing or Swallowing, Nutrition Focused Physical Findings, Weight     Discharge Planning:     Too soon to determine     Electronically signed by Olaf Miguel MS, RD, LD on 11/12/21 at 1:33 PM CST    Contact: [phone]

## 2021-11-12 NOTE — PLAN OF CARE
Problem: Falls - Risk of:  Goal: Will remain free from falls  Description: Will remain free from falls  11/11/2021 2321 by Simón Weaver RN  Outcome: Ongoing  11/11/2021 1014 by Brady Sanders RN  Outcome: Ongoing  Goal: Absence of physical injury  Description: Absence of physical injury  11/11/2021 2321 by Simón Weaver RN  Outcome: Ongoing  11/11/2021 1014 by Brady Sanders RN  Outcome: Ongoing     Problem: Discharge Planning:  Goal: Ability to perform activities of daily living will improve  Description: Ability to perform activities of daily living will improve  11/11/2021 2321 by Simón Weaver RN  Outcome: Ongoing  11/11/2021 1014 by Brady Sanders RN  Outcome: Ongoing  Goal: Participates in care planning  Description: Participates in care planning  11/11/2021 2321 by Simón Weaver RN  Outcome: Ongoing  11/11/2021 1014 by Brady Sanders RN  Outcome: Ongoing     Problem: Confusion - Acute:  Goal: Absence of continued neurological deterioration signs and symptoms  Description: Absence of continued neurological deterioration signs and symptoms  11/11/2021 2321 by Simón Weaver RN  Outcome: Ongoing  11/11/2021 1014 by Brady Sanders RN  Outcome: Ongoing  Goal: Mental status will be restored to baseline  Description: Mental status will be restored to baseline  11/11/2021 2321 by Simón Weaver RN  Outcome: Ongoing  11/11/2021 1014 by Brady Sanders RN  Outcome: Ongoing     Problem: Mood - Altered:  Goal: Mood stable  Description: Mood stable  11/11/2021 2321 by Simón Weaver RN  Outcome: Ongoing  11/11/2021 1014 by Brady Sanders RN  Outcome: Ongoing  Goal: Absence of abusive behavior  Description: Absence of abusive behavior  11/11/2021 2321 by Simón Weaver RN  Outcome: Ongoing  11/11/2021 1014 by Brady Sanders RN  Outcome: Ongoing  Goal: Verbalizations of feeling emotionally comfortable while being cared for will increase  Description: Verbalizations of feeling emotionally comfortable while being cared for will increase  11/11/2021 2321 by Chepe Lainez RN  Outcome: Ongoing  11/11/2021 1014 by Raven Sloan RN  Outcome: Ongoing

## 2021-11-12 NOTE — PROGRESS NOTES
Occupational Therapy     11/11/21 1100   Restrictions/Precautions   Restrictions/Precautions Fall Risk; Weight Bearing; Swallowing - Thickened Liquids; Modified Diet   Position Activity Restriction   Other position/activity restrictions Dysphagia-soft and bite sized, NTL, water in between meals   General   Additional Pertinent Hx CVA; palliative care pt   Diagnosis Acute ischemic B CVA; aphasia; AMS; hemiplegia affecting dominant side   ADL   Feeding Moderate assistance   Grooming Minimal assistance   UE Bathing Minimal assistance   LE Bathing Maximum assistance   UE Dressing Moderate assistance   LE Dressing Dependent/Total   Toileting Dependent/Total   Additional Comments incontinent of bowel/bladder- required total assist for cleaning up/changing clothes   Balance   Standing Balance Minimal assistance   Standing Balance   Comment after several stands utilized so barraza d/t fatigue   Wheelchair Bed Transfers   Wheelchair/Bed - Technique Stand pivot   Equipment Used Wheelchair; Bed   Level of Asssistance Minimal assistance   Assessment   Performance deficits / Impairments Decreased functional mobility ; Decreased ADL status; Decreased ROM;  Decreased strength; Decreased safe awareness; Decreased cognition; Decreased endurance; Decreased high-level IADLs; Decreased balance; Decreased coordination; Decreased vision/visual deficit   Treatment Diagnosis B CVAs/R hemiparesis   Timed Code Treatment Minutes 60 Minutes   Activity Tolerance   Activity Tolerance Patient limited by fatigue

## 2021-11-12 NOTE — PROGRESS NOTES
[facility]  INPATIENT SPEECH THERAPY  [facility] REHAB UNIT    [x]Daily Note  []Progress Note    Date: 2021  Patient Name: Giorgio Andersen        MRN: [id number]    Account #: [de-identified]  : 1933  (80 y.o.)  Gender: female   Primary Provider: Cristine Lopez MD  Swallowing Status/Diet: Soft and bite sized with thin liquids         PATIENT DIAGNOSIS(ES):    Diagnosis: Acute ischemic stroke, L MCA infarct, R sided involvement      Additional Pertinent Hx: hx CVAs, stress incontinence     RESTRICTIONS/PRECAUTIONS:    Restrictions/Precautions  Restrictions/Precautions: Fall Risk, Weight Bearing, Swallowing Modified Diet  Required Braces or Orthoses?: No  Position Activity Restriction  Other position/activity restrictions: Dysphagia-soft and bite sized        Subjective:  She was upright in her wheelchair. No caregivers were present today. She denied pain this date. She still exhibit a good appetite. Objective:  Naming pictures at 80%. Automatic speech tasks were completed. She was able to count from 1-12. She was able to state the days of the week and months of the year. Recommend total feed as she fatigues quickly and has trouble holding her utensils. Today she requested that the therapist take over and feed her. She had been feeding herself however she became tired. She also asked the therapist to wipe her mouth. Her facial sensation is improving. She is afebrile today. No audible congestion noted. No significant buccal cavity pocketing observed. Some mild lingual residue was noted. Decreased hyolaryngeal elevation and delayed swallow responses were noted this date. No overt s/s of aspiration was observed with soft foods or with thin liquids. She did have difficulty masticating ground meats with gravy. Her order was changed to pureed meats. Recommended Diet and Intervention  Diet Solids Recommendation: Dysphagia Soft and Bite-Sized (Dysphagia III)  Liquid Consistency Recommendation:  Thin liquids  Recommended Form of Meds: Crushed in puree as able  Therapeutic Interventions: Mendelsohn; Diet tolerance monitoring; Oral care; Therapeutic PO trials with SLP; Oral motor exercises; Tongue base strengthening; Patient/Family education; Effortful swallow; Pharyngeal exercises; Yanet     Compensatory Swallowing Strategies  Compensatory Swallowing Strategies: Upright as possible for all oral intake; Alternate solids and liquids; Remain upright for 30-45 minutes after meals; Check for pocketing of food on the Right; Swallow 2 times per bite/sip; Small bites/sips; External pacing                 SHORT TERM GOAL #1:  Goal 1: The patient will follow two step commands with minimal cueing and 100% accuracy. SHORT TERM GOAL #2:  Goal 2: The patient will complete naming tasks (picture, object) with minimal cueing and 100% accuracy. SHORT TERM GOAL #3:  Goal 3: The patient will complete concrete divergent naming tasks with minimal cueing and 100% accuracy. SHORT TERM GOAL #4:  Goal 4: The patient will complete oral motor exercises to improve lingual and labial strength and range of motion as well as improve speech intelligibility. SHORT TERM GOAL #5:  Goal 5: The patient will utilize dysarthria strategies (slow rate, increased volume, over exaggeration of words, increased breath support) during structured tasks and during conversations with minimal cueing. Swallowing Short Term Goals  Short-term Goals  Goal 1: The patient will complete the yanet (tongue hold) technique to improve base of tongue retraction and base of tongue to pharyngeal wall approximation with 90% accuracy and minimal verbal cues. Goal 2: The patient will complete the effortful swallow maneuver to improve hyolaryngeal elevation, tongue base retraction, and vocal fold closure with 90% accuracy and minimal verbal cues. Goal 3: Staff/caregivers will complete daily oral hygiene to prevent aspiration of bacteria laden secretions.   Goal 4: The patient will tolerate a dysphagia soft and bite sized diet with nectar (mildly thick) liquids with minimal overt s/s of aspiration. Comprehension: 3 - Patient understands basic needs 50-74% of the time  Expression: 3 - Expresses basic ideas/needs 50-74% of the time  Social Interaction: 4 - Patient appropriate 75-90%+ of the time  Problem Solving: 3 - Patient solves simple/routine tasks 50%-74%  Memory: 3 - Patient remembers 50%-74% of the time    ASSESSMENT:  Assessment: [x]Progressing towards goals          []Not Progressing towards goals    Patient Tolerance of Treatment:   [x]Tolerated well []Tolerated fair []Required rest breaks []Fatigued    Education:  Learner:  [x]Patient          []Significant Other          []Other  Education provided regarding:  [x]Goals and POC   []Diet and swallowing precautions    []Home Exercise Program  []Progress and/or discharge information  Method of Education:  [x]Discussion          []Demonstration          []Handout          []Other  Evaluation of Education:   [x]Verbalized understanding   []Demonstrates without assistance  []Demonstrates with assistance  []Needs further instruction     []No evidence of learning                  []No family present      Plan: [x]Continue with current plan of care    []Modify current plan of care as follows:    []Discharge patient    Discharge Location:    Services/Supervision Recommended:      [x]Patient continues to require treatment by a licensed therapist to address functional deficits as outlined in the established plan of care.                       Electronically Signed By:  Gilbert Caballero M.S., CCC-SLP  11/12/2021,11:39 AM.

## 2021-11-12 NOTE — PROGRESS NOTES
Clinical Pharmacy Note    Warfarin consult follow-up    Recent Labs     11/12/21  0451   INR 2.02*     Recent Labs     11/10/21  0603 11/11/21  0507 11/12/21  0451   HGB 14.8 15.1 15.1   HCT 48.0* 48.1* 49.5*    149 143       Significant Drug-Drug Interactions:  New warfarin drug-drug interactions: none  Discontinued drug-drug interactions: none    Date INR Warfarin Dose   11/06/21 1.01 ---   11/07/21 --- 5 mg   11/08/21 1.04  5 mg    11/09/21  1.30 5 mg    11/10/21 1.74 5 mg    11/11/21   2.44 2.5 mg     11/12/21 2.02 5 mg              Notes: Will give warfarin 5 mg once this evening. Daily PT/INR until stable within therapeutic range.      Electronically signed by Dianelys [facility] on 11/12/2021 at 10:16 AM

## 2021-11-12 NOTE — PROGRESS NOTES
Occupational Therapy     11/12/21 1100   Restrictions/Precautions   Restrictions/Precautions Fall Risk   Position Activity Restriction   Other position/activity restrictions Dysphagia-soft and bite sized, NTL, water in between meals   General   Additional Pertinent Hx CVA; palliative care pt   Diagnosis Acute ischemic B CVA; aphasia; AMS; hemiplegia affecting dominant side   ADL   Feeding Minimal assistance   Grooming Setup; Minimal assistance; Increased time to complete  (ASSIST FOR THOROUGHNESS OF ORAL CARE)   Additional Comments scoop assist at times and then max assist at the end d/t fatigue, Much extra time for mastication of ground beef. Cues for swallowing before taking another bite. Vision   Vision Comment field cut   Assessment   Performance deficits / Impairments Decreased functional mobility ; Decreased ADL status; Decreased ROM; Decreased strength; Decreased safe awareness; Decreased cognition; Decreased endurance; Decreased high-level IADLs; Decreased balance; Decreased coordination; Decreased vision/visual deficit   Assessment Visual deficits possibly compounded by left inattention.    Treatment Diagnosis B CVAs/R hemiparesis   Timed Code Treatment Minutes 60 Minutes   Activity Tolerance   Activity Tolerance Patient Tolerated treatment well

## 2021-11-12 NOTE — PROGRESS NOTES
11/12/21 0900   Restrictions/Precautions   Restrictions/Precautions Fall Risk; Weight Bearing; Swallowing - Thickened Liquids; Modified Diet   Required Braces or Orthoses? No   Lower Extremity Weight Bearing Restrictions   Right Lower Extremity Weight Bearing Weight Bearing As Tolerated   Left Lower Extremity Weight Bearing Weight Bearing As Tolerated   Pain Screening   Patient Currently in Pain No   Pain Assessment   Pain Assessment 0-10   Nguyen-Baker Pain Rating 0   Pain Level 0   Bed Mobility   Rolling Contact guard assistance   Supine to Sit Stand by assistance   Scooting Contact guard assistance   Comment slow to move, triplanar to her right    Transfers   Sit to Stand Minimal Assistance   Stand to sit Minimal Assistance   Bed to Chair Minimal assistance   Stand Pivot Transfers Minimal Assistance   Comment pt comes to partial stand with tf to [facility]   Ambulation   Ambulation? Yes   WB Status WBAT   More Ambulation? Yes   Ambulation 1   Surface level tile   Device Rolling Walker   Other Apparatus Wheelchair follow   Assistance Minimal assistance; Moderate assistance   Quality of Gait gradual right lean with fatigue, internal rotation of right leg, short step length and flex hip posture   Gait Deviations Decreased step length; Decreased step height; Shuffles;  Slow Cyn   Distance 12ft    Comments pt fatigues very quickly and requires max cues on posture and intrmittent management of FWW    Ambulation 2   Surface - 2 level tile   Device 2 Rolling Walker   Other Apparatus 2 Wheelchair follow   Assistance 2 Moderate assistance   Quality of Gait 2 increased right lean with fatigue, internal rotation and circumduction of right leg to clear foot, short step length and flex hip posture   Distance 6ft   Comments dc'd gait d/t pt needing to use bathroom    Exercises   Hip Flexion x20   Knee Long Arc Quad x10   Ankle Pumps x10    Conditions Requiring Skilled Therapeutic Intervention   Assessment pt more awake and alert this am for mobility but still is having issues wit incontinence and required to be changed by the end of session. pt demosntrates decreased right sided awareness when performing tfs and gait, requires max cueing for posture. pt currently toelrates very short distances before either fatiguing or until right sided lean becomes too severe. pt fatigues very quickly and requires frequent rest breaks. will continue to progress functional mobility and ambulation as tolerated.

## 2021-11-13 LAB
ALBUMIN SERPL-MCNC: 3.7 G/DL (ref 3.5–5.2)
ALP BLD-CCNC: 98 U/L (ref 35–104)
ALT SERPL-CCNC: 35 U/L (ref 5–33)
ANION GAP SERPL CALCULATED.3IONS-SCNC: 9 MMOL/L (ref 7–19)
AST SERPL-CCNC: 40 U/L (ref 5–32)
BASOPHILS ABSOLUTE: 0 K/UL (ref 0–0.2)
BASOPHILS RELATIVE PERCENT: 0.6 % (ref 0–1)
BILIRUB SERPL-MCNC: 0.5 MG/DL (ref 0.2–1.2)
BUN BLDV-MCNC: 16 MG/DL (ref 8–23)
CALCIUM SERPL-MCNC: 10.7 MG/DL (ref 8.8–10.2)
CHLORIDE BLD-SCNC: 108 MMOL/L (ref 98–111)
CO2: 25 MMOL/L (ref 22–29)
CREAT SERPL-MCNC: 0.5 MG/DL (ref 0.5–0.9)
EOSINOPHILS ABSOLUTE: 0.2 K/UL (ref 0–0.6)
EOSINOPHILS RELATIVE PERCENT: 4.1 % (ref 0–5)
GFR AFRICAN AMERICAN: >59
GFR NON-AFRICAN AMERICAN: >60
GLUCOSE BLD-MCNC: 58 MG/DL (ref 70–99)
GLUCOSE BLD-MCNC: 81 MG/DL (ref 70–99)
GLUCOSE BLD-MCNC: 90 MG/DL (ref 74–109)
GLUCOSE BLD-MCNC: 99 MG/DL (ref 70–99)
HCT VFR BLD CALC: 47.4 % (ref 37–47)
HEMOGLOBIN: 14.8 G/DL (ref 12–16)
IMMATURE GRANULOCYTES #: 0 K/UL
INR BLD: 1.97 (ref 0.88–1.18)
LYMPHOCYTES ABSOLUTE: 1.9 K/UL (ref 1.1–4.5)
LYMPHOCYTES RELATIVE PERCENT: 36.3 % (ref 20–40)
MCH RBC QN AUTO: 29.6 PG (ref 27–31)
MCHC RBC AUTO-ENTMCNC: 31.2 G/DL (ref 33–37)
MCV RBC AUTO: 94.8 FL (ref 81–99)
MONOCYTES ABSOLUTE: 0.4 K/UL (ref 0–0.9)
MONOCYTES RELATIVE PERCENT: 6.7 % (ref 0–10)
NEUTROPHILS ABSOLUTE: 2.8 K/UL (ref 1.5–7.5)
NEUTROPHILS RELATIVE PERCENT: 51.9 % (ref 50–65)
PDW BLD-RTO: 14.4 % (ref 11.5–14.5)
PERFORMED ON: ABNORMAL
PERFORMED ON: NORMAL
PERFORMED ON: NORMAL
PLATELET # BLD: 153 K/UL (ref 130–400)
PMV BLD AUTO: 10.3 FL (ref 9.4–12.3)
POTASSIUM SERPL-SCNC: 4.9 MMOL/L (ref 3.5–5)
PROTHROMBIN TIME: 22.3 SEC (ref 12–14.6)
RBC # BLD: 5 M/UL (ref 4.2–5.4)
SODIUM BLD-SCNC: 142 MMOL/L (ref 136–145)
TOTAL PROTEIN: 5.6 G/DL (ref 6.6–8.7)
WBC # BLD: 5.4 K/UL (ref 4.8–10.8)

## 2021-11-13 PROCEDURE — 97110 THERAPEUTIC EXERCISES: CPT

## 2021-11-13 PROCEDURE — 6370000000 HC RX 637 (ALT 250 FOR IP): Performed by: PSYCHIATRY & NEUROLOGY

## 2021-11-13 PROCEDURE — 99232 SBSQ HOSP IP/OBS MODERATE 35: CPT | Performed by: PSYCHIATRY & NEUROLOGY

## 2021-11-13 PROCEDURE — 82947 ASSAY GLUCOSE BLOOD QUANT: CPT

## 2021-11-13 PROCEDURE — 97535 SELF CARE MNGMENT TRAINING: CPT

## 2021-11-13 PROCEDURE — 97129 THER IVNTJ 1ST 15 MIN: CPT

## 2021-11-13 PROCEDURE — 85025 COMPLETE CBC W/AUTO DIFF WBC: CPT

## 2021-11-13 PROCEDURE — 6370000000 HC RX 637 (ALT 250 FOR IP): Performed by: NURSE PRACTITIONER

## 2021-11-13 PROCEDURE — 80053 COMPREHEN METABOLIC PANEL: CPT

## 2021-11-13 PROCEDURE — 1180000000 HC REHAB R&B

## 2021-11-13 PROCEDURE — 2580000003 HC RX 258: Performed by: PSYCHIATRY & NEUROLOGY

## 2021-11-13 PROCEDURE — 92526 ORAL FUNCTION THERAPY: CPT

## 2021-11-13 PROCEDURE — 97130 THER IVNTJ EA ADDL 15 MIN: CPT

## 2021-11-13 PROCEDURE — 36415 COLL VENOUS BLD VENIPUNCTURE: CPT

## 2021-11-13 PROCEDURE — 85610 PROTHROMBIN TIME: CPT

## 2021-11-13 RX ORDER — WARFARIN SODIUM 5 MG/1
5 TABLET ORAL
Status: COMPLETED | OUTPATIENT
Start: 2021-11-13 | End: 2021-11-13

## 2021-11-13 RX ADMIN — ATORVASTATIN CALCIUM 20 MG: 20 TABLET, FILM COATED ORAL at 09:02

## 2021-11-13 RX ADMIN — Medication 10 ML: at 22:55

## 2021-11-13 RX ADMIN — SENNOSIDES AND DOCUSATE SODIUM 1 TABLET: 50; 8.6 TABLET ORAL at 09:02

## 2021-11-13 RX ADMIN — FLUTICASONE PROPIONATE 1 SPRAY: 50 SPRAY, METERED NASAL at 09:02

## 2021-11-13 RX ADMIN — WARFARIN SODIUM 5 MG: 5 TABLET ORAL at 18:01

## 2021-11-13 RX ADMIN — DOCUSATE SODIUM 100 MG: 100 CAPSULE, LIQUID FILLED ORAL at 09:02

## 2021-11-13 RX ADMIN — CETIRIZINE HYDROCHLORIDE 5 MG: 10 TABLET, FILM COATED ORAL at 09:02

## 2021-11-13 RX ADMIN — Medication 10 ML: at 10:01

## 2021-11-13 NOTE — PROGRESS NOTES
Patient:   Prisca Comer  MR#:    [id number]   Room:    0826/826-01   YOB: 1933  Date of Progress Note: 11/13/2021  Time of Note                           10:15 AM  Consulting Physician:   Rey Reeder M.D. Attending Physician:  Rey Reeder MD       CHIEF COMPLAINT: Right-sided weakness with dysphagia     subjective  This 80 y.o. female  with history of 3 strokes since March 2021, stress incontinence, arthritis, blood clots and hyperlipidemia. She presented to [place] on 11/6/21 after being found on the floor at home by her son. She was confused, had abnormal speech and right sided weakness. CT of head was negative for acute changes. CTA was also negative. She was admitted to the Hospitalist service with consult for neurology. MRI done revealed an acute lacunar infarct within the left thalamus and multiple Multifocal old ischemic change in both cerebral hemispheres and within the right side of the cerebellum. She had been placed on Plavix and statin after her last stroke. She was seen by Dr. Niya Nguyen and not felt to be a candidate for TPA d/t being out of the time frame and no evidence of thrombus on CTA. Dr. Niya Nguyen felt strokes were most likely cardioembolic. Plavix was discontinued and she was started on Coumadin with Lovenox for bridging. Echo done showed Bubble study with evidence of small right-to-left shunting with Valsalva consistent with possible ASD/PFO. Cardiology was consulted for possible PFO closure. Patient/family do not wish to have any invasive measure. Prefer to continue with medical management with anticoagulation. Patient is a DNR. She was evaluated by SPT for swallow and aphasia. She was initially made NPO but has now been started on a mechanical soft diet with nectar thick liquids. She continues to have right sided weakness and is participating in both PT/OT.   No complaints today  REVIEW OF SYSTEMS:  Constitutional: No fevers No chills  Neck:No stiffness  Respiratory: No shortness of breath  Cardiovascular: No chest pain No palpitations  Gastrointestinal: No abdominal pain    Genitourinary: No Dysuria  Neurological: No headache, no confusion      PHYSICAL EXAM:  BP (!) 152/86   Pulse 54   Temp 96.4 °F (35.8 °C) (Temporal)   Resp 18   Ht 5' 6\" (1.676 m)   Wt 137 lb 0.6 oz (62.2 kg)   SpO2 93%   BMI 22.12 kg/m²     Constitutional: she appears well-developed and well-nourished. Eyes  conjunctiva normal.  Pupils react to light  Ear, nose, throat -hearing intact to voice. No scars, masses, or lesions over external nose or ears, no atrophy of tongue  Neck-symmetric, no masses noted, no jugular vein distension  Respiration- chest wall appears symmetric, good expansion,   normal effort without use of accessory muscles  Cardiovascular- RRR  Musculoskeletal  no significant wasting of muscles noted, no bony deformities, gait no gross ataxia  Extremities-no clubbing, cyanosis or edema  Skin  warm, dry, and intact. No rash, erythema, or pallor. Psychiatric  mood, affect, and behavior appear normal.      Neurology  NEUROLOGICAL EXAM:  Awake, alert, fluent oriented to [facility].  Attention and concentration appear appropriate  Speech shows dysarthria       Cranial Nerve Exam   CN II- Visual fields show an apparent left homonymous hemianopsia  CN III, IV,VI-EOMI, No nystagmus, conjugate eye movements, no ptosis  CN VII-right facial droop  CN VIII-Hearing intact        Motor Exam  Relatively normal throughout with significant atrophy in the hands       Tremors- no tremors in hands or head noted    Coordination  Clumsiness in the bilateral upper extremities           Nursing/pcp notes, imaging,labs and vitals reviewed.      PT,OT and/or speech notes reviewed    Lab Results   Component Value Date    WBC 5.4 11/13/2021    HGB 14.8 11/13/2021    HCT 47.4 (H) 11/13/2021    MCV 94.8 11/13/2021     11/13/2021     Lab Results   Component Value Date     11/13/2021    K 4.9 11/13/2021  11/13/2021    CO2 25 11/13/2021    BUN 16 11/13/2021    CREATININE 0.5 11/13/2021    GLUCOSE 90 11/13/2021    CALCIUM 10.7 (H) 11/13/2021    PROT 5.6 (L) 11/13/2021    LABALBU 3.7 11/13/2021    BILITOT 0.5 11/13/2021    ALKPHOS 98 11/13/2021    AST 40 (H) 11/13/2021    ALT 35 (H) 11/13/2021    LABGLOM >60 11/13/2021    GFRAA >59 11/13/2021     Lab Results   Component Value Date    INR 1.97 (H) 11/13/2021    INR 2.02 (H) 11/12/2021    INR 2.44 (H) 11/11/2021   No results found for: PHENYTOIN, ESR, CRP      11/12/21 1100   Restrictions/Precautions   Restrictions/Precautions Fall Risk   Position Activity Restriction   Other position/activity restrictions Dysphagia-soft and bite sized, NTL, water in between meals   General   Additional Pertinent Hx CVA; palliative care pt   Diagnosis Acute ischemic B CVA; aphasia; AMS; hemiplegia affecting dominant side   ADL   Feeding Minimal assistance   Grooming Setup; Minimal assistance; Increased time to complete  (ASSIST FOR THOROUGHNESS OF ORAL CARE)   Additional Comments scoop assist at times and then max assist at the end d/t fatigue, Much extra time for mastication of ground beef. Cues for swallowing before taking another bite. Vision   Vision Comment field cut   Assessment   Performance deficits / Impairments Decreased functional mobility ; Decreased ADL status; Decreased ROM; Decreased strength; Decreased safe awareness; Decreased cognition; Decreased endurance; Decreased high-level IADLs; Decreased balance; Decreased coordination; Decreased vision/visual deficit   Assessment Visual deficits possibly compounded by left inattention. Treatment Diagnosis B CVAs/R hemiparesis   Timed Code Treatment Minutes 60 Minutes   Activity Tolerance   Activity Tolerance Patient Tolerated treatment well                      11/12/21 0900   Restrictions/Precautions   Restrictions/Precautions Fall Risk; Weight Bearing; Swallowing - Thickened Liquids;  Modified Diet   Required Braces or Orthoses? No   Lower Extremity Weight Bearing Restrictions   Right Lower Extremity Weight Bearing Weight Bearing As Tolerated   Left Lower Extremity Weight Bearing Weight Bearing As Tolerated   Pain Screening   Patient Currently in Pain No   Pain Assessment   Pain Assessment 0-10   Nguyen-Baker Pain Rating 0   Pain Level 0   Bed Mobility   Rolling Contact guard assistance   Supine to Sit Stand by assistance   Scooting Contact guard assistance   Comment slow to move, triplanar to her right    Transfers   Sit to Stand Minimal Assistance   Stand to sit Minimal Assistance   Bed to Chair Minimal assistance   Stand Pivot Transfers Minimal Assistance   Comment pt comes to partial stand with tf to [facility]   Ambulation   Ambulation? Yes   WB Status WBAT   More Ambulation? Yes   Ambulation 1   Surface level tile   Device Rolling Walker   Other Apparatus Wheelchair follow   Assistance Minimal assistance; Moderate assistance   Quality of Gait gradual right lean with fatigue, internal rotation of right leg, short step length and flex hip posture   Gait Deviations Decreased step length; Decreased step height; Shuffles; Slow Cyn   Distance 12ft    Comments pt fatigues very quickly and requires max cues on posture and intrmittent management of FWW    Ambulation 2   Surface - 2 level tile   Device 2 Rolling Walker   Other Apparatus 2 Wheelchair follow   Assistance 2 Moderate assistance   Quality of Gait 2 increased right lean with fatigue, internal rotation and circumduction of right leg to clear foot, short step length and flex hip posture   Distance 6ft   Comments dc'd gait d/t pt needing to use bathroom    Exercises   Hip Flexion x20   Knee Long Arc Quad x10   Ankle Pumps x10    Conditions Requiring Skilled Therapeutic Intervention   Assessment pt more awake and alert this am for mobility but still is having issues wit incontinence and required to be changed by the end of session.  pt demosntrates decreased right sided awareness

## 2021-11-13 NOTE — PLAN OF CARE
Problem: Falls - Risk of:  Goal: Will remain free from falls  Description: Will remain free from falls  Outcome: Ongoing  Goal: Absence of physical injury  Description: Absence of physical injury  Outcome: Ongoing     Problem: Confusion - Acute:  Goal: Absence of continued neurological deterioration signs and symptoms  Description: Absence of continued neurological deterioration signs and symptoms  Outcome: Ongoing  Goal: Mental status will be restored to baseline  Description: Mental status will be restored to baseline  Outcome: Ongoing     Problem: Injury - Risk of, Physical Injury:  Goal: Will remain free from falls  Description: Will remain free from falls  Outcome: Ongoing  Goal: Absence of physical injury  Description: Absence of physical injury  Outcome: Ongoing

## 2021-11-13 NOTE — PROGRESS NOTES
Clinical Pharmacy Note    Warfarin consult follow-up    Recent Labs     11/13/21  0352   INR 1.97*     Recent Labs     11/11/21  0507 11/12/21  0451 11/13/21  0352   HGB 15.1 15.1 14.8   HCT 48.1* 49.5* 47.4*    143 153       Significant Drug-Drug Interactions:  New warfarin drug-drug interactions: none  Discontinued drug-drug interactions: none    Date INR Warfarin Dose   11/06/21 1.01 ---   11/07/21 --- 5 mg   11/08/21 1.04  5 mg    11/09/21  1.30 5 mg    11/10/21 1.74 5 mg    11/11/21   2.44 2.5 mg     11/12/21 2.02 5 mg    11/13/21   1.97 5 mg                           Notes:                   Warfarin 5mg x 1 dose tonight. Daily PT/INR until stable within therapeutic range.      KAI SCOTT, PHARM D, 11/13/2021, 9:56 AM

## 2021-11-13 NOTE — PLAN OF CARE
Problem: Falls - Risk of:  Goal: Will remain free from falls  Description: Will remain free from falls  11/13/2021 1108 by Radha Ortiz RN  Outcome: Ongoing  11/13/2021 0042 by Kayy Louie RN  Outcome: Ongoing  Goal: Absence of physical injury  Description: Absence of physical injury  11/13/2021 1108 by Radha Ortiz RN  Outcome: Ongoing  11/13/2021 0042 by Kayy Louie RN  Outcome: Ongoing     Problem: Confusion - Acute:  Goal: Absence of continued neurological deterioration signs and symptoms  Description: Absence of continued neurological deterioration signs and symptoms  11/13/2021 1108 by Radha Ortiz RN  Outcome: Ongoing  11/13/2021 0042 by Kayy Louie RN  Outcome: Ongoing  Goal: Mental status will be restored to baseline  Description: Mental status will be restored to baseline  11/13/2021 1108 by Radha Ortiz RN  Outcome: Ongoing  11/13/2021 0042 by Kayy Louie RN  Outcome: Ongoing     Problem: Discharge Planning:  Goal: Ability to perform activities of daily living will improve  Description: Ability to perform activities of daily living will improve  11/13/2021 1108 by Radha Ortiz RN  Outcome: Ongoing  11/13/2021 0042 by Kayy Louie RN  Outcome: Ongoing  Goal: Participates in care planning  Description: Participates in care planning  11/13/2021 1108 by Radha Ortiz RN  Outcome: Ongoing  11/13/2021 0042 by Kayy Louie RN  Outcome: Ongoing     Problem: Injury - Risk of, Physical Injury:  Goal: Will remain free from falls  Description: Will remain free from falls  11/13/2021 1108 by Radha Ortiz RN  Outcome: Ongoing  11/13/2021 0042 by aKyy Louie RN  Outcome: Ongoing  Goal: Absence of physical injury  Description: Absence of physical injury  11/13/2021 1108 by Radha Ortiz RN  Outcome: Ongoing  11/13/2021 0042 by Kayy Louie RN  Outcome: Ongoing     Problem: Mood - Altered:  Goal: Mood stable  Description: Mood stable  11/13/2021 1108 by Heide Fernandez RN  Outcome: Ongoing  11/13/2021 0042 by Anibal Gregg RN  Outcome: Ongoing  Goal: Absence of abusive behavior  Description: Absence of abusive behavior  11/13/2021 1108 by Heide Fernandez RN  Outcome: Ongoing  11/13/2021 0042 by Anibal Gregg RN  Outcome: Ongoing  Goal: Verbalizations of feeling emotionally comfortable while being cared for will increase  Description: Verbalizations of feeling emotionally comfortable while being cared for will increase  11/13/2021 1108 by Heide Fernandez RN  Outcome: Ongoing  11/13/2021 0042 by Anibal Gregg RN  Outcome: Ongoing     Problem: Psychomotor Activity - Altered:  Goal: Absence of psychomotor disturbance signs and symptoms  Description: Absence of psychomotor disturbance signs and symptoms  11/13/2021 1108 by Heide Fernandez RN  Outcome: Ongoing  11/13/2021 0042 by Anibal Gregg RN  Outcome: Ongoing     Problem: Sensory Perception - Impaired:  Goal: Demonstrations of improved sensory functioning will increase  Description: Demonstrations of improved sensory functioning will increase  11/13/2021 1108 by Heide Fernandez RN  Outcome: Ongoing  11/13/2021 0042 by Anibal Gregg RN  Outcome: Ongoing  Goal: Decrease in sensory misperception frequency  Description: Decrease in sensory misperception frequency  11/13/2021 1108 by Heide Fernandez RN  Outcome: Ongoing  11/13/2021 0042 by Anibal Gregg RN  Outcome: Ongoing  Goal: Able to refrain from responding to false sensory perceptions  Description: Able to refrain from responding to false sensory perceptions  11/13/2021 1108 by Heide Fernandez RN  Outcome: Ongoing  11/13/2021 0042 by Anibal Gregg RN  Outcome: Ongoing  Goal: Demonstrates accurate environmental perceptions  Description: Demonstrates accurate environmental perceptions  11/13/2021 1108 by Heide Fernandez RN  Outcome: Ongoing  11/13/2021 0042 by Anibal Gregg RN  Outcome: Ongoing  Goal: Able to distinguish between reality-based and nonreality-based thinking  Description: Able to distinguish between reality-based and nonreality-based thinking  11/13/2021 1108 by Rani Keys RN  Outcome: Ongoing  11/13/2021 0042 by Miguel Corey RN  Outcome: Ongoing  Goal: Able to interrupt nonreality-based thinking  Description: Able to interrupt nonreality-based thinking  11/13/2021 1108 by Rani Keys RN  Outcome: Ongoing  11/13/2021 0042 by Miguel Corey RN  Outcome: Ongoing     Problem: Sleep Pattern Disturbance:  Goal: Appears well-rested  Description: Appears well-rested  11/13/2021 1108 by Rani Keys RN  Outcome: Ongoing  11/13/2021 0042 by Miguel Corey RN  Outcome: Ongoing     Problem: IP BLADDER/VOIDING  Goal: LTG - patient will complete bladder elimination  11/13/2021 1108 by Rani Keys RN  Outcome: Ongoing  11/13/2021 0042 by Miguel Corey RN  Outcome: Ongoing  Goal: LTG - Patient will utilize adaptive techniques/equipment to complete bladder elimination  11/13/2021 1108 by Rani Keys RN  Outcome: Ongoing  11/13/2021 0042 by Miguel Corey RN  Outcome: Ongoing  Goal: LTG - patient will achieve acceptable level of continence  11/13/2021 1108 by Rani Keys RN  Outcome: Ongoing  11/13/2021 0042 by Miguel Corey RN  Outcome: Ongoing  Goal: STG - Patient demonstrates ability to take care of indwelling catheter  11/13/2021 1108 by Rani Keys RN  Outcome: Ongoing  11/13/2021 0042 by Miguel Corey RN  Outcome: Ongoing  Goal: STG - patient demonstrates self-cath technique using clean technique and care of the catheter  11/13/2021 1108 by Rani Keys RN  Outcome: Ongoing  11/13/2021 0042 by Miguel Corey RN  Outcome: Ongoing  Goal: STG - Patient demonstrates no accidents  11/13/2021 1108 by Rani Keys RN  Outcome: Ongoing  11/13/2021 0042 by Miguel Corey RN  Outcome: Ongoing  Goal: Cade Jiménez RN  Outcome: Ongoing  Goal: STG - Patient participates in bowel management program  11/13/2021 1108 by Leon Hooker RN  Outcome: Ongoing  11/13/2021 0042 by Lula Self RN  Outcome: Ongoing  Goal: STG - patient maintains skin integrity  11/13/2021 1108 by Leon Hooker RN  Outcome: Ongoing  11/13/2021 0042 by Lula Self RN  Outcome: Ongoing  Goal: STG - Patient will verbalize signs and symptoms of constipation and how to prevent/alleviate  11/13/2021 1108 by Leon Hooker RN  Outcome: Ongoing  11/13/2021 0042 by Lula Self RN  Outcome: Ongoing  Goal: STG - patient will be continent of stool  11/13/2021 1108 by Leon Hooker RN  Outcome: Ongoing  11/13/2021 0042 by Lula Self RN  Outcome: Ongoing  Goal: STG - Patient completes digital stimulation technique  11/13/2021 1108 by Leon Hooker RN  Outcome: Ongoing  11/13/2021 0042 by Lula Self RN  Outcome: Ongoing  Goal: STG - Patient verbalizes knowledge about relationship between diet, fluid intake, activity and medication on constipation  11/13/2021 1108 by Leon Hooker RN  Outcome: Ongoing  11/13/2021 0042 by Lula Self RN  Outcome: Ongoing     Problem: IP BREATHING  Goal: LTG - patient will mobilize secretions and maintain airway  11/13/2021 1108 by Leon Hooker RN  Outcome: Ongoing  11/13/2021 0042 by Lula Self RN  Outcome: Ongoing  Goal: LTG - Patient/caregiver will demonstrate/perform proper techniques to maintain patent airway  11/13/2021 1108 by Leon Hooker RN  Outcome: Ongoing  11/13/2021 0042 by Lula Self RN  Outcome: Ongoing  Goal: LTG - patient/caregiver will demonstrate/perform improved or stable self care abilities within physical limitations  11/13/2021 1108 by Leon Hooker RN  Outcome: Ongoing  11/13/2021 0042 by Lula Self RN  Outcome: Ongoing  Goal: STG - Patient/caregiver will maintain patent airway  11/13/2021 1108 by Marium Pool Flaca Johnson RN  Outcome: Ongoing  11/13/2021 0042 by Jermain Crowell RN  Outcome: Ongoing  Goal: STG - respiratory rate and effort will be within normal limits for the patient  11/13/2021 1108 by Ghanshyam Hunter RN  Outcome: Ongoing  11/13/2021 0042 by Jermain Crowell RN  Outcome: Ongoing  Goal: STG - patient/caregiver will be able to verbalize oxygen safety precautions  11/13/2021 1108 by Ghanshyam Hunter RN  Outcome: Ongoing  11/13/2021 0042 by Jermain Crowell RN  Outcome: Ongoing  Goal: STG - Patient/caregiver demonstrates correct suctioning technique  11/13/2021 1108 by Ghanshyam Hunter RN  Outcome: Ongoing  11/13/2021 0042 by Jermain Crowell RN  Outcome: Ongoing  Goal: STG - patient will utilize incentive spirometer  11/13/2021 1108 by Ghanshyam Hunter RN  Outcome: Ongoing  11/13/2021 0042 by Jermain Crowell RN  Outcome: Ongoing  Goal: STG - Patient performs or directs assisted coughing  11/13/2021 1108 by Ghanshyam Hunter RN  Outcome: Ongoing  11/13/2021 0042 by Jermain Crowell RN  Outcome: Ongoing  Goal: STG - patient can administer MDI's  11/13/2021 1108 by Ghanshyam Hunter RN  Outcome: Ongoing  11/13/2021 0042 by Jermain Crowell RN  Outcome: Ongoing  Goal: STG - Patient can utilize incentive spirometer  11/13/2021 1108 by Ghanshyam Hunter RN  Outcome: Ongoing  11/13/2021 0042 by Jermain Crowell RN  Outcome: Ongoing  Goal: STG - family can complete suctioning  11/13/2021 1108 by Ghanshyam Hunter RN  Outcome: Ongoing  11/13/2021 0042 by Jermain Crowell RN  Outcome: Ongoing     Problem: NUTRITION  Goal: Patient maintains adequate hydration  11/13/2021 1108 by Ghanshyam Hunter RN  Outcome: Ongoing  11/13/2021 0042 by Jermain Crowell RN  Outcome: Ongoing  Goal: Patient maintains weight  11/13/2021 1108 by Ghanshyam Hunter RN  Outcome: Ongoing  11/13/2021 0042 by Jermain Crowell RN  Outcome: Ongoing  Goal: Patient/Family demonstrates understanding of diet  11/13/2021 8193 by Lisa Echols RN  Outcome: Ongoing  11/13/2021 0042 by Keeley Park RN  Outcome: Ongoing  Goal: Patient/Family independently completes tube feeding  11/13/2021 1108 by Lisa Echols RN  Outcome: Ongoing  11/13/2021 0042 by Keeley Park RN  Outcome: Ongoing  Goal: Patient will have no more than 5 lb weight change during LOS  11/13/2021 1108 by Lisa Echols RN  Outcome: Ongoing  11/13/2021 0042 by Keeley Park RN  Outcome: Ongoing  Goal: Patient will utilize adaptive techniques to administer nutrition  11/13/2021 1108 by Lisa Echols RN  Outcome: Ongoing  11/13/2021 0042 by Keeley Park RN  Outcome: Ongoing  Goal: Patient will verbalize dietary restrictions  11/13/2021 1108 by Lisa Echols RN  Outcome: Ongoing  11/13/2021 0042 by Keeley Park RN  Outcome: Ongoing     Problem: SAFETY  Goal: LTG - patient will adhere to hip precautions during ADL's and transfers  11/13/2021 1108 by Lisa Echols RN  Outcome: Ongoing  11/13/2021 0042 by Keeley Park RN  Outcome: Ongoing  Goal: LTG - Patient will demonstrate safety requirements appropriate to situation/environment  11/13/2021 1108 by Lisa Echols RN  Outcome: Ongoing  11/13/2021 0042 by Keeley Park RN  Outcome: Ongoing  Goal: LTG - patient will utilize safety techniques  11/13/2021 1108 by Lisa Echols RN  Outcome: Ongoing  11/13/2021 0042 by Keeley Park RN  Outcome: Ongoing  Goal: STG - patient locks brakes on wheelchair  11/13/2021 1108 by Lisa Echols RN  Outcome: Ongoing  11/13/2021 0042 by Keeley Park RN  Outcome: Ongoing  Goal: STG - Patient uses call light consistently to request assistance with transfers  11/13/2021 1108 by Lisa Echols RN  Outcome: Ongoing  11/13/2021 0042 by Keeley Park RN  Outcome: Ongoing  Goal: STG - patient uses gait belt during all transfers  11/13/2021 1108 by Lisa Frater, RN  Outcome: Ongoing  11/13/2021 0042 by Maco Steiner Rosie Reece RN  Outcome: Ongoing  Goal: Free from accidental physical injury  11/13/2021 1108 by Lis Ulloa RN  Outcome: Ongoing  11/13/2021 0042 by Eldon Torres RN  Outcome: Ongoing  Goal: Free from intentional harm  11/13/2021 1108 by Lis Ulloa RN  Outcome: Ongoing  11/13/2021 0042 by Eldon Torres RN  Outcome: Ongoing     Problem: SKIN INTEGRITY  Goal: LTG - Patient will be free from infection  11/13/2021 1108 by Lis Ulola RN  Outcome: Ongoing  11/13/2021 0042 by Eldon Torres RN  Outcome: Ongoing  Goal: LTG - patient will maintain/improve skin integrity through proper skin care techniques  11/13/2021 1108 by Lis Ulloa RN  Outcome: Ongoing  11/13/2021 0042 by Eldon Torres RN  Outcome: Ongoing  Goal: LTG - Patient will demonstrate appropriate pressure relief techniques  11/13/2021 1108 by Lis Ulloa RN  Outcome: Ongoing  11/13/2021 0042 by Eldon Torres RN  Outcome: Ongoing  Goal: LTG - patient will demonstrate appropriate skin care techniques  11/13/2021 1108 by Lis Ulloa RN  Outcome: Ongoing  11/13/2021 0042 by Eldon Torres RN  Outcome: Ongoing  Goal: LTG - Patient will be free from infection  11/13/2021 1108 by Lis Ulloa RN  Outcome: Ongoing  11/13/2021 0042 by Eldon Torres RN  Outcome: Ongoing  Goal: STG - Patient demonstrates skin care/treatment/dressing change  11/13/2021 1108 by Lis Ulloa RN  Outcome: Ongoing  11/13/2021 0042 by Eldon Torres RN  Outcome: Ongoing  Goal: STG - patient will maintain good skin integrity  11/13/2021 1108 by Lis Ulloa RN  Outcome: Ongoing  11/13/2021 0042 by Eldon Torres RN  Outcome: Ongoing  Goal: STG - Patient exhibits signs of wound healing.   11/13/2021 1108 by Lis Ulloa RN  Outcome: Ongoing  11/13/2021 0042 by Eldon Torres RN  Outcome: Ongoing  Goal: STG - patient demonstrates pressure reduction techniques  11/13/2021 1108 by Lis Ulloa RN  Outcome: Ongoing  11/13/2021 0042 by Richard Childress RN  Outcome: Ongoing  Goal: STG - Patient demonstrates preventative skin care measures  11/13/2021 1108 by Ardyth Scheuermann, RN  Outcome: Ongoing  11/13/2021 0042 by Richard Childress RN  Outcome: Ongoing  Goal: Skin integrity is maintained or improved  11/13/2021 1108 by Ardyth Scheuermann, RN  Outcome: Ongoing  11/13/2021 0042 by Richard Childress RN  Outcome: Ongoing     Problem: PAIN  Goal: LTG - Patient will manage pain with the appropriate technique/Intervention  11/13/2021 1108 by Ardyth Scheuermann, RN  Outcome: Ongoing  11/13/2021 0042 by Richard Childress RN  Outcome: Ongoing  Goal: LTG - Patient will demonstrate intervention for managing pain  11/13/2021 1108 by Ardyth Scheuermann, RN  Outcome: Ongoing  11/13/2021 0042 by Richard Childress RN  Outcome: Ongoing  Goal: STG - Patient will reduce or eliminate use of analgesics  11/13/2021 1108 by Ardyth Scheuermann, RN  Outcome: Ongoing  11/13/2021 0042 by Richard Childress RN  Outcome: Ongoing  Goal: STG - pain is manageable through therapies  11/13/2021 1108 by Ardyth Scheuermann, RN  Outcome: Ongoing  11/13/2021 0042 by Richard Childress RN  Outcome: Ongoing  Goal: STG - Patient will verbalize an acceptable level of pain  11/13/2021 1108 by Ardyth Scheuermann, RN  Outcome: Ongoing  11/13/2021 0042 by Richard Childress RN  Outcome: Ongoing  Goal: STG - patients pain is managed to allow active participation in daily activities  11/13/2021 1108 by Ardyth Scheuermann, RN  Outcome: Ongoing  11/13/2021 0042 by Richard Childress RN  Outcome: Ongoing  Goal: STG - Patient will increase activity level  11/13/2021 1108 by Ardyth Scheuermann, RN  Outcome: Ongoing  11/13/2021 0042 by Richard Childress RN  Outcome: Ongoing  Goal: STG - patient verbalizes a reduction in pain level  11/13/2021 1108 by Ardyth Scheuermann, RN  Outcome: Ongoing  11/13/2021 0042 by Richard Childress, RN  Outcome: Ongoing  Goal: Patient's pain/discomfort is manageable  11/13/2021 1108 by Debbie Patel RN  Outcome: Ongoing  11/13/2021 0042 by Tamir Patrick RN  Outcome: Ongoing

## 2021-11-13 NOTE — PROGRESS NOTES
[facility]  INPATIENT SPEECH THERAPY  [facility] 8 REHAB UNIT        TIME   Time In: 1400  Time Out: 1500  Minutes: 60       [x]Daily Note  []Progress Note    Date: 2021  Patient Name: Loco Smith        MRN: [id number]    Account #: [de-identified]  : 1933  (80 y.o.)  Gender: female   Primary Provider: Latesha Leija MD  Swallowing Status/Diet: Soft and bite sized diet with pureed meats, thin liquids    PATIENT DIAGNOSIS(ES):    Diagnosis: Acute ischemic stroke, L MCA infarct, R sided involvement      Additional Pertinent Hx: hx CVAs, stress incontinence     RESTRICTIONS/PRECAUTIONS:    Restrictions/Precautions  Restrictions/Precautions: Fall Risk, Weight Bearing, Swallowing Modified Diet  Required Braces or Orthoses?: No  Position Activity Restriction  Other position/activity restrictions: Dysphagia-soft and bite sized            Subjective: The patient was upright in her recliner and requested she return to her bed. She was cooperative with all therapy tasks. No caregivers were present this afternoon. Objective:  Automatic speech tasks were completed. She was able to state the days of the week and months of the year. She was able to count from 1-20. She was able to state her location and the year. She was unable to state her age and birthdate today. Naming pictures was at 80%. She followed one step commands at 80%. She tolerated thin liquids via cup well this date without overt s/s of aspiration. She is still having difficulty holding the cup and placing this back down on her tray. She had several spills today with her beverages. Per OT she has left inattention and impaired vision. She will continue to require assistance during meals due to fatigue. She will require cues to take small bites and masticate thoroughly before taking another bite. She will need cues to use a lingual sweep and search and to utilize liquid wash to help clear the oral cavity.   Staff should check the oral cavity prior to lowering the head of the bed. Recommended Diet and Intervention  Diet Solids Recommendation: Dysphagia Soft and Bite-Sized with pureed meats (Dysphagia III)  Liquid Consistency Recommendation: Thin liquids  Recommended Form of Meds: Crushed in puree as able  Therapeutic Interventions: Mendelsohn; Diet tolerance monitoring; Oral care; Therapeutic PO trials with SLP; Oral motor exercises; Tongue base strengthening; Patient/Family education; Effortful swallow; Pharyngeal exercises; Yanet     Compensatory Swallowing Strategies  Compensatory Swallowing Strategies: Upright as possible for all oral intake; Alternate solids and liquids; Remain upright for 30-45 minutes after meals; Check for pocketing of food on the Right; Swallow 2 times per bite/sip; Small bites/sips; External pacing             SHORT TERM GOAL #1:  Goal 1: The patient will follow two step commands with minimal cueing and 100% accuracy. SHORT TERM GOAL #2:  Goal 2: The patient will complete naming tasks (picture, object) with minimal cueing and 100% accuracy. SHORT TERM GOAL #3:  Goal 3: The patient will complete concrete divergent naming tasks with minimal cueing and 100% accuracy. SHORT TERM GOAL #4:  Goal 4: The patient will complete oral motor exercises to improve lingual and labial strength and range of motion as well as improve speech intelligibility. SHORT TERM GOAL #5:  Goal 5: The patient will utilize dysarthria strategies (slow rate, increased volume, over exaggeration of words, increased breath support) during structured tasks and during conversations with minimal cueing. Swallowing Short Term Goals  Short-term Goals  Goal 1: The patient will complete the yanet (tongue hold) technique to improve base of tongue retraction and base of tongue to pharyngeal wall approximation with 90% accuracy and minimal verbal cues.   Goal 2: The patient will complete the effortful swallow maneuver to improve hyolaryngeal elevation, tongue base retraction, and vocal fold closure with 90% accuracy and minimal verbal cues. Goal 3: Staff/caregivers will complete daily oral hygiene to prevent aspiration of bacteria laden secretions. Goal 4: The patient will tolerate a dysphagia soft and bite sized diet with nectar (mildly thick) liquids with minimal overt s/s of aspiration. Comprehension: 3 - Patient understands basic needs 50-74% of the time  Expression: 3 - Expresses basic ideas/needs 50-74% of the time  Social Interaction: 4 - Patient appropriate 75-90%+ of the time  Problem Solving: 3 - Patient solves simple/routine tasks 50%-74%  Memory: 3 - Patient remembers 50%-74% of the time    ASSESSMENT:  Assessment: [x]Progressing towards goals          []Not Progressing towards goals    Patient Tolerance of Treatment:   [x]Tolerated well []Tolerated fair []Required rest breaks []Fatigued    Education:  Learner:  [x]Patient          []Significant Other          []Other  Education provided regarding:  [x]Goals and POC   []Diet and swallowing precautions    []Home Exercise Program  []Progress and/or discharge information  Method of Education:  [x]Discussion          []Demonstration          []Handout          []Other  Evaluation of Education:   [x]Verbalized understanding   []Demonstrates without assistance  []Demonstrates with assistance  []Needs further instruction     []No evidence of learning                  []No family present      Plan: [x]Continue with current plan of care    []Modify current plan of care as follows:    []Discharge patient    Discharge Location:    Services/Supervision Recommended:      [x]Patient continues to require treatment by a licensed therapist to address functional deficits as outlined in the established plan of care.                     Electronically Signed By:  Carmen Schwarz M.S., CCC-SLP  11/13/2021,1:54 PM.

## 2021-11-13 NOTE — PROGRESS NOTES
11/13/21 1047 11/13/21 1048 11/13/21 1050   Exercises   Comments Supine BLE ex  x10 reps/ 3 sets performed at bedside A-AAROM. --   --    Conditions Requiring Skilled Therapeutic Intervention   Assessment  --  Pt. performed supine BLE exercises at bedside this session. Kept dozing and alertness seemed to come and go at times. All exercises were A-AAROM. --    Activity Tolerance   Activity Tolerance  --  Patient limited by fatigue; Patient limited by endurance  --    Safety Devices   Type of devices  --   --  Call light within reach;  Bed alarm in place   Electronically signed by Jarett Hatfield PTA on 11/13/2021 at 10:56 AM

## 2021-11-14 LAB
ALBUMIN SERPL-MCNC: 3.8 G/DL (ref 3.5–5.2)
ALP BLD-CCNC: 98 U/L (ref 35–104)
ALT SERPL-CCNC: 34 U/L (ref 5–33)
ANION GAP SERPL CALCULATED.3IONS-SCNC: 10 MMOL/L (ref 7–19)
AST SERPL-CCNC: 36 U/L (ref 5–32)
BASOPHILS ABSOLUTE: 0 K/UL (ref 0–0.2)
BASOPHILS RELATIVE PERCENT: 0.7 % (ref 0–1)
BILIRUB SERPL-MCNC: 0.7 MG/DL (ref 0.2–1.2)
BUN BLDV-MCNC: 18 MG/DL (ref 8–23)
CALCIUM SERPL-MCNC: 10.4 MG/DL (ref 8.8–10.2)
CHLORIDE BLD-SCNC: 105 MMOL/L (ref 98–111)
CO2: 27 MMOL/L (ref 22–29)
CREAT SERPL-MCNC: 0.5 MG/DL (ref 0.5–0.9)
EOSINOPHILS ABSOLUTE: 0.2 K/UL (ref 0–0.6)
EOSINOPHILS RELATIVE PERCENT: 3 % (ref 0–5)
GFR AFRICAN AMERICAN: >59
GFR NON-AFRICAN AMERICAN: >60
GLUCOSE BLD-MCNC: 53 MG/DL (ref 70–99)
GLUCOSE BLD-MCNC: 54 MG/DL (ref 70–99)
GLUCOSE BLD-MCNC: 66 MG/DL (ref 70–99)
GLUCOSE BLD-MCNC: 73 MG/DL (ref 70–99)
GLUCOSE BLD-MCNC: 76 MG/DL (ref 70–99)
GLUCOSE BLD-MCNC: 80 MG/DL (ref 70–99)
GLUCOSE BLD-MCNC: 92 MG/DL (ref 74–109)
GLUCOSE BLD-MCNC: 96 MG/DL (ref 70–99)
HCT VFR BLD CALC: 47.3 % (ref 37–47)
HEMOGLOBIN: 14.7 G/DL (ref 12–16)
IMMATURE GRANULOCYTES #: 0 K/UL
INR BLD: 2.44 (ref 0.88–1.18)
LYMPHOCYTES ABSOLUTE: 1.5 K/UL (ref 1.1–4.5)
LYMPHOCYTES RELATIVE PERCENT: 24.8 % (ref 20–40)
MCH RBC QN AUTO: 29.5 PG (ref 27–31)
MCHC RBC AUTO-ENTMCNC: 31.1 G/DL (ref 33–37)
MCV RBC AUTO: 94.8 FL (ref 81–99)
MONOCYTES ABSOLUTE: 0.3 K/UL (ref 0–0.9)
MONOCYTES RELATIVE PERCENT: 5.4 % (ref 0–10)
NEUTROPHILS ABSOLUTE: 4 K/UL (ref 1.5–7.5)
NEUTROPHILS RELATIVE PERCENT: 65.8 % (ref 50–65)
PDW BLD-RTO: 14.3 % (ref 11.5–14.5)
PERFORMED ON: ABNORMAL
PERFORMED ON: NORMAL
PLATELET # BLD: 162 K/UL (ref 130–400)
PMV BLD AUTO: 10.3 FL (ref 9.4–12.3)
POTASSIUM SERPL-SCNC: 4.3 MMOL/L (ref 3.5–5)
PROTHROMBIN TIME: 26.2 SEC (ref 12–14.6)
RBC # BLD: 4.99 M/UL (ref 4.2–5.4)
SODIUM BLD-SCNC: 142 MMOL/L (ref 136–145)
TOTAL PROTEIN: 6.1 G/DL (ref 6.6–8.7)
WBC # BLD: 6.1 K/UL (ref 4.8–10.8)

## 2021-11-14 PROCEDURE — 6370000000 HC RX 637 (ALT 250 FOR IP): Performed by: PSYCHIATRY & NEUROLOGY

## 2021-11-14 PROCEDURE — 80053 COMPREHEN METABOLIC PANEL: CPT

## 2021-11-14 PROCEDURE — 36415 COLL VENOUS BLD VENIPUNCTURE: CPT

## 2021-11-14 PROCEDURE — 2580000003 HC RX 258: Performed by: PSYCHIATRY & NEUROLOGY

## 2021-11-14 PROCEDURE — 6370000000 HC RX 637 (ALT 250 FOR IP): Performed by: NURSE PRACTITIONER

## 2021-11-14 PROCEDURE — 82947 ASSAY GLUCOSE BLOOD QUANT: CPT

## 2021-11-14 PROCEDURE — 1180000000 HC REHAB R&B

## 2021-11-14 PROCEDURE — 85610 PROTHROMBIN TIME: CPT

## 2021-11-14 PROCEDURE — 85025 COMPLETE CBC W/AUTO DIFF WBC: CPT

## 2021-11-14 RX ADMIN — Medication 10 ML: at 09:36

## 2021-11-14 RX ADMIN — DEXTROSE 15 G: 15 GEL ORAL at 09:24

## 2021-11-14 RX ADMIN — Medication 10 ML: at 21:44

## 2021-11-14 RX ADMIN — DOCUSATE SODIUM 100 MG: 100 CAPSULE, LIQUID FILLED ORAL at 09:28

## 2021-11-14 RX ADMIN — CETIRIZINE HYDROCHLORIDE 5 MG: 10 TABLET, FILM COATED ORAL at 09:28

## 2021-11-14 RX ADMIN — SENNOSIDES AND DOCUSATE SODIUM 1 TABLET: 50; 8.6 TABLET ORAL at 09:28

## 2021-11-14 RX ADMIN — WARFARIN SODIUM 4 MG: 3 TABLET ORAL at 18:41

## 2021-11-14 RX ADMIN — FLUTICASONE PROPIONATE 1 SPRAY: 50 SPRAY, METERED NASAL at 09:28

## 2021-11-14 RX ADMIN — ATORVASTATIN CALCIUM 20 MG: 20 TABLET, FILM COATED ORAL at 09:28

## 2021-11-14 NOTE — PLAN OF CARE
Problem: Falls - Risk of:  Goal: Will remain free from falls  Outcome: Ongoing  Goal: Absence of physical injury  Outcome: Ongoing     Problem: Confusion - Acute:  Goal: Absence of continued neurological deterioration signs and symptoms  Outcome: Ongoing  Goal: Mental status will be restored to baseline  Outcome: Ongoing     Problem: Discharge Planning:  Goal: Ability to perform activities of daily living will improve  Outcome: Ongoing  Goal: Participates in care planning  Outcome: Ongoing     Problem: Injury - Risk of, Physical Injury:  Goal: Will remain free from falls  Outcome: Ongoing  Goal: Absence of physical injury  Outcome: Ongoing     Problem: Mood - Altered:  Goal: Mood stable  Outcome: Ongoing  Goal: Absence of abusive behavior  Outcome: Ongoing  Goal: Verbalizations of feeling emotionally comfortable while being cared for will increase  Outcome: Ongoing     Problem: Psychomotor Activity - Altered:  Goal: Absence of psychomotor disturbance signs and symptoms  Outcome: Ongoing     Problem: Sensory Perception - Impaired:  Goal: Demonstrations of improved sensory functioning will increase  Outcome: Ongoing  Goal: Decrease in sensory misperception frequency  Outcome: Ongoing  Goal: Able to refrain from responding to false sensory perceptions  Outcome: Ongoing  Goal: Demonstrates accurate environmental perceptions  Outcome: Ongoing  Goal: Able to distinguish between reality-based and nonreality-based thinking  Outcome: Ongoing  Goal: Able to interrupt nonreality-based thinking  Outcome: Ongoing     Problem: Sleep Pattern Disturbance:  Goal: Appears well-rested  Outcome: Ongoing     Problem: IP BLADDER/VOIDING  Goal: LTG - patient will complete bladder elimination  Outcome: Ongoing  Goal: LTG - Patient will utilize adaptive techniques/equipment to complete bladder elimination  Outcome: Ongoing  Goal: LTG - patient will achieve acceptable level of continence  Outcome: Ongoing  Goal: STG - Patient demonstrates ability to take care of indwelling catheter  Outcome: Ongoing  Goal: STG - patient demonstrates self-cath technique using clean technique and care of the catheter  Outcome: Ongoing  Goal: STG - Patient demonstrates no accidents  Outcome: Ongoing  Goal: STG - Patient will state signs and symptoms of UTI  Outcome: Ongoing  Goal: STG - patient will be able to empty bladder  Outcome: Ongoing  Goal: STG - Patient demonstrates understanding of self catheterization schedule by completing task on time  Outcome: Ongoing  Goal: STG - patient participates in bladder program by expressing need to void  Outcome: Ongoing  Goal: STG - Patient verbalizes understanding of catheter care indwelling/intermittent  Outcome: Ongoing  Goal: STG - patient participates in bladder program by adhering to implemented toileting schedule  Outcome: Ongoing     Problem: IP BOWEL ELIMINATION  Goal: LTG - patient will utilize adaptive techniques/equipment to complete bowel elimination  Outcome: Ongoing  Goal: LTG - patient will have regular and routine bowel evacuation  Outcome: Ongoing  Goal: STG - patient will be accident free  Outcome: Ongoing  Goal: STG - Patient demonstrates care and management of ostomy bag  Outcome: Ongoing  Goal: STG - Patient participates in bowel management program  Outcome: Ongoing  Goal: STG - patient maintains skin integrity  Outcome: Ongoing  Goal: STG - Patient will verbalize signs and symptoms of constipation and how to prevent/alleviate  Outcome: Ongoing  Goal: STG - patient will be continent of stool  Outcome: Ongoing  Goal: STG - Patient completes digital stimulation technique  Outcome: Ongoing  Goal: STG - Patient verbalizes knowledge about relationship between diet, fluid intake, activity and medication on constipation  Outcome: Ongoing     Problem: IP BREATHING  Goal: LTG - patient will mobilize secretions and maintain airway  Outcome: Ongoing  Goal: LTG - Patient/caregiver will demonstrate/perform proper techniques to maintain patent airway  Outcome: Ongoing  Goal: LTG - patient/caregiver will demonstrate/perform improved or stable self care abilities within physical limitations  Outcome: Ongoing  Goal: STG - Patient/caregiver will maintain patent airway  Outcome: Ongoing  Goal: STG - respiratory rate and effort will be within normal limits for the patient  Outcome: Ongoing  Goal: STG - patient/caregiver will be able to verbalize oxygen safety precautions  Outcome: Ongoing  Goal: STG - Patient/caregiver demonstrates correct suctioning technique  Outcome: Ongoing  Goal: STG - patient will utilize incentive spirometer  Outcome: Ongoing  Goal: STG - Patient performs or directs assisted coughing  Outcome: Ongoing  Goal: STG - patient can administer MDI's  Outcome: Ongoing  Goal: STG - Patient can utilize incentive spirometer  Outcome: Ongoing  Goal: STG - family can complete suctioning  Outcome: Ongoing     Problem: NUTRITION  Description: Altered Nutrition and Hydration  Goal: Patient maintains adequate hydration  Outcome: Ongoing  Goal: Patient maintains weight  Outcome: Ongoing  Goal: Patient/Family demonstrates understanding of diet  Outcome: Ongoing  Goal: Patient/Family independently completes tube feeding  Outcome: Ongoing  Goal: Patient will have no more than 5 lb weight change during LOS  Outcome: Ongoing  Goal: Patient will utilize adaptive techniques to administer nutrition  Outcome: Ongoing  Goal: Patient will verbalize dietary restrictions  Outcome: Ongoing     Problem: SAFETY  Goal: LTG - patient will adhere to hip precautions during ADL's and transfers  Outcome: Ongoing  Goal: LTG - Patient will demonstrate safety requirements appropriate to situation/environment  Outcome: Ongoing  Goal: LTG - patient will utilize safety techniques  Outcome: Ongoing  Goal: STG - patient locks brakes on wheelchair  Outcome: Ongoing  Goal: STG - Patient uses call light consistently to request assistance with transfers  Outcome: Ongoing  Goal: STG - patient uses gait belt during all transfers  Outcome: Ongoing  Goal: Free from accidental physical injury  Outcome: Ongoing  Goal: Free from intentional harm  Outcome: Ongoing     Problem: SKIN INTEGRITY  Goal: LTG - Patient will be free from infection  Outcome: Ongoing  Goal: LTG - patient will maintain/improve skin integrity through proper skin care techniques  Outcome: Ongoing  Goal: LTG - Patient will demonstrate appropriate pressure relief techniques  Outcome: Ongoing  Goal: LTG - patient will demonstrate appropriate skin care techniques  Outcome: Ongoing  Goal: LTG - Patient will be free from infection  Outcome: Ongoing  Goal: STG - Patient demonstrates skin care/treatment/dressing change  Outcome: Ongoing  Goal: STG - patient will maintain good skin integrity  Outcome: Ongoing  Goal: STG - Patient exhibits signs of wound healing.   Outcome: Ongoing  Goal: STG - patient demonstrates pressure reduction techniques  Outcome: Ongoing  Goal: STG - Patient demonstrates preventative skin care measures  Outcome: Ongoing  Goal: Skin integrity is maintained or improved  Outcome: Ongoing     Problem: PAIN  Goal: LTG - Patient will manage pain with the appropriate technique/Intervention  Outcome: Ongoing  Goal: LTG - Patient will demonstrate intervention for managing pain  Outcome: Ongoing  Goal: STG - Patient will reduce or eliminate use of analgesics  Outcome: Ongoing  Goal: STG - pain is manageable through therapies  Outcome: Ongoing  Goal: STG - Patient will verbalize an acceptable level of pain  Outcome: Ongoing  Goal: STG - patients pain is managed to allow active participation in daily activities  Outcome: Ongoing  Goal: STG - Patient will increase activity level  Outcome: Ongoing  Goal: STG - patient verbalizes a reduction in pain level  Outcome: Ongoing  Goal: Patient's pain/discomfort is manageable  Outcome: Ongoing

## 2021-11-14 NOTE — PROGRESS NOTES
Clinical Pharmacy Note    Warfarin consult follow-up    Recent Labs     11/14/21  0636   INR 2.44*     Recent Labs     11/12/21  0451 11/13/21  0352 11/14/21  0636   HGB 15.1 14.8 14.7   HCT 49.5* 47.4* 47.3*    153 162       Significant Drug-Drug Interactions:  New warfarin drug-drug interactions: none  Discontinued drug-drug interactions: none    Date INR Warfarin Dose   11/06/21 1.01 ---   11/07/21 --- 5 mg   11/08/21 1.04  5 mg    11/09/21  1.30 5 mg    11/10/21 1.74 5 mg    11/11/21   2.44 2.5 mg     11/12/21 2.02 5 mg    11/13/21   1.97 5 mg   11/14/21  2.44  4 mg                                 Notes:                   Give coumadin 4mg x 1 dose tonight. Daily PT/INR until stable within therapeutic range.      KAI SCOTT PHARM D, 11/14/2021, 7:57 AM

## 2021-11-14 NOTE — PROGRESS NOTES
Patient was given orange juice breakfast rechecked at approximately 30 minutes later and was 69. Patient alert and oriented and continuing to eat breakfast visiting with son.

## 2021-11-14 NOTE — PLAN OF CARE
Problem: Falls - Risk of:  Goal: Will remain free from falls  Description: Will remain free from falls  11/14/2021 1349 by Daniel Alonso RN  Outcome: Ongoing  11/14/2021 0243 by Carlos Myers RN  Outcome: Ongoing  Goal: Absence of physical injury  Description: Absence of physical injury  11/14/2021 1349 by Daniel Alonso RN  Outcome: Ongoing  11/14/2021 0243 by Carlos Myers RN  Outcome: Ongoing     Problem: Confusion - Acute:  Goal: Absence of continued neurological deterioration signs and symptoms  Description: Absence of continued neurological deterioration signs and symptoms  11/14/2021 1349 by Daniel Alonso RN  Outcome: Ongoing  11/14/2021 0243 by Carlos Myers RN  Outcome: Ongoing  Goal: Mental status will be restored to baseline  Description: Mental status will be restored to baseline  11/14/2021 1349 by Daniel Alonso RN  Outcome: Ongoing  11/14/2021 0243 by Carlos Myers RN  Outcome: Ongoing     Problem: Discharge Planning:  Goal: Ability to perform activities of daily living will improve  Description: Ability to perform activities of daily living will improve  11/14/2021 1349 by Daniel Alonso RN  Outcome: Ongoing  11/14/2021 0243 by Carlos Myers RN  Outcome: Ongoing  Goal: Participates in care planning  Description: Participates in care planning  11/14/2021 1349 by Daniel Alonso RN  Outcome: Ongoing  11/14/2021 0243 by Carlos Myers RN  Outcome: Ongoing     Problem: Injury - Risk of, Physical Injury:  Goal: Will remain free from falls  Description: Will remain free from falls  11/14/2021 1349 by Daniel Alonso RN  Outcome: Ongoing  11/14/2021 0243 by Carlos Myers RN  Outcome: Ongoing  Goal: Absence of physical injury  Description: Absence of physical injury  11/14/2021 1349 by Daniel Alonso RN  Outcome: Ongoing  11/14/2021 0243 by Carlos Myers RN  Outcome: Ongoing     Problem: Mood - Altered:  Goal: Mood stable  Description: Mood stable  11/14/2021 1349 by Linda Grier Balaji Wheatley RN  Outcome: Ongoing  11/14/2021 0243 by Payton Page RN  Outcome: Ongoing  Goal: Absence of abusive behavior  Description: Absence of abusive behavior  11/14/2021 1349 by Filippo Guadarrama RN  Outcome: Ongoing  11/14/2021 0243 by Payton Page RN  Outcome: Ongoing  Goal: Verbalizations of feeling emotionally comfortable while being cared for will increase  Description: Verbalizations of feeling emotionally comfortable while being cared for will increase  11/14/2021 1349 by Filippo Guadarrama RN  Outcome: Ongoing  11/14/2021 0243 by Payton Page RN  Outcome: Ongoing     Problem: Psychomotor Activity - Altered:  Goal: Absence of psychomotor disturbance signs and symptoms  Description: Absence of psychomotor disturbance signs and symptoms  11/14/2021 1349 by Filippo Guadarrama RN  Outcome: Ongoing  11/14/2021 0243 by Payton Page RN  Outcome: Ongoing     Problem: Sensory Perception - Impaired:  Goal: Demonstrations of improved sensory functioning will increase  Description: Demonstrations of improved sensory functioning will increase  11/14/2021 1349 by Filippo Guadarrama RN  Outcome: Ongoing  11/14/2021 0243 by Payton Page RN  Outcome: Ongoing  Goal: Decrease in sensory misperception frequency  Description: Decrease in sensory misperception frequency  11/14/2021 1349 by Filippo Guadarrama RN  Outcome: Ongoing  11/14/2021 0243 by Payton Page RN  Outcome: Ongoing  Goal: Able to refrain from responding to false sensory perceptions  Description: Able to refrain from responding to false sensory perceptions  11/14/2021 1349 by Filippo Guadarrama RN  Outcome: Ongoing  11/14/2021 0243 by Payton Page RN  Outcome: Ongoing  Goal: Demonstrates accurate environmental perceptions  Description: Demonstrates accurate environmental perceptions  11/14/2021 1349 by Filippo Guadarrama RN  Outcome: Ongoing  11/14/2021 0243 by Payton Page RN  Outcome: Ongoing  Goal: Able to distinguish between reality-based and nonreality-based thinking  Description: Able to distinguish between reality-based and nonreality-based thinking  11/14/2021 1349 by Simone Armenta RN  Outcome: Ongoing  11/14/2021 0243 by Teresa Rosario RN  Outcome: Ongoing  Goal: Able to interrupt nonreality-based thinking  Description: Able to interrupt nonreality-based thinking  11/14/2021 1349 by Simone Armenta RN  Outcome: Ongoing  11/14/2021 0243 by Teresa Rosario RN  Outcome: Ongoing     Problem: Sleep Pattern Disturbance:  Goal: Appears well-rested  Description: Appears well-rested  11/14/2021 1349 by Simone Armenta RN  Outcome: Ongoing  11/14/2021 0243 by Teresa Rosario RN  Outcome: Ongoing     Problem: IP BLADDER/VOIDING  Goal: LTG - patient will complete bladder elimination  11/14/2021 1349 by Simone Armenta RN  Outcome: Ongoing  11/14/2021 0243 by Teresa Rosario RN  Outcome: Ongoing  Goal: LTG - Patient will utilize adaptive techniques/equipment to complete bladder elimination  11/14/2021 1349 by Simone Armenta RN  Outcome: Ongoing  11/14/2021 0243 by Teresa Rosario RN  Outcome: Ongoing  Goal: LTG - patient will achieve acceptable level of continence  11/14/2021 1349 by Simone Armenta RN  Outcome: Ongoing  11/14/2021 0243 by Teresa Rosario RN  Outcome: Ongoing  Goal: STG - Patient demonstrates ability to take care of indwelling catheter  11/14/2021 1349 by Simone Armenta RN  Outcome: Ongoing  11/14/2021 0243 by Teresa Rosario RN  Outcome: Ongoing  Goal: STG - patient demonstrates self-cath technique using clean technique and care of the catheter  11/14/2021 1349 by Simoen Armenta RN  Outcome: Ongoing  11/14/2021 0243 by Teresa Rosario RN  Outcome: Ongoing  Goal: STG - Patient demonstrates no accidents  11/14/2021 1349 by Simone Armenta RN  Outcome: Ongoing  11/14/2021 0243 by Teresa Rosario RN  Outcome: Ongoing  Goal: STG - Patient will state signs and symptoms of UTI  11/14/2021 1349 by Simone Armenta RN  Outcome: Ongoing  11/14/2021 0243 by Carey Torrez RN  Outcome: Ongoing  Goal: STG - patient will be able to empty bladder  11/14/2021 1349 by Lola Martino RN  Outcome: Ongoing  11/14/2021 0243 by Carey Torrez RN  Outcome: Ongoing  Goal: STG - Patient demonstrates understanding of self catheterization schedule by completing task on time  11/14/2021 1349 by Lola Martino RN  Outcome: Ongoing  11/14/2021 0243 by Carey Torrez RN  Outcome: Ongoing  Goal: STG - patient participates in bladder program by expressing need to void  11/14/2021 1349 by Lola Martino RN  Outcome: Ongoing  11/14/2021 0243 by Carey Torrez RN  Outcome: Ongoing  Goal: STG - Patient verbalizes understanding of catheter care indwelling/intermittent  11/14/2021 1349 by Lola Martino RN  Outcome: Ongoing  11/14/2021 0243 by Carey Torrez RN  Outcome: Ongoing  Goal: STG - patient participates in bladder program by adhering to implemented toileting schedule  11/14/2021 1349 by Lola Martino RN  Outcome: Ongoing  11/14/2021 0243 by Carey Torrez RN  Outcome: Ongoing     Problem: IP BOWEL ELIMINATION  Goal: LTG - patient will utilize adaptive techniques/equipment to complete bowel elimination  11/14/2021 1349 by Lola Martino RN  Outcome: Ongoing  11/14/2021 0243 by Carey Torrez RN  Outcome: Ongoing  Goal: LTG - patient will have regular and routine bowel evacuation  11/14/2021 1349 by Lola Martino RN  Outcome: Ongoing  11/14/2021 0243 by Carey Torrez RN  Outcome: Ongoing  Goal: STG - patient will be accident free  11/14/2021 1349 by Lola Martino RN  Outcome: Ongoing  11/14/2021 0243 by Carey Torrez RN  Outcome: Ongoing  Goal: STG - Patient demonstrates care and management of ostomy bag  11/14/2021 1349 by Lola Martino RN  Outcome: Ongoing  11/14/2021 0243 by Carey Torrez RN  Outcome: Ongoing  Goal: STG - Patient participates in bowel management program  11/14/2021 1349 by Lola Martino RN  Outcome: Ongoing  11/14/2021 0243 by Dilia Walker RN  Outcome: Ongoing  Goal: STG - patient maintains skin integrity  11/14/2021 1349 by Giorgio Arceo RN  Outcome: Ongoing  11/14/2021 0243 by Dilia Walker RN  Outcome: Ongoing  Goal: STG - Patient will verbalize signs and symptoms of constipation and how to prevent/alleviate  11/14/2021 1349 by Giorgio Arceo RN  Outcome: Ongoing  11/14/2021 0243 by Dilia Walker RN  Outcome: Ongoing  Goal: STG - patient will be continent of stool  11/14/2021 1349 by Giorgio Arceo RN  Outcome: Ongoing  11/14/2021 0243 by Dilia Walker RN  Outcome: Ongoing  Goal: STG - Patient completes digital stimulation technique  11/14/2021 1349 by Giorgio Arceo RN  Outcome: Ongoing  11/14/2021 0243 by Dilia Walker RN  Outcome: Ongoing  Goal: STG - Patient verbalizes knowledge about relationship between diet, fluid intake, activity and medication on constipation  11/14/2021 1349 by Giorgio Arceo RN  Outcome: Ongoing  11/14/2021 0243 by Dilia Walker RN  Outcome: Ongoing     Problem: IP BREATHING  Goal: LTG - patient will mobilize secretions and maintain airway  11/14/2021 1349 by Giorgio Arceo RN  Outcome: Ongoing  11/14/2021 0243 by Dilia Walker RN  Outcome: Ongoing  Goal: LTG - Patient/caregiver will demonstrate/perform proper techniques to maintain patent airway  11/14/2021 1349 by Giorgio Arceo RN  Outcome: Ongoing  11/14/2021 0243 by Dilia Walker RN  Outcome: Ongoing  Goal: LTG - patient/caregiver will demonstrate/perform improved or stable self care abilities within physical limitations  11/14/2021 1349 by Giorgio Arceo RN  Outcome: Ongoing  11/14/2021 0243 by Dilia Walker RN  Outcome: Ongoing  Goal: STG - [place] will maintain patent airway  11/14/2021 1349 by Giorgio Arceo RN  Outcome: Ongoing  11/14/2021 0243 by Dilia Walker RN  Outcome: Ongoing  Goal: STG - respiratory rate and effort will be within normal limits for the Ongoing  11/14/2021 0243 by Yana Patton RN  Outcome: Ongoing  Goal: Patient will have no more than 5 lb weight change during LOS  11/14/2021 1349 by Mohini Geller RN  Outcome: Ongoing  11/14/2021 0243 by Yana Patton RN  Outcome: Ongoing  Goal: Patient will utilize adaptive techniques to administer nutrition  11/14/2021 1349 by Mohini Geller RN  Outcome: Ongoing  11/14/2021 0243 by Yana Patton RN  Outcome: Ongoing  Goal: Patient will verbalize dietary restrictions  11/14/2021 1349 by Mohini Geller RN  Outcome: Ongoing  11/14/2021 0243 by Yana Patton RN  Outcome: Ongoing     Problem: SAFETY  Goal: LTG - patient will adhere to hip precautions during ADL's and transfers  11/14/2021 1349 by Mohini Geller RN  Outcome: Ongoing  11/14/2021 0243 by Yana Patton RN  Outcome: Ongoing  Goal: LTG - Patient will demonstrate safety requirements appropriate to situation/environment  11/14/2021 1349 by Mohini Geller RN  Outcome: Ongoing  11/14/2021 0243 by Yana Patton RN  Outcome: Ongoing  Goal: LTG - patient will utilize safety techniques  11/14/2021 1349 by Mohini Geller RN  Outcome: Ongoing  11/14/2021 0243 by Yana Patton RN  Outcome: Ongoing  Goal: STG - patient locks brakes on wheelchair  11/14/2021 1349 by Mohini Geller RN  Outcome: Ongoing  11/14/2021 0243 by Yana Patton RN  Outcome: Ongoing  Goal: STG - Patient uses call light consistently to request assistance with transfers  11/14/2021 1349 by Mohini Geller RN  Outcome: Ongoing  11/14/2021 0243 by Yana Patton RN  Outcome: Ongoing  Goal: STG - patient uses gait belt during all transfers  11/14/2021 1349 by Mohini Geller RN  Outcome: Ongoing  11/14/2021 0243 by Yana Patton RN  Outcome: Ongoing  Goal: Free from accidental physical injury  11/14/2021 1349 by Mhoini Geller RN  Outcome: Ongoing  11/14/2021 0243 by Yana Patton RN  Outcome: Ongoing  Goal: Free from intentional harm  11/14/2021 1349 by Ragini Pacheco Santiago Julien RN  Outcome: Ongoing  11/14/2021 0243 by Rena Coffey RN  Outcome: Ongoing     Problem: SKIN INTEGRITY  Goal: LTG - Patient will be free from infection  11/14/2021 1349 by Heide Fernandez RN  Outcome: Ongoing  11/14/2021 0243 by Rena Coffey RN  Outcome: Ongoing  Goal: LTG - patient will maintain/improve skin integrity through proper skin care techniques  11/14/2021 1349 by Heide Fernandez RN  Outcome: Ongoing  11/14/2021 0243 by Rena Coffey RN  Outcome: Ongoing  Goal: LTG - Patient will demonstrate appropriate pressure relief techniques  11/14/2021 1349 by Heide Fernandez RN  Outcome: Ongoing  11/14/2021 0243 by Rena Coffey RN  Outcome: Ongoing  Goal: LTG - patient will demonstrate appropriate skin care techniques  11/14/2021 1349 by Heide Fernandez RN  Outcome: Ongoing  11/14/2021 0243 by Rena Coffey RN  Outcome: Ongoing  Goal: LTG - Patient will be free from infection  11/14/2021 1349 by Heide Fernandez RN  Outcome: Ongoing  11/14/2021 0243 by Rena Coffey RN  Outcome: Ongoing  Goal: STG - Patient demonstrates skin care/treatment/dressing change  11/14/2021 1349 by Heide Fernandez RN  Outcome: Ongoing  11/14/2021 0243 by Rena Coffey RN  Outcome: Ongoing  Goal: STG - patient will maintain good skin integrity  11/14/2021 1349 by Heide Fernandez RN  Outcome: Ongoing  11/14/2021 0243 by Rena Coffey RN  Outcome: Ongoing  Goal: STG - Patient exhibits signs of wound healing.   11/14/2021 1349 by Heide Fernandez RN  Outcome: Ongoing  11/14/2021 0243 by Rena Coffey RN  Outcome: Ongoing  Goal: STG - patient demonstrates pressure reduction techniques  11/14/2021 1349 by Heide Fernandez RN  Outcome: Ongoing  11/14/2021 0243 by Rena Coffey RN  Outcome: Ongoing  Goal: STG - Patient demonstrates preventative skin care measures  11/14/2021 1349 by Heide Fernandez RN  Outcome: Ongoing  11/14/2021 0243 by Rena Custard, RN  Outcome: Ongoing  Goal: Skin integrity is maintained or improved  11/14/2021 1349 by Patricia Aguila RN  Outcome: Ongoing  11/14/2021 0243 by Ron Hassan RN  Outcome: Ongoing     Problem: PAIN  Goal: LTG - Patient will manage pain with the appropriate technique/Intervention  11/14/2021 1349 by Patricia Aguila RN  Outcome: Ongoing  11/14/2021 0243 by Ron Hassan RN  Outcome: Ongoing  Goal: LTG - Patient will demonstrate intervention for managing pain  11/14/2021 1349 by Patricia Aguila RN  Outcome: Ongoing  11/14/2021 0243 by Ron Hassan RN  Outcome: Ongoing  Goal: STG - Patient will reduce or eliminate use of analgesics  11/14/2021 1349 by Patricia Aguila RN  Outcome: Ongoing  11/14/2021 0243 by Ron Hassan RN  Outcome: Ongoing  Goal: STG - pain is manageable through therapies  11/14/2021 1349 by Patricia Aguila RN  Outcome: Ongoing  11/14/2021 0243 by Ron Hassan RN  Outcome: Ongoing  Goal: STG - Patient will verbalize an acceptable level of pain  11/14/2021 1349 by Patricia Aguila RN  Outcome: Ongoing  11/14/2021 0243 by Ron Hassan RN  Outcome: Ongoing  Goal: STG - patients pain is managed to allow active participation in daily activities  11/14/2021 1349 by Patricia Aguila RN  Outcome: Ongoing  11/14/2021 0243 by Ron Hassan RN  Outcome: Ongoing  Goal: STG - Patient will increase activity level  11/14/2021 1349 by Patricia Aguila RN  Outcome: Ongoing  11/14/2021 0243 by Ron Hassan RN  Outcome: Ongoing  Goal: STG - patient verbalizes a reduction in pain level  11/14/2021 1349 by Patricia Aguila RN  Outcome: Ongoing  11/14/2021 0243 by Ron Hassan RN  Outcome: Ongoing  Goal: Patient's pain/discomfort is manageable  11/14/2021 1349 by Patricia Aguila RN  Outcome: Ongoing  11/14/2021 0243 by Ron Hassan RN  Outcome: Ongoing     Problem: Falls - Risk of:  Goal: Will remain free from falls  Description: Will remain free from falls  11/14/2021 1349 by Patricia Aguila RN  Outcome: Ongoing  11/14/2021 0243 by Sarthak Quevedo Ragini Davidson RN  Outcome: Ongoing  Goal: Absence of physical injury  Description: Absence of physical injury  11/14/2021 1349 by Ajay Gabriel RN  Outcome: Ongoing  11/14/2021 0243 by Lore Cadena RN  Outcome: Ongoing     Problem: Confusion - Acute:  Goal: Absence of continued neurological deterioration signs and symptoms  Description: Absence of continued neurological deterioration signs and symptoms  11/14/2021 1349 by Ajay Gabriel RN  Outcome: Ongoing  11/14/2021 0243 by Lore Cadena RN  Outcome: Ongoing  Goal: Mental status will be restored to baseline  Description: Mental status will be restored to baseline  11/14/2021 1349 by Ajay Gabriel RN  Outcome: Ongoing  11/14/2021 0243 by Lore Cadena RN  Outcome: Ongoing     Problem: Discharge Planning:  Goal: Ability to perform activities of daily living will improve  Description: Ability to perform activities of daily living will improve  11/14/2021 1349 by Ajay Gabriel RN  Outcome: Ongoing  11/14/2021 0243 by Lore Cadena RN  Outcome: Ongoing  Goal: Participates in care planning  Description: Participates in care planning  11/14/2021 1349 by Ajay Gabriel RN  Outcome: Ongoing  11/14/2021 0243 by Lore Cadena RN  Outcome: Ongoing     Problem: Injury - Risk of, Physical Injury:  Goal: Will remain free from falls  Description: Will remain free from falls  11/14/2021 1349 by Ajay Gabriel RN  Outcome: Ongoing  11/14/2021 0243 by Lore Cadena RN  Outcome: Ongoing  Goal: Absence of physical injury  Description: Absence of physical injury  11/14/2021 1349 by Ajay Gabriel RN  Outcome: Ongoing  11/14/2021 0243 by Lore Cadena RN  Outcome: Ongoing     Problem: Mood - Altered:  Goal: Mood stable  Description: Mood stable  11/14/2021 1349 by Ajay Gabriel RN  Outcome: Ongoing  11/14/2021 0243 by Lore Cadena RN  Outcome: Ongoing  Goal: Absence of abusive behavior  Description: Absence of abusive behavior  11/14/2021 1349 by Rashel Gilliland Balaji Wheatley RN  Outcome: Ongoing  11/14/2021 0243 by Payton Page RN  Outcome: Ongoing  Goal: Verbalizations of feeling emotionally comfortable while being cared for will increase  Description: Verbalizations of feeling emotionally comfortable while being cared for will increase  11/14/2021 1349 by Filippo Guadarrama RN  Outcome: Ongoing  11/14/2021 0243 by Payton Page RN  Outcome: Ongoing     Problem: Psychomotor Activity - Altered:  Goal: Absence of psychomotor disturbance signs and symptoms  Description: Absence of psychomotor disturbance signs and symptoms  11/14/2021 1349 by Filippo Guadarrama RN  Outcome: Ongoing  11/14/2021 0243 by Payton Page RN  Outcome: Ongoing     Problem: Sensory Perception - Impaired:  Goal: Demonstrations of improved sensory functioning will increase  Description: Demonstrations of improved sensory functioning will increase  11/14/2021 1349 by Filippo Guadarrama RN  Outcome: Ongoing  11/14/2021 0243 by Payton Page RN  Outcome: Ongoing  Goal: Decrease in sensory misperception frequency  Description: Decrease in sensory misperception frequency  11/14/2021 1349 by Filippo Guadarrama RN  Outcome: Ongoing  11/14/2021 0243 by Payton Page RN  Outcome: Ongoing  Goal: Able to refrain from responding to false sensory perceptions  Description: Able to refrain from responding to false sensory perceptions  11/14/2021 1349 by Filippo Guadarrama RN  Outcome: Ongoing  11/14/2021 0243 by Payton Page RN  Outcome: Ongoing  Goal: Demonstrates accurate environmental perceptions  Description: Demonstrates accurate environmental perceptions  11/14/2021 1349 by Filippo Guadarrama RN  Outcome: Ongoing  11/14/2021 0243 by Payton Page RN  Outcome: Ongoing  Goal: Able to distinguish between reality-based and nonreality-based thinking  Description: Able to distinguish between reality-based and nonreality-based thinking  11/14/2021 1349 by Filippo Guadarrama RN  Outcome: Ongoing  11/14/2021 0243 by Monty Garcia Bella Doran RN  Outcome: Ongoing  Goal: Able to interrupt nonreality-based thinking  Description: Able to interrupt nonreality-based thinking  11/14/2021 1349 by Jarek Donis RN  Outcome: Ongoing  11/14/2021 0243 by Kan lGaser RN  Outcome: Ongoing     Problem: Sleep Pattern Disturbance:  Goal: Appears well-rested  Description: Appears well-rested  11/14/2021 1349 by Jarek Donis RN  Outcome: Ongoing  11/14/2021 0243 by Kan Glaser RN  Outcome: Ongoing     Problem: IP BLADDER/VOIDING  Goal: LTG - patient will complete bladder elimination  11/14/2021 1349 by Jarek Donis RN  Outcome: Ongoing  11/14/2021 0243 by Kan Glaser RN  Outcome: Ongoing  Goal: LTG - Patient will utilize adaptive techniques/equipment to complete bladder elimination  11/14/2021 1349 by Jarek Donis RN  Outcome: Ongoing  11/14/2021 0243 by Kan Glaser RN  Outcome: Ongoing  Goal: LTG - patient will achieve acceptable level of continence  11/14/2021 1349 by Jarek Donis RN  Outcome: Ongoing  11/14/2021 0243 by Kan Glaser RN  Outcome: Ongoing  Goal: STG - Patient demonstrates ability to take care of indwelling catheter  11/14/2021 1349 by Jarek Donis RN  Outcome: Ongoing  11/14/2021 0243 by Kan Glaser RN  Outcome: Ongoing  Goal: STG - patient demonstrates self-cath technique using clean technique and care of the catheter  11/14/2021 1349 by Jarek Donis RN  Outcome: Ongoing  11/14/2021 0243 by Kan Glaser RN  Outcome: Ongoing  Goal: STG - Patient demonstrates no accidents  11/14/2021 1349 by Jarek Donis RN  Outcome: Ongoing  11/14/2021 0243 by Kan Glaser RN  Outcome: Ongoing  Goal: STG - Patient will state signs and symptoms of UTI  11/14/2021 1349 by Jarek Donis RN  Outcome: Ongoing  11/14/2021 0243 by Kan Glaser RN  Outcome: Ongoing  Goal: STG - patient will be able to empty bladder  11/14/2021 1349 by Jarek Donis RN  Outcome: Ongoing  11/14/2021 0243 by Julian Hurtado Albin Navraro RN  Outcome: Ongoing  Goal: STG - Patient demonstrates understanding of self catheterization schedule by completing task on time  11/14/2021 1349 by Leon Hooker RN  Outcome: Ongoing  11/14/2021 0243 by Maricruz Rangel RN  Outcome: Ongoing  Goal: STG - patient participates in bladder program by expressing need to void  11/14/2021 1349 by Leon Hooker RN  Outcome: Ongoing  11/14/2021 0243 by Maricruz Rangel RN  Outcome: Ongoing  Goal: STG - Patient verbalizes understanding of catheter care indwelling/intermittent  11/14/2021 1349 by Leon Hooker RN  Outcome: Ongoing  11/14/2021 0243 by Maricruz Rangel RN  Outcome: Ongoing  Goal: STG - patient participates in bladder program by adhering to implemented toileting schedule  11/14/2021 1349 by Leon Hooker RN  Outcome: Ongoing  11/14/2021 0243 by Maricruz Rangel RN  Outcome: Ongoing     Problem: IP BOWEL ELIMINATION  Goal: LTG - patient will utilize adaptive techniques/equipment to complete bowel elimination  11/14/2021 1349 by Leon Hooker RN  Outcome: Ongoing  11/14/2021 0243 by Maricruz Rangel RN  Outcome: Ongoing  Goal: LTG - patient will have regular and routine bowel evacuation  11/14/2021 1349 by Leon Hooker RN  Outcome: Ongoing  11/14/2021 0243 by Maricruz Rangel RN  Outcome: Ongoing  Goal: STG - patient will be accident free  11/14/2021 1349 by Leon Hooker RN  Outcome: Ongoing  11/14/2021 0243 by Maricruz Rangel RN  Outcome: Ongoing  Goal: STG - Patient demonstrates care and management of ostomy bag  11/14/2021 1349 by Leon Hooker RN  Outcome: Ongoing  11/14/2021 0243 by Maricruz Rangel RN  Outcome: Ongoing  Goal: STG - Patient participates in bowel management program  11/14/2021 1349 by Leon Hooker RN  Outcome: Ongoing  11/14/2021 0243 by Maricruz Rangel RN  Outcome: Ongoing  Goal: STG - patient maintains skin integrity  11/14/2021 1349 by Leon Cowboy, RN  Outcome: Ongoing  11/14/2021 0243 by Maricruz Rangel, RN  Outcome: Ongoing  Goal: STG - Patient will verbalize signs and symptoms of constipation and how to prevent/alleviate  11/14/2021 1349 by Lis Ulloa RN  Outcome: Ongoing  11/14/2021 0243 by Jesika Chaudhari RN  Outcome: Ongoing  Goal: STG - patient will be continent of stool  11/14/2021 1349 by Lis Ulloa RN  Outcome: Ongoing  11/14/2021 0243 by Jesika Chaudhari RN  Outcome: Ongoing  Goal: STG - Patient completes digital stimulation technique  11/14/2021 1349 by Lis Ulloa RN  Outcome: Ongoing  11/14/2021 0243 by Jesika Chaudhari RN  Outcome: Ongoing  Goal: STG - Patient verbalizes knowledge about relationship between diet, fluid intake, activity and medication on constipation  11/14/2021 1349 by Lis Ulloa RN  Outcome: Ongoing  11/14/2021 0243 by Jesika Chaudhari RN  Outcome: Ongoing     Problem: IP BREATHING  Goal: LTG - patient will mobilize secretions and maintain airway  11/14/2021 1349 by Lis Ulloa RN  Outcome: Ongoing  11/14/2021 0243 by Jesika Chaudhari RN  Outcome: Ongoing  Goal: LTG - Patient/caregiver will demonstrate/perform proper techniques to maintain patent airway  11/14/2021 1349 by Lis Ulloa RN  Outcome: Ongoing  11/14/2021 0243 by Jesika Chaudhari RN  Outcome: Ongoing  Goal: LTG - patient/caregiver will demonstrate/perform improved or stable self care abilities within physical limitations  11/14/2021 1349 by Lis Ulloa RN  Outcome: Ongoing  11/14/2021 0243 by Jesika Chaudhari RN  Outcome: Ongoing  Goal: STG - Patient/caregiver will maintain patent airway  11/14/2021 1349 by Lis Ulloa RN  Outcome: Ongoing  11/14/2021 0243 by Jesika Chaudhari RN  Outcome: Ongoing  Goal: STG - respiratory rate and effort will be within normal limits for the patient  11/14/2021 1349 by Lis Ulloa RN  Outcome: Ongoing  11/14/2021 0243 by Jesika Chaudhari RN  Outcome: Ongoing  Goal: STG - patient/caregiver will be able to verbalize oxygen safety precautions  11/14/2021 1349 by Heide Fernandez RN  Outcome: Ongoing  11/14/2021 0243 by Rena Coffey RN  Outcome: Ongoing  Goal: STG - Patient/caregiver demonstrates correct suctioning technique  11/14/2021 1349 by Heide Fernandez RN  Outcome: Ongoing  11/14/2021 0243 by Rena Coffey RN  Outcome: Ongoing  Goal: STG - patient will utilize incentive spirometer  11/14/2021 1349 by Heide Fernandez RN  Outcome: Ongoing  11/14/2021 0243 by Rena Coffey RN  Outcome: Ongoing  Goal: STG - Patient performs or directs assisted coughing  11/14/2021 1349 by Heide Fernandez RN  Outcome: Ongoing  11/14/2021 0243 by Rena Coffey RN  Outcome: Ongoing  Goal: STG - patient can administer MDI's  11/14/2021 1349 by Heide Fernandez RN  Outcome: Ongoing  11/14/2021 0243 by Rena Coffey RN  Outcome: Ongoing  Goal: STG - Patient can utilize incentive spirometer  11/14/2021 1349 by Heide Fernandez RN  Outcome: Ongoing  11/14/2021 0243 by Rena Coffey RN  Outcome: Ongoing  Goal: STG - family can complete suctioning  11/14/2021 1349 by Heide Fernandez RN  Outcome: Ongoing  11/14/2021 0243 by Rena Coffey RN  Outcome: Ongoing     Problem: NUTRITION  Goal: Patient maintains adequate hydration  11/14/2021 1349 by Heide Fernandez RN  Outcome: Ongoing  11/14/2021 0243 by Rena Coffey RN  Outcome: Ongoing  Goal: Patient maintains weight  11/14/2021 1349 by Heide Fernandez RN  Outcome: Ongoing  11/14/2021 0243 by Rena Coffey RN  Outcome: Ongoing  Goal: Patient/Family demonstrates understanding of diet  11/14/2021 1349 by Heide Fernandez RN  Outcome: Ongoing  11/14/2021 0243 by Rena Coffey RN  Outcome: Ongoing  Goal: Patient/Family independently completes tube feeding  11/14/2021 1349 by Heide Fernandez RN  Outcome: Ongoing  11/14/2021 0243 by Rena Coffey RN  Outcome: Ongoing  Goal: Patient will have no more than 5 lb weight change during LOS  11/14/2021 1349 by Heide Fernandez RN  Outcome: Ongoing  11/14/2021 0243 by Sydnee Cramer Bri Felipe RN  Outcome: Ongoing  Goal: Patient will utilize adaptive techniques to administer nutrition  11/14/2021 1349 by Xi Dewey RN  Outcome: Ongoing  11/14/2021 0243 by Erica Tao RN  Outcome: Ongoing  Goal: Patient will verbalize dietary restrictions  11/14/2021 1349 by Xi Dewey RN  Outcome: Ongoing  11/14/2021 0243 by Erica Tao RN  Outcome: Ongoing     Problem: SAFETY  Goal: LTG - patient will adhere to hip precautions during ADL's and transfers  11/14/2021 1349 by Xi Dewey RN  Outcome: Ongoing  11/14/2021 0243 by Erica Tao RN  Outcome: Ongoing  Goal: LTG - Patient will demonstrate safety requirements appropriate to situation/environment  11/14/2021 1349 by Xi Dewey RN  Outcome: Ongoing  11/14/2021 0243 by Erica Tao RN  Outcome: Ongoing  Goal: LTG - patient will utilize safety techniques  11/14/2021 1349 by Xi Dewey RN  Outcome: Ongoing  11/14/2021 0243 by Erica Tao RN  Outcome: Ongoing  Goal: STG - patient locks brakes on wheelchair  11/14/2021 1349 by Xi Dewey RN  Outcome: Ongoing  11/14/2021 0243 by Erica Tao RN  Outcome: Ongoing  Goal: STG - Patient uses call light consistently to request assistance with transfers  11/14/2021 1349 by Xi Dewey RN  Outcome: Ongoing  11/14/2021 0243 by Erica Tao RN  Outcome: Ongoing  Goal: STG - patient uses gait belt during all transfers  11/14/2021 1349 by Xi Dewey RN  Outcome: Ongoing  11/14/2021 0243 by Erica Tao RN  Outcome: Ongoing  Goal: Free from accidental physical injury  11/14/2021 1349 by Xi Dewey RN  Outcome: Ongoing  11/14/2021 0243 by Erica Tao RN  Outcome: Ongoing  Goal: Free from intentional harm  11/14/2021 1349 by Xi Dewey RN  Outcome: Ongoing  11/14/2021 0243 by Erica Tao RN  Outcome: Ongoing     Problem: SKIN INTEGRITY  Goal: LTG - Patient will be free from infection  11/14/2021 1349 by Xi Dewey RN  Outcome: technique/Intervention  11/14/2021 1349 by Xi Dewey RN  Outcome: Ongoing  11/14/2021 0243 by Erica Tao RN  Outcome: Ongoing  Goal: LTG - Patient will demonstrate intervention for managing pain  11/14/2021 1349 by Xi Dewey RN  Outcome: Ongoing  11/14/2021 0243 by Erica Tao RN  Outcome: Ongoing  Goal: STG - Patient will reduce or eliminate use of analgesics  11/14/2021 1349 by Xi Dewey RN  Outcome: Ongoing  11/14/2021 0243 by Erica Tao RN  Outcome: Ongoing  Goal: STG - pain is manageable through therapies  11/14/2021 1349 by Xi Dewey RN  Outcome: Ongoing  11/14/2021 0243 by Erica Tao RN  Outcome: Ongoing  Goal: STG - Patient will verbalize an acceptable level of pain  11/14/2021 1349 by Xi Dewey RN  Outcome: Ongoing  11/14/2021 0243 by Erica Tao RN  Outcome: Ongoing  Goal: STG - patients pain is managed to allow active participation in daily activities  11/14/2021 1349 by Xi Dewey RN  Outcome: Ongoing  11/14/2021 0243 by Erica Tao RN  Outcome: Ongoing  Goal: STG - Patient will increase activity level  11/14/2021 1349 by Xi Dewey RN  Outcome: Ongoing  11/14/2021 0243 by Erica Tao RN  Outcome: Ongoing  Goal: STG - patient verbalizes a reduction in pain level  11/14/2021 1349 by Xi Dewey RN  Outcome: Ongoing  11/14/2021 0243 by Erica Tao RN  Outcome: Ongoing  Goal: Patient's pain/discomfort is manageable  11/14/2021 1349 by Xi Dewey RN  Outcome: Ongoing  11/14/2021 0243 by Erica Tao RN  Outcome: Ongoing

## 2021-11-15 LAB
ALBUMIN SERPL-MCNC: 3.7 G/DL (ref 3.5–5.2)
ALP BLD-CCNC: 99 U/L (ref 35–104)
ALT SERPL-CCNC: 30 U/L (ref 5–33)
ANION GAP SERPL CALCULATED.3IONS-SCNC: 8 MMOL/L (ref 7–19)
AST SERPL-CCNC: 29 U/L (ref 5–32)
BASOPHILS ABSOLUTE: 0 K/UL (ref 0–0.2)
BASOPHILS RELATIVE PERCENT: 0.6 % (ref 0–1)
BILIRUB SERPL-MCNC: 0.6 MG/DL (ref 0.2–1.2)
BUN BLDV-MCNC: 18 MG/DL (ref 8–23)
CALCIUM SERPL-MCNC: 10.1 MG/DL (ref 8.8–10.2)
CHLORIDE BLD-SCNC: 108 MMOL/L (ref 98–111)
CO2: 23 MMOL/L (ref 22–29)
CREAT SERPL-MCNC: 0.5 MG/DL (ref 0.5–0.9)
EOSINOPHILS ABSOLUTE: 0.1 K/UL (ref 0–0.6)
EOSINOPHILS RELATIVE PERCENT: 2.5 % (ref 0–5)
GFR AFRICAN AMERICAN: >59
GFR NON-AFRICAN AMERICAN: >60
GLUCOSE BLD-MCNC: 71 MG/DL (ref 70–99)
GLUCOSE BLD-MCNC: 91 MG/DL (ref 74–109)
GLUCOSE BLD-MCNC: 98 MG/DL (ref 70–99)
HCT VFR BLD CALC: 45.9 % (ref 37–47)
HEMOGLOBIN: 14.2 G/DL (ref 12–16)
IMMATURE GRANULOCYTES #: 0 K/UL
INR BLD: 2.58 (ref 0.88–1.18)
LYMPHOCYTES ABSOLUTE: 1.4 K/UL (ref 1.1–4.5)
LYMPHOCYTES RELATIVE PERCENT: 27.4 % (ref 20–40)
MCH RBC QN AUTO: 29 PG (ref 27–31)
MCHC RBC AUTO-ENTMCNC: 30.9 G/DL (ref 33–37)
MCV RBC AUTO: 93.7 FL (ref 81–99)
MONOCYTES ABSOLUTE: 0.3 K/UL (ref 0–0.9)
MONOCYTES RELATIVE PERCENT: 5.7 % (ref 0–10)
NEUTROPHILS ABSOLUTE: 3.3 K/UL (ref 1.5–7.5)
NEUTROPHILS RELATIVE PERCENT: 63.4 % (ref 50–65)
PDW BLD-RTO: 14.2 % (ref 11.5–14.5)
PERFORMED ON: NORMAL
PERFORMED ON: NORMAL
PLATELET # BLD: 159 K/UL (ref 130–400)
PMV BLD AUTO: 10.1 FL (ref 9.4–12.3)
POTASSIUM SERPL-SCNC: 4.3 MMOL/L (ref 3.5–5)
PROTHROMBIN TIME: 27.3 SEC (ref 12–14.6)
RBC # BLD: 4.9 M/UL (ref 4.2–5.4)
SODIUM BLD-SCNC: 139 MMOL/L (ref 136–145)
TOTAL PROTEIN: 5.4 G/DL (ref 6.6–8.7)
WBC # BLD: 5.3 K/UL (ref 4.8–10.8)

## 2021-11-15 PROCEDURE — 97530 THERAPEUTIC ACTIVITIES: CPT

## 2021-11-15 PROCEDURE — 2580000003 HC RX 258: Performed by: PSYCHIATRY & NEUROLOGY

## 2021-11-15 PROCEDURE — 6370000000 HC RX 637 (ALT 250 FOR IP): Performed by: PSYCHIATRY & NEUROLOGY

## 2021-11-15 PROCEDURE — 85025 COMPLETE CBC W/AUTO DIFF WBC: CPT

## 2021-11-15 PROCEDURE — 99232 SBSQ HOSP IP/OBS MODERATE 35: CPT | Performed by: PSYCHIATRY & NEUROLOGY

## 2021-11-15 PROCEDURE — 97535 SELF CARE MNGMENT TRAINING: CPT

## 2021-11-15 PROCEDURE — 1180000000 HC REHAB R&B

## 2021-11-15 PROCEDURE — 92526 ORAL FUNCTION THERAPY: CPT

## 2021-11-15 PROCEDURE — 80053 COMPREHEN METABOLIC PANEL: CPT

## 2021-11-15 PROCEDURE — 6370000000 HC RX 637 (ALT 250 FOR IP): Performed by: NURSE PRACTITIONER

## 2021-11-15 PROCEDURE — 97116 GAIT TRAINING THERAPY: CPT

## 2021-11-15 PROCEDURE — 97129 THER IVNTJ 1ST 15 MIN: CPT

## 2021-11-15 PROCEDURE — 82947 ASSAY GLUCOSE BLOOD QUANT: CPT

## 2021-11-15 PROCEDURE — 97110 THERAPEUTIC EXERCISES: CPT

## 2021-11-15 PROCEDURE — 97130 THER IVNTJ EA ADDL 15 MIN: CPT

## 2021-11-15 PROCEDURE — 85610 PROTHROMBIN TIME: CPT

## 2021-11-15 RX ADMIN — Medication 10 ML: at 08:32

## 2021-11-15 RX ADMIN — FLUTICASONE PROPIONATE 1 SPRAY: 50 SPRAY, METERED NASAL at 08:31

## 2021-11-15 RX ADMIN — SENNOSIDES AND DOCUSATE SODIUM 1 TABLET: 50; 8.6 TABLET ORAL at 22:01

## 2021-11-15 RX ADMIN — DOCUSATE SODIUM 100 MG: 100 CAPSULE, LIQUID FILLED ORAL at 08:32

## 2021-11-15 RX ADMIN — Medication 10 ML: at 22:14

## 2021-11-15 RX ADMIN — WARFARIN SODIUM 4 MG: 3 TABLET ORAL at 17:30

## 2021-11-15 RX ADMIN — ATORVASTATIN CALCIUM 20 MG: 20 TABLET, FILM COATED ORAL at 08:32

## 2021-11-15 RX ADMIN — CETIRIZINE HYDROCHLORIDE 5 MG: 10 TABLET, FILM COATED ORAL at 08:32

## 2021-11-15 RX ADMIN — SENNOSIDES AND DOCUSATE SODIUM 1 TABLET: 50; 8.6 TABLET ORAL at 08:31

## 2021-11-15 ASSESSMENT — PAIN SCALES - WONG BAKER
WONGBAKER_NUMERICALRESPONSE: 0

## 2021-11-15 ASSESSMENT — PAIN SCALES - GENERAL: PAINLEVEL_OUTOF10: 0

## 2021-11-15 NOTE — PROGRESS NOTES
shortness of breath  Cardiovascular: No chest pain No palpitations  Gastrointestinal: No abdominal pain    Genitourinary: No Dysuria  Neurological: No headache, no confusion      PHYSICAL EXAM:  BP (!) 173/74   Pulse 75   Temp 97.5 °F (36.4 °C) (Temporal)   Resp 16   Ht 5' 6\" (1.676 m)   Wt 137 lb 4.8 oz (62.3 kg)   SpO2 (!) 80%   BMI 22.16 kg/m²     Constitutional: she appears well-developed and well-nourished. Eyes  conjunctiva normal.  Pupils react to light  Ear, nose, throat -hearing intact to voice. No scars, masses, or lesions over external nose or ears, no atrophy of tongue  Neck-symmetric, no masses noted, no jugular vein distension  Respiration- chest wall appears symmetric, good expansion,   normal effort without use of accessory muscles  Cardiovascular- RRR  Musculoskeletal  no significant wasting of muscles noted, no bony deformities, gait no gross ataxia  Extremities-no clubbing, cyanosis or edema  Skin  warm, dry, and intact. No rash, erythema, or pallor. Psychiatric  mood, affect, and behavior appear normal.      Neurology  NEUROLOGICAL EXAM:  Awake, alert, fluent oriented to [facility].  Attention and concentration appear appropriate  Speech shows dysarthria       Cranial Nerve Exam   CN II- Visual fields show an apparent left homonymous hemianopsia  CN III, IV,VI-EOMI, No nystagmus, conjugate eye movements, no ptosis  CN VII-right facial droop  CN VIII-Hearing intact        Motor Exam  Relatively normal throughout with significant atrophy in the hands       Tremors- no tremors in hands or head noted    Coordination  Clumsiness in the bilateral upper extremities           Nursing/pcp notes, imaging,labs and vitals reviewed.      PT,OT and/or speech notes reviewed    Lab Results   Component Value Date    WBC 5.3 11/15/2021    HGB 14.2 11/15/2021    HCT 45.9 11/15/2021    MCV 93.7 11/15/2021     11/15/2021     Lab Results   Component Value Date     11/15/2021    K 4.3 11/15/2021     11/15/2021    CO2 23 11/15/2021    BUN 18 11/15/2021    CREATININE 0.5 11/15/2021    GLUCOSE 91 11/15/2021    CALCIUM 10.1 11/15/2021    PROT 5.4 (L) 11/15/2021    LABALBU 3.7 11/15/2021    BILITOT 0.6 11/15/2021    ALKPHOS 99 11/15/2021    AST 29 11/15/2021    ALT 30 11/15/2021    LABGLOM >60 11/15/2021    GFRAA >59 11/15/2021     Lab Results   Component Value Date    INR 2.58 (H) 11/15/2021    INR 2.44 (H) 11/14/2021    INR 1.97 (H) 11/13/2021   No results found for: PHENYTOIN, ESR, CRP      11/12/21 1100   Restrictions/Precautions   Restrictions/Precautions Fall Risk   Position Activity Restriction   Other position/activity restrictions Dysphagia-soft and bite sized, NTL, water in between meals   General   Additional Pertinent Hx CVA; palliative care pt   Diagnosis Acute ischemic B CVA; aphasia; AMS; hemiplegia affecting dominant side   ADL   Feeding Minimal assistance   Grooming Setup; Minimal assistance; Increased time to complete  (ASSIST FOR THOROUGHNESS OF ORAL CARE)   Additional Comments scoop assist at times and then max assist at the end d/t fatigue, Much extra time for mastication of ground beef. Cues for swallowing before taking another bite. Vision   Vision Comment field cut   Assessment   Performance deficits / Impairments Decreased functional mobility ; Decreased ADL status; Decreased ROM; Decreased strength; Decreased safe awareness; Decreased cognition; Decreased endurance; Decreased high-level IADLs; Decreased balance; Decreased coordination; Decreased vision/visual deficit   Assessment Visual deficits possibly compounded by left inattention. Treatment Diagnosis B CVAs/R hemiparesis   Timed Code Treatment Minutes 60 Minutes   Activity Tolerance   Activity Tolerance Patient Tolerated treatment well                      11/12/21 0900   Restrictions/Precautions   Restrictions/Precautions Fall Risk; Weight Bearing; Swallowing - Thickened Liquids;  Modified Diet   Required Braces or Orthoses? No   Lower Extremity Weight Bearing Restrictions   Right Lower Extremity Weight Bearing Weight Bearing As Tolerated   Left Lower Extremity Weight Bearing Weight Bearing As Tolerated   Pain Screening   Patient Currently in Pain No   Pain Assessment   Pain Assessment 0-10   Nguyen-Baker Pain Rating 0   Pain Level 0   Bed Mobility   Rolling Contact guard assistance   Supine to Sit Stand by assistance   Scooting Contact guard assistance   Comment slow to move, triplanar to her right    Transfers   Sit to Stand Minimal Assistance   Stand to sit Minimal Assistance   Bed to Chair Minimal assistance   Stand Pivot Transfers Minimal Assistance   Comment pt comes to partial stand with tf to R Neema [address]a 23   Ambulation   Ambulation? Yes   WB Status WBAT   More Ambulation? Yes   Ambulation 1   Surface level tile   Device Rolling Walker   Other Apparatus Wheelchair follow   Assistance Minimal assistance; Moderate assistance   Quality of Gait gradual right lean with fatigue, internal rotation of right leg, short step length and flex hip posture   Gait Deviations Decreased step length; Decreased step height; Shuffles; Slow Cyn   Distance 12ft    Comments pt fatigues very quickly and requires max cues on posture and intrmittent management of FWW    Ambulation 2   Surface - 2 level tile   Device 2 Rolling Walker   Other Apparatus 2 Wheelchair follow   Assistance 2 Moderate assistance   Quality of Gait 2 increased right lean with fatigue, internal rotation and circumduction of right leg to clear foot, short step length and flex hip posture   Distance 6ft   Comments dc'd gait d/t pt needing to use bathroom    Exercises   Hip Flexion x20   Knee Long Arc Quad x10   Ankle Pumps x10    Conditions Requiring Skilled Therapeutic Intervention   Assessment pt more awake and alert this am for mobility but still is having issues wit incontinence and required to be changed by the end of session.  pt demosntrates decreased right sided awareness when performing tfs and gait, requires max cueing for posture. pt currently toelrates very short distances before either fatiguing or until right sided lean becomes too severe. pt fatigues very quickly and requires frequent rest breaks. will continue to progress functional mobility and ambulation as tolerated.         Objective:  Naming pictures at 80%. Automatic speech tasks were completed. She was able to count from 1-12. She was able to state the days of the week and months of the year.     Recommend total feed as she fatigues quickly and has trouble holding her utensils. Today she requested that the therapist take over and feed her. She had been feeding herself however she became tired. She also asked the therapist to wipe her mouth. Her facial sensation is improving. She is afebrile today. No audible congestion noted. No significant buccal cavity pocketing observed. Some mild lingual residue was noted. Decreased hyolaryngeal elevation and delayed swallow responses were noted this date. No overt s/s of aspiration was observed with soft foods or with thin liquids. She did have difficulty masticating ground meats with gravy. Her order was changed to pureed meats.             RECORD REVIEW: Previous medical records, medications were reviewed at today's visit    IMPRESSION:   1. 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 Suspected to be cardioembolic. Plavix changed to Coumadin. Lovenox discontinued due to therapeutic INR. PT/OT/SLP  2. DVT prophylaxisCoumadin  3. Hyperlipidemiacontinue statin  4. GIbowel regimen  5. Bacteruria with unremarkable u/a. Suspect contaminant. No Rx for now.   Continue current treatment       ELOS:restaff this week

## 2021-11-15 NOTE — PROGRESS NOTES
Clinical Pharmacy Note    Warfarin consult follow-up    Recent Labs     11/15/21  0444   INR 2.58*     Recent Labs     11/13/21  0352 11/14/21  0636 11/15/21  0444   HGB 14.8 14.7 14.2   HCT 47.4* 47.3* 45.9    162 159       Significant Drug-Drug Interactions:  New warfarin drug-drug interactions: none  Discontinued drug-drug interactions: none    Date INR Warfarin Dose   11/06/21 1.01 ---   11/07/21 --- 5 mg   11/08/21 1.04  5 mg    11/09/21  1.30 5 mg    11/10/21 1.74 5 mg    11/11/21   2.44 2.5 mg     11/12/21 2.02 5 mg    11/13/21   1.97 5 mg   11/14/21  2.44  4 mg     11/15/21  2.58 4 mg                      Notes: Will give warfarin 4 mg once this evening. Daily PT/INR until stable within therapeutic range.      Electronically signed by Dustin Bazan [facility] on 11/15/2021 at 12:58 PM

## 2021-11-15 NOTE — PATIENT CARE CONFERENCE
[facility] ACUTE INPATIENT REHABILITATION  TEAM CONFERENCE NOTE    Date: 2021  Patient Name: Prisca Comer        MRN: [id number]    : 1933  (80 y.o.)  Gender: female      Diagnosis: Acute ischemic stroke, L MCA infarct, R sided involvement       PHYSICAL THERAPY  STRENGTH  Strength RLE  Comment: grossly assessed 3+/5  Strength LLE  Comment: grossly assessed 4/5  ROM  AROM RLE (degrees)  RLE AROM: WFL  AROM LLE (degrees)  LLE AROM : WFL  BED MOBILITY  Bed Mobility  Bridging: Supervision  Rolling: Stand by assistance  Supine to Sit: Stand by assistance, Supervision  Sit to Supine: Stand by assistance, Supervision  Scooting: Stand by assistance, Supervision  Comment: requires cues on what to do but performs bed mobility well with supervision.  triplanar from right side  TRANSFERS  Transfers  Sit to Stand: Minimal Assistance  Stand to sit: Minimal Assistance  Bed to Chair: Moderate assistance  Stand Pivot Transfers: Minimal Assistance  Squat Pivot Transfers: Contact guard assistance  Comment: pt takes a few steps to turn to sit in [facility] using FWW needs walker management, pt able to perform squat pivot well to the right to/from bed    WHEELCHAIR PROPULSION     AMBULATION  Ambulation 1  Surface: level tile  Device: Rolling Walker  Other Apparatus: Wheelchair follow  Assistance: Minimal assistance  Quality of Gait: FWW management, reciprical, short steps, IR and in-toeing of right leg  Gait Deviations: Decreased step length, Decreased step height, Shuffles, Slow Cyn  Distance: 15ft   Comments: pt fatigues very quickly and requires max cues on posture and intrmittent management of FWW   STAIRS     GOALS:  Short term goals  Time Frame for Short term goals: marching in place   Short term goal 1: bed mobility Mod I   Short term goal 2: transfers with AD SBA  Short term goal 3: ambulation 50ft with AD Joshua  Short term goal 4: navigate 4 step with handrails Joshua  Short term goal 5: WC mobility 75ft CGA    Long term goals  Time Frame for Long term goals : 3-4 weeks  Long term goal 1: bed mobility Indp  Long term goal 2: transfers with AD Mod I  Long term goal 3: ambulation 150 ft with AD SBA  Long term goal 4: navigate 4 step with hand rail SBA  Long term goal 5: WC mobility 150 ft SBA    ASSESSMENT:  Assessment: pt understands basic commands with activity. decreased right lean today with ambulation and tfs, tolerates very short distances d/t fatigue will benefit more with functional moblility training for tfs and ambulation as tolerated     SPEECH THERAPY  Objective:   SLP read aloud various short-stories from the newspaper. Pt would recall one word on 25% of opportunities. Yes/no questions presented. Pt answered 75% of y/n questions regarding family members names, interests of topics presented.     Rehab dining tray presented. Pt tolerated bites of puree meat texture with extra gravy with 1x swallow response. Alternation of liquids and solids were presented every two bites. SLP encouraged pt to utilize effortful swallow; this was evidenced 1x with PO intake. SLP monitored buccal pads and minimal to no residue observed with puree diet texture. Oral care routine completed and pt required total assistance.        Recommended Diet and Intervention  Diet Solids Recommendation: Dysphagia Soft and Bite-Sized with pureed meats (Dysphagia III)  Liquid Consistency Recommendation: Thin liquids  Recommended Form of Meds: Crushed in puree as able  Therapeutic Interventions: Mendelsohn; Diet tolerance monitoring; Oral care; Therapeutic PO trials with SLP; Oral motor exercises; Tongue base strengthening; Patient/Family education; Effortful swallow; Pharyngeal exercises; Yanet     Compensatory Swallowing Strategies  Compensatory Swallowing Strategies: Upright as possible for all oral intake; Alternate solids and liquids; Remain upright for 30-45 minutes after meals;  Check for pocketing of food on the Right; Swallow 2 times per bite/sip; Small bites/sips; External pacing    OCCUPATIONAL THERAPY    CURRENT IRF-CORTES SCORES  Eating: CARE Score: 3  Comment: Min A, SBA at times, impaired vision impedes with independence    Oral Hygiene: CARE Score: 4        Toileting: CARE Score: 1         Shower/Bathe: CARE Score: 2           Upper Body Dressing: CARE Score: 3         Lower Body Dressing: CARE Score: 2          Footwear: CARE Score: 1          Toilet Transfers: CARE Score: 2          Picking Up Object:  CARE Score: 1          UE Functioning:  B sh deficits  Left inattention, apraxia  R distally WFLs    Pain Assessment:          STGs:  Short term goals  Time Frame for Short term goals: 1 week  Short term goal 1: Mod A with clothing management/hygiene for toileting. Short term goal 2: Mod A with LB dressing, AE PRN. Short term goal 3: Mod A with donning/doffing socks and shoes, AE PRN. Short term goal 4: Min A with toilet transfers. Short term goal 5: Min A with bathing hygiene. LTGs:  Long term goals  Time Frame for Long term goals : 3-4 weeks  Long term goal 1: CGA with clothing management/hygiene for toileting. Long term goal 2: Min A with LB dressing, AE PRN. Long term goal 3: Min A with donning/doffing socks and shoes, AE PRN. Long term goal 4: CGA with toilet transfers. Long term goal 5: Supervision with bathing hygiene. Long term goals 6: Patient/family will verbalize DME needs. Assessment:  Performance deficits / Impairments: Decreased functional mobility , Decreased ADL status, Decreased ROM, Decreased strength, Decreased safe awareness, Decreased cognition, Decreased endurance, Decreased high-level IADLs, Decreased balance, Decreased coordination, Decreased vision/visual deficit                  NUTRITION  Current Wt: Weight: 135 lb 4.8 oz (61.4 kg) / Body mass index is 21.84 kg/m².   Admission Wt: Admission Body Weight: 126 lb (57.2 kg)  Oral Diet Orders: Soft/bite-sized  Oral Nutrition Supplement (ONS) Orders: Ensure Enlive/Plus BID    Po intake remains >50% of most meals with assistance. Wt gain of 9 lbs noted. Continue current POC. Please see nutrition note for details. NURSING    Wounds/Incisions/Ulcers: Sacral shear. Shane Scale Score: 17    Pain: No pain concerns to address    Consultations/Labs/X-rays: PTP daily. Coumadin. Family Education: Family available and participating in education     Fall Risk:  Jerel Cantrell Score: 80    Fall in the last week?no      Other Nursing Issues: Weak right. BM 14. Left field cut. Up x1 assist. Confused. Purewick. Incont bladder. Feeder. Pills JONEL. DNR. Accuchek qid. Dysphagia diet. SOCIAL WORK/CASE MANAGEMENT  Assessment: Has support from her son, who lives next door, works from home and has electronic survielance for her safety; has been advised of expected discharge and need for caregivers. Discharge Plan   Estimated Length of Stay: 11/28/21 is CMG date  Destination: undetermined at this time    Pass: No    Services at Discharge: Continued rehabilitative services    Equipment at Discharge: to be determined pending d/c plans    Progress made in the prior week:  1. Set up for oral hygiene  2.   3.  4.  5.      Goals for following week:  1. Self feed 50% of tray  2.  Mod A for toileting   3.   4.   5.     Factors facilitating achievement of predicted outcomes: positives are bed mobility and transfers to the left with walker    Barriers to the achievement of predicted outcomes: ability to follow instructions, visual impairment    Team Members Present at Conference:  : Melissa Grossman, [facility]   Occupational Therapist: Carmon Martini OTR/L  Physical Therapist: Michael Pyle DPT  Speech Therapist: Lisa Jovel Runkelen  Nurse: Jossie Avila, RN,BSN   Nurse Manager:  Jossie Avila, RN, BSN  Dietitian:  Evelin Beckham, MS, RD, LD  Rehab Director:  Alisia Carvajal approve the established interdisciplinary plan of care as documented within the medical record of Collet[facility] Gallery.

## 2021-11-15 NOTE — PLAN OF CARE
Problem: Falls - Risk of:  Goal: Will remain free from falls  11/15/2021 0115 by Jp Chamberlain RN  Outcome: Ongoing  11/14/2021 1349 by Maksim Leija RN  Outcome: Ongoing  Goal: Absence of physical injury  11/15/2021 0115 by Jp Chamberlain RN  Outcome: Ongoing  11/14/2021 1349 by Maksim Leija RN  Outcome: Ongoing     Problem: Confusion - Acute:  Goal: Absence of continued neurological deterioration signs and symptoms  11/15/2021 0115 by Jp Chamberlain RN  Outcome: Ongoing  11/14/2021 1349 by Maksim Leija RN  Outcome: Ongoing  Goal: Mental status will be restored to baseline  11/15/2021 0115 by Jp Chamberlain RN  Outcome: Ongoing  11/14/2021 1349 by Maksim Leija RN  Outcome: Ongoing     Problem: Discharge Planning:  Goal: Ability to perform activities of daily living will improve  11/15/2021 0115 by Jp Chamberlain RN  Outcome: Ongoing  11/14/2021 1349 by Maksim Leija RN  Outcome: Ongoing  Goal: Participates in care planning  11/15/2021 0115 by Jp Chamberlain RN  Outcome: Ongoing  11/14/2021 1349 by Maksim Leija RN  Outcome: Ongoing     Problem: Injury - Risk of, Physical Injury:  Goal: Will remain free from falls  11/15/2021 0115 by Jp Chamberlain RN  Outcome: Ongoing  11/14/2021 1349 by Maksim Leija RN  Outcome: Ongoing  Goal: Absence of physical injury  11/15/2021 0115 by Jp Chamberlain RN  Outcome: Ongoing  11/14/2021 1349 by Maksim Leija RN  Outcome: Ongoing     Problem: Mood - Altered:  Goal: Mood stable  11/15/2021 0115 by Jp Chamberlain RN  Outcome: Ongoing  11/14/2021 1349 by Maksim Leija RN  Outcome: Ongoing  Goal: Absence of abusive behavior  11/15/2021 0115 by Jp Chamberlain RN  Outcome: Ongoing  11/14/2021 1349 by Maksim Leija RN  Outcome: Ongoing  Goal: Verbalizations of feeling emotionally comfortable while being cared for will increase  11/15/2021 0115 by Jp Chamberlain RN  Outcome: Ongoing  11/14/2021 1349 by Maksim Leija RN  Outcome: Ongoing Problem: Psychomotor Activity - Altered:  Goal: Absence of psychomotor disturbance signs and symptoms  11/15/2021 0115 by Hugh Charles RN  Outcome: Ongoing  11/14/2021 1349 by Melissa Spain RN  Outcome: Ongoing     Problem: Sensory Perception - Impaired:  Goal: Demonstrations of improved sensory functioning will increase  11/15/2021 0115 by Hugh Charles RN  Outcome: Ongoing  11/14/2021 1349 by Melissa Spain RN  Outcome: Ongoing  Goal: Decrease in sensory misperception frequency  11/15/2021 0115 by Hugh Charles RN  Outcome: Ongoing  11/14/2021 1349 by Melissa Spain RN  Outcome: Ongoing  Goal: Able to refrain from responding to false sensory perceptions  11/15/2021 0115 by Hugh Charles RN  Outcome: Ongoing  11/14/2021 1349 by Melissa Spain RN  Outcome: Ongoing  Goal: Demonstrates accurate environmental perceptions  11/15/2021 0115 by Hugh Charles RN  Outcome: Ongoing  11/14/2021 1349 by Melissa Spain RN  Outcome: Ongoing  Goal: Able to distinguish between reality-based and nonreality-based thinking  11/15/2021 0115 by Hugh Charles RN  Outcome: Ongoing  11/14/2021 1349 by Melissa Spain RN  Outcome: Ongoing  Goal: Able to interrupt nonreality-based thinking  11/15/2021 0115 by Hugh Charles RN  Outcome: Ongoing  11/14/2021 1349 by Melissa Spain RN  Outcome: Ongoing     Problem: Sensory Perception - Impaired:  Goal: Demonstrations of improved sensory functioning will increase  11/15/2021 0115 by Hugh Charles RN  Outcome: Ongoing  11/14/2021 1349 by Melissa Spain RN  Outcome: Ongoing  Goal: Decrease in sensory misperception frequency  11/15/2021 0115 by Hugh Charles RN  Outcome: Ongoing  11/14/2021 1349 by Melissa Spain RN  Outcome: Ongoing  Goal: Able to refrain from responding to false sensory perceptions  11/15/2021 0115 by Hugh Charles RN  Outcome: Ongoing  11/14/2021 1349 by Melissa Spain RN  Outcome: Ongoing  Goal: Demonstrates accurate environmental perceptions  11/15/2021 0115 by Frances Pitts RN  Outcome: Ongoing  11/14/2021 1349 by Blas Perez RN  Outcome: Ongoing  Goal: Able to distinguish between reality-based and nonreality-based thinking  11/15/2021 0115 by Frances Pitts RN  Outcome: Ongoing  11/14/2021 1349 by Blas Perez RN  Outcome: Ongoing  Goal: Able to interrupt nonreality-based thinking  11/15/2021 0115 by Frances Pitts RN  Outcome: Ongoing  11/14/2021 1349 by Blas Perez RN  Outcome: Ongoing     Problem: Sleep Pattern Disturbance:  Goal: Appears well-rested  11/15/2021 0115 by Frances Pitts RN  Outcome: Ongoing  11/14/2021 1349 by Blas Peerz RN  Outcome: Ongoing     Problem: IP BLADDER/VOIDING  Goal: LTG - patient will complete bladder elimination  11/15/2021 0115 by Frances Pitts RN  Outcome: Ongoing  11/14/2021 1349 by Blas Perez RN  Outcome: Ongoing  Goal: LTG - Patient will utilize adaptive techniques/equipment to complete bladder elimination  11/15/2021 0115 by Frances Pitts RN  Outcome: Ongoing  11/14/2021 1349 by Blas Perez RN  Outcome: Ongoing  Goal: LTG - patient will achieve acceptable level of continence  11/15/2021 0115 by Frances Pitts RN  Outcome: Ongoing  11/14/2021 1349 by Blas Perez RN  Outcome: Ongoing  Goal: STG - Patient demonstrates ability to take care of indwelling catheter  11/15/2021 0115 by Frances Pitts RN  Outcome: Ongoing  11/14/2021 1349 by Blas Perez RN  Outcome: Ongoing  Goal: STG - patient demonstrates self-cath technique using clean technique and care of the catheter  11/15/2021 0115 by Frances Pitts RN  Outcome: Ongoing  11/14/2021 1349 by Blas Perez RN  Outcome: Ongoing  Goal: STG - Patient demonstrates no accidents  11/15/2021 0115 by Frances Pitts RN  Outcome: Ongoing  11/14/2021 1349 by Blas Perez RN  Outcome: Ongoing  Goal: STG - Patient will state signs and symptoms of UTI  11/15/2021 0115 by Frances Pitts RN  Outcome: Ongoing  11/14/2021 1349 by Ajay Gabriel RN  Outcome: Ongoing  Goal: STG - patient will be able to empty bladder  11/15/2021 0115 by Lore Cadena RN  Outcome: Ongoing  11/14/2021 1349 by Ajay Gabriel RN  Outcome: Ongoing  Goal: STG - Patient demonstrates understanding of self catheterization schedule by completing task on time  11/15/2021 0115 by Lore Cadena RN  Outcome: Ongoing  11/14/2021 1349 by Ajay Gabriel RN  Outcome: Ongoing  Goal: STG - patient participates in bladder program by expressing need to void  11/15/2021 0115 by Lore Cadena RN  Outcome: Ongoing  11/14/2021 1349 by Ajay Gabriel RN  Outcome: Ongoing  Goal: STG - Patient verbalizes understanding of catheter care indwelling/intermittent  11/15/2021 0115 by Lore Cadena RN  Outcome: Ongoing  11/14/2021 1349 by Ajay Gabriel RN  Outcome: Ongoing  Goal: STG - patient participates in bladder program by adhering to implemented toileting schedule  11/15/2021 0115 by Lore Cadena RN  Outcome: Ongoing  11/14/2021 1349 by Ajay Gabriel RN  Outcome: Ongoing     Problem: IP BOWEL ELIMINATION  Goal: LTG - patient will utilize adaptive techniques/equipment to complete bowel elimination  11/15/2021 0115 by Loer Cadena RN  Outcome: Ongoing  11/14/2021 1349 by Ajay Gabriel RN  Outcome: Ongoing  Goal: LTG - patient will have regular and routine bowel evacuation  11/15/2021 0115 by Lore Cadena RN  Outcome: Ongoing  11/14/2021 1349 by Ajay Gabriel RN  Outcome: Ongoing  Goal: STG - patient will be accident free  11/15/2021 0115 by Lore Cadena RN  Outcome: Ongoing  11/14/2021 1349 by Ajay Gabriel RN  Outcome: Ongoing  Goal: STG - Patient demonstrates care and management of ostomy bag  11/15/2021 0115 by Lore Cadena RN  Outcome: Ongoing  11/14/2021 1349 by Ajay Gabriel RN  Outcome: Ongoing  Goal: STG - Patient participates in bowel management program  11/15/2021 0115 by Lore Cadena RN  Outcome: Ongoing  11/14/2021 1349 by Rosalie Mendez RN  Outcome: Ongoing  Goal: STG - patient maintains skin integrity  11/15/2021 0115 by Rodri Juan RN  Outcome: Ongoing  11/14/2021 1349 by Rosalie Mendez RN  Outcome: Ongoing  Goal: STG - Patient will verbalize signs and symptoms of constipation and how to prevent/alleviate  11/15/2021 0115 by Rodri Juan RN  Outcome: Ongoing  11/14/2021 1349 by Rosalie Mendez RN  Outcome: Ongoing  Goal: STG - patient will be continent of stool  11/15/2021 0115 by Rodri Juan RN  Outcome: Ongoing  11/14/2021 1349 by Rosalie Mendez RN  Outcome: Ongoing  Goal: STG - Patient completes digital stimulation technique  11/15/2021 0115 by Rodri Juan RN  Outcome: Ongoing  11/14/2021 1349 by Rosalie Mendez RN  Outcome: Ongoing  Goal: STG - Patient verbalizes knowledge about relationship between diet, fluid intake, activity and medication on constipation  11/15/2021 0115 by Rodri Juan RN  Outcome: Ongoing  11/14/2021 1349 by Rosalie Mendez RN  Outcome: Ongoing     Problem: IP BREATHING  Goal: LTG - patient will mobilize secretions and maintain airway  11/15/2021 0115 by Rodri Juan RN  Outcome: Ongoing  11/14/2021 1349 by Rosalie Mendez RN  Outcome: Ongoing  Goal: LTG - Patient/caregiver will demonstrate/perform proper techniques to maintain patent airway  11/15/2021 0115 by Rodri Juan RN  Outcome: Ongoing  11/14/2021 1349 by Rosalie Mendez RN  Outcome: Ongoing  Goal: LTG - patient/caregiver will demonstrate/perform improved or stable self care abilities within physical limitations  11/15/2021 0115 by Rodri Juan RN  Outcome: Ongoing  11/14/2021 1349 by Rosalie Mendez RN  Outcome: Ongoing  Goal: STG - Patient/caregiver will maintain patent airway  11/15/2021 0115 by Rodri Juan RN  Outcome: Ongoing  11/14/2021 1349 by Rosalie Mendez RN  Outcome: Ongoing  Goal: STG - respiratory rate and effort will be within normal limits for the patient  11/15/2021 0115 by Agus Mendez RN  Outcome: Ongoing  11/14/2021 1349 by Nilson Renteria RN  Outcome: Ongoing  Goal: STG - patient/caregiver will be able to verbalize oxygen safety precautions  11/15/2021 0115 by Agus Mendez RN  Outcome: Ongoing  11/14/2021 1349 by Nilson Renteria RN  Outcome: Ongoing  Goal: STG - Patient/caregiver demonstrates correct suctioning technique  11/15/2021 0115 by Agus Mendez RN  Outcome: Ongoing  11/14/2021 1349 by Nilson Renteria RN  Outcome: Ongoing  Goal: STG - patient will utilize incentive spirometer  11/15/2021 0115 by Agus Mendez RN  Outcome: Ongoing  11/14/2021 1349 by Nilson Renteria RN  Outcome: Ongoing  Goal: STG - Patient performs or directs assisted coughing  11/15/2021 0115 by Agus Mendez RN  Outcome: Ongoing  11/14/2021 1349 by Nilson Renteria RN  Outcome: Ongoing  Goal: STG - patient can administer MDI's  11/15/2021 0115 by Agus Mendez RN  Outcome: Ongoing  11/14/2021 1349 by Nilson Renteria RN  Outcome: Ongoing  Goal: STG - Patient can utilize incentive spirometer  11/15/2021 0115 by Agus Mendez RN  Outcome: Ongoing  11/14/2021 1349 by Nilson Renteria RN  Outcome: Ongoing  Goal: STG - family can complete suctioning  11/15/2021 0115 by Agus Mendez RN  Outcome: Ongoing  11/14/2021 1349 by Nilson Renteria RN  Outcome: Ongoing     Problem: NUTRITION  Description: Altered Nutrition and Hydration  Goal: Patient maintains adequate hydration  11/15/2021 0115 by Agus Mendez RN  Outcome: Ongoing  11/14/2021 1349 by Nilson Renteria RN  Outcome: Ongoing  Goal: Patient maintains weight  11/15/2021 0115 by Agus Mendez RN  Outcome: Ongoing  11/14/2021 1349 by Nilson Renteria RN  Outcome: Ongoing  Goal: Patient/Family demonstrates understanding of diet  11/15/2021 0115 by Agus Mendez RN  Outcome: Ongoing  11/14/2021 1349 by Nilson Renteria, RN  Outcome: Ongoing  Goal: Patient/Family independently completes tube feeding  11/15/2021 0115 by Rena Coffey RN  Outcome: Ongoing  11/14/2021 1349 by Heide Fernandez RN  Outcome: Ongoing  Goal: Patient will have no more than 5 lb weight change during LOS  11/15/2021 0115 by Rena Coffey RN  Outcome: Ongoing  11/14/2021 1349 by Heide Fernandez RN  Outcome: Ongoing  Goal: Patient will utilize adaptive techniques to administer nutrition  11/15/2021 0115 by Rena Coffey RN  Outcome: Ongoing  11/14/2021 1349 by Heide Fernandez RN  Outcome: Ongoing  Goal: Patient will verbalize dietary restrictions  11/15/2021 0115 by Rena Coffey RN  Outcome: Ongoing  11/14/2021 1349 by Heide Fernandez RN  Outcome: Ongoing     Problem: SAFETY  Goal: LTG - patient will adhere to hip precautions during ADL's and transfers  11/15/2021 0115 by Rena Coffey RN  Outcome: Ongoing  11/14/2021 1349 by Heide Fernandez RN  Outcome: Ongoing  Goal: LTG - Patient will demonstrate safety requirements appropriate to situation/environment  11/15/2021 0115 by Rena Coffey RN  Outcome: Ongoing  11/14/2021 1349 by Heide Fernandez RN  Outcome: Ongoing  Goal: LTG - patient will utilize safety techniques  11/15/2021 0115 by Rena Coffey RN  Outcome: Ongoing  11/14/2021 1349 by Heide Fernandez RN  Outcome: Ongoing  Goal: STG - patient locks brakes on wheelchair  11/15/2021 0115 by Rena Coffey RN  Outcome: Ongoing  11/14/2021 1349 by Heide Fernandez RN  Outcome: Ongoing  Goal: STG - Patient uses call light consistently to request assistance with transfers  11/15/2021 0115 by Rena Coffey RN  Outcome: Ongoing  11/14/2021 1349 by Heide Fernandez RN  Outcome: Ongoing  Goal: STG - patient uses gait belt during all transfers  11/15/2021 0115 by Rena Coffey RN  Outcome: Ongoing  11/14/2021 1349 by Heide Fernandez RN  Outcome: Ongoing  Goal: Free from accidental physical injury  11/15/2021 0115 by Rena Coffey RN  Outcome: Ongoing  11/14/2021 1349 by Heide Fernandez RN  Outcome: Ongoing  Goal: Free from intentional harm  11/15/2021 0115 by Rodri Juan RN  Outcome: Ongoing  11/14/2021 1349 by Rosalie Mendez RN  Outcome: Ongoing     Problem: SKIN INTEGRITY  Goal: LTG - Patient will be free from infection  11/15/2021 0115 by Rodri Juan RN  Outcome: Ongoing  11/14/2021 1349 by Rosalie Mendez RN  Outcome: Ongoing  Goal: LTG - patient will maintain/improve skin integrity through proper skin care techniques  11/15/2021 0115 by Rodri Juan RN  Outcome: Ongoing  11/14/2021 1349 by Rosalie Mendez RN  Outcome: Ongoing  Goal: LTG - Patient will demonstrate appropriate pressure relief techniques  11/15/2021 0115 by Rodri Juan RN  Outcome: Ongoing  11/14/2021 1349 by Rosalie Mendez RN  Outcome: Ongoing  Goal: LTG - patient will demonstrate appropriate skin care techniques  11/15/2021 0115 by Rodri Juan RN  Outcome: Ongoing  11/14/2021 1349 by Rosalie Mendez RN  Outcome: Ongoing  Goal: LTG - Patient will be free from infection  11/15/2021 0115 by Rodri Juan RN  Outcome: Ongoing  11/14/2021 1349 by Rosalie Mendez RN  Outcome: Ongoing  Goal: STG - Patient demonstrates skin care/treatment/dressing change  11/15/2021 0115 by Rodri Juan RN  Outcome: Ongoing  11/14/2021 1349 by Rosalie Mendez RN  Outcome: Ongoing  Goal: STG - patient will maintain good skin integrity  11/15/2021 0115 by Rodri Juan RN  Outcome: Ongoing  11/14/2021 1349 by Rosalie Mendez RN  Outcome: Ongoing  Goal: STG - Patient exhibits signs of wound healing.   11/15/2021 0115 by Rodri Juan RN  Outcome: Ongoing  11/14/2021 1349 by Rosalie Mendez RN  Outcome: Ongoing  Goal: STG - patient demonstrates pressure reduction techniques  11/15/2021 0115 by Rodri Juan RN  Outcome: Ongoing  11/14/2021 1349 by Rosalie Mendez RN  Outcome: Ongoing  Goal: STG - Patient demonstrates preventative skin care measures  11/15/2021 0115 by Rodri Hard, RN  Outcome: Ongoing  11/14/2021 1349 by Maksim Leija RN  Outcome: Ongoing  Goal: Skin integrity is maintained or improved  11/15/2021 0115 by Jp Chamberlain RN  Outcome: Ongoing  11/14/2021 1349 by Maksim Leija RN  Outcome: Ongoing     Problem: PAIN  Goal: LTG - Patient will manage pain with the appropriate technique/Intervention  11/15/2021 0115 by Jp Chamberlain RN  Outcome: Ongoing  11/14/2021 1349 by Maksim Leija RN  Outcome: Ongoing  Goal: LTG - Patient will demonstrate intervention for managing pain  11/15/2021 0115 by Jp Chamberlain RN  Outcome: Ongoing  11/14/2021 1349 by Maksim Leija RN  Outcome: Ongoing  Goal: STG - Patient will reduce or eliminate use of analgesics  11/15/2021 0115 by Jp Chamberlain RN  Outcome: Ongoing  11/14/2021 1349 by Maksim Leija RN  Outcome: Ongoing  Goal: STG - pain is manageable through therapies  11/15/2021 0115 by Jp Chamberlain RN  Outcome: Ongoing  11/14/2021 1349 by Maksim Leija RN  Outcome: Ongoing  Goal: STG - Patient will verbalize an acceptable level of pain  11/15/2021 0115 by Jp Chamberlain RN  Outcome: Ongoing  11/14/2021 1349 by Maksim Leija RN  Outcome: Ongoing  Goal: STG - patients pain is managed to allow active participation in daily activities  11/15/2021 0115 by Jp Chamberlain RN  Outcome: Ongoing  11/14/2021 1349 by Maksim Leija RN  Outcome: Ongoing  Goal: STG - Patient will increase activity level  11/15/2021 0115 by Jp Chamberlain RN  Outcome: Ongoing  11/14/2021 1349 by Maksim Leija RN  Outcome: Ongoing  Goal: STG - patient verbalizes a reduction in pain level  11/15/2021 0115 by Jp Chamberlain RN  Outcome: Ongoing  11/14/2021 1349 by Maksim Leija RN  Outcome: Ongoing  Goal: Patient's pain/discomfort is manageable  11/15/2021 0115 by Jp Chamberlain RN  Outcome: Ongoing  11/14/2021 1349 by Maksim Leija RN  Outcome: Ongoing

## 2021-11-15 NOTE — PROGRESS NOTES
[facility]  INPATIENT SPEECH THERAPY  [facility] 8 REHAB UNIT          TIME   Time In: 9335  Time Out: 1200  Minutes: 25   Time In: 1400  Time Out: 1435  Minutes: 35  Total: 60 minutes        [x]Daily Note  []Progress Note    Date: 11/15/2021  Patient Name: Giorgio Andersen        MRN: [id number]    Account #: [de-identified]  : 1933  (80 y.o.)  Gender: female   Primary Provider: Cristine Lopez MD  Swallowing Status/Diet:Soft and bite sized diet with pureed meats, thin liquids      PATIENT DIAGNOSIS(ES):    Diagnosis: Acute ischemic stroke, L MCA infarct, R sided involvement      Additional Pertinent Hx: hx CVAs, stress incontinence     RESTRICTIONS/PRECAUTIONS:    Restrictions/Precautions  Restrictions/Precautions: Fall Risk, Weight Bearing, Swallowing Modified Diet  Required Braces or Orthoses?: No  Position Activity Restriction  Other position/activity restrictions: Dysphagia-soft and bite sized          Subjective: The patient was upright in recliner. When asked, she stated she was not hungry. She denied pain this date. Objective:  Temporal orientation questions were attempted. She was unable to recall the year or the month. Echolalia was noted this date. She did exhibit increased vocal intensity and appeared more energetic. Spatial orientation questions were at 30%. Naming pictures was at 100%. Temporal orientation questions were at 0%. She was able to tolerate thin liquids via cup this date without difficulty. Clear voicing was noted following all sips. No overt s/s of aspiration were observed with thin liquids throughout the session. Decreased hyolaryngeal elevation and mildy delayed pharyngeal swallow initiation were noted. No oral residue or buccal cavity pocketing was observed following lunch. Due to risk of oral pocketing and difficulty masticating ground meats with gravy, she has been downgraded to pureed meats. She continues to exhibit inattention and visual deficits.  She continues to require verbal and tactile cues to locate items on her tray table. Reminders were provided for her to utilize her call light. This was placed in her lap. Recommended Diet and Intervention  Diet Solids Recommendation: Dysphagia Soft and Bite-Sized with pureed meats (Dysphagia III)  Liquid Consistency Recommendation: Thin liquids  Recommended Form of Meds: Crushed in puree as able  Therapeutic Interventions: Mendelsohn; Diet tolerance monitoring; Oral care; Therapeutic PO trials with SLP; Oral motor exercises; Tongue base strengthening; Patient/Family education; Effortful swallow; Pharyngeal exercises; Yanet     Compensatory Swallowing Strategies  Compensatory Swallowing Strategies: Upright as possible for all oral intake; Alternate solids and liquids; Remain upright for 30-45 minutes after meals; Check for pocketing of food on the Right; Swallow 2 times per bite/sip; Small bites/sips; External pacing                   SHORT TERM GOAL #1:  Goal 1: The patient will follow two step commands with minimal cueing and 100% accuracy. SHORT TERM GOAL #2:  Goal 2: The patient will complete naming tasks (picture, object) with minimal cueing and 100% accuracy. SHORT TERM GOAL #3:  Goal 3: The patient will complete concrete divergent naming tasks with minimal cueing and 100% accuracy. SHORT TERM GOAL #4:  Goal 4: The patient will complete oral motor exercises to improve lingual and labial strength and range of motion as well as improve speech intelligibility. SHORT TERM GOAL #5:  Goal 5: The patient will utilize dysarthria strategies (slow rate, increased volume, over exaggeration of words, increased breath support) during structured tasks and during conversations with minimal cueing.     Swallowing Short Term Goals  Short-term Goals  Goal 1: The patient will complete the yanet (tongue hold) technique to improve base of tongue retraction and base of tongue to pharyngeal wall approximation with 90% accuracy and minimal verbal cues. Goal 2: The patient will complete the effortful swallow maneuver to improve hyolaryngeal elevation, tongue base retraction, and vocal fold closure with 90% accuracy and minimal verbal cues. Goal 3: Staff/caregivers will complete daily oral hygiene to prevent aspiration of bacteria laden secretions. Goal 4: The patient will tolerate a dysphagia soft and bite sized diet with nectar (mildly thick) liquids with minimal overt s/s of aspiration. Comprehension: 3 - Patient understands basic needs 50-74% of the time  Expression: 3 - Expresses basic ideas/needs 50-74% of the time  Social Interaction: 4 - Patient appropriate 75-90%+ of the time  Problem Solving: 3 - Patient solves simple/routine tasks 50%-74%  Memory: 3 - Patient remembers 50%-74% of the time    ASSESSMENT:  Assessment: [x]Progressing towards goals          []Not Progressing towards goals    Patient Tolerance of Treatment:   [x]Tolerated well []Tolerated fair []Required rest breaks []Fatigued    Education:  Learner:  [x]Patient          []Significant Other          []Other  Education provided regarding:  [x]Goals and POC   []Diet and swallowing precautions    []Home Exercise Program  []Progress and/or discharge information  Method of Education:  [x]Discussion          []Demonstration          []Handout          []Other  Evaluation of Education:   [x]Verbalized understanding   []Demonstrates without assistance  []Demonstrates with assistance  []Needs further instruction     []No evidence of learning                  []No family present      Plan: [x]Continue with current plan of care    []Modify current plan of care as follows:    []Discharge patient    Discharge Location:    Services/Supervision Recommended:      [x]Patient continues to require treatment by a licensed therapist to address functional deficits as outlined in the established plan of care.             Electronically Signed By:  Carmen Schwarz M.S. CCC-SLP 11/15/2021,12:52 PM.

## 2021-11-15 NOTE — PROGRESS NOTES
11/15/21 1000   Restrictions/Precautions   Restrictions/Precautions Fall Risk   Required Braces or Orthoses? No   Lower Extremity Weight Bearing Restrictions   Right Lower Extremity Weight Bearing Weight Bearing As Tolerated   Left Lower Extremity Weight Bearing Weight Bearing As Tolerated   Pain Screening   Patient Currently in Pain No   Pain Assessment   Pain Assessment 0-10   Nguyen-Baker Pain Rating 0   Patient's Stated Pain Goal No pain   Pain Level 0   Bed Mobility   Bridging Supervision   Rolling Stand by assistance   Supine to Sit Stand by assistance; Supervision   Sit to Supine Stand by assistance; Supervision   Scooting Stand by assistance; Supervision   Comment requires cues on what to do but performs bed mobility well with supervision. triplanar from right side   Transfers   Sit to Stand Minimal Assistance   Stand to sit Minimal Assistance   Bed to Chair Moderate assistance   Stand Pivot Transfers Minimal Assistance   Squat Pivot Transfers Contact guard assistance   Comment pt takes a few steps to turn to sit in [facility] using FWW needs walker management, pt able to perform squat pivot well to the right to/from bed     Ambulation   Ambulation? Yes   WB Status WBAT   More Ambulation? Yes   Ambulation 1   Surface level tile   Device Rolling Walker   Other Apparatus Wheelchair follow   Assistance Minimal assistance   Quality of Gait FWW management, reciprical, short steps, IR and in-toeing of right leg   Distance 15ft    Ambulation 2   Surface - 2 level tile   Device 2 Rolling Walker   Other Apparatus 2 Wheelchair follow   Assistance 2 Minimal assistance   Quality of Gait 2 cues on step length with right difficulty sustaining, varying step length, flexed forward posture   Distance 25ft   Comments better but difficulty understanding commands   Exercises   Hip Flexion x10    Knee Long Arc Quad x10    Conditions Requiring Skilled Therapeutic Intervention   Assessment pt understands basic commands with activity. decreased right lean today with ambulation and tfs, tolerates very short distances d/t fatigue will benefit more with functional moblility training for tfs and ambulation as tolerated

## 2021-11-15 NOTE — PLAN OF CARE
Problem: Falls - Risk of:  Goal: Will remain free from falls  Description: Will remain free from falls  11/15/2021 1036 by Brittany Pineda RN  Outcome: Ongoing  11/15/2021 0115 by Vanita Abdalla RN  Outcome: Ongoing  Goal: Absence of physical injury  Description: Absence of physical injury  11/15/2021 1036 by Brittany Pineda RN  Outcome: Ongoing  11/15/2021 0115 by Vanita Abdalla RN  Outcome: Ongoing

## 2021-11-16 LAB
ALBUMIN SERPL-MCNC: 3.6 G/DL (ref 3.5–5.2)
ALP BLD-CCNC: 92 U/L (ref 35–104)
ALT SERPL-CCNC: 24 U/L (ref 5–33)
ANION GAP SERPL CALCULATED.3IONS-SCNC: 10 MMOL/L (ref 7–19)
AST SERPL-CCNC: 26 U/L (ref 5–32)
BASOPHILS ABSOLUTE: 0 K/UL (ref 0–0.2)
BASOPHILS RELATIVE PERCENT: 0.8 % (ref 0–1)
BILIRUB SERPL-MCNC: 0.7 MG/DL (ref 0.2–1.2)
BUN BLDV-MCNC: 20 MG/DL (ref 8–23)
CALCIUM SERPL-MCNC: 10.1 MG/DL (ref 8.8–10.2)
CHLORIDE BLD-SCNC: 107 MMOL/L (ref 98–111)
CO2: 23 MMOL/L (ref 22–29)
CREAT SERPL-MCNC: 0.5 MG/DL (ref 0.5–0.9)
EOSINOPHILS ABSOLUTE: 0.1 K/UL (ref 0–0.6)
EOSINOPHILS RELATIVE PERCENT: 2.7 % (ref 0–5)
GFR AFRICAN AMERICAN: >59
GFR NON-AFRICAN AMERICAN: >60
GLUCOSE BLD-MCNC: 81 MG/DL (ref 70–99)
GLUCOSE BLD-MCNC: 82 MG/DL (ref 70–99)
GLUCOSE BLD-MCNC: 94 MG/DL (ref 74–109)
HCT VFR BLD CALC: 42.9 % (ref 37–47)
HEMOGLOBIN: 13.5 G/DL (ref 12–16)
IMMATURE GRANULOCYTES #: 0 K/UL
INR BLD: 2.35 (ref 0.88–1.18)
LYMPHOCYTES ABSOLUTE: 1.5 K/UL (ref 1.1–4.5)
LYMPHOCYTES RELATIVE PERCENT: 27.9 % (ref 20–40)
MCH RBC QN AUTO: 29.5 PG (ref 27–31)
MCHC RBC AUTO-ENTMCNC: 31.5 G/DL (ref 33–37)
MCV RBC AUTO: 93.9 FL (ref 81–99)
MONOCYTES ABSOLUTE: 0.4 K/UL (ref 0–0.9)
MONOCYTES RELATIVE PERCENT: 7.6 % (ref 0–10)
NEUTROPHILS ABSOLUTE: 3.2 K/UL (ref 1.5–7.5)
NEUTROPHILS RELATIVE PERCENT: 60.8 % (ref 50–65)
PDW BLD-RTO: 14.3 % (ref 11.5–14.5)
PERFORMED ON: NORMAL
PERFORMED ON: NORMAL
PLATELET # BLD: 156 K/UL (ref 130–400)
PMV BLD AUTO: 10.4 FL (ref 9.4–12.3)
POTASSIUM SERPL-SCNC: 4.3 MMOL/L (ref 3.5–5)
PROTHROMBIN TIME: 25.5 SEC (ref 12–14.6)
RBC # BLD: 4.57 M/UL (ref 4.2–5.4)
SODIUM BLD-SCNC: 140 MMOL/L (ref 136–145)
TOTAL PROTEIN: 5.2 G/DL (ref 6.6–8.7)
WBC # BLD: 5.3 K/UL (ref 4.8–10.8)

## 2021-11-16 PROCEDURE — 6370000000 HC RX 637 (ALT 250 FOR IP): Performed by: NURSE PRACTITIONER

## 2021-11-16 PROCEDURE — 36415 COLL VENOUS BLD VENIPUNCTURE: CPT

## 2021-11-16 PROCEDURE — 6370000000 HC RX 637 (ALT 250 FOR IP): Performed by: PSYCHIATRY & NEUROLOGY

## 2021-11-16 PROCEDURE — 97535 SELF CARE MNGMENT TRAINING: CPT

## 2021-11-16 PROCEDURE — 97129 THER IVNTJ 1ST 15 MIN: CPT

## 2021-11-16 PROCEDURE — 2580000003 HC RX 258: Performed by: PSYCHIATRY & NEUROLOGY

## 2021-11-16 PROCEDURE — 85025 COMPLETE CBC W/AUTO DIFF WBC: CPT

## 2021-11-16 PROCEDURE — 92526 ORAL FUNCTION THERAPY: CPT

## 2021-11-16 PROCEDURE — 97130 THER IVNTJ EA ADDL 15 MIN: CPT

## 2021-11-16 PROCEDURE — 80053 COMPREHEN METABOLIC PANEL: CPT

## 2021-11-16 PROCEDURE — 97116 GAIT TRAINING THERAPY: CPT

## 2021-11-16 PROCEDURE — 99232 SBSQ HOSP IP/OBS MODERATE 35: CPT | Performed by: PSYCHIATRY & NEUROLOGY

## 2021-11-16 PROCEDURE — 1180000000 HC REHAB R&B

## 2021-11-16 PROCEDURE — 97110 THERAPEUTIC EXERCISES: CPT

## 2021-11-16 PROCEDURE — 85610 PROTHROMBIN TIME: CPT

## 2021-11-16 PROCEDURE — 82947 ASSAY GLUCOSE BLOOD QUANT: CPT

## 2021-11-16 RX ORDER — WARFARIN SODIUM 5 MG/1
5 TABLET ORAL
Status: COMPLETED | OUTPATIENT
Start: 2021-11-16 | End: 2021-11-16

## 2021-11-16 RX ADMIN — Medication 10 ML: at 21:23

## 2021-11-16 RX ADMIN — SENNOSIDES AND DOCUSATE SODIUM 1 TABLET: 50; 8.6 TABLET ORAL at 21:23

## 2021-11-16 RX ADMIN — SENNOSIDES AND DOCUSATE SODIUM 1 TABLET: 50; 8.6 TABLET ORAL at 08:06

## 2021-11-16 RX ADMIN — ATORVASTATIN CALCIUM 20 MG: 20 TABLET, FILM COATED ORAL at 08:06

## 2021-11-16 RX ADMIN — Medication 10 ML: at 08:11

## 2021-11-16 RX ADMIN — WARFARIN SODIUM 5 MG: 5 TABLET ORAL at 17:45

## 2021-11-16 RX ADMIN — DOCUSATE SODIUM 100 MG: 100 CAPSULE, LIQUID FILLED ORAL at 08:06

## 2021-11-16 RX ADMIN — FLUTICASONE PROPIONATE 1 SPRAY: 50 SPRAY, METERED NASAL at 08:06

## 2021-11-16 RX ADMIN — CETIRIZINE HYDROCHLORIDE 5 MG: 10 TABLET, FILM COATED ORAL at 08:06

## 2021-11-16 ASSESSMENT — PAIN SCALES - WONG BAKER: WONGBAKER_NUMERICALRESPONSE: 0

## 2021-11-16 ASSESSMENT — PAIN SCALES - GENERAL
PAINLEVEL_OUTOF10: 0
PAINLEVEL_OUTOF10: 0

## 2021-11-16 NOTE — PROGRESS NOTES
Clinical Pharmacy Note    Warfarin consult follow-up    Recent Labs     11/16/21  0443   INR 2.35*     Recent Labs     11/14/21  0636 11/15/21  0444 11/16/21  0443   HGB 14.7 14.2 13.5   HCT 47.3* 45.9 42.9    159 156       Significant Drug-Drug Interactions:  New warfarin drug-drug interactions: none  Discontinued drug-drug interactions: none    Date INR Warfarin Dose   11/06/21 1.01 ---   11/07/21 --- 5 mg   11/08/21 1.04  5 mg    11/09/21  1.30 5 mg    11/10/21 1.74 5 mg    11/11/21   2.44 2.5 mg     11/12/21 2.02 5 mg    11/13/21   1.97 5 mg   11/14/21  2.44  4 mg     11/15/21  2.58 4 mg     11/16/21 2.35  5 mg              Notes: Will give warfarin 5 mg once this evening. Daily PT/INR until stable within therapeutic range.      Electronically signed by Daniela Lizarraga, [facility] on 11/16/2021 at 10:36 AM

## 2021-11-16 NOTE — PROGRESS NOTES
Patient:   Prisca Comer  MR#:    [id number]   Room:    26/826-01   YOB: 1933  Date of Progress Note: 11/16/2021  Time of Note                           11:42 AM  Consulting Physician:   Rey Reeder M.D. Attending Physician:  Rey Reeder MD       CHIEF COMPLAINT: Right-sided weakness with dysphagia     subjective  This 80 y.o. female  with history of 3 strokes since March 2021, stress incontinence, arthritis, blood clots and hyperlipidemia. She presented to [facility] ER on 11/6/21 after being found on the floor at home by her son. She was confused, had abnormal speech and right sided weakness. CT of head was negative for acute changes. CTA was also negative. She was admitted to the Hospitalist service with consult for neurology. MRI done revealed an acute lacunar infarct within the left thalamus and multiple Multifocal old ischemic change in both cerebral hemispheres and within the right side of the cerebellum. She had been placed on Plavix and statin after her last stroke. She was seen by Dr. Niya Nguyen and not felt to be a candidate for TPA d/t being out of the time frame and no evidence of thrombus on CTA. Dr. Niya Nguyen felt strokes were most likely cardioembolic. Plavix was discontinued and she was started on Coumadin with Lovenox for bridging. Echo done showed Bubble study with evidence of small right-to-left shunting with Valsalva consistent with possible ASD/PFO. Cardiology was consulted for possible PFO closure. Patient/family do not wish to have any invasive measure. Prefer to continue with medical management with anticoagulation. Patient is a DNR. She was evaluated by SPT for swallow and aphasia. She was initially made NPO but has now been started on a mechanical soft diet with nectar thick liquids. She continues to have right sided weakness and is participating in both PT/OT.   No complaint today  REVIEW OF SYSTEMS:  Constitutional: No fevers No chills  Neck:No stiffness  Respiratory: No shortness of breath  Cardiovascular: No chest pain No palpitations  Gastrointestinal: No abdominal pain    Genitourinary: No Dysuria  Neurological: No headache, no confusion      PHYSICAL EXAM:  /74   Pulse 62   Temp 96.5 °F (35.8 °C) (Temporal)   Resp 16   Ht 5' 6\" (1.676 m)   Wt 135 lb 4.8 oz (61.4 kg)   SpO2 93%   BMI 21.84 kg/m²     Constitutional: she appears well-developed and well-nourished. Eyes  conjunctiva normal.  Pupils react to light  Ear, nose, throat -hearing intact to voice. No scars, masses, or lesions over external nose or ears, no atrophy of tongue  Neck-symmetric, no masses noted, no jugular vein distension  Respiration- chest wall appears symmetric, good expansion,   normal effort without use of accessory muscles  Cardiovascular- RRR  Musculoskeletal  no significant wasting of muscles noted, no bony deformities, gait no gross ataxia  Extremities-no clubbing, cyanosis or edema  Skin  warm, dry, and intact. No rash, erythema, or pallor. Psychiatric  mood, affect, and behavior appear normal.      Neurology  NEUROLOGICAL EXAM:  Awake, alert, fluent oriented to [facility].  Attention and concentration appear appropriate  Speech shows dysarthria       Cranial Nerve Exam   CN II- Visual fields show an apparent left homonymous hemianopsia  CN III, IV,VI-EOMI, No nystagmus, conjugate eye movements, no ptosis  CN VII-right facial droop  CN VIII-Hearing intact        Motor Exam  Relatively normal throughout with significant atrophy in the hands       Tremors- no tremors in hands or head noted    Coordination  Clumsiness in the bilateral upper extremities           Nursing/pcp notes, imaging,labs and vitals reviewed.      PT,OT and/or speech notes reviewed    Lab Results   Component Value Date    WBC 5.3 11/16/2021    HGB 13.5 11/16/2021    HCT 42.9 11/16/2021    MCV 93.9 11/16/2021     11/16/2021     Lab Results   Component Value Date     11/16/2021    K 4.3 11/16/2021     11/16/2021    CO2 23 11/16/2021    BUN 20 11/16/2021    CREATININE 0.5 11/16/2021    GLUCOSE 94 11/16/2021    CALCIUM 10.1 11/16/2021    PROT 5.2 (L) 11/16/2021    LABALBU 3.6 11/16/2021    BILITOT 0.7 11/16/2021    ALKPHOS 92 11/16/2021    AST 26 11/16/2021    ALT 24 11/16/2021    LABGLOM >60 11/16/2021    GFRAA >59 11/16/2021     Lab Results   Component Value Date    INR 2.35 (H) 11/16/2021    INR 2.58 (H) 11/15/2021    INR 2.44 (H) 11/14/2021   No results found for: PHENYTOIN, ESR, CRP      Objective:  Temporal orientation questions were attempted. She was unable to recall the year or the month.     Echolalia was noted this date. She did exhibit increased vocal intensity and appeared more energetic.     Spatial orientation questions were at 30%. Naming pictures was at 100%. Temporal orientation questions were at 0%.     She was able to tolerate thin liquids via cup this date without difficulty. Clear voicing was noted following all sips. No overt s/s of aspiration were observed with thin liquids throughout the session. Decreased hyolaryngeal elevation and mildy delayed pharyngeal swallow initiation were noted. No oral residue or buccal cavity pocketing was observed following lunch. Due to risk of oral pocketing and difficulty masticating ground meats with gravy, she has been downgraded to pureed meats.     She continues to exhibit inattention and visual deficits. She continues to require verbal and tactile cues to locate items on her tray table. Reminders were provided for her to utilize her call light. This was placed in her lap.     11/15/21 1000    Restrictions/Precautions   Restrictions/Precautions Fall Risk   Required Braces or Orthoses?  No   Lower Extremity Weight Bearing Restrictions   Right Lower Extremity Weight Bearing Weight Bearing As Tolerated   Left Lower Extremity Weight Bearing Weight Bearing As Tolerated   Pain Screening   Patient Currently in Pain No   Pain Assessment   Pain Assessment 0-10 Nguyen-Baker Pain Rating 0   Patient's Stated Pain Goal No pain   Pain Level 0   Bed Mobility   Bridging Supervision   Rolling Stand by assistance   Supine to Sit Stand by assistance; Supervision   Sit to Supine Stand by assistance; Supervision   Scooting Stand by assistance; Supervision   Comment requires cues on what to do but performs bed mobility well with supervision. triplanar from right side   Transfers   Sit to Stand Minimal Assistance   Stand to sit Minimal Assistance   Bed to Chair Moderate assistance   Stand Pivot Transfers Minimal Assistance   Squat Pivot Transfers Contact guard assistance   Comment pt takes a few steps to turn to sit in [employer] using FWW needs walker management, pt able to perform squat pivot well to the right to/from bed     Ambulation   Ambulation? Yes   WB Status WBAT   More Ambulation? Yes   Ambulation 1   Surface level tile   Device Rolling Walker   Other Apparatus Wheelchair follow   Assistance Minimal assistance   Quality of Gait FWW management, reciprical, short steps, IR and in-toeing of right leg   Distance 15ft    Ambulation 2   Surface - 2 level tile   Device 2 Rolling Walker   Other Apparatus 2 Wheelchair follow   Assistance 2 Minimal assistance   Quality of Gait 2 cues on step length with right difficulty sustaining, varying step length, flexed forward posture   Distance 25ft   Comments better but difficulty understanding commands   Exercises   Hip Flexion x10    Knee Long Arc Quad x10    Conditions Requiring Skilled Therapeutic Intervention   Assessment pt understands basic commands with activity. decreased right lean today with ambulation and tfs, tolerates very short distances d/t fatigue will benefit more with functional moblility training for tfs and ambulation as tolerated           RECORD REVIEW: Previous medical records, medications were reviewed at today's visit    IMPRESSION:   1. 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 Suspected to be cardioembolic. Plavix changed to Coumadin. Lovenox discontinued due to therapeutic INR. PT/OT/SLP  2. DVT prophylaxisCoumadin  3. Hyperlipidemiacontinue statin  4. GIbowel regimen  5. Bacteruria with unremarkable u/a. Suspect contaminant. No Rx for now.   Continue current treatment       ELOS:restaff tomorrow morning

## 2021-11-16 NOTE — PROGRESS NOTES
Occupational Therapy     11/15/21 1100   Restrictions/Precautions   Restrictions/Precautions Fall Risk   Position Activity Restriction   Other position/activity restrictions Dysphagia-soft and bite sized, NTL, water in between meals   Pain Assessment   Nguyen-Baker Pain Rating 0   ADL   Feeding Moderate assistance; Maximum assistance; Scoop assist; Adaptive utensils (comment); Bringing food to mouth assist; Increased time to complete   Additional Comments self fed ~ 5 bites   Balance   Standing Balance Minimal assistance  (CGA- Min A)   Standing Balance   Time 2 mins   Activity 1 hand (R) grasp release task   Functional Mobility   Functional - Mobility Device Rolling Walker   Activity Other   Assist Level Minimal assistance   Functional Mobility Comments short distance in room recliner to bed   Coordination   Gross Motor B UE acts   Vision   Vision Comment L side field cut   Perception   Overall Perceptual Status Impaired   Unilateral Attention Cues to attend left visual field   Motor Planning Hand over hand to sequence tasks   Assessment   Performance deficits / Impairments Decreased functional mobility ; Decreased ADL status; Decreased ROM;  Decreased strength; Decreased safe awareness; Decreased cognition; Decreased endurance; Decreased high-level IADLs; Decreased balance; Decreased coordination; Decreased vision/visual deficit   Treatment Diagnosis B CVAs/R hemiparesis   Timed Code Treatment Minutes 75 Minutes

## 2021-11-16 NOTE — PLAN OF CARE
Problem: Falls - Risk of:  Goal: Will remain free from falls  Description: Will remain free from falls  11/16/2021 0942 by Shirin Robledo RN  Outcome: Ongoing  11/16/2021 0042 by Payton Page RN  Outcome: Ongoing   Up with 1 assist with walker

## 2021-11-16 NOTE — PROGRESS NOTES
[facility]  INPATIENT SPEECH THERAPY  [facility] 8 REHAB UNIT  TIME   Time In: 1100  Time Out: 1200  Minutes: 60  Total Treatment Time: 60    [x]Daily Note  []Progress Note    Date: 2021  Patient Name: Aneesh Herrera        MRN: [id number]    Account #: [de-identified]  : 1933  (80 y.o.)  Gender: female   Primary Provider: Jeannette Laughlin MD  Swallowing Status/Diet:Soft and bite sized diet with pureed meats, thin liquids        PATIENT DIAGNOSIS(ES):    Diagnosis: Acute ischemic stroke, L MCA infarct, R sided involvement      Additional Pertinent Hx: hx CVAs, stress incontinence     RESTRICTIONS/PRECAUTIONS:    Restrictions/Precautions  Restrictions/Precautions: Fall Risk, Weight Bearing, Swallowing Modified Diet  Required Braces or Orthoses?: No  Position Activity Restriction  Other position/activity restrictions: Dysphagia-soft and bite sized    Subjective:   Pt upright in recliner chair. She was fatigued this date and required consistent cues to maintain alertness. Objective:   SLP read aloud various short-stories from the newspaper. Pt would recall one word on 25% of opportunities. Yes/no questions presented. Pt answered 75% of y/n questions regarding family members names, interests of topics presented. Rehab dining tray presented. Pt tolerated bites of puree meat texture with extra gravy with 1x swallow response. Alternation of liquids and solids were presented every two bites. SLP encouraged pt to utilize effortful swallow; this was evidenced 1x with PO intake. SLP monitored buccal pads and minimal to no residue observed with puree diet texture. Oral care routine completed and pt required total assistance.       Recommended Diet and Intervention  Diet Solids Recommendation: Dysphagia Soft and Bite-Sized with pureed meats (Dysphagia III)  Liquid Consistency Recommendation: Thin liquids  Recommended Form of Meds: Crushed in puree as able  Therapeutic Interventions: Mendelsohn; Diet tolerance monitoring; Oral care; Therapeutic PO trials with SLP; Oral motor exercises; Tongue base strengthening; Patient/Family education; Effortful swallow; Pharyngeal exercises; Yanet     Compensatory Swallowing Strategies  Compensatory Swallowing Strategies: Upright as possible for all oral intake; Alternate solids and liquids; Remain upright for 30-45 minutes after meals; Check for pocketing of food on the Right; Swallow 2 times per bite/sip; Small bites/sips; External pacing        SHORT TERM GOAL #1:  Goal 1: The patient will follow two step commands with minimal cueing and 100% accuracy. SHORT TERM GOAL #2:  Goal 2: The patient will complete naming tasks (picture, object) with minimal cueing and 100% accuracy. SHORT TERM GOAL #3:  Goal 3: The patient will complete concrete divergent naming tasks with minimal cueing and 100% accuracy. SHORT TERM GOAL #4:  Goal 4: The patient will complete oral motor exercises to improve lingual and labial strength and range of motion as well as improve speech intelligibility. SHORT TERM GOAL #5:  Goal 5: The patient will utilize dysarthria strategies (slow rate, increased volume, over exaggeration of words, increased breath support) during structured tasks and during conversations with minimal cueing. Swallowing Short Term Goals  Short-term Goals  Goal 1: The patient will complete the yanet (tongue hold) technique to improve base of tongue retraction and base of tongue to pharyngeal wall approximation with 90% accuracy and minimal verbal cues. Goal 2: The patient will complete the effortful swallow maneuver to improve hyolaryngeal elevation, tongue base retraction, and vocal fold closure with 90% accuracy and minimal verbal cues. Goal 3: Staff/caregivers will complete daily oral hygiene to prevent aspiration of bacteria laden secretions.   Goal 4: The patient will tolerate a dysphagia soft and bite sized diet with nectar (mildly thick) liquids with minimal overt s/s of aspiration. Comprehension: 3 - Patient understands basic needs 50-74% of the time  Expression: 3 - Expresses basic ideas/needs 50-74% of the time  Social Interaction: 4 - Patient appropriate 75-90%+ of the time  Problem Solving: 3 - Patient solves simple/routine tasks 50%-74%  Memory: 3 - Patient remembers 50%-74% of the time    ASSESSMENT:  Assessment: [x]Progressing towards goals          []Not Progressing towards goals    Patient Tolerance of Treatment:   []Tolerated well [x]Tolerated fair [x]Required rest breaks []Fatigued    Education:  Learner:  [x]Patient          []Significant Other          []Other  Education provided regarding:  [x]Goals and POC   [x]Diet and swallowing precautions    []Home Exercise Program  []Progress and/or discharge information  Method of Education:  [x]Discussion          []Demonstration          []Handout          []Other  Evaluation of Education:   []Verbalized understanding   []Demonstrates without assistance  [x]Demonstrates with assistance  []Needs further instruction     []No evidence of learning                  [x]No family present      Plan: [x]Continue with current plan of care    []Modify current plan of care as follows:    []Discharge patient    Discharge Location:    Services/Supervision Recommended:      [x]Patient continues to require treatment by a licensed therapist to address functional deficits as outlined in the established plan of care.     Electronically signed by ULISSES Mccarthy on 11/16/21 at 12:06 PM CST

## 2021-11-16 NOTE — PROGRESS NOTES
11/16/21 1300   Restrictions/Precautions   Restrictions/Precautions Fall Risk   Required Braces or Orthoses? No   Lower Extremity Weight Bearing Restrictions   Right Lower Extremity Weight Bearing Weight Bearing As Tolerated   Left Lower Extremity Weight Bearing Weight Bearing As Tolerated   Pain Screening   Patient Currently in Pain No   Pain Assessment   Pain Assessment 0-10   Nguyen-Baker Pain Rating 0   Bed Mobility   Bridging Supervision   Rolling Stand by assistance   Supine to Sit Stand by assistance   Sit to Supine Stand by assistance   Transfers   Sit to Stand Minimal Assistance   Stand to sit Minimal Assistance   Bed to Chair Minimal assistance   Stand Pivot Transfers Minimal Assistance   Squat Pivot Transfers Minimal Assistance   Comment pt requires max cueing in order to bring hands in correct spots to tf safely. pt performes tfs with or w/o walker to the right better d/t right sided lean. tf to left required more cues    Ambulation   Ambulation? Yes   WB Status WBAT   Ambulation 1   Surface level tile   Device Rolling Walker   Assistance Minimal assistance   Quality of Gait cues on direction and assit with walker advancement intermittently, IR of right leg with gait and very narrow MIKE, unsteady    Distance 15, 15ft   Comments ambulation, turn and sit in chair with armrest, pt had difficulty locating chair required a lot of cueing. pt presents with strong  on walker with left hand >right     Balance   Posture Fair   Sitting - Static Fair   Sitting - Dynamic Poor; +   Standing - Static Poor   Standing - Dynamic Poor; -   Exercises   Knee Long Arc Quad x10   Other exercises   Other exercises? Yes   Other exercises 1 marching in place 30s   Conditions Requiring Skilled Therapeutic Intervention   Assessment decreased safety awareness with tfs, needs hands to be lead to surface in order to complete tf safely.  attmept to tf to/from bed to the left with increased difficulty to turn to left d/t right sided lean. difficulty to advance gait distance d/t decreased safety awarreness and poor gait posture

## 2021-11-16 NOTE — PROGRESS NOTES
Occupational Therapy     11/13/21 1100   Restrictions/Precautions   Restrictions/Precautions Fall Risk   Position Activity Restriction   Other position/activity restrictions Dysphagia-soft and bite sized, NTL, water in between meals   General   Additional Pertinent Hx CVA; palliative care pt   ADL   Feeding Maximum assistance   Grooming Setup  (for oral care)   UE Dressing Moderate assistance  (assist over head and to find sleeve )   LE Dressing Dependent/Total   Balance   Standing Balance Minimal assistance   Bed mobility   Supine to Sit Minimal assistance   Transfers   Sit to stand Minimal assistance   Stand to sit Minimal assistance   Wheelchair Bed Transfers   Wheelchair/Bed - Technique Stand pivot   Equipment Used Wheelchair; Bed   Level of Asssistance Minimal assistance   Assessment   Performance deficits / Impairments Decreased functional mobility ; Decreased ADL status; Decreased ROM;  Decreased strength; Decreased safe awareness; Decreased cognition; Decreased endurance; Decreased high-level IADLs; Decreased balance; Decreased coordination; Decreased vision/visual deficit   Timed Code Treatment Minutes 60 Minutes

## 2021-11-16 NOTE — PROGRESS NOTES
Occupational Therapy     11/16/21 1000   Restrictions/Precautions   Restrictions/Precautions Fall Risk   Position Activity Restriction   Other position/activity restrictions Dysphagia-soft and bite sized, NTL, water in between meals   General   Additional Pertinent Hx CVA; palliative care pt   Diagnosis Acute ischemic B CVA; aphasia; AMS; hemiplegia affecting dominant side   Subjective   Subjective Pt, clothing, and bedding wet d/t poor positioning of purewick external catheter. Pt assisted with cleanup. ADL   Additional Comments see CARE scores   Balance   Standing Balance Minimal assistance   Transfers   Sit to stand Minimal assistance   Stand to sit Minimal assistance   Toilet Transfers   Toilet Transfers Comments completed with [place] d/t poor endurance   Assessment   Performance deficits / Impairments Decreased functional mobility ; Decreased ADL status; Decreased ROM; Decreased strength; Decreased safe awareness; Decreased cognition; Decreased endurance; Decreased high-level IADLs; Decreased balance; Decreased coordination; Decreased vision/visual deficit   Assessment Pt displays very low endurance. Overall max A for ADLs. Learning of adaptive techs impeded by visual deficits. perceptual deficits, apraxia, and low activity tolerance.     Treatment Diagnosis B CVAs/R hemiparesis   Timed Code Treatment Minutes 60 Minutes   Activity Tolerance   Activity Tolerance Patient Tolerated treatment well   Safety Devices   Safety Devices in place Yes     11/16/21 1000    Oral Hygiene   Assistance Needed Supervision or touching assistance   CARE Score [id number] Needed Dependent   CARE Score 1   Shower/Bathe Self   Assistance Needed Substantial/maximal assistance   CARE Score 2   Upper Body Dressing   Assistance Needed Partial/moderate assistance   CARE Score 3   Lower Body Dressing   Assistance Needed Substantial/maximal assistance   CARE Score 2   Putting [address] Needed Dependent   CARE Score 1

## 2021-11-17 LAB
ALBUMIN SERPL-MCNC: 3.8 G/DL (ref 3.5–5.2)
ALP BLD-CCNC: 98 U/L (ref 35–104)
ALT SERPL-CCNC: 23 U/L (ref 5–33)
ANION GAP SERPL CALCULATED.3IONS-SCNC: 11 MMOL/L (ref 7–19)
AST SERPL-CCNC: 27 U/L (ref 5–32)
BASOPHILS ABSOLUTE: 0 K/UL (ref 0–0.2)
BASOPHILS RELATIVE PERCENT: 0.7 % (ref 0–1)
BILIRUB SERPL-MCNC: 0.8 MG/DL (ref 0.2–1.2)
BUN BLDV-MCNC: 26 MG/DL (ref 8–23)
CALCIUM SERPL-MCNC: 10.3 MG/DL (ref 8.8–10.2)
CHLORIDE BLD-SCNC: 107 MMOL/L (ref 98–111)
CO2: 23 MMOL/L (ref 22–29)
CREAT SERPL-MCNC: 0.6 MG/DL (ref 0.5–0.9)
EOSINOPHILS ABSOLUTE: 0.2 K/UL (ref 0–0.6)
EOSINOPHILS RELATIVE PERCENT: 2.5 % (ref 0–5)
GFR AFRICAN AMERICAN: >59
GFR NON-AFRICAN AMERICAN: >60
GLUCOSE BLD-MCNC: 76 MG/DL (ref 70–99)
GLUCOSE BLD-MCNC: 93 MG/DL (ref 70–99)
GLUCOSE BLD-MCNC: 98 MG/DL (ref 74–109)
HCT VFR BLD CALC: 45.5 % (ref 37–47)
HEMOGLOBIN: 14.2 G/DL (ref 12–16)
IMMATURE GRANULOCYTES #: 0 K/UL
INR BLD: 2.3 (ref 0.88–1.18)
LYMPHOCYTES ABSOLUTE: 1.5 K/UL (ref 1.1–4.5)
LYMPHOCYTES RELATIVE PERCENT: 25 % (ref 20–40)
MCH RBC QN AUTO: 29.5 PG (ref 27–31)
MCHC RBC AUTO-ENTMCNC: 31.2 G/DL (ref 33–37)
MCV RBC AUTO: 94.4 FL (ref 81–99)
MONOCYTES ABSOLUTE: 0.4 K/UL (ref 0–0.9)
MONOCYTES RELATIVE PERCENT: 7 % (ref 0–10)
NEUTROPHILS ABSOLUTE: 3.8 K/UL (ref 1.5–7.5)
NEUTROPHILS RELATIVE PERCENT: 64.6 % (ref 50–65)
PDW BLD-RTO: 14.3 % (ref 11.5–14.5)
PERFORMED ON: NORMAL
PERFORMED ON: NORMAL
PLATELET # BLD: 157 K/UL (ref 130–400)
PMV BLD AUTO: 10.3 FL (ref 9.4–12.3)
POTASSIUM SERPL-SCNC: 4.7 MMOL/L (ref 3.5–5)
PROTHROMBIN TIME: 25.1 SEC (ref 12–14.6)
RBC # BLD: 4.82 M/UL (ref 4.2–5.4)
SODIUM BLD-SCNC: 141 MMOL/L (ref 136–145)
TOTAL PROTEIN: 5.5 G/DL (ref 6.6–8.7)
WBC # BLD: 5.9 K/UL (ref 4.8–10.8)

## 2021-11-17 PROCEDURE — 97116 GAIT TRAINING THERAPY: CPT

## 2021-11-17 PROCEDURE — 6370000000 HC RX 637 (ALT 250 FOR IP): Performed by: NURSE PRACTITIONER

## 2021-11-17 PROCEDURE — 97535 SELF CARE MNGMENT TRAINING: CPT

## 2021-11-17 PROCEDURE — 97130 THER IVNTJ EA ADDL 15 MIN: CPT

## 2021-11-17 PROCEDURE — 82947 ASSAY GLUCOSE BLOOD QUANT: CPT

## 2021-11-17 PROCEDURE — 36415 COLL VENOUS BLD VENIPUNCTURE: CPT

## 2021-11-17 PROCEDURE — 92526 ORAL FUNCTION THERAPY: CPT

## 2021-11-17 PROCEDURE — 97129 THER IVNTJ 1ST 15 MIN: CPT

## 2021-11-17 PROCEDURE — 2580000003 HC RX 258: Performed by: PSYCHIATRY & NEUROLOGY

## 2021-11-17 PROCEDURE — 1180000000 HC REHAB R&B

## 2021-11-17 PROCEDURE — 99233 SBSQ HOSP IP/OBS HIGH 50: CPT | Performed by: PSYCHIATRY & NEUROLOGY

## 2021-11-17 PROCEDURE — 6370000000 HC RX 637 (ALT 250 FOR IP): Performed by: PSYCHIATRY & NEUROLOGY

## 2021-11-17 PROCEDURE — 80053 COMPREHEN METABOLIC PANEL: CPT

## 2021-11-17 PROCEDURE — 85610 PROTHROMBIN TIME: CPT

## 2021-11-17 PROCEDURE — 97110 THERAPEUTIC EXERCISES: CPT

## 2021-11-17 PROCEDURE — 85025 COMPLETE CBC W/AUTO DIFF WBC: CPT

## 2021-11-17 RX ORDER — WARFARIN SODIUM 5 MG/1
5 TABLET ORAL
Status: COMPLETED | OUTPATIENT
Start: 2021-11-17 | End: 2021-11-17

## 2021-11-17 RX ADMIN — WARFARIN SODIUM 5 MG: 5 TABLET ORAL at 17:30

## 2021-11-17 RX ADMIN — Medication 10 ML: at 09:04

## 2021-11-17 RX ADMIN — ATORVASTATIN CALCIUM 20 MG: 20 TABLET, FILM COATED ORAL at 08:08

## 2021-11-17 RX ADMIN — SENNOSIDES AND DOCUSATE SODIUM 1 TABLET: 50; 8.6 TABLET ORAL at 08:08

## 2021-11-17 RX ADMIN — CETIRIZINE HYDROCHLORIDE 5 MG: 10 TABLET, FILM COATED ORAL at 08:08

## 2021-11-17 RX ADMIN — FLUTICASONE PROPIONATE 1 SPRAY: 50 SPRAY, METERED NASAL at 08:08

## 2021-11-17 RX ADMIN — DOCUSATE SODIUM 100 MG: 100 CAPSULE, LIQUID FILLED ORAL at 08:08

## 2021-11-17 RX ADMIN — SENNOSIDES AND DOCUSATE SODIUM 1 TABLET: 50; 8.6 TABLET ORAL at 20:48

## 2021-11-17 RX ADMIN — Medication 10 ML: at 20:48

## 2021-11-17 ASSESSMENT — PAIN SCALES - WONG BAKER: WONGBAKER_NUMERICALRESPONSE: 0

## 2021-11-17 ASSESSMENT — PAIN SCALES - GENERAL: PAINLEVEL_OUTOF10: 0

## 2021-11-17 NOTE — PROGRESS NOTES
Patient:   Prisca Comer  MR#:    [id number]   Room:    [facility]/826-01   YOB: 1933  Date of Progress Note: 11/17/2021  Time of Note                           8:24 AM  Consulting Physician:   Rey Reeder M.D. Attending Physician:  Rey Reeder MD       CHIEF COMPLAINT: Right-sided weakness with dysphagia     subjective  This 80 y.o. female  with history of 3 strokes since March 2021, stress incontinence, arthritis, blood clots and hyperlipidemia. She presented to [facility] on 11/6/21 after being found on the floor at home by her son. She was confused, had abnormal speech and right sided weakness. CT of head was negative for acute changes. CTA was also negative. She was admitted to the Hospitalist service with consult for neurology. MRI done revealed an acute lacunar infarct within the left thalamus and multiple Multifocal old ischemic change in both cerebral hemispheres and within the right side of the cerebellum. She had been placed on Plavix and statin after her last stroke. She was seen by Dr. Niya Nguyen and not felt to be a candidate for TPA d/t being out of the time frame and no evidence of thrombus on CTA. Dr. Niya Nguyen felt strokes were most likely cardioembolic. Plavix was discontinued and she was started on Coumadin with Lovenox for bridging. Echo done showed Bubble study with evidence of small right-to-left shunting with Valsalva consistent with possible ASD/PFO. Cardiology was consulted for possible PFO closure. Patient/family do not wish to have any invasive measure. Prefer to continue with medical management with anticoagulation. Patient is a DNR. She was evaluated by SPT for swallow and aphasia. She was initially made NPO but has now been started on a mechanical soft diet with nectar thick liquids. She continues to have right sided weakness and is participating in both PT/OT.   No complaint this am  REVIEW OF SYSTEMS:  Constitutional: No fevers No chills  Neck:No stiffness  Respiratory: No shortness of breath  Cardiovascular: No chest pain No palpitations  Gastrointestinal: No abdominal pain    Genitourinary: No Dysuria  Neurological: No headache, no confusion      PHYSICAL EXAM:  BP (!) 141/75   Pulse 54   Temp 96.4 °F (35.8 °C)   Resp 18   Ht 5' 6\" (1.676 m)   Wt 138 lb 12.8 oz (63 kg)   SpO2 97%   BMI 22.40 kg/m²     Constitutional: she appears well-developed and well-nourished. Eyes  conjunctiva normal.  Pupils react to light  Ear, nose, throat -hearing intact to voice. No scars, masses, or lesions over external nose or ears, no atrophy of tongue  Neck-symmetric, no masses noted, no jugular vein distension  Respiration- chest wall appears symmetric, good expansion,   normal effort without use of accessory muscles  Cardiovascular- RRR  Musculoskeletal  no significant wasting of muscles noted, no bony deformities, gait no gross ataxia  Extremities-no clubbing, cyanosis or edema  Skin  warm, dry, and intact. No rash, erythema, or pallor. Psychiatric  mood, affect, and behavior appear normal.      Neurology  NEUROLOGICAL EXAM:  Awake, alert, fluent oriented to [facility].  Attention and concentration appear appropriate  Speech shows dysarthria       Cranial Nerve Exam   CN II- Visual fields show an apparent left homonymous hemianopsia  CN III, IV,VI-EOMI, No nystagmus, conjugate eye movements, no ptosis  CN VII-right facial droop  CN VIII-Hearing intact        Motor Exam  Relatively normal throughout with significant atrophy in the hands       Tremors- no tremors in hands or head noted    Coordination  Clumsiness in the bilateral upper extremities           Nursing/pcp notes, imaging,labs and vitals reviewed.      PT,OT and/or speech notes reviewed    Lab Results   Component Value Date    WBC 5.9 11/17/2021    HGB 14.2 11/17/2021    HCT 45.5 11/17/2021    MCV 94.4 11/17/2021     11/17/2021     Lab Results   Component Value Date     11/17/2021    K 4.7 11/17/2021     11/17/2021    CO2 23 11/17/2021    BUN 26 (H) 11/17/2021    CREATININE 0.6 11/17/2021    GLUCOSE 98 11/17/2021    CALCIUM 10.3 (H) 11/17/2021    PROT 5.5 (L) 11/17/2021    LABALBU 3.8 11/17/2021    BILITOT 0.8 11/17/2021    ALKPHOS 98 11/17/2021    AST 27 11/17/2021    ALT 23 11/17/2021    LABGLOM >60 11/17/2021    GFRAA >59 11/17/2021     Lab Results   Component Value Date    INR 2.30 (H) 11/17/2021    INR 2.35 (H) 11/16/2021    INR 2.58 (H) 11/15/2021   No results found for: PHENYTOIN, ESR, CRP      PHYSICAL THERAPY  STRENGTH  Strength RLE  Comment: grossly assessed 3+/5  Strength LLE  Comment: grossly assessed 4/5  ROM  AROM RLE (degrees)  RLE AROM: WFL  AROM LLE (degrees)  LLE AROM : WFL  BED MOBILITY  Bed Mobility  Bridging: Supervision  Rolling: Stand by assistance  Supine to Sit: Stand by assistance, Supervision  Sit to Supine: Stand by assistance, Supervision  Scooting: Stand by assistance, Supervision  Comment: requires cues on what to do but performs bed mobility well with supervision.  triplanar from right side  TRANSFERS  Transfers  Sit to Stand: Minimal Assistance  Stand to sit: Minimal Assistance  Bed to Chair: Moderate assistance  Stand Pivot Transfers: Minimal Assistance  Squat Pivot Transfers: Contact guard assistance  Comment: pt takes a few steps to turn to sit in [facility] using FWW needs walker management, pt able to perform squat pivot well to the right to/from bed    WHEELCHAIR PROPULSION  AMBULATION  Ambulation 1  Surface: level tile  Device: Rolling Walker  Other Apparatus: Wheelchair follow  Assistance: Minimal assistance  Quality of Gait: FWW management, reciprical, short steps, IR and in-toeing of right leg  Gait Deviations: Decreased step length, Decreased step height, Shuffles, Slow Cyn  Distance: 15ft   Comments: pt fatigues very quickly and requires max cues on posture and intrmittent management of FWW   STAIRS  GOALS:  Short term goals  Time Frame for Short term goals: marching in place Short term goal 1: bed mobility Mod I   Short term goal 2: transfers with AD SBA  Short term goal 3: ambulation 50ft with AD Joshua  Short term goal 4: navigate 4 step with handrails Joshua  Short term goal 5: WC mobility 75ft CGA     Long term goals  Time Frame for Long term goals : 3-4 weeks  Long term goal 1: bed mobility Indp  Long term goal 2: transfers with AD Mod I  Long term goal 3: ambulation 150 ft with AD SBA  Long term goal 4: navigate 4 step with hand rail SBA  Long term goal 5: WC mobility 150 ft SBA     ASSESSMENT:  Assessment: pt understands basic commands with activity. decreased right lean today with ambulation and tfs, tolerates very short distances d/t fatigue will benefit more with functional moblility training for tfs and ambulation as tolerated      SPEECH THERAPY  Objective:   SLP read aloud various short-stories from the newspaper. Pt would recall one word on 25% of opportunities. Yes/no questions presented. Pt answered 75% of y/n questions regarding family members names, interests of topics presented.     Rehab dining tray presented. Pt tolerated bites of puree meat texture with extra gravy with 1x swallow response. Alternation of liquids and solids were presented every two bites. SLP encouraged pt to utilize effortful swallow; this was evidenced 1x with PO intake. SLP monitored buccal pads and minimal to no residue observed with puree diet texture. Oral care routine completed and pt required total assistance.        Recommended Diet and Intervention  Diet Solids Recommendation: Dysphagia Soft and Bite-Sized with pureed meats (Dysphagia III)  Liquid Consistency Recommendation: Thin liquids  Recommended Form of Meds: Crushed in puree as able  Therapeutic Interventions: Mendelsohn; Diet tolerance monitoring; Oral care; Therapeutic PO trials with SLP; Oral motor exercises; Tongue base strengthening; Patient/Family education; Effortful swallow; Pharyngeal exercises;  Yanet     Compensatory Swallowing Strategies  Compensatory Swallowing Strategies: Upright as possible for all oral intake; Alternate solids and liquids; Remain upright for 30-45 minutes after meals; Check for pocketing of food on the Right; Swallow 2 times per bite/sip; Small bites/sips; External pacing     OCCUPATIONAL THERAPY     CURRENT IRF-CORTES SCORES  Eating: CARE Score: 3  Comment: Min A, SBA at times, impaired vision impedes with independence     Oral Hygiene: CARE Score: 4         Toileting: CARE Score: 1         Shower/Bathe: CARE Score: 2           Upper Body Dressing: CARE Score: 3          Lower Body Dressing: CARE Score: 2           Footwear: CARE Score: 1           Toilet Transfers: CARE Score: 2           Picking Up Object:  CARE Score: 1           UE Functioning:  B sh deficits  Left inattention, apraxia  R distally WFLs     Pain Assessment:     STGs:  Short term goals  Time Frame for Short term goals: 1 week  Short term goal 1: Mod A with clothing management/hygiene for toileting. Short term goal 2: Mod A with LB dressing, AE PRN. Short term goal 3: Mod A with donning/doffing socks and shoes, AE PRN. Short term goal 4: Min A with toilet transfers. Short term goal 5: Min A with bathing hygiene.     LTGs:  Long term goals  Time Frame for Long term goals : 3-4 weeks  Long term goal 1: CGA with clothing management/hygiene for toileting. Long term goal 2: Min A with LB dressing, AE PRN. Long term goal 3: Min A with donning/doffing socks and shoes, AE PRN. Long term goal 4: CGA with toilet transfers. Long term goal 5: Supervision with bathing hygiene.   Long term goals 6: Patient/family will verbalize DME needs.     Assessment:  Performance deficits / Impairments: Decreased functional mobility , Decreased ADL status, Decreased ROM, Decreased strength, Decreased safe awareness, Decreased cognition, Decreased endurance, Decreased high-level IADLs, Decreased balance, Decreased coordination, Decreased vision/visual deficit                   NUTRITION  Current Wt: Weight: 135 lb 4.8 oz (61.4 kg) / Body mass index is 21.84 kg/m². Admission Wt: Admission Body Weight: 126 lb (57.2 kg)  Oral Diet Orders: Soft/bite-sized  Oral Nutrition Supplement (ONS) Orders: Ensure Enlive/Plus BID     Po intake remains >50% of most meals with assistance. Wt gain of 9 lbs noted. Continue current POC. Please see nutrition note for details.            RECORD REVIEW: Previous medical records, medications were reviewed at today's visit    IMPRESSION:   1. 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 Suspected to be cardioembolic. Plavix changed to Coumadin. Lovenox discontinued due to therapeutic INR. PT/OT/SLP  2. DVT prophylaxisCoumadin  3. Hyperlipidemiacontinue statin  4. GIbowel regimen  5. Bacteruria with unremarkable u/a. Suspect contaminant. No Rx for now. Continue current treatment     Staffing this date , mutlidisciplinary with entire team with complex decision making and planning for discharge  ELOS:11/28. Suspect snf.

## 2021-11-17 NOTE — PROGRESS NOTES
11/17/21 1300   Restrictions/Precautions   Restrictions/Precautions Fall Risk   Required Braces or Orthoses? No   Lower Extremity Weight Bearing Restrictions   Right Lower Extremity Weight Bearing Weight Bearing As Tolerated   Left Lower Extremity Weight Bearing Weight Bearing As Tolerated   Pain Screening   Patient Currently in Pain Denies   Transfers   Sit to Stand Moderate Assistance   Stand to sit Moderate Assistance   Stand Pivot Transfers Moderate Assistance   Comment very difficult attempt at squat pivot to WC to the right. pt presents with significant right lean and gribs WC armrest with strong . pts thrid attempt using FWW to stand pivot to R [address] better stand but strong right lean and pt does not turn hips to square up with chair prior to sit. Ambulation 1   Surface level tile   Device Rolling Walker   Other Apparatus Wheelchair follow   Assistance Moderate assistance   Quality of Gait significant right lean and flexed hip posture despite cues on posture. RLE IR throughout gait sequence, decreased safety awareness with FWW    Distance 15ft   Ambulation 2   Surface - 2 level tile   Device 2 Rollator   Other Apparatus 2 Wheelchair follow   Assistance 2 Moderate assistance   Quality of Gait 2 improved step quality, RLE IR and in toeing, improved stability with rollator but continues to have strong right lean and flexed forward posture    Distance 50ft   Comments gradual decreased step length with fatigue    Conditions Requiring Skilled Therapeutic Intervention   Assessment improved ambulation with rollator, tolerated distance well but continues to present with significant right sided lean today.  will continue to ambulate and initiate tf training with rollator next visit

## 2021-11-17 NOTE — PROGRESS NOTES
Clinical Pharmacy Note    Warfarin consult follow-up    Recent Labs     11/17/21  0403   INR 2.30*     Recent Labs     11/15/21  0444 11/16/21  0443 11/17/21  0403   HGB 14.2 13.5 14.2   HCT 45.9 42.9 45.5    156 157       Significant Drug-Drug Interactions:  New warfarin drug-drug interactions: none  Discontinued drug-drug interactions: none    Date INR Warfarin Dose   11/06/21 1.01 ---   11/07/21 --- 5 mg   11/08/21 1.04  5 mg    11/09/21  1.30 5 mg    11/10/21 1.74 5 mg    11/11/21   2.44 2.5 mg     11/12/21 2.02 5 mg    11/13/21   1.97 5 mg   11/14/21  2.44  4 mg     11/15/21  2.58 4 mg     11/16/21 2.35  5 mg     11/17/21 2.30  5 mg      Notes: Will give warfarin 5 mg once this evening. Daily PT/INR until stable within therapeutic range.      Electronically signed by Polly Mcburney, [facility] on 11/17/2021 at 1:47 PM

## 2021-11-17 NOTE — PROGRESS NOTES
[facility]  INPATIENT SPEECH THERAPY  [facility] 8 REHAB UNIT    [x]Daily Note  []Progress Note    Date: 2021  Patient Name: Deidra Cortes        MRN: [id number]    Account #: [de-identified]  : 1933  (80 y.o.)  Gender: female   Primary Provider: Darshana Azevedo MD  Swallowing Status/Diet: Soft and bite sized diet, thin liquids    PATIENT DIAGNOSIS(ES):    Diagnosis: Acute ischemic stroke, L MCA infarct, R sided involvement      Additional Pertinent Hx: hx CVAs, stress incontinence     RESTRICTIONS/PRECAUTIONS:    Restrictions/Precautions  Restrictions/Precautions: Fall Risk, Weight Bearing, Swallowing Modified Diet  Required Braces or Orthoses?: No  Position Activity Restriction  Other position/activity restrictions: Dysphagia-soft and bite sized           Subjective: Today she was upright in her recliner. She was very drowsy and fell asleep several times during today's session. Objective:  She was able to feed herself lunch today. She did require cues to take additional sips of liquid to help clear the oral cavity of residue. Some labial residue was observed this date. No overt s/s of aspiration observed with thin liquids or soft foods. Spatial orientation questions and temporal orientation questions were at 0% this date. Perseverations were noted throughout the session. A task for naming objects was completed and she scored at 70% today without cueing. She continues to exhibit inattention and visual deficits. She continues to require verbal and tactile cues to locate items on her tray table. Reminders were provided for her to utilize her call light. This was placed on her tray table. SHORT TERM GOAL #1:  Goal 1: The patient will follow two step commands with minimal cueing and 100% accuracy. SHORT TERM GOAL #2:  Goal 2: The patient will complete naming tasks (picture, object) with minimal cueing and 100% accuracy.     SHORT TERM GOAL #3:  Goal 3: The patient will complete concrete divergent naming tasks with minimal cueing and 100% accuracy. SHORT TERM GOAL #4:  Goal 4: The patient will complete oral motor exercises to improve lingual and labial strength and range of motion as well as improve speech intelligibility. SHORT TERM GOAL #5:  Goal 5: The patient will utilize dysarthria strategies (slow rate, increased volume, over exaggeration of words, increased breath support) during structured tasks and during conversations with minimal cueing. Swallowing Short Term Goals  Short-term Goals  Goal 1: The patient will complete the soco (tongue hold) technique to improve base of tongue retraction and base of tongue to pharyngeal wall approximation with 90% accuracy and minimal verbal cues. Goal 2: The patient will complete the effortful swallow maneuver to improve hyolaryngeal elevation, tongue base retraction, and vocal fold closure with 90% accuracy and minimal verbal cues. Goal 3: Staff/caregivers will complete daily oral hygiene to prevent aspiration of bacteria laden secretions. Goal 4: The patient will tolerate a dysphagia soft and bite sized diet with nectar (mildly thick) liquids with minimal overt s/s of aspiration.     Comprehension: 3 - Patient understands basic needs 50-74% of the time  Expression: 3 - Expresses basic ideas/needs 50-74% of the time  Social Interaction: 4 - Patient appropriate 75-90%+ of the time  Problem Solving: 3 - Patient solves simple/routine tasks 50%-74%  Memory: 3 - Patient remembers 50%-74% of the time    ASSESSMENT:  Assessment: [x]Progressing towards goals          []Not Progressing towards goals    Patient Tolerance of Treatment:   [x]Tolerated well []Tolerated fair []Required rest breaks []Fatigued    Education:  Learner:  [x]Patient          []Significant Other          []Other  Education provided regarding:  [x]Goals and POC   []Diet and swallowing precautions    []Home Exercise Program  []Progress and/or discharge information  Method of Education:  [x]Discussion          []Demonstration          []Handout          []Other  Evaluation of Education:   [x]Verbalized understanding   []Demonstrates without assistance  []Demonstrates with assistance  []Needs further instruction     []No evidence of learning                  []No family present      Plan: [x]Continue with current plan of care    []Modify current plan of care as follows:    []Discharge patient    Discharge Location:    Services/Supervision Recommended:      [x]Patient continues to require treatment by a licensed therapist to address functional deficits as outlined in the established plan of care.             Electronically Signed By:  Enedelia Goodman  11/17/2021,3:20 PM.

## 2021-11-17 NOTE — PROGRESS NOTES
Occupational Therapy  Facility/Department: [facility] 8 REHAB UNIT  Daily Treatment Note  NAME: Edgar Hatfield  : 1933  MRN: [id number]    Date of Service: 2021    Discharge Recommendations:  Continue to assess pending progress       Assessment   Performance deficits / Impairments: Decreased functional mobility ; Decreased ADL status; Decreased ROM; Decreased strength; Decreased safe awareness; Decreased cognition; Decreased endurance; Decreased high-level IADLs; Decreased balance; Decreased coordination; Decreased vision/visual deficit  Activity Tolerance  Activity Tolerance: Patient Tolerated treatment well  Safety Devices  Safety Devices in place: Yes  Type of devices: Left in chair; Call light within reach; Chair alarm in place         Patient Diagnosis(es): There were no encounter diagnoses. has a past medical history of Arthritis, CAD (coronary artery disease), Hx of blood clots, Hyperlipidemia, Palliative care patient, and Stroke ([facility] [address] has a past surgical history that includes Leg Surgery. Restrictions  Restrictions/Precautions  Restrictions/Precautions: Fall Risk  Required Braces or Orthoses?: No  Lower Extremity Weight Bearing Restrictions  Right Lower Extremity Weight Bearing: Weight Bearing As Tolerated  Left Lower Extremity Weight Bearing: Weight Bearing As Tolerated  Position Activity Restriction  Other position/activity restrictions: Dysphagia-soft and bite sized, NTL, water in between meals  Subjective   General  Chart Reviewed: Yes  Patient assessed for rehabilitation services?: Yes  Additional Pertinent Hx: CVA; palliative care pt  Diagnosis: Acute ischemic B CVA; aphasia; AMS; hemiplegia affecting dominant side  Subjective  Subjective: Pt, clothing, and bedding wet d/t poor positioning of purewick external catheter. Pt assisted with cleanup.     Pain Assessment  Pain Assessment: Faces  Nguyen-Benoit Pain Rating: No hurt  Pre Treatment Pain Screening  Pain at present: 0  Scale Used: Faces  Vital Signs  Patient Currently in Pain: No     Objective       Balance  Sitting Balance: Contact guard assistance (CGA-SBA with mary use of bedrails)  Standing Balance: Minimal assistance  Standing Balance  Time: Brief periods  Activity: At RW during ADLs  Bed mobility  Rolling to Right: Stand by assistance  Supine to Sit: Minimal assistance  Scooting: Contact guard assistance  Transfers  Stand Step Transfers: Minimal assistance  Sit to stand: Contact guard assistance  Stand to sit: Contact guard assistance  Transfer Comments: Pt requires max vc and some physical cueing for sequencing all parts of transfer       11/17/21 [address] Needed Dependent   Comment Pt is incontinent of bladder   CARE Score 1   Shower/Bathe Self   Assistance Needed Substantial/maximal assistance   Comment Spongebath EOB, max cues   CARE Score 2   Upper Body Dressing   Assistance Needed Partial/moderate assistance   CARE Score 3   Lower Body Dressing   Assistance Needed Substantial/maximal assistance   CARE Score 2   Putting On/Taking Off Footwear   Assistance Needed Dependent   CARE Score 1      Plan   Plan  Specific instructions for Next Treatment: BITS  Current Treatment Recommendations: Strengthening, Pain Management, Positioning, ROM, Safety Education & Training, Balance Training, Patient/Caregiver Education & Training, Self-Care / ADL, Cognitive/Perceptual Training, Functional Mobility Training, Neuromuscular Re-education, Equipment Evaluation, Education, & procurement, Home Management Training, Endurance Training, Cognitive Reorientation    Goals  Short term goals  Time Frame for Short term goals: 1 week  Short term goal 1: Mod A with clothing management/hygiene for toileting. Short term goal 2: Mod A with LB dressing, AE PRN. Short term goal 3: Mod A with donning/doffing socks and shoes, AE PRN. Short term goal 4: Min A with toilet transfers. Short term goal 5: Min A with bathing hygiene.   Long

## 2021-11-18 LAB
ALBUMIN SERPL-MCNC: 3.7 G/DL (ref 3.5–5.2)
ALP BLD-CCNC: 92 U/L (ref 35–104)
ALT SERPL-CCNC: 21 U/L (ref 5–33)
ANION GAP SERPL CALCULATED.3IONS-SCNC: 9 MMOL/L (ref 7–19)
AST SERPL-CCNC: 23 U/L (ref 5–32)
BASOPHILS ABSOLUTE: 0 K/UL (ref 0–0.2)
BASOPHILS RELATIVE PERCENT: 0.5 % (ref 0–1)
BILIRUB SERPL-MCNC: 0.6 MG/DL (ref 0.2–1.2)
BUN BLDV-MCNC: 34 MG/DL (ref 8–23)
CALCIUM SERPL-MCNC: 10 MG/DL (ref 8.8–10.2)
CHLORIDE BLD-SCNC: 109 MMOL/L (ref 98–111)
CO2: 24 MMOL/L (ref 22–29)
CREAT SERPL-MCNC: 0.6 MG/DL (ref 0.5–0.9)
EOSINOPHILS ABSOLUTE: 0.2 K/UL (ref 0–0.6)
EOSINOPHILS RELATIVE PERCENT: 3.3 % (ref 0–5)
GFR AFRICAN AMERICAN: >59
GFR NON-AFRICAN AMERICAN: >60
GLUCOSE BLD-MCNC: 110 MG/DL (ref 70–99)
GLUCOSE BLD-MCNC: 53 MG/DL (ref 70–99)
GLUCOSE BLD-MCNC: 54 MG/DL (ref 70–99)
GLUCOSE BLD-MCNC: 64 MG/DL (ref 70–99)
GLUCOSE BLD-MCNC: 82 MG/DL (ref 74–109)
HCT VFR BLD CALC: 43.8 % (ref 37–47)
HEMOGLOBIN: 13.4 G/DL (ref 12–16)
IMMATURE GRANULOCYTES #: 0 K/UL
INR BLD: 2.4 (ref 0.88–1.18)
LYMPHOCYTES ABSOLUTE: 1.9 K/UL (ref 1.1–4.5)
LYMPHOCYTES RELATIVE PERCENT: 31.8 % (ref 20–40)
MCH RBC QN AUTO: 29.1 PG (ref 27–31)
MCHC RBC AUTO-ENTMCNC: 30.6 G/DL (ref 33–37)
MCV RBC AUTO: 95.2 FL (ref 81–99)
MONOCYTES ABSOLUTE: 0.4 K/UL (ref 0–0.9)
MONOCYTES RELATIVE PERCENT: 7.4 % (ref 0–10)
NEUTROPHILS ABSOLUTE: 3.3 K/UL (ref 1.5–7.5)
NEUTROPHILS RELATIVE PERCENT: 56.7 % (ref 50–65)
PDW BLD-RTO: 14.2 % (ref 11.5–14.5)
PERFORMED ON: ABNORMAL
PLATELET # BLD: 178 K/UL (ref 130–400)
PMV BLD AUTO: 10.6 FL (ref 9.4–12.3)
POTASSIUM REFLEX MAGNESIUM: 4.4 MMOL/L (ref 3.5–5)
PROTHROMBIN TIME: 25.9 SEC (ref 12–14.6)
RBC # BLD: 4.6 M/UL (ref 4.2–5.4)
SODIUM BLD-SCNC: 142 MMOL/L (ref 136–145)
TOTAL PROTEIN: 5.3 G/DL (ref 6.6–8.7)
WBC # BLD: 5.8 K/UL (ref 4.8–10.8)

## 2021-11-18 PROCEDURE — 97535 SELF CARE MNGMENT TRAINING: CPT

## 2021-11-18 PROCEDURE — 99232 SBSQ HOSP IP/OBS MODERATE 35: CPT | Performed by: PSYCHIATRY & NEUROLOGY

## 2021-11-18 PROCEDURE — 2580000003 HC RX 258: Performed by: PSYCHIATRY & NEUROLOGY

## 2021-11-18 PROCEDURE — 97110 THERAPEUTIC EXERCISES: CPT

## 2021-11-18 PROCEDURE — 36415 COLL VENOUS BLD VENIPUNCTURE: CPT

## 2021-11-18 PROCEDURE — 6370000000 HC RX 637 (ALT 250 FOR IP): Performed by: NURSE PRACTITIONER

## 2021-11-18 PROCEDURE — 85025 COMPLETE CBC W/AUTO DIFF WBC: CPT

## 2021-11-18 PROCEDURE — 97129 THER IVNTJ 1ST 15 MIN: CPT

## 2021-11-18 PROCEDURE — 97130 THER IVNTJ EA ADDL 15 MIN: CPT

## 2021-11-18 PROCEDURE — 92526 ORAL FUNCTION THERAPY: CPT

## 2021-11-18 PROCEDURE — 80053 COMPREHEN METABOLIC PANEL: CPT

## 2021-11-18 PROCEDURE — 82947 ASSAY GLUCOSE BLOOD QUANT: CPT

## 2021-11-18 PROCEDURE — 97116 GAIT TRAINING THERAPY: CPT

## 2021-11-18 PROCEDURE — 6370000000 HC RX 637 (ALT 250 FOR IP): Performed by: PSYCHIATRY & NEUROLOGY

## 2021-11-18 PROCEDURE — 1180000000 HC REHAB R&B

## 2021-11-18 PROCEDURE — 85610 PROTHROMBIN TIME: CPT

## 2021-11-18 PROCEDURE — 97530 THERAPEUTIC ACTIVITIES: CPT

## 2021-11-18 RX ADMIN — CETIRIZINE HYDROCHLORIDE 5 MG: 10 TABLET, FILM COATED ORAL at 08:06

## 2021-11-18 RX ADMIN — SENNOSIDES AND DOCUSATE SODIUM 1 TABLET: 50; 8.6 TABLET ORAL at 08:06

## 2021-11-18 RX ADMIN — Medication 10 ML: at 08:08

## 2021-11-18 RX ADMIN — ATORVASTATIN CALCIUM 20 MG: 20 TABLET, FILM COATED ORAL at 08:06

## 2021-11-18 RX ADMIN — WARFARIN SODIUM 4 MG: 3 TABLET ORAL at 17:00

## 2021-11-18 RX ADMIN — SENNOSIDES AND DOCUSATE SODIUM 1 TABLET: 50; 8.6 TABLET ORAL at 20:41

## 2021-11-18 RX ADMIN — FLUTICASONE PROPIONATE 1 SPRAY: 50 SPRAY, METERED NASAL at 08:05

## 2021-11-18 RX ADMIN — DEXTROSE 15 G: 15 GEL ORAL at 07:24

## 2021-11-18 RX ADMIN — DOCUSATE SODIUM 100 MG: 100 CAPSULE, LIQUID FILLED ORAL at 08:06

## 2021-11-18 ASSESSMENT — PAIN SCALES - GENERAL: PAINLEVEL_OUTOF10: 0

## 2021-11-18 NOTE — PROGRESS NOTES
[facility]  INPATIENT SPEECH THERAPY  [facility] 8 REHAB UNIT  TIME   Time In: 1100  Time Out: 1200  Minutes: 60  Total Treatment Time: 60    [x]Daily Note  []Progress Note    Date: 2021  Patient Name: Bladimir Jefferson        MRN: [id number]    Account #: [de-identified]  : 1933  (80 y.o.)  Gender: female   Primary Provider: Kylah Gray MD  Swallowing Status/Diet: Soft and bite sized diet, thin liquids     PATIENT DIAGNOSIS(ES):    Diagnosis: Acute ischemic stroke, L MCA infarct, R sided involvement      Additional Pertinent Hx: hx CVAs, stress incontinence     RESTRICTIONS/PRECAUTIONS:    Restrictions/Precautions  Restrictions/Precautions: Fall Risk, Weight Bearing, Swallowing Modified Diet  Required Braces or Orthoses?: No  Position Activity Restriction  Other position/activity restrictions: Dysphagia-soft and bite sized     Subjective:   Pt sitting upright in recliner chair. Pt was alert and cooperative with all therapy tasks. Objective:  SLP presented simple 'wh' questions. Initially pt demonstrated echolalia. After verbal repetition, pt responded to 100% of simple 'wh' questions with 2-4 word phrases. Verbal perseverations were observed as well. Y/n questions presented and pt responded 100% with appropriate response. Field of four was presented and pt identified with 0% accuracy  Field of two presented and pt identified with 50% accuracy. Naming task completed and pt demonstrated 60% accuracy with verbal cues. Pt requested to call her niece, \"Shy. \" This was completed. Pt participated in phone conversation with SLP assisting with details to questions. During conversation with niece, pt utilized slow rate, verbal repetition, and increased volume frequently demonstrating 75% with dysarthria strategies. Pt took sips of thin liquids via open cup with assistance without overt s/s of aspiration. SLP set-up meal tray and provided built-up utensil.  Pt fed self 100% of meal. Pt had no overt s/s of aspiration. SHORT TERM GOAL #1:  Goal 1: The patient will follow two step commands with minimal cueing and 100% accuracy. SHORT TERM GOAL #2:  Goal 2: The patient will complete naming tasks (picture, object) with minimal cueing and 100% accuracy. SHORT TERM GOAL #3:  Goal 3: The patient will complete concrete divergent naming tasks with minimal cueing and 100% accuracy. SHORT TERM GOAL #4:  Goal 4: The patient will complete oral motor exercises to improve lingual and labial strength and range of motion as well as improve speech intelligibility. SHORT TERM GOAL #5:  Goal 5: The patient will utilize dysarthria strategies (slow rate, increased volume, over exaggeration of words, increased breath support) during structured tasks and during conversations with minimal cueing. Swallowing Short Term Goals  Short-term Goals  Goal 1: The patient will complete the soco (tongue hold) technique to improve base of tongue retraction and base of tongue to pharyngeal wall approximation with 90% accuracy and minimal verbal cues. Goal 2: The patient will complete the effortful swallow maneuver to improve hyolaryngeal elevation, tongue base retraction, and vocal fold closure with 90% accuracy and minimal verbal cues. Goal 3: Staff/caregivers will complete daily oral hygiene to prevent aspiration of bacteria laden secretions. Goal 4: The patient will tolerate a dysphagia soft and bite sized diet with nectar (mildly thick) liquids with minimal overt s/s of aspiration.     Comprehension: 3 - Patient understands basic needs 50-74% of the time  Expression: 3 - Expresses basic ideas/needs 50-74% of the time  Social Interaction: 4 - Patient appropriate 75-90%+ of the time  Problem Solving: 3 - Patient solves simple/routine tasks 50%-74%  Memory: 3 - Patient remembers 50%-74% of the time    ASSESSMENT:  Assessment: [x]Progressing towards goals          []Not Progressing towards goals    Patient Tolerance of Treatment:   [x]Tolerated well []Tolerated fair []Required rest breaks []Fatigued    Education:  Learner:  [x]Patient          []Significant Other          [x]Other  Education provided regarding:  [x]Goals and POC   [x]Diet and swallowing precautions    []Home Exercise Program  [x]Progress and/or discharge information  Method of Education:  [x]Discussion          [x]Demonstration          []Handout          []Other  Evaluation of Education:   []Verbalized understanding   []Demonstrates without assistance  [x]Demonstrates with assistance  []Needs further instruction     []No evidence of learning                  []No family present      Plan: [x]Continue with current plan of care    []Modify current plan of care as follows:    []Discharge patient    Discharge Location:    Services/Supervision Recommended:      [x]Patient continues to require treatment by a licensed therapist to address functional deficits as outlined in the established plan of care.     Electronically signed by ULISSES Barone on 11/18/21 at 1:02 PM CST

## 2021-11-18 NOTE — PROGRESS NOTES
Patient:   Giorgio Andersen  MR#:    [id number]   Room:    0826/826-01   YOB: 1933  Date of Progress Note: 11/18/2021  Time of Note                           11:51 AM  Consulting Physician:   Cristine Lopez M.D. Attending Physician:  Cristine Lopez MD       CHIEF COMPLAINT: Right-sided weakness with dysphagia     subjective  This 80 y.o. female  with history of 3 strokes since March 2021, stress incontinence, arthritis, blood clots and hyperlipidemia. She [facility] ER on 11/6/21 after being found on the floor at home by her son. She was confused, had abnormal speech and right sided weakness. CT of head was negative for acute changes. CTA was also negative. She was admitted to the Hospitalist service with consult for neurology. MRI done revealed an acute lacunar infarct within the left thalamus and multiple Multifocal old ischemic change in both cerebral hemispheres and within the right side of the cerebellum. She had been placed on Plavix and statin after her last stroke. She was seen by Dr. Noa Stacy and not felt to be a candidate for TPA d/t being out of the time frame and no evidence of thrombus on CTA. Dr. Noa Stacy felt strokes were most likely cardioembolic. Plavix was discontinued and she was started on Coumadin with Lovenox for bridging. Echo done showed Bubble study with evidence of small right-to-left shunting with Valsalva consistent with possible ASD/PFO. Cardiology was consulted for possible PFO closure. Patient/family do not wish to have any invasive measure. Prefer to continue with medical management with anticoagulation. Patient is a DNR. She was evaluated by SPT for swallow and aphasia. She was initially made NPO but has now been started on a mechanical soft diet with nectar thick liquids. She continues to have right sided weakness and is participating in both PT/OT.   No complaint today  REVIEW OF SYSTEMS:  Patient is aphasic      PHYSICAL EXAM:  BP (!) 162/82   Pulse 68   Temp 96.2 °F (35.7 °C) (Temporal)   Resp 18   Ht 5' 6\" (1.676 m)   Wt 138 lb 12.8 oz (63 kg)   SpO2 100%   BMI 22.40 kg/m²     Constitutional: she appears well-developed and well-nourished. Eyes  conjunctiva normal.  Pupils react to light  Ear, nose, throat -hearing intact to voice. No scars, masses, or lesions over external nose or ears, no atrophy of tongue  Neck-symmetric, no masses noted, no jugular vein distension  Respiration- chest wall appears symmetric, good expansion,   normal effort without use of accessory muscles  Cardiovascular- RRR  Musculoskeletal  no significant wasting of muscles noted, no bony deformities, gait no gross ataxia  Extremities-no clubbing, cyanosis or edema  Skin  warm, dry, and intact. No rash, erythema, or pallor. Psychiatric  mood, affect, and behavior appear normal.      Neurology  NEUROLOGICAL EXAM:  Awake, alert, fluent oriented to [facility].  Attention and concentration appear appropriate  Speech shows dysarthria       Cranial Nerve Exam   CN II- Visual fields show an apparent left homonymous hemianopsia  CN III, IV,VI-EOMI, No nystagmus, conjugate eye movements, no ptosis  CN VII-right facial droop  CN VIII-Hearing intact        Motor Exam  Relatively normal throughout with significant atrophy in the hands       Tremors- no tremors in hands or head noted    Coordination  Clumsiness in the bilateral upper extremities           Nursing/pcp notes, imaging,labs and vitals reviewed.      PT,OT and/or speech notes reviewed    Lab Results   Component Value Date    WBC 5.8 11/18/2021    HGB 13.4 11/18/2021    HCT 43.8 11/18/2021    MCV 95.2 11/18/2021     11/18/2021     Lab Results   Component Value Date     11/18/2021    K 4.4 11/18/2021     11/18/2021    CO2 24 11/18/2021    BUN 34 (H) 11/18/2021    CREATININE 0.6 11/18/2021    GLUCOSE 82 11/18/2021    CALCIUM 10.0 11/18/2021    PROT 5.3 (L) 11/18/2021    LABALBU 3.7 11/18/2021    BILITOT 0.6 11/18/2021 CGA     Long term goals  Time Frame for Long term goals : 3-4 weeks  Long term goal 1: bed mobility Indp  Long term goal 2: transfers with AD Mod I  Long term goal 3: ambulation 150 ft with AD SBA  Long term goal 4: navigate 4 step with hand rail SBA  Long term goal 5: WC mobility 150 ft SBA     ASSESSMENT:  Assessment: pt understands basic commands with activity. decreased right lean today with ambulation and tfs, tolerates very short distances d/t fatigue will benefit more with functional moblility training for tfs and ambulation as tolerated      SPEECH THERAPY  Objective:   SLP read aloud various short-stories from the newspaper. Pt would recall one word on 25% of opportunities. Yes/no questions presented. Pt answered 75% of y/n questions regarding family members names, interests of topics presented.     Rehab dining tray presented. Pt tolerated bites of puree meat texture with extra gravy with 1x swallow response. Alternation of liquids and solids were presented every two bites. SLP encouraged pt to utilize effortful swallow; this was evidenced 1x with PO intake. SLP monitored buccal pads and minimal to no residue observed with puree diet texture. Oral care routine completed and pt required total assistance.        Recommended Diet and Intervention  Diet Solids Recommendation: Dysphagia Soft and Bite-Sized with pureed meats (Dysphagia III)  Liquid Consistency Recommendation: Thin liquids  Recommended Form of Meds: Crushed in puree as able  Therapeutic Interventions: Mendelsohn; Diet tolerance monitoring; Oral care; Therapeutic PO trials with SLP; Oral motor exercises; Tongue base strengthening; Patient/Family education; Effortful swallow; Pharyngeal exercises; Yanet     Compensatory Swallowing Strategies  Compensatory Swallowing Strategies: Upright as possible for all oral intake; Alternate solids and liquids; Remain upright for 30-45 minutes after meals;  Check for pocketing of food on the Right; Swallow 2 times per bite/sip; Small bites/sips; External pacing     OCCUPATIONAL THERAPY     CURRENT IRF-CORTES SCORES  Eating: CARE Score: 3  Comment: Min A, SBA at times, impaired vision impedes with independence     Oral Hygiene: CARE Score: 4         Toileting: CARE Score: 1         Shower/Bathe: CARE Score: 2           Upper Body Dressing: CARE Score: 3          Lower Body Dressing: CARE Score: 2           Footwear: CARE Score: 1           Toilet Transfers: CARE Score: 2           Picking Up Object:  CARE Score: 1           UE Functioning:  B sh deficits  Left inattention, apraxia  R distally WFLs     Pain Assessment:     STGs:  Short term goals  Time Frame for Short term goals: 1 week  Short term goal 1: Mod A with clothing management/hygiene for toileting. Short term goal 2: Mod A with LB dressing, AE PRN. Short term goal 3: Mod A with donning/doffing socks and shoes, AE PRN. Short term goal 4: Min A with toilet transfers. Short term goal 5: Min A with bathing hygiene.     LTGs:  Long term goals  Time Frame for Long term goals : 3-4 weeks  Long term goal 1: CGA with clothing management/hygiene for toileting. Long term goal 2: Min A with LB dressing, AE PRN. Long term goal 3: Min A with donning/doffing socks and shoes, AE PRN. Long term goal 4: CGA with toilet transfers. Long term goal 5: Supervision with bathing hygiene. Long term goals 6: Patient/family will verbalize DME needs.     Assessment:  Performance deficits / Impairments: Decreased functional mobility , Decreased ADL status, Decreased ROM, Decreased strength, Decreased safe awareness, Decreased cognition, Decreased endurance, Decreased high-level IADLs, Decreased balance, Decreased coordination, Decreased vision/visual deficit                        NUTRITION  Current Wt: Weight: 135 lb 4.8 oz (61.4 kg) / Body mass index is 21.84 kg/m².   Admission Wt: Admission Body Weight: 126 lb (57.2 kg)  Oral Diet Orders: Soft/bite-sized  Oral Nutrition Supplement

## 2021-11-18 NOTE — PROGRESS NOTES
11/18/21 1300   Restrictions/Precautions   Restrictions/Precautions Fall Risk   Required Braces or Orthoses? No   Lower Extremity Weight Bearing Restrictions   Right Lower Extremity Weight Bearing Weight Bearing As Tolerated   Left Lower Extremity Weight Bearing Weight Bearing As Tolerated   Position Activity Restriction   Other position/activity restrictions Dysphagia-soft and bite sized, NTL, water in between meals   Pain Screening   Patient Currently in Pain Denies   Vital Signs   /70   Transfers   Sit to Stand Minimal Assistance   Stand to sit Minimal Assistance   Stand Pivot Transfers Moderate Assistance   Comment pt has difficulty remembering where to place hands and tends to reach for walker vs armrests. tf recliner > WC going to the left with rollator pt has strong right lean requires asist with steay and rollator management to turn and sit   Ambulation   Ambulation? Yes   WB Status WBAT   More Ambulation? Yes   Ambulation 1   Surface level tile   Device Rollator   Other Apparatus Wheelchair follow   Assistance Moderate assistance   Quality of Gait signifigcant right lean requires assist to maintain balance, intermittently catches right toe on back of left foot during swing. flexed forward posture improved with higher rollator    Gait Deviations Decreased step height; Decreased step length; Shuffles; Slow Cyn   Distance 80ft    Ambulation 2   Surface - 2 level tile   Device 2 Rollator   Other Apparatus 2 Wheelchair follow   Assistance 2 Moderate assistance   Quality of Gait 2 alble to manage rollator indp has significant right lean needing steady asssit. Distance 75ft    Conditions Requiring Skilled Therapeutic Intervention   Assessment improved endurance with rollator despite significant rght lean and gait deviation.  pt fatigues very easily, demosntrated labored breathing during rest break after first walk, BP was checked and O2 sats read as 90 but pts finger profusion isnt reliable, after sizeable rest pt agrees to second walk. pt tfs significantly better when going to her left with stand pivot with rollator.

## 2021-11-18 NOTE — PROGRESS NOTES
Clinical Pharmacy Note    Warfarin consult follow-up    Recent Labs     11/18/21  0529   INR 2.40*     Recent Labs     11/16/21  0443 11/17/21  0403 11/18/21  0529   HGB 13.5 14.2 13.4   HCT 42.9 45.5 43.8    157 178       Significant Drug-Drug Interactions:  New warfarin drug-drug interactions: None  Discontinued drug-drug interactions: None    Date INR Warfarin Dose   11/06/21 1.01 ---   11/07/21 --- 5 mg   11/08/21 1.04  5 mg    11/09/21  1.30 5 mg   11/10/21 1.74 5 mg    11/11/21  2.44 2.5 mg    11/12/21 2.02 5 mg    11/13/21  1.97 5 mg   11/14/21  2.44  4 mg    11/15/21 2.58 4 mg    11/16/21 2.35  5 mg    11/17/21 2.30  5 mg    11/18/21 2.40 4 mg                 Notes:  Give Warfarin 4 mg po x 1 tonight                     Daily PT/INR until stable within therapeutic range.      Electronically signed by Josesito Baker [facility] on 11/18/2021 at 1:55 PM

## 2021-11-18 NOTE — PROGRESS NOTES
Occupational Therapy     11/18/21 0915   Restrictions/Precautions   Restrictions/Precautions Fall Risk   Position Activity Restriction   Other position/activity restrictions Dysphagia-soft and bite sized, NTL, water in between meals   General   Diagnosis Acute ischemic B CVA; aphasia; AMS; hemiplegia affecting dominant side   Subjective   Subjective Pt unable to stay awake. Attempted grooming tasks and UE ROM. ADL   Grooming Maximum assistance   Type of ROM/Therapeutic Exercise   Type of ROM/Therapeutic Exercise AAROM   Assessment   Performance deficits / Impairments Decreased functional mobility ; Decreased ADL status; Decreased ROM;  Decreased strength; Decreased safe awareness; Decreased cognition; Decreased endurance; Decreased high-level IADLs; Decreased balance; Decreased coordination; Decreased vision/visual deficit   Treatment Diagnosis B CVAs/R hemiparesis   Timed Code Treatment Minutes 15 Minutes   Activity Tolerance   Activity Tolerance Patient limited by fatigue; Treatment limited secondary to decreased cognition

## 2021-11-18 NOTE — PROGRESS NOTES
Occupational Therapy     11/18/21 1030   Restrictions/Precautions   Restrictions/Precautions Fall Risk   Position Activity Restriction   Other position/activity restrictions Dysphagia-soft and bite sized, NTL, water in between meals   General   Additional Pertinent Hx CVA; palliative care pt   Diagnosis Acute ischemic B CVA; aphasia; AMS; hemiplegia affecting dominant side   Subjective   Subjective Pt more alert now and able to complete tx session. ADL   UE Bathing Minimal assistance   LE Bathing Maximum assistance   UE Dressing Moderate assistance  (hindered by L UE proprioceptive deficits/inattention)   LE Dressing Dependent/Total   Toileting Dependent/Total   Additional Comments encouragement to complete    Bed mobility   Supine to Sit Stand by assistance   Transfers   Sit to stand Minimal assistance   Stand to sit Minimal assistance   Toilet Transfers   Toilet Transfers Comments completed with Abby o   Assessment   Performance deficits / Impairments Decreased functional mobility ; Decreased ADL status; Decreased ROM;  Decreased strength; Decreased safe awareness; Decreased cognition; Decreased endurance; Decreased high-level IADLs; Decreased balance; Decreased coordination; Decreased vision/visual deficit   Treatment Diagnosis B CVAs/R hemiparesis   Timed Code Treatment Minutes 30 Minutes   Activity Tolerance   Activity Tolerance Patient Tolerated treatment well

## 2021-11-18 NOTE — PLAN OF CARE
Problem: Falls - Risk of:  Goal: Will remain free from falls  Description: Will remain free from falls  Outcome: Ongoing  Goal: Absence of physical injury  Description: Absence of physical injury  Outcome: Ongoing     Problem: Confusion - Acute:  Goal: Absence of continued neurological deterioration signs and symptoms  Description: Absence of continued neurological deterioration signs and symptoms  Outcome: Ongoing  Goal: Mental status will be restored to baseline  Description: Mental status will be restored to baseline  Outcome: Ongoing     Problem: Discharge Planning:  Goal: Ability to perform activities of daily living will improve  Description: Ability to perform activities of daily living will improve  Outcome: Ongoing  Goal: Participates in care planning  Description: Participates in care planning  Outcome: Ongoing     Problem: Injury - Risk of, Physical Injury:  Goal: Will remain free from falls  Description: Will remain free from falls  Outcome: Ongoing  Goal: Absence of physical injury  Description: Absence of physical injury  Outcome: Ongoing     Problem: Mood - Altered:  Goal: Mood stable  Description: Mood stable  Outcome: Ongoing  Goal: Absence of abusive behavior  Description: Absence of abusive behavior  Outcome: Ongoing  Goal: Verbalizations of feeling emotionally comfortable while being cared for will increase  Description: Verbalizations of feeling emotionally comfortable while being cared for will increase  Outcome: Ongoing     Problem: Psychomotor Activity - Altered:  Goal: Absence of psychomotor disturbance signs and symptoms  Description: Absence of psychomotor disturbance signs and symptoms  Outcome: Ongoing     Problem: Sensory Perception - Impaired:  Goal: Demonstrations of improved sensory functioning will increase  Description: Demonstrations of improved sensory functioning will increase  Outcome: Ongoing  Goal: Decrease in sensory misperception frequency  Description: Decrease in sensory misperception frequency  Outcome: Ongoing  Goal: Able to refrain from responding to false sensory perceptions  Description: Able to refrain from responding to false sensory perceptions  Outcome: Ongoing  Goal: Demonstrates accurate environmental perceptions  Description: Demonstrates accurate environmental perceptions  Outcome: Ongoing  Goal: Able to distinguish between reality-based and nonreality-based thinking  Description: Able to distinguish between reality-based and nonreality-based thinking  Outcome: Ongoing  Goal: Able to interrupt nonreality-based thinking  Description: Able to interrupt nonreality-based thinking  Outcome: Ongoing     Problem: Sleep Pattern Disturbance:  Goal: Appears well-rested  Description: Appears well-rested  Outcome: Ongoing     Problem: IP BLADDER/VOIDING  Goal: LTG - patient will complete bladder elimination  Outcome: Ongoing  Goal: LTG - Patient will utilize adaptive techniques/equipment to complete bladder elimination  Outcome: Ongoing  Goal: LTG - patient will achieve acceptable level of continence  Outcome: Ongoing  Goal: STG - Patient demonstrates ability to take care of indwelling catheter  Outcome: Ongoing  Goal: STG - patient demonstrates self-cath technique using clean technique and care of the catheter  Outcome: Ongoing  Goal: STG - Patient demonstrates no accidents  Outcome: Ongoing  Goal: STG - Patient will state signs and symptoms of UTI  Outcome: Ongoing  Goal: STG - patient will be able to empty bladder  Outcome: Ongoing  Goal: STG - Patient demonstrates understanding of self catheterization schedule by completing task on time  Outcome: Ongoing  Goal: STG - patient participates in bladder program by expressing need to void  Outcome: Ongoing  Goal: STG - Patient verbalizes understanding of catheter care indwelling/intermittent  Outcome: Ongoing  Goal: STG - patient participates in bladder program by adhering to implemented toileting schedule  Outcome: Ongoing Problem: IP BOWEL ELIMINATION  Goal: LTG - patient will utilize adaptive techniques/equipment to complete bowel elimination  Outcome: Ongoing  Goal: LTG - patient will have regular and routine bowel evacuation  Outcome: Ongoing  Goal: STG - patient will be accident free  Outcome: Ongoing  Goal: STG - Patient demonstrates care and management of ostomy bag  Outcome: Ongoing  Goal: STG - Patient participates in bowel management program  Outcome: Ongoing  Goal: STG - patient maintains skin integrity  Outcome: Ongoing  Goal: STG - Patient will verbalize signs and symptoms of constipation and how to prevent/alleviate  Outcome: Ongoing  Goal: STG - patient will be continent of stool  Outcome: Ongoing  Goal: STG - Patient completes digital stimulation technique  Outcome: Ongoing  Goal: STG - Patient verbalizes knowledge about relationship between diet, fluid intake, activity and medication on constipation  Outcome: Ongoing     Problem: IP BREATHING  Goal: LTG - patient will mobilize secretions and maintain airway  Outcome: Ongoing  Goal: LTG - Patient/caregiver will demonstrate/perform proper techniques to maintain patent airway  Outcome: Ongoing  Goal: LTG - patient/caregiver will demonstrate/perform improved or stable self care abilities within physical limitations  Outcome: Ongoing  Goal: STG - Patient/caregiver will maintain patent airway  Outcome: Ongoing  Goal: STG - respiratory rate and effort will be within normal limits for the patient  Outcome: Ongoing  Goal: STG - patient/caregiver will be able to verbalize oxygen safety precautions  Outcome: Ongoing  Goal: STG - Patient/caregiver demonstrates correct suctioning technique  Outcome: Ongoing  Goal: STG - patient will utilize incentive spirometer  Outcome: Ongoing  Goal: STG - Patient performs or directs assisted coughing  Outcome: Ongoing  Goal: STG - patient can administer MDI's  Outcome: Ongoing  Goal: STG - Patient can utilize incentive spirometer  Outcome: Ongoing  Goal: STG - family can complete suctioning  Outcome: Ongoing     Problem: NUTRITION  Goal: Patient maintains adequate hydration  Outcome: Ongoing  Goal: Patient maintains weight  Outcome: Ongoing  Goal: Patient/Family demonstrates understanding of diet  Outcome: Ongoing  Goal: Patient/Family independently completes tube feeding  Outcome: Ongoing  Goal: Patient will have no more than 5 lb weight change during LOS  Outcome: Ongoing  Goal: Patient will utilize adaptive techniques to administer nutrition  Outcome: Ongoing  Goal: Patient will verbalize dietary restrictions  Outcome: Ongoing     Problem: SAFETY  Goal: LTG - patient will adhere to hip precautions during ADL's and transfers  Outcome: Ongoing  Goal: LTG - Patient will demonstrate safety requirements appropriate to situation/environment  Outcome: Ongoing  Goal: LTG - patient will utilize safety techniques  Outcome: Ongoing  Goal: STG - patient locks brakes on wheelchair  Outcome: Ongoing  Goal: STG - Patient uses call light consistently to request assistance with transfers  Outcome: Ongoing  Goal: STG - patient uses gait belt during all transfers  Outcome: Ongoing  Goal: Free from accidental physical injury  Outcome: Ongoing  Goal: Free from intentional harm  Outcome: Ongoing     Problem: SKIN INTEGRITY  Goal: LTG - Patient will be free from infection  Outcome: Ongoing  Goal: LTG - patient will maintain/improve skin integrity through proper skin care techniques  Outcome: Ongoing  Goal: LTG - Patient will demonstrate appropriate pressure relief techniques  Outcome: Ongoing  Goal: LTG - patient will demonstrate appropriate skin care techniques  Outcome: Ongoing  Goal: LTG - Patient will be free from infection  Outcome: Ongoing  Goal: STG - Patient demonstrates skin care/treatment/dressing change  Outcome: Ongoing  Goal: STG - patient will maintain good skin integrity  Outcome: Ongoing  Goal: STG - Patient exhibits signs of wound healing.   Outcome: Ongoing  Goal: STG - patient demonstrates pressure reduction techniques  Outcome: Ongoing  Goal: STG - Patient demonstrates preventative skin care measures  Outcome: Ongoing  Goal: Skin integrity is maintained or improved  Outcome: Ongoing     Problem: PAIN  Goal: LTG - Patient will manage pain with the appropriate technique/Intervention  Outcome: Ongoing  Goal: LTG - Patient will demonstrate intervention for managing pain  Outcome: Ongoing  Goal: STG - Patient will reduce or eliminate use of analgesics  Outcome: Ongoing  Goal: STG - pain is manageable through therapies  Outcome: Ongoing  Goal: STG - Patient will verbalize an acceptable level of pain  Outcome: Ongoing  Goal: STG - patients pain is managed to allow active participation in daily activities  Outcome: Ongoing  Goal: STG - Patient will increase activity level  Outcome: Ongoing  Goal: STG - patient verbalizes a reduction in pain level  Outcome: Ongoing  Goal: Patient's pain/discomfort is manageable  Outcome: Ongoing     Problem: DAILY CARE  Goal: Daily care needs are met  Outcome: Ongoing     Problem: KNOWLEDGE DEFICIT  Goal: Patient/S.O. demonstrates understanding of disease process, treatment plan, medications, and discharge instructions.   Outcome: Ongoing     Problem: DISCHARGE BARRIERS  Goal: Patient's continuum of care needs are met  Outcome: Ongoing     Problem: Skin Integrity:  Goal: Will show no infection signs and symptoms  Description: Will show no infection signs and symptoms  Outcome: Ongoing  Goal: Absence of new skin breakdown  Description: Absence of new skin breakdown  Outcome: Ongoing     Problem: HEMODYNAMIC STATUS  Goal: Patient has stable vital signs and fluid balance  Outcome: Ongoing     Problem: ACTIVITY INTOLERANCE/IMPAIRED MOBILITY  Goal: Mobility/activity is maintained at optimum level for patient  Outcome: Ongoing     Problem: COMMUNICATION IMPAIRMENT  Goal: Ability to express needs and understand communication  Outcome: Ongoing

## 2021-11-19 LAB
ALBUMIN SERPL-MCNC: 3.6 G/DL (ref 3.5–5.2)
ALP BLD-CCNC: 89 U/L (ref 35–104)
ALT SERPL-CCNC: 21 U/L (ref 5–33)
ANION GAP SERPL CALCULATED.3IONS-SCNC: 9 MMOL/L (ref 7–19)
AST SERPL-CCNC: 22 U/L (ref 5–32)
BASOPHILS ABSOLUTE: 0 K/UL (ref 0–0.2)
BASOPHILS RELATIVE PERCENT: 0.7 % (ref 0–1)
BILIRUB SERPL-MCNC: 0.6 MG/DL (ref 0.2–1.2)
BUN BLDV-MCNC: 28 MG/DL (ref 8–23)
CALCIUM SERPL-MCNC: 10.2 MG/DL (ref 8.8–10.2)
CHLORIDE BLD-SCNC: 109 MMOL/L (ref 98–111)
CO2: 24 MMOL/L (ref 22–29)
CREAT SERPL-MCNC: 0.6 MG/DL (ref 0.5–0.9)
EOSINOPHILS ABSOLUTE: 0.2 K/UL (ref 0–0.6)
EOSINOPHILS RELATIVE PERCENT: 2.7 % (ref 0–5)
GFR AFRICAN AMERICAN: >59
GFR NON-AFRICAN AMERICAN: >60
GLUCOSE BLD-MCNC: 88 MG/DL (ref 70–99)
GLUCOSE BLD-MCNC: 91 MG/DL (ref 74–109)
GLUCOSE BLD-MCNC: 98 MG/DL (ref 70–99)
HCT VFR BLD CALC: 43.9 % (ref 37–47)
HEMOGLOBIN: 13.7 G/DL (ref 12–16)
IMMATURE GRANULOCYTES #: 0 K/UL
INR BLD: 2.57 (ref 0.88–1.18)
LYMPHOCYTES ABSOLUTE: 1.3 K/UL (ref 1.1–4.5)
LYMPHOCYTES RELATIVE PERCENT: 23.1 % (ref 20–40)
MCH RBC QN AUTO: 29.1 PG (ref 27–31)
MCHC RBC AUTO-ENTMCNC: 31.2 G/DL (ref 33–37)
MCV RBC AUTO: 93.2 FL (ref 81–99)
MONOCYTES ABSOLUTE: 0.4 K/UL (ref 0–0.9)
MONOCYTES RELATIVE PERCENT: 6.6 % (ref 0–10)
NEUTROPHILS ABSOLUTE: 3.7 K/UL (ref 1.5–7.5)
NEUTROPHILS RELATIVE PERCENT: 66.7 % (ref 50–65)
PDW BLD-RTO: 14.2 % (ref 11.5–14.5)
PERFORMED ON: NORMAL
PERFORMED ON: NORMAL
PLATELET # BLD: 177 K/UL (ref 130–400)
PMV BLD AUTO: 9.9 FL (ref 9.4–12.3)
POTASSIUM SERPL-SCNC: 4.3 MMOL/L (ref 3.5–5)
PROTHROMBIN TIME: 27.1 SEC (ref 12–14.6)
RBC # BLD: 4.71 M/UL (ref 4.2–5.4)
SODIUM BLD-SCNC: 142 MMOL/L (ref 136–145)
TOTAL PROTEIN: 5.3 G/DL (ref 6.6–8.7)
WBC # BLD: 5.6 K/UL (ref 4.8–10.8)

## 2021-11-19 PROCEDURE — 85610 PROTHROMBIN TIME: CPT

## 2021-11-19 PROCEDURE — 97110 THERAPEUTIC EXERCISES: CPT

## 2021-11-19 PROCEDURE — 6370000000 HC RX 637 (ALT 250 FOR IP): Performed by: PSYCHIATRY & NEUROLOGY

## 2021-11-19 PROCEDURE — 82947 ASSAY GLUCOSE BLOOD QUANT: CPT

## 2021-11-19 PROCEDURE — 97129 THER IVNTJ 1ST 15 MIN: CPT

## 2021-11-19 PROCEDURE — 1180000000 HC REHAB R&B

## 2021-11-19 PROCEDURE — 97530 THERAPEUTIC ACTIVITIES: CPT

## 2021-11-19 PROCEDURE — 97116 GAIT TRAINING THERAPY: CPT

## 2021-11-19 PROCEDURE — 85025 COMPLETE CBC W/AUTO DIFF WBC: CPT

## 2021-11-19 PROCEDURE — 6370000000 HC RX 637 (ALT 250 FOR IP): Performed by: NURSE PRACTITIONER

## 2021-11-19 PROCEDURE — 97535 SELF CARE MNGMENT TRAINING: CPT

## 2021-11-19 PROCEDURE — 80053 COMPREHEN METABOLIC PANEL: CPT

## 2021-11-19 PROCEDURE — 2580000003 HC RX 258: Performed by: PSYCHIATRY & NEUROLOGY

## 2021-11-19 PROCEDURE — 92526 ORAL FUNCTION THERAPY: CPT

## 2021-11-19 PROCEDURE — 97130 THER IVNTJ EA ADDL 15 MIN: CPT

## 2021-11-19 RX ADMIN — ATORVASTATIN CALCIUM 20 MG: 20 TABLET, FILM COATED ORAL at 08:36

## 2021-11-19 RX ADMIN — WARFARIN SODIUM 4 MG: 3 TABLET ORAL at 17:17

## 2021-11-19 RX ADMIN — MAGNESIUM HYDROXIDE 30 ML: 400 SUSPENSION ORAL at 18:15

## 2021-11-19 RX ADMIN — Medication 10 ML: at 08:39

## 2021-11-19 RX ADMIN — DOCUSATE SODIUM 100 MG: 100 CAPSULE, LIQUID FILLED ORAL at 08:36

## 2021-11-19 RX ADMIN — CETIRIZINE HYDROCHLORIDE 5 MG: 10 TABLET, FILM COATED ORAL at 08:36

## 2021-11-19 RX ADMIN — SENNOSIDES AND DOCUSATE SODIUM 1 TABLET: 50; 8.6 TABLET ORAL at 20:43

## 2021-11-19 RX ADMIN — SENNOSIDES AND DOCUSATE SODIUM 1 TABLET: 50; 8.6 TABLET ORAL at 08:36

## 2021-11-19 RX ADMIN — FLUTICASONE PROPIONATE 1 SPRAY: 50 SPRAY, METERED NASAL at 08:36

## 2021-11-19 ASSESSMENT — PAIN SCALES - GENERAL: PAINLEVEL_OUTOF10: 0

## 2021-11-19 NOTE — PROGRESS NOTES
11/19/21 1300   Restrictions/Precautions   Restrictions/Precautions Fall Risk   Required Braces or Orthoses? No   Lower Extremity Weight Bearing Restrictions   Right Lower Extremity Weight Bearing Weight Bearing As Tolerated   Left Lower Extremity Weight Bearing Weight Bearing As Tolerated   Pain Screening   Patient Currently in Pain No   Bed Mobility   Rolling Stand by assistance   Supine to Sit Stand by assistance   Sit to Supine Stand by assistance   Scooting Stand by assistance   Comment cues for direction    Transfers   Sit to Stand Minimal Assistance   Stand to sit Minimal Assistance   Lateral Transfers Minimal Assistance   Comment assist to turn squat pivot to her right on to bedside commode    Ambulation   Ambulation? No   Balance   Posture Fair   Sitting - Static Fair; +   Sitting - Dynamic Fair; +   Standing - Static Fair; +   Standing - Dynamic Fair; -   Exercises   Heelslides x10   Hip Abduction x10   Other exercises   Other exercises? Yes   Other exercises 1 sitting dynamic balance exercises translation of objects to both R and L    Other exercises 2 standing dynamic exercise translation of items to both R and L   Conditions Requiring Skilled Therapeutic Intervention   Assessment pt performed various dynamic balance exercises in sitting and standing. pt presents with fair balance in both settings able to identify various objects presented to her and translating them to the opposite side while maintaining trunk balance. pt demosntrates with inattention to her left side unless maximally cued to look that way. after a few trials pt able to consistently translate objects from her left to right.

## 2021-11-19 NOTE — PROGRESS NOTES
[facility]  INPATIENT SPEECH THERAPY  [facility] 8 REHAB UNIT  TIME   Time In: 1100  Time Out: 1200  Minutes: 60       [x]Daily Note  []Progress Note    Date: 2021  Patient Name: Fallon Chou        MRN: [id number]    Account #: [de-identified]  : 1933  (80 y.o.)  Gender: female   Primary Provider: Padmini Borges MD  Swallowing Status/Diet: Soft and bite sized diet with pureed meats, thin liquids    PATIENT DIAGNOSIS(ES):    Diagnosis: Acute ischemic stroke, L MCA infarct, R sided involvement      Additional Pertinent Hx: hx CVAs, stress incontinence     RESTRICTIONS/PRECAUTIONS:    Restrictions/Precautions  Restrictions/Precautions: Fall Risk, Weight Bearing, Swallowing Modified Diet  Required Braces or Orthoses?: No  Position Activity Restriction  Other position/activity restrictions: Dysphagia-soft and bite sized          Subjective: The patient was upright in her recliner. She attempted all therapy tasks. Objective:  She did attempt to complete orientation questions but could not recall the year, month, or her location. She was unable to state her date of birth or age. She was able to recall family members names without cueing. She did follow simple one step commands at 35% this date. She is able to communicate her immediate wants and needs. She is able to answer yes/no questions reliably this date. Reviewed all swallowing precautions and she was able to recall a few of these immediately after education. She was downgraded to pureed meats. Due to poor sensation and awareness, recommend she continue these for safety. She has exhibited coughing with ground and chopped meats. She continues to exhibit inattention and visual deficits. She continues to require verbal and tactile cues to locate items on her tray table. Reminders were provided for her to utilize her call light. This was placed in her lap.       Recommended Diet and Intervention  Diet Solids Recommendation: Dysphagia Soft and Bite-Sized with pureed meats (Dysphagia III)  Liquid Consistency Recommendation: Thin liquids  Recommended Form of Meds: Crushed in puree as able  Therapeutic Interventions: Mendelsohn; Diet tolerance monitoring; Oral care; Therapeutic PO trials with SLP; Oral motor exercises; Tongue base strengthening; Patient/Family education; Effortful swallow; Pharyngeal exercises; Yanet     Compensatory Swallowing Strategies  Compensatory Swallowing Strategies: Upright as possible for all oral intake; Alternate solids and liquids; Remain upright for 30-45 minutes after meals; Check for pocketing of food on the Right; Swallow 2 times per bite/sip; Small bites/sips; External pacing                         SHORT TERM GOAL #1:  Goal 1: The patient will follow two step commands with minimal cueing and 100% accuracy. SHORT TERM GOAL #2:  Goal 2: The patient will complete naming tasks (picture, object) with minimal cueing and 100% accuracy. SHORT TERM GOAL #3:  Goal 3: The patient will complete concrete divergent naming tasks with minimal cueing and 100% accuracy. SHORT TERM GOAL #4:  Goal 4: The patient will complete oral motor exercises to improve lingual and labial strength and range of motion as well as improve speech intelligibility. SHORT TERM GOAL #5:  Goal 5: The patient will utilize dysarthria strategies (slow rate, increased volume, over exaggeration of words, increased breath support) during structured tasks and during conversations with minimal cueing. Swallowing Short Term Goals  Short-term Goals  Goal 1: The patient will complete the yanet (tongue hold) technique to improve base of tongue retraction and base of tongue to pharyngeal wall approximation with 90% accuracy and minimal verbal cues. Goal 2: The patient will complete the effortful swallow maneuver to improve hyolaryngeal elevation, tongue base retraction, and vocal fold closure with 90% accuracy and minimal verbal cues.   Goal 3: Staff/caregivers will complete daily oral hygiene to prevent aspiration of bacteria laden secretions. Goal 4: The patient will tolerate a dysphagia soft and bite sized diet with nectar (mildly thick) liquids with minimal overt s/s of aspiration. Comprehension: 3 - Patient understands basic needs 50-74% of the time  Expression: 3 - Expresses basic ideas/needs 50-74% of the time  Social Interaction: 4 - Patient appropriate 75-90%+ of the time  Problem Solving: 3 - Patient solves simple/routine tasks 50%-74%  Memory: 3 - Patient remembers 50%-74% of the time    ASSESSMENT:  Assessment: [x]Progressing towards goals          []Not Progressing towards goals    Patient Tolerance of Treatment:   [x]Tolerated well []Tolerated fair []Required rest breaks []Fatigued    Education:  Learner:  [x]Patient          []Significant Other          []Other  Education provided regarding:  [x]Goals and POC   []Diet and swallowing precautions    []Home Exercise Program  []Progress and/or discharge information  Method of Education:  [x]Discussion          []Demonstration          []Handout          []Other  Evaluation of Education:   [x]Verbalized understanding   []Demonstrates without assistance  []Demonstrates with assistance  []Needs further instruction     []No evidence of learning                  []No family present      Plan: [x]Continue with current plan of care    []Modify current plan of care as follows:    []Discharge patient    Discharge Location:    Services/Supervision Recommended:      [x]Patient continues to require treatment by a licensed therapist to address functional deficits as outlined in the established plan of care.                  Electronically Signed By:  Jay Banks M.S., CCC-SLP  11/19/2021,11:14 AM.

## 2021-11-19 NOTE — PROGRESS NOTES
Occupational Therapy  Facility/Department: [facility] 8 REHAB UNIT  Daily Treatment Note  NAME: Domenico Coyle  : 1933  MRN: [id number]    Date of Service: 2021    Discharge Recommendations:  Continue to assess pending progress       Assessment   Performance deficits / Impairments: Decreased functional mobility ; Decreased ADL status; Decreased ROM; Decreased strength; Decreased safe awareness; Decreased cognition; Decreased endurance; Decreased high-level IADLs; Decreased balance; Decreased coordination; Decreased vision/visual deficit  Treatment Diagnosis: B CVAs/R hemiparesis  Safety Devices  Safety Devices in place: Yes  Type of devices: Left in chair; Call light within reach; Chair alarm in place         Patient Diagnosis(es): There were no encounter diagnoses. has a past medical history of Arthritis, CAD (coronary artery disease), Hx of blood clots, Hyperlipidemia, Palliative care patient, and Stroke ([facility] [address] has a past surgical history that includes Leg Surgery. Restrictions  Restrictions/Precautions  Restrictions/Precautions: Fall Risk  Required Braces or Orthoses?: No  Lower Extremity Weight Bearing Restrictions  Right Lower Extremity Weight Bearing: Weight Bearing As Tolerated  Left Lower Extremity Weight Bearing: Weight Bearing As Tolerated  Position Activity Restriction  Other position/activity restrictions: Dysphagia-soft and bite sized, NTL, water in between meals  Objective             Balance  Sitting Balance: Contact guard assistance  Standing Balance: Minimal assistance  Functional Mobility  Functional - Mobility Device: 4-Wheeled Walker  Activity: Other  Assist Level: Minimal assistance  Functional Mobility Comments: Short distance in room, difficulty following commands. Continued to ambulate when therapist was attaching brief. Also attempting to sit in rollator instead of sitting in w/c. Decreased safety awareness.             Plan   Plan  Specific instructions for Next Treatment: BITS  Current Treatment Recommendations: Strengthening, Pain Management, Positioning, ROM, Safety Education & Training, Balance Training, Patient/Caregiver Education & Training, Self-Care / ADL, Cognitive/Perceptual Training, Functional Mobility Training, Neuromuscular Re-education, Equipment Evaluation, Education, & procurement, Home Management Training, Endurance Training, Cognitive Reorientation  OutComes Score       11/19/21 0845   [address] Needed Dependent   CARE Score 1   Lower Body Dressing   Assistance Needed Substantial/maximal assistance   CARE Score 2       Goals  Short term goals  Time Frame for Short term goals: 1 week  Short term goal 1: Mod A with clothing management/hygiene for toileting. Short term goal 2: Mod A with LB dressing, AE PRN. Short term goal 3: Mod A with donning/doffing socks and shoes, AE PRN. Short term goal 4: Min A with toilet transfers. Short term goal 5: Min A with bathing hygiene. Long term goals  Time Frame for Long term goals : 3-4 weeks  Long term goal 1: CGA with clothing management/hygiene for toileting. Long term goal 2: Min A with LB dressing, AE PRN. Long term goal 3: Min A with donning/doffing socks and shoes, AE PRN. Long term goal 4: CGA with toilet transfers. Long term goal 5: Supervision with bathing hygiene. Long term goals 6: Patient/family will verbalize DME needs. Patient Goals   Patient goals : Return home with assist from her son.        Therapy Time   Individual Concurrent Group Co-treatment   Time In 0845         Time Out 0900         Minutes 15         Timed Code Treatment Minutes: 15 Minutes  Reason: Make up minutes (Makeup time for yesterday 11/18/2021)    Gil Hays OT

## 2021-11-19 NOTE — PROGRESS NOTES
Clinical Pharmacy Note    Warfarin consult follow-up    Recent Labs     11/19/21  0431   INR 2.57*     Recent Labs     11/17/21  0403 11/18/21  0529 11/19/21  0431   HGB 14.2 13.4 13.7   HCT 45.5 43.8 43.9    178 177       Significant Drug-Drug Interactions:  New warfarin drug-drug interactions: none  Discontinued drug-drug interactions: none    Date INR Warfarin Dose   11/06/21 1.01 ---   11/07/21 --- 5 mg   11/08/21 1.04  5 mg    11/09/21  1.30 5 mg   11/10/21 1.74 5 mg    11/11/21  2.44 2.5 mg    11/12/21 2.02 5 mg    11/13/21  1.97 5 mg   11/14/21  2.44  4 mg    11/15/21 2.58 4 mg    11/16/21 2.35  5 mg    11/17/21 2.30  5 mg    11/18/21 2.40 4 mg    11/19/21 2.57  4 mg              Notes: Will give warfarin 4 mg once this evening. Daily PT/INR until stable within therapeutic range.      Electronically signed by Lay Avila [facility] on 11/19/2021 at 11:08 AM

## 2021-11-19 NOTE — PROGRESS NOTES
11/19/21 1000   Restrictions/Precautions   Restrictions/Precautions Fall Risk   Required Braces or Orthoses? No   Lower Extremity Weight Bearing Restrictions   Right Lower Extremity Weight Bearing Weight Bearing As Tolerated   Left Lower Extremity Weight Bearing Weight Bearing As Tolerated   Pain Screening   Patient Currently in Pain No   Transfers   Sit to Stand Minimal Assistance   Stand to sit Minimal Assistance   Comment partial stand d/t forward flexed posture, asssit to steady    Ambulation   Ambulation? Yes   WB Status WBAT   More Ambulation? Yes   Ambulation 1   Surface level tile   Device Rollator   Other Apparatus Wheelchair follow   Assistance Minimal assistance   Quality of Gait signifigcant right lean requires assist to maintain balance, improved MIEK but still narrow, incorporated one right turn    Gait Deviations Slow Cyn; Decreased step length; Decreased step height   Distance 40ft    Ambulation 2   Surface - 2 level tile   Device 2 Rollator   Other Apparatus 2 Wheelchair follow   Assistance 2 Minimal assistance   Quality of Gait 2 incorporated 2 left turns, very slow to turn no LOB    Distance 50ft, 50ft   Balance   Posture Fair   Sitting - Static Fair   Sitting - Dynamic Poor; +   Standing - Static Fair   Standing - Dynamic Fair; -   Other exercises   Other exercises? Yes   Other exercises 1 static standing balance w/in MIKE exercises grabbing objects of various weight and size with R/L then putting it on seat of rollator    Conditions Requiring Skilled Therapeutic Intervention   Assessment pt has decreased awareness to her left. pt states she can see but does not realize there is anything to her left without max cues. pt takes a long time to turn to left to reach and tap a hand but is able to tap hand when  in her filed of vision to her right. pt has fair static balance with no LOB during reaching exercises.

## 2021-11-19 NOTE — PROGRESS NOTES
Occupational Therapy     11/19/21 0900   Restrictions/Precautions   Restrictions/Precautions Fall Risk   Position Activity Restriction   Other position/activity restrictions Dysphagia-soft and bite sized, NTL, water in between meals   General   Additional Pertinent Hx CVA; palliative care pt   Diagnosis Acute ischemic B CVA; aphasia; AMS; hemiplegia affecting dominant side   ADL   Grooming Setup; Verbal cueing  (Ind with oral hygiene)   UE Bathing Minimal assistance   Balance   Standing Balance Contact guard assistance   Standing Balance   Time 5 mins   Activity 1 hand table top task   Functional Mobility   Functional - Mobility Device 4-Wheeled Walker   Assist Level Minimal assistance   Functional Mobility Comments short distance   Type of ROM/Therapeutic Exercise   Type of ROM/Therapeutic Exercise AROM   Exercises   Grasp/Release RUE for midline crossing act   Perception   Unilateral Attention Cues to attend left visual field; Cues to maintain midline in sitting; Cues to maintain midline in standing; Cues to attend to left side of body   Assessment   Performance deficits / Impairments Decreased functional mobility ; Decreased ADL status; Decreased ROM; Decreased strength; Decreased safe awareness; Decreased cognition; Decreased endurance; Decreased high-level IADLs; Decreased balance; Decreased coordination; Decreased vision/visual deficit   Assessment Improved initiation of self care (joaquín grooming)  this session   Treatment Diagnosis B CVAs/R hemiparesis   Timed Code Treatment Minutes 60 Minutes   Safety Devices   Safety Devices in place Yes   Type of devices Left in chair; Call light within reach;  Chair alarm in place

## 2021-11-19 NOTE — PLAN OF CARE
Problem: Falls - Risk of:  Goal: Will remain free from falls  Description: Will remain free from falls  11/19/2021 0006 by Fredi Traore RN  Outcome: Ongoing  11/18/2021 1127 by Cara Fournier LPN  Outcome: Ongoing  Goal: Absence of physical injury  Description: Absence of physical injury  11/19/2021 0006 by Fredi Traore RN  Outcome: Ongoing  11/18/2021 1127 by Cara Fournier LPN  Outcome: Ongoing     Problem: Injury - Risk of, Physical Injury:  Goal: Will remain free from falls  Description: Will remain free from falls  11/19/2021 0006 by Fredi Traore RN  Outcome: Ongoing  11/18/2021 1127 by Cara Fournier LPN  Outcome: Ongoing  Goal: Absence of physical injury  Description: Absence of physical injury  11/19/2021 0006 by Fredi Traore RN  Outcome: Ongoing  11/18/2021 1127 by Cara Fournier LPN  Outcome: Ongoing     Problem: Falls - Risk of:  Goal: Will remain free from falls  Description: Will remain free from falls  11/19/2021 0006 by Fredi Traore RN  Outcome: Ongoing  11/18/2021 1127 by Cara Fournier LPN  Outcome: Ongoing  Goal: Absence of physical injury  Description: Absence of physical injury  11/19/2021 0006 by Fredi Traore RN  Outcome: Ongoing  11/18/2021 1127 by Cara Fournier LPN  Outcome: Ongoing

## 2021-11-19 NOTE — PLAN OF CARE
Problem: Falls - Risk of:  Goal: Will remain free from falls  Description: Will remain free from falls  11/19/2021 1154 by Magi Arceo LPN  Outcome: Ongoing  11/19/2021 0006 by Nancy Salamanca RN  Outcome: Ongoing  Goal: Absence of physical injury  Description: Absence of physical injury  11/19/2021 1154 by Magi Arceo LPN  Outcome: Ongoing  11/19/2021 0006 by Nancy Salamanca RN  Outcome: Ongoing     Problem: Confusion - Acute:  Goal: Absence of continued neurological deterioration signs and symptoms  Description: Absence of continued neurological deterioration signs and symptoms  11/19/2021 1154 by Magi Arceo LPN  Outcome: Ongoing  11/19/2021 0006 by Nancy Salamanca RN  Outcome: Ongoing  Goal: Mental status will be restored to baseline  Description: Mental status will be restored to baseline  11/19/2021 1154 by Magi Arceo LPN  Outcome: Ongoing  11/19/2021 0006 by Nancy Salamanca RN  Outcome: Ongoing     Problem: Discharge Planning:  Goal: Ability to perform activities of daily living will improve  Description: Ability to perform activities of daily living will improve  11/19/2021 1154 by Magi Arceo LPN  Outcome: Ongoing  11/19/2021 0006 by Nancy Salamanca RN  Outcome: Ongoing  Goal: Participates in care planning  Description: Participates in care planning  11/19/2021 1154 by Magi Arceo LPN  Outcome: Ongoing  11/19/2021 0006 by Nancy Salamanca RN  Outcome: Ongoing     Problem: Injury - Risk of, Physical Injury:  Goal: Will remain free from falls  Description: Will remain free from falls  11/19/2021 1154 by Magi Arceo LPN  Outcome: Ongoing  11/19/2021 0006 by Nancy Salamanca RN  Outcome: Ongoing  Goal: Absence of physical injury  Description: Absence of physical injury  11/19/2021 1154 by Magi Arceo LPN  Outcome: Ongoing  11/19/2021 0006 by Nancy Salamanca RN  Outcome: Ongoing     Problem: Mood - Altered:  Goal: Mood stable  Description: Mood stable  11/19/2021 1154 by Arianna Neves LPN  Outcome: Ongoing  11/19/2021 0006 by Jamil Mancini RN  Outcome: Ongoing  Goal: Absence of abusive behavior  Description: Absence of abusive behavior  11/19/2021 1154 by Arianna Neves LPN  Outcome: Ongoing  11/19/2021 0006 by Jamil Mancini RN  Outcome: Ongoing  Goal: Verbalizations of feeling emotionally comfortable while being cared for will increase  Description: Verbalizations of feeling emotionally comfortable while being cared for will increase  11/19/2021 1154 by Arianna Neves LPN  Outcome: Ongoing  11/19/2021 0006 by Jamil Mancini RN  Outcome: Ongoing     Problem: Psychomotor Activity - Altered:  Goal: Absence of psychomotor disturbance signs and symptoms  Description: Absence of psychomotor disturbance signs and symptoms  11/19/2021 1154 by Arianna Neves LPN  Outcome: Ongoing  11/19/2021 0006 by Jamil Mancini RN  Outcome: Ongoing     Problem: Sensory Perception - Impaired:  Goal: Demonstrations of improved sensory functioning will increase  Description: Demonstrations of improved sensory functioning will increase  11/19/2021 1154 by Arianna Neves LPN  Outcome: Ongoing  11/19/2021 0006 by Jamil Mancini RN  Outcome: Ongoing  Goal: Decrease in sensory misperception frequency  Description: Decrease in sensory misperception frequency  11/19/2021 1154 by Arianna Neves LPN  Outcome: Ongoing  11/19/2021 0006 by Jamil Mancini RN  Outcome: Ongoing  Goal: Able to refrain from responding to false sensory perceptions  Description: Able to refrain from responding to false sensory perceptions  11/19/2021 1154 by Arianna Neves LPN  Outcome: Ongoing  11/19/2021 0006 by Jamil Mancini RN  Outcome: Ongoing  Goal: Demonstrates accurate environmental perceptions  Description: Demonstrates accurate environmental perceptions  11/19/2021 1154 by Arianna Neves LPN  Outcome: Ongoing  11/19/2021 0006 by Kayy Louie RN  Outcome: Ongoing  Goal: Able to distinguish between reality-based and nonreality-based thinking  Description: Able to distinguish between reality-based and nonreality-based thinking  11/19/2021 1154 by Yeimi Napoles LPN  Outcome: Ongoing  11/19/2021 0006 by Kayy Louie RN  Outcome: Ongoing  Goal: Able to interrupt nonreality-based thinking  Description: Able to interrupt nonreality-based thinking  11/19/2021 1154 by Yeimi Napoles LPN  Outcome: Ongoing  11/19/2021 0006 by Kayy Louie RN  Outcome: Ongoing     Problem: Sleep Pattern Disturbance:  Goal: Appears well-rested  Description: Appears well-rested  11/19/2021 1154 by Yeimi Napoles LPN  Outcome: Ongoing  11/19/2021 0006 by Kayy Louie RN  Outcome: Ongoing     Problem: IP BLADDER/VOIDING  Goal: LTG - patient will complete bladder elimination  11/19/2021 1154 by Yeimi Napoles LPN  Outcome: Ongoing  11/19/2021 0006 by Kayy Louie RN  Outcome: Ongoing  Goal: LTG - Patient will utilize adaptive techniques/equipment to complete bladder elimination  11/19/2021 1154 by Yeimi Napoles LPN  Outcome: Ongoing  11/19/2021 0006 by Kayy Louie RN  Outcome: Ongoing  Goal: LTG - patient will achieve acceptable level of continence  11/19/2021 1154 by Yeimi Napoles LPN  Outcome: Ongoing  11/19/2021 0006 by Kayy Louie RN  Outcome: Ongoing  Goal: STG - Patient demonstrates ability to take care of indwelling catheter  11/19/2021 1154 by Yeimi Napoles LPN  Outcome: Ongoing  11/19/2021 0006 by Kayy Louie RN  Outcome: Ongoing  Goal: STG - patient demonstrates self-cath technique using clean technique and care of the catheter  11/19/2021 1154 by Yeimi Napoles LPN  Outcome: Ongoing  11/19/2021 0006 by Kayy Louie RN  Outcome: Ongoing  Goal: STG - Patient demonstrates no accidents  11/19/2021 1154 by Yeimi Napoles LPN  Outcome: Ongoing  11/19/2021 0006 by Betty Hargrove RN  Outcome: Ongoing  Goal: STG - Patient will state signs and symptoms of UTI  11/19/2021 1154 by Enriqueta Gale LPN  Outcome: Ongoing  11/19/2021 0006 by Betty Hargrove RN  Outcome: Ongoing  Goal: STG - patient will be able to empty bladder  11/19/2021 1154 by Enriqueta Gale LPN  Outcome: Ongoing  11/19/2021 0006 by Betty Hargrove RN  Outcome: Ongoing  Goal: STG - Patient demonstrates understanding of self catheterization schedule by completing task on time  11/19/2021 1154 by Enriqueta Gale LPN  Outcome: Ongoing  11/19/2021 0006 by Betty Hargrove RN  Outcome: Ongoing  Goal: STG - patient participates in bladder program by expressing need to void  11/19/2021 1154 by Enriqueta Gale LPN  Outcome: Ongoing  11/19/2021 0006 by Betty Hargrove RN  Outcome: Ongoing  Goal: STG - Patient verbalizes understanding of catheter care indwelling/intermittent  11/19/2021 1154 by Enriqueta Gale LPN  Outcome: Ongoing  11/19/2021 0006 by Betty Hargrove RN  Outcome: Ongoing  Goal: STG - patient participates in bladder program by adhering to implemented toileting schedule  11/19/2021 1154 by Enriqueta Gale LPN  Outcome: Ongoing  11/19/2021 0006 by Betty Hargrove RN  Outcome: Ongoing     Problem: IP BOWEL ELIMINATION  Goal: LTG - patient will utilize adaptive techniques/equipment to complete bowel elimination  11/19/2021 1154 by Enriqueta Gale LPN  Outcome: Ongoing  11/19/2021 0006 by Betty Hargrove RN  Outcome: Ongoing  Goal: LTG - patient will have regular and routine bowel evacuation  11/19/2021 1154 by Enriqueta Gale LPN  Outcome: Ongoing  11/19/2021 0006 by Betty Hargrove RN  Outcome: Ongoing  Goal: STG - patient will be accident free  11/19/2021 1154 by Enriqueta Gale LPN  Outcome: Ongoing  11/19/2021 0006 by Betty Hargrove RN  Outcome: Ongoing  Goal: STG - Patient demonstrates care and management of ostomy bag  11/19/2021 1154 by Nancy Newton LPN  Outcome: Ongoing  11/19/2021 0006 by Courtney Alonso RN  Outcome: Ongoing  Goal: STG - Patient participates in bowel management program  11/19/2021 1154 by Nancy Newton LPN  Outcome: Ongoing  11/19/2021 0006 by Courtney Alonso RN  Outcome: Ongoing  Goal: STG - patient maintains skin integrity  11/19/2021 1154 by Nancy Newton LPN  Outcome: Ongoing  11/19/2021 0006 by Courtney Alonso RN  Outcome: Ongoing  Goal: STG - Patient will verbalize signs and symptoms of constipation and how to prevent/alleviate  11/19/2021 1154 by Nancy Newton LPN  Outcome: Ongoing  11/19/2021 0006 by Courtney Alonso RN  Outcome: Ongoing  Goal: STG - patient will be continent of stool  11/19/2021 1154 by Nancy Newton LPN  Outcome: Ongoing  11/19/2021 0006 by Courtney Alonso RN  Outcome: Ongoing  Goal: STG - Patient completes digital stimulation technique  11/19/2021 1154 by Nancy Newton LPN  Outcome: Ongoing  11/19/2021 0006 by Courtney Alonso RN  Outcome: Ongoing  Goal: STG - Patient verbalizes knowledge about relationship between diet, fluid intake, activity and medication on constipation  11/19/2021 1154 by Nancy Newton LPN  Outcome: Ongoing  11/19/2021 0006 by Courtney Alonso RN  Outcome: Ongoing     Problem: IP BREATHING  Goal: LTG - patient will mobilize secretions and maintain airway  11/19/2021 1154 by Nancy Newton LPN  Outcome: Ongoing  11/19/2021 0006 by Courtney Alonso RN  Outcome: Ongoing  Goal: LTG - Patient/caregiver will demonstrate/perform proper techniques to maintain patent airway  11/19/2021 1154 by Nancy Newton LPN  Outcome: Ongoing  11/19/2021 0006 by Courtney Alonso RN  Outcome: Ongoing  Goal: LTG - patient/caregiver will demonstrate/perform improved or stable self care abilities within physical limitations  11/19/2021 1154 by Nancy Newton LPN  Outcome: Ongoing  11/19/2021 0006 by Darshana Welch RN  Outcome: Ongoing  Goal: STG - Patient/caregiver will maintain patent airway  11/19/2021 1154 by Ananda Ha LPN  Outcome: Ongoing  11/19/2021 0006 by Darshana Welch RN  Outcome: Ongoing  Goal: STG - respiratory rate and effort will be within normal limits for the patient  11/19/2021 1154 by Ananda Ha LPN  Outcome: Ongoing  11/19/2021 0006 by Darshana Welch RN  Outcome: Ongoing  Goal: STG - patient/caregiver will be able to verbalize oxygen safety precautions  11/19/2021 1154 by Ananda Ha LPN  Outcome: Ongoing  11/19/2021 0006 by Darhsana Welch RN  Outcome: Ongoing  Goal: STG - Patient/caregiver demonstrates correct suctioning technique  11/19/2021 1154 by Ananda Ha LPN  Outcome: Ongoing  11/19/2021 0006 by Darshana Welch RN  Outcome: Ongoing  Goal: STG - patient will utilize incentive spirometer  11/19/2021 1154 by Ananda Ha LPN  Outcome: Ongoing  11/19/2021 0006 by Darshana Welch RN  Outcome: Ongoing  Goal: STG - Patient performs or directs assisted coughing  11/19/2021 1154 by Ananda Ha LPN  Outcome: Ongoing  11/19/2021 0006 by Darshana Welch RN  Outcome: Ongoing  Goal: STG - patient can administer MDI's  11/19/2021 1154 by Ananda Ha LPN  Outcome: Ongoing  11/19/2021 0006 by Darshana Welch RN  Outcome: Ongoing  Goal: STG - Patient can utilize incentive spirometer  11/19/2021 1154 by Ananda Ha LPN  Outcome: Ongoing  11/19/2021 0006 by Darshana Welch RN  Outcome: Ongoing  Goal: STG - family can complete suctioning  11/19/2021 1154 by Ananda Ha LPN  Outcome: Ongoing  11/19/2021 0006 by Darshana Welch RN  Outcome: Ongoing     Problem: NUTRITION  Goal: Patient maintains adequate hydration  11/19/2021 1154 by Ananda Ha LPN  Outcome: Ongoing  11/19/2021 0006 by Darshana Welch, RN  Outcome: Ongoing  Goal: Patient maintains weight  11/19/2021 1154 by Wolf Larsen Funmi Leon LPN  Outcome: Ongoing  11/19/2021 0006 by Jamil Mancini RN  Outcome: Ongoing  Goal: Patient/Family demonstrates understanding of diet  11/19/2021 1154 by Arianna Neves LPN  Outcome: Ongoing  11/19/2021 0006 by Jamil Mancini RN  Outcome: Ongoing  Goal: Patient/Family independently completes tube feeding  11/19/2021 1154 by Arianna Neves LPN  Outcome: Ongoing  11/19/2021 0006 by Jamil Mancini RN  Outcome: Ongoing  Goal: Patient will have no more than 5 lb weight change during LOS  11/19/2021 1154 by Arianna Neves LPN  Outcome: Ongoing  11/19/2021 0006 by Jamil Mancini RN  Outcome: Ongoing  Goal: Patient will utilize adaptive techniques to administer nutrition  11/19/2021 1154 by Arianna Neves LPN  Outcome: Ongoing  11/19/2021 0006 by Jamil Mancini RN  Outcome: Ongoing  Goal: Patient will verbalize dietary restrictions  11/19/2021 1154 by Arianna Neves LPN  Outcome: Ongoing  11/19/2021 0006 by Jamil Mancini RN  Outcome: Ongoing     Problem: SAFETY  Goal: LTG - patient will adhere to hip precautions during ADL's and transfers  11/19/2021 1154 by Arainna Neves LPN  Outcome: Ongoing  11/19/2021 0006 by Jamil Mancini RN  Outcome: Ongoing  Goal: LTG - Patient will demonstrate safety requirements appropriate to situation/environment  11/19/2021 1154 by Arianna Neves LPN  Outcome: Ongoing  11/19/2021 0006 by Jamil Mancini RN  Outcome: Ongoing  Goal: LTG - patient will utilize safety techniques  11/19/2021 1154 by Arianna Neves LPN  Outcome: Ongoing  11/19/2021 0006 by Jamil Mancini RN  Outcome: Ongoing  Goal: STG - patient locks brakes on wheelchair  11/19/2021 1154 by Arianna Neves LPN  Outcome: Ongoing  11/19/2021 0006 by Jamli Mancini RN  Outcome: Ongoing  Goal: STG - Patient uses call light consistently to request assistance with transfers  11/19/2021 1154 by Arianna Neves LPN  Outcome: LPN  Outcome: Ongoing  11/19/2021 0006 by Yves Robles RN  Outcome: Ongoing  Goal: STG - patient verbalizes a reduction in pain level  11/19/2021 1154 by William Garcia LPN  Outcome: Ongoing  11/19/2021 0006 by Yves Robles RN  Outcome: Ongoing  Goal: Patient's pain/discomfort is manageable  11/19/2021 1154 by William Garcia LPN  Outcome: Ongoing  11/19/2021 0006 by Yves Robles RN  Outcome: Ongoing     Problem: DAILY CARE  Goal: Daily care needs are met  Outcome: Ongoing     Problem: KNOWLEDGE DEFICIT  Goal: Patient/S.O. demonstrates understanding of disease process, treatment plan, medications, and discharge instructions.   Outcome: Ongoing     Problem: DISCHARGE BARRIERS  Goal: Patient's continuum of care needs are met  Outcome: Ongoing     Problem: Skin Integrity:  Goal: Will show no infection signs and symptoms  Description: Will show no infection signs and symptoms  Outcome: Ongoing  Goal: Absence of new skin breakdown  Description: Absence of new skin breakdown  Outcome: Ongoing     Problem: HEMODYNAMIC STATUS  Goal: Patient has stable vital signs and fluid balance  Outcome: Ongoing     Problem: ACTIVITY INTOLERANCE/IMPAIRED MOBILITY  Goal: Mobility/activity is maintained at optimum level for patient  Outcome: Ongoing     Problem: COMMUNICATION IMPAIRMENT  Goal: Ability to express needs and understand communication  Outcome: Ongoing

## 2021-11-20 LAB
ALBUMIN SERPL-MCNC: 3.9 G/DL (ref 3.5–5.2)
ALP BLD-CCNC: 98 U/L (ref 35–104)
ALT SERPL-CCNC: 22 U/L (ref 5–33)
ANION GAP SERPL CALCULATED.3IONS-SCNC: 9 MMOL/L (ref 7–19)
AST SERPL-CCNC: 27 U/L (ref 5–32)
BASOPHILS ABSOLUTE: 0 K/UL (ref 0–0.2)
BASOPHILS RELATIVE PERCENT: 0.5 % (ref 0–1)
BILIRUB SERPL-MCNC: 0.6 MG/DL (ref 0.2–1.2)
BUN BLDV-MCNC: 27 MG/DL (ref 8–23)
CALCIUM SERPL-MCNC: 10.5 MG/DL (ref 8.8–10.2)
CHLORIDE BLD-SCNC: 105 MMOL/L (ref 98–111)
CO2: 25 MMOL/L (ref 22–29)
CREAT SERPL-MCNC: 0.5 MG/DL (ref 0.5–0.9)
EOSINOPHILS ABSOLUTE: 0.2 K/UL (ref 0–0.6)
EOSINOPHILS RELATIVE PERCENT: 2.4 % (ref 0–5)
GFR AFRICAN AMERICAN: >59
GFR NON-AFRICAN AMERICAN: >60
GLUCOSE BLD-MCNC: 75 MG/DL (ref 70–99)
GLUCOSE BLD-MCNC: 84 MG/DL (ref 74–109)
HCT VFR BLD CALC: 46.6 % (ref 37–47)
HEMOGLOBIN: 14.1 G/DL (ref 12–16)
IMMATURE GRANULOCYTES #: 0 K/UL
INR BLD: 2.48 (ref 0.88–1.18)
LYMPHOCYTES ABSOLUTE: 1.6 K/UL (ref 1.1–4.5)
LYMPHOCYTES RELATIVE PERCENT: 25.1 % (ref 20–40)
MCH RBC QN AUTO: 29.3 PG (ref 27–31)
MCHC RBC AUTO-ENTMCNC: 30.3 G/DL (ref 33–37)
MCV RBC AUTO: 96.7 FL (ref 81–99)
MONOCYTES ABSOLUTE: 0.5 K/UL (ref 0–0.9)
MONOCYTES RELATIVE PERCENT: 7.5 % (ref 0–10)
NEUTROPHILS ABSOLUTE: 4.1 K/UL (ref 1.5–7.5)
NEUTROPHILS RELATIVE PERCENT: 64.2 % (ref 50–65)
PDW BLD-RTO: 14.4 % (ref 11.5–14.5)
PERFORMED ON: NORMAL
PLATELET # BLD: 171 K/UL (ref 130–400)
PMV BLD AUTO: 10.2 FL (ref 9.4–12.3)
POTASSIUM SERPL-SCNC: 4.7 MMOL/L (ref 3.5–5)
PROTHROMBIN TIME: 26.4 SEC (ref 12–14.6)
RBC # BLD: 4.82 M/UL (ref 4.2–5.4)
SODIUM BLD-SCNC: 139 MMOL/L (ref 136–145)
TOTAL PROTEIN: 5.8 G/DL (ref 6.6–8.7)
WBC # BLD: 6.3 K/UL (ref 4.8–10.8)

## 2021-11-20 PROCEDURE — 99232 SBSQ HOSP IP/OBS MODERATE 35: CPT | Performed by: PSYCHIATRY & NEUROLOGY

## 2021-11-20 PROCEDURE — 82947 ASSAY GLUCOSE BLOOD QUANT: CPT

## 2021-11-20 PROCEDURE — 6370000000 HC RX 637 (ALT 250 FOR IP): Performed by: PSYCHIATRY & NEUROLOGY

## 2021-11-20 PROCEDURE — 85025 COMPLETE CBC W/AUTO DIFF WBC: CPT

## 2021-11-20 PROCEDURE — 80053 COMPREHEN METABOLIC PANEL: CPT

## 2021-11-20 PROCEDURE — 85610 PROTHROMBIN TIME: CPT

## 2021-11-20 PROCEDURE — 1180000000 HC REHAB R&B

## 2021-11-20 PROCEDURE — 6370000000 HC RX 637 (ALT 250 FOR IP): Performed by: NURSE PRACTITIONER

## 2021-11-20 PROCEDURE — 36415 COLL VENOUS BLD VENIPUNCTURE: CPT

## 2021-11-20 PROCEDURE — 2580000003 HC RX 258: Performed by: PSYCHIATRY & NEUROLOGY

## 2021-11-20 RX ORDER — WARFARIN SODIUM 2 MG/1
4 TABLET ORAL
Status: COMPLETED | OUTPATIENT
Start: 2021-11-20 | End: 2021-11-20

## 2021-11-20 RX ADMIN — SENNOSIDES AND DOCUSATE SODIUM 1 TABLET: 50; 8.6 TABLET ORAL at 20:47

## 2021-11-20 RX ADMIN — WARFARIN SODIUM 4 MG: 2 TABLET ORAL at 17:30

## 2021-11-20 RX ADMIN — DOCUSATE SODIUM 100 MG: 100 CAPSULE, LIQUID FILLED ORAL at 08:09

## 2021-11-20 RX ADMIN — CETIRIZINE HYDROCHLORIDE 5 MG: 10 TABLET, FILM COATED ORAL at 08:09

## 2021-11-20 RX ADMIN — SENNOSIDES AND DOCUSATE SODIUM 1 TABLET: 50; 8.6 TABLET ORAL at 08:09

## 2021-11-20 RX ADMIN — FLUTICASONE PROPIONATE 1 SPRAY: 50 SPRAY, METERED NASAL at 08:09

## 2021-11-20 RX ADMIN — ATORVASTATIN CALCIUM 20 MG: 20 TABLET, FILM COATED ORAL at 08:09

## 2021-11-20 RX ADMIN — Medication 10 ML: at 08:09

## 2021-11-20 NOTE — PROGRESS NOTES
Patient:   Ghazala Lizarraga  MR#:    [id number]   Room:    0826/826-01   YOB: 1933  Date of Progress Note: 11/20/2021  Time of Note                           9:18 AM  Consulting Physician:   Mariana Alberts M.D. Attending Physician:  Mariana Alberts MD       CHIEF COMPLAINT: Right-sided weakness with dysphagia     subjective  This 80 y.o. female  with history of 3 strokes since March 2021, stress incontinence, arthritis, blood clots and hyperlipidemia. She presented to [facility] on 11/6/21 after being found on the floor at home by her son. She was confused, had abnormal speech and right sided weakness. CT of head was negative for acute changes. CTA was also negative. She was admitted to the Hospitalist service with consult for neurology. MRI done revealed an acute lacunar infarct within the left thalamus and multiple Multifocal old ischemic change in both cerebral hemispheres and within the right side of the cerebellum. She had been placed on Plavix and statin after her last stroke. She was seen by Dr. Shanel Azevedo and not felt to be a candidate for TPA d/t being out of the time frame and no evidence of thrombus on CTA. Dr. Shanel Azevedo felt strokes were most likely cardioembolic. Plavix was discontinued and she was started on Coumadin with Lovenox for bridging. Echo done showed Bubble study with evidence of small right-to-left shunting with Valsalva consistent with possible ASD/PFO. Cardiology was consulted for possible PFO closure. Patient/family do not wish to have any invasive measure. Prefer to continue with medical management with anticoagulation. Patient is a DNR. She was evaluated by SPT for swallow and aphasia. She was initially made NPO but has now been started on a mechanical soft diet with nectar thick liquids. She continues to have right sided weakness and is participating in both PT/OT.   No complaint this morning  REVIEW OF SYSTEMS:  Patient is aphasic      PHYSICAL EXAM:  /67   Pulse 67   Temp 96.5 °F (35.8 °C) (Temporal)   Resp 18   Ht 5' 6\" (1.676 m)   Wt 141 lb 3.2 oz (64 kg)   SpO2 96%   BMI 22.79 kg/m²     Constitutional: she appears well-developed and well-nourished. Eyes  conjunctiva normal.  Pupils react to light  Ear, nose, throat -hearing intact to voice. No scars, masses, or lesions over external nose or ears, no atrophy of tongue  Neck-symmetric, no masses noted, no jugular vein distension  Respiration- chest wall appears symmetric, good expansion,   normal effort without use of accessory muscles  Cardiovascular- RRR  Musculoskeletal  no significant wasting of muscles noted, no bony deformities, gait no gross ataxia  Extremities-no clubbing, cyanosis or edema  Skin  warm, dry, and intact. No rash, erythema, or pallor. Psychiatric  mood, affect, and behavior appear normal.      Neurology  NEUROLOGICAL EXAM:  Awake, alert, fluent oriented to [facility].  Attention and concentration appear appropriate  Speech shows dysarthria       Cranial Nerve Exam   CN II- Visual fields show an apparent left homonymous hemianopsia  CN III, IV,VI-EOMI, No nystagmus, conjugate eye movements, no ptosis  CN VII-right facial droop  CN VIII-Hearing intact        Motor Exam  Relatively normal throughout with significant atrophy in the hands       Tremors- no tremors in hands or head noted    Coordination  Clumsiness in the bilateral upper extremities           Nursing/pcp notes, imaging,labs and vitals reviewed.      PT,OT and/or speech notes reviewed    Lab Results   Component Value Date    WBC 6.3 11/20/2021    HGB 14.1 11/20/2021    HCT 46.6 11/20/2021    MCV 96.7 11/20/2021     11/20/2021     Lab Results   Component Value Date     11/20/2021    K 4.7 11/20/2021     11/20/2021    CO2 25 11/20/2021    BUN 27 (H) 11/20/2021    CREATININE 0.5 11/20/2021    GLUCOSE 84 11/20/2021    CALCIUM 10.5 (H) 11/20/2021    PROT 5.8 (L) 11/20/2021    LABALBU 3.9 11/20/2021    BILITOT 0.6 11/20/2021 ALKPHOS 98 11/20/2021    AST 27 11/20/2021    ALT 22 11/20/2021    LABGLOM >60 11/20/2021    GFRAA >59 11/20/2021     Lab Results   Component Value Date    INR 2.48 (H) 11/20/2021    INR 2.57 (H) 11/19/2021    INR 2.40 (H) 11/18/2021   No results found for: PHENYTOIN, ESR, CRP        11/19/21 0900   Restrictions/Precautions   Restrictions/Precautions Fall Risk   Position Activity Restriction   Other position/activity restrictions Dysphagia-soft and bite sized, NTL, water in between meals   General   Additional Pertinent Hx CVA; palliative care pt   Diagnosis Acute ischemic B CVA; aphasia; AMS; hemiplegia affecting dominant side   ADL   Grooming Setup; Verbal cueing  (Ind with oral hygiene)   UE Bathing Minimal assistance   Balance   Standing Balance Contact guard assistance   Standing Balance   Time 5 mins   Activity 1 hand table top task   Functional Mobility   Functional - Mobility Device 4-Wheeled Walker   Assist Level Minimal assistance   Functional Mobility Comments short distance   Type of ROM/Therapeutic Exercise   Type of ROM/Therapeutic Exercise AROM   Exercises   Grasp/Release RUE for midline crossing act   Perception   Unilateral Attention Cues to attend left visual field; Cues to maintain midline in sitting; Cues to maintain midline in standing; Cues to attend to left side of body   Assessment   Performance deficits / Impairments Decreased functional mobility ; Decreased ADL status; Decreased ROM; Decreased strength; Decreased safe awareness; Decreased cognition; Decreased endurance; Decreased high-level IADLs; Decreased balance; Decreased coordination; Decreased vision/visual deficit   Assessment Improved initiation of self care (joaquín grooming)  this session   Treatment Diagnosis B CVAs/R hemiparesis   Timed Code Treatment Minutes 60 Minutes   Safety Devices   Safety Devices in place Yes   Type of devices Left in chair; Call light within reach;  Chair alarm in place                      11/19/21 1300 Restrictions/Precautions   Restrictions/Precautions Fall Risk   Required Braces or Orthoses? No   Lower Extremity Weight Bearing Restrictions   Right Lower Extremity Weight Bearing Weight Bearing As Tolerated   Left Lower Extremity Weight Bearing Weight Bearing As Tolerated   Pain Screening   Patient Currently in Pain No   Bed Mobility   Rolling Stand by assistance   Supine to Sit Stand by assistance   Sit to Supine Stand by assistance   Scooting Stand by assistance   Comment cues for direction    Transfers   Sit to Stand Minimal Assistance   Stand to sit Minimal Assistance   Lateral Transfers Minimal Assistance   Comment assist to turn squat pivot to her right on to bedside commode    Ambulation   Ambulation? No   Balance   Posture Fair   Sitting - Static Fair; +   Sitting - Dynamic Fair; +   Standing - Static Fair; +   Standing - Dynamic Fair; -   Exercises   Heelslides x10   Hip Abduction x10   Other exercises   Other exercises? Yes   Other exercises 1 sitting dynamic balance exercises translation of objects to both R and L    Other exercises 2 standing dynamic exercise translation of items to both R and L   Conditions Requiring Skilled Therapeutic Intervention   Assessment pt performed various dynamic balance exercises in sitting and standing. pt presents with fair balance in both settings able to identify various objects presented to her and translating them to the opposite side while maintaining trunk balance. pt demosntrates with inattention to her left side unless maximally cued to look that way. after a few trials pt able to consistently translate objects from her left to right.           Objective:  She did attempt to complete orientation questions but could not recall the year, month, or her location.      She was unable to state her date of birth or age.  She was able to recall family members names without cueing.     She did follow simple one step commands at 35% this date.      She is able to communicate her immediate wants and needs. She is able to answer yes/no questions reliably this date.      Reviewed all swallowing precautions and she was able to recall a few of these immediately after education.      She was downgraded to pureed meats. Due to poor sensation and awareness, recommend she continue these for safety. She has exhibited coughing with ground and chopped meats.     She continues to exhibit inattention and visual deficits. She continues to require verbal and tactile cues to locate items on her tray table. Reminders were provided for her to utilize her call light. This was placed in her lap. RECORD REVIEW: Previous medical records, medications were reviewed at today's visit    IMPRESSION:   1. 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 Suspected to be cardioembolic. Plavix changed to Coumadin. Lovenox discontinued due to therapeutic INR. PT/OT/SLP  2. DVT prophylaxisCoumadin  3. Hyperlipidemiacontinue statin  4. GIbowel regimen  5. Bacteruria with unremarkable u/a. Suspect contaminant. No Rx for now. Continue current treatment       ELOS:11/28. Suspect snf.

## 2021-11-20 NOTE — FLOWSHEET NOTE
11/19/21 1830   Encounter Summary   Services provided to: Patient   Referral/Consult From: [address] [place]; Family members   Length of Encounter 30 minutes   Spiritual/Protestant   Type Spiritual support   Assessment Anxious; Coping   Intervention Active listening; Explored feelings, thoughts, concerns; Prayer   Outcome Expressed feelings/needs/concerns     Patient's son requested a visit. Ms. Daniele Castillo was comfortable resting in the bed. Wanted \"to have a cup of coffee\" with this . Discussed patient care with pt's nurse. No family present at this time. Patient indicated no other needs.   Electronically signed by Stacy Gross on 11/19/2021 at 7:38 PM

## 2021-11-20 NOTE — PLAN OF CARE
Problem: Falls - Risk of:  Goal: Will remain free from falls  Description: Will remain free from falls  11/19/2021 2350 by Robin Bergeron RN  Outcome: Ongoing  11/19/2021 1154 by Linda Miller LPN  Outcome: Ongoing  Goal: Absence of physical injury  Description: Absence of physical injury  11/19/2021 2350 by Robin Bergeron RN  Outcome: Ongoing  11/19/2021 1154 by Linda Miller LPN  Outcome: Ongoing     Problem: Confusion - Acute:  Goal: Absence of continued neurological deterioration signs and symptoms  Description: Absence of continued neurological deterioration signs and symptoms  11/19/2021 2350 by Robin Bergeron RN  Outcome: Ongoing  11/19/2021 1154 by Linda Miller LPN  Outcome: Ongoing  Goal: Mental status will be restored to baseline  Description: Mental status will be restored to baseline  11/19/2021 2350 by Robin Bergeron RN  Outcome: Ongoing  11/19/2021 1154 by Linda Miller LPN  Outcome: Ongoing

## 2021-11-20 NOTE — PLAN OF CARE
Problem: Falls - Risk of:  Goal: Will remain free from falls  Description: Will remain free from falls  11/20/2021 0900 by Gallito Lopez RN  Outcome: Ongoing  11/19/2021 2350 by Robin Bergeron RN  Outcome: Ongoing  Goal: Absence of physical injury  Description: Absence of physical injury  11/20/2021 0900 by Gallito Lopez RN  Outcome: Ongoing  11/19/2021 2350 by Robin Bergeron RN  Outcome: Ongoing

## 2021-11-21 LAB
ALBUMIN SERPL-MCNC: 3.7 G/DL (ref 3.5–5.2)
ALP BLD-CCNC: 94 U/L (ref 35–104)
ALT SERPL-CCNC: 23 U/L (ref 5–33)
ANION GAP SERPL CALCULATED.3IONS-SCNC: 8 MMOL/L (ref 7–19)
AST SERPL-CCNC: 26 U/L (ref 5–32)
BASOPHILS ABSOLUTE: 0 K/UL (ref 0–0.2)
BASOPHILS RELATIVE PERCENT: 0.7 % (ref 0–1)
BILIRUB SERPL-MCNC: 0.5 MG/DL (ref 0.2–1.2)
BUN BLDV-MCNC: 32 MG/DL (ref 8–23)
CALCIUM SERPL-MCNC: 11 MG/DL (ref 8.8–10.2)
CHLORIDE BLD-SCNC: 110 MMOL/L (ref 98–111)
CO2: 30 MMOL/L (ref 22–29)
CREAT SERPL-MCNC: 0.7 MG/DL (ref 0.5–0.9)
EOSINOPHILS ABSOLUTE: 0.2 K/UL (ref 0–0.6)
EOSINOPHILS RELATIVE PERCENT: 3.3 % (ref 0–5)
GFR AFRICAN AMERICAN: >59
GFR NON-AFRICAN AMERICAN: >60
GLUCOSE BLD-MCNC: 103 MG/DL (ref 74–109)
GLUCOSE BLD-MCNC: 104 MG/DL (ref 70–99)
HCT VFR BLD CALC: 46.2 % (ref 37–47)
HEMOGLOBIN: 14 G/DL (ref 12–16)
IMMATURE GRANULOCYTES #: 0 K/UL
INR BLD: 2.37 (ref 0.88–1.18)
LYMPHOCYTES ABSOLUTE: 1.6 K/UL (ref 1.1–4.5)
LYMPHOCYTES RELATIVE PERCENT: 28.1 % (ref 20–40)
MCH RBC QN AUTO: 29.3 PG (ref 27–31)
MCHC RBC AUTO-ENTMCNC: 30.3 G/DL (ref 33–37)
MCV RBC AUTO: 96.7 FL (ref 81–99)
MONOCYTES ABSOLUTE: 0.5 K/UL (ref 0–0.9)
MONOCYTES RELATIVE PERCENT: 7.7 % (ref 0–10)
NEUTROPHILS ABSOLUTE: 3.5 K/UL (ref 1.5–7.5)
NEUTROPHILS RELATIVE PERCENT: 59.7 % (ref 50–65)
PDW BLD-RTO: 14.3 % (ref 11.5–14.5)
PERFORMED ON: ABNORMAL
PLATELET # BLD: 181 K/UL (ref 130–400)
PMV BLD AUTO: 9.8 FL (ref 9.4–12.3)
POTASSIUM SERPL-SCNC: 5.1 MMOL/L (ref 3.5–5)
PROTHROMBIN TIME: 25.5 SEC (ref 12–14.6)
RBC # BLD: 4.78 M/UL (ref 4.2–5.4)
SODIUM BLD-SCNC: 148 MMOL/L (ref 136–145)
TOTAL PROTEIN: 5.6 G/DL (ref 6.6–8.7)
WBC # BLD: 5.8 K/UL (ref 4.8–10.8)

## 2021-11-21 PROCEDURE — 82947 ASSAY GLUCOSE BLOOD QUANT: CPT

## 2021-11-21 PROCEDURE — 85610 PROTHROMBIN TIME: CPT

## 2021-11-21 PROCEDURE — 36415 COLL VENOUS BLD VENIPUNCTURE: CPT

## 2021-11-21 PROCEDURE — 6370000000 HC RX 637 (ALT 250 FOR IP): Performed by: PSYCHIATRY & NEUROLOGY

## 2021-11-21 PROCEDURE — 80053 COMPREHEN METABOLIC PANEL: CPT

## 2021-11-21 PROCEDURE — 85025 COMPLETE CBC W/AUTO DIFF WBC: CPT

## 2021-11-21 PROCEDURE — 1180000000 HC REHAB R&B

## 2021-11-21 PROCEDURE — 6370000000 HC RX 637 (ALT 250 FOR IP): Performed by: NURSE PRACTITIONER

## 2021-11-21 RX ORDER — WARFARIN SODIUM 5 MG/1
5 TABLET ORAL
Status: COMPLETED | OUTPATIENT
Start: 2021-11-21 | End: 2021-11-21

## 2021-11-21 RX ADMIN — WARFARIN SODIUM 5 MG: 5 TABLET ORAL at 17:30

## 2021-11-21 RX ADMIN — DOCUSATE SODIUM 100 MG: 100 CAPSULE, LIQUID FILLED ORAL at 07:42

## 2021-11-21 RX ADMIN — FLUTICASONE PROPIONATE 1 SPRAY: 50 SPRAY, METERED NASAL at 07:42

## 2021-11-21 RX ADMIN — ATORVASTATIN CALCIUM 20 MG: 20 TABLET, FILM COATED ORAL at 07:41

## 2021-11-21 RX ADMIN — SENNOSIDES AND DOCUSATE SODIUM 1 TABLET: 50; 8.6 TABLET ORAL at 07:41

## 2021-11-21 RX ADMIN — CETIRIZINE HYDROCHLORIDE 5 MG: 10 TABLET, FILM COATED ORAL at 07:42

## 2021-11-21 NOTE — PLAN OF CARE
Problem: Falls - Risk of:  Goal: Will remain free from falls  Description: Will remain free from falls  11/20/2021 2132 by Keeley Park RN  Outcome: Ongoing  11/20/2021 0900 by Christelle Natarajan RN  Outcome: Ongoing  Goal: Absence of physical injury  Description: Absence of physical injury  11/20/2021 2132 by Keeley Park RN  Outcome: Ongoing  11/20/2021 0900 by Christelle Natarajan RN  Outcome: Ongoing     Problem: Confusion - Acute:  Goal: Absence of continued neurological deterioration signs and symptoms  Description: Absence of continued neurological deterioration signs and symptoms  11/20/2021 2132 by Keeley Park RN  Outcome: Ongoing  11/20/2021 0900 by Christelle Natarajan RN  Outcome: Ongoing  Goal: Mental status will be restored to baseline  Description: Mental status will be restored to baseline  11/20/2021 2132 by Keeley Park RN  Outcome: Ongoing  11/20/2021 0900 by Christelle Natarajan RN  Outcome: Ongoing

## 2021-11-21 NOTE — PROGRESS NOTES
Clinical Pharmacy Note    Warfarin consult follow-up    Recent Labs     11/21/21  0453   INR 2.37*     Recent Labs     11/19/21  0431 11/20/21  0507 11/21/21  0453   HGB 13.7 14.1 14.0   HCT 43.9 46.6 46.2    171 181       Significant Drug-Drug Interactions:  New warfarin drug-drug interactions: None  Discontinued drug-drug interactions: None    Date INR Warfarin Dose   11/06/21 1.01 ---   11/07/21 --- 5 mg   11/08/21 1.04  5 mg    11/09/21  1.30 5 mg   11/10/21 1.74 5 mg    11/11/21  2.44 2.5 mg    11/12/21 2.02 5 mg    11/13/21  1.97 5 mg   11/14/21  2.44  4 mg    11/15/21 2.58 4 mg    11/16/21 2.35  5 mg    11/17/21 2.30  5 mg    11/18/21 2.40 4 mg    11/19/21 2.57  4 mg    11/20/21 2.48  4 mg   11/21/21 2.37 5 mg                 Notes:  Give Warfarin 5 mg po x 1 tonight. Daily PT/INR until stable within therapeutic range.      Electronically signed by Julio C Bruce [facility] on 11/21/2021 at 9:34 AM

## 2021-11-22 ENCOUNTER — APPOINTMENT (OUTPATIENT)
Dept: CT IMAGING | Age: 86
DRG: 056 | End: 2021-11-22
Attending: PSYCHIATRY & NEUROLOGY
Payer: MEDICARE

## 2021-11-22 LAB
ALBUMIN SERPL-MCNC: 3.8 G/DL (ref 3.5–5.2)
ALP BLD-CCNC: 90 U/L (ref 35–104)
ALT SERPL-CCNC: 20 U/L (ref 5–33)
AMMONIA: 17 UMOL/L (ref 11–51)
ANION GAP SERPL CALCULATED.3IONS-SCNC: 9 MMOL/L (ref 7–19)
AST SERPL-CCNC: 24 U/L (ref 5–32)
BASOPHILS ABSOLUTE: 0.1 K/UL (ref 0–0.2)
BASOPHILS RELATIVE PERCENT: 0.8 % (ref 0–1)
BILIRUB SERPL-MCNC: 0.6 MG/DL (ref 0.2–1.2)
BUN BLDV-MCNC: 34 MG/DL (ref 8–23)
CALCIUM SERPL-MCNC: 10.5 MG/DL (ref 8.8–10.2)
CHLORIDE BLD-SCNC: 110 MMOL/L (ref 98–111)
CO2: 28 MMOL/L (ref 22–29)
CREAT SERPL-MCNC: 0.6 MG/DL (ref 0.5–0.9)
EOSINOPHILS ABSOLUTE: 0.2 K/UL (ref 0–0.6)
EOSINOPHILS RELATIVE PERCENT: 2.7 % (ref 0–5)
GFR AFRICAN AMERICAN: >59
GFR NON-AFRICAN AMERICAN: >60
GLUCOSE BLD-MCNC: 101 MG/DL (ref 70–99)
GLUCOSE BLD-MCNC: 41 MG/DL (ref 70–99)
GLUCOSE BLD-MCNC: 67 MG/DL (ref 70–99)
GLUCOSE BLD-MCNC: 79 MG/DL (ref 70–99)
GLUCOSE BLD-MCNC: 95 MG/DL (ref 74–109)
HCT VFR BLD CALC: 47.2 % (ref 37–47)
HEMOGLOBIN: 14.3 G/DL (ref 12–16)
IMMATURE GRANULOCYTES #: 0 K/UL
INR BLD: 1.86 (ref 0.88–1.18)
LYMPHOCYTES ABSOLUTE: 1.3 K/UL (ref 1.1–4.5)
LYMPHOCYTES RELATIVE PERCENT: 22.1 % (ref 20–40)
MCH RBC QN AUTO: 29.7 PG (ref 27–31)
MCHC RBC AUTO-ENTMCNC: 30.3 G/DL (ref 33–37)
MCV RBC AUTO: 97.9 FL (ref 81–99)
MONOCYTES ABSOLUTE: 0.4 K/UL (ref 0–0.9)
MONOCYTES RELATIVE PERCENT: 6.6 % (ref 0–10)
NEUTROPHILS ABSOLUTE: 4 K/UL (ref 1.5–7.5)
NEUTROPHILS RELATIVE PERCENT: 67.6 % (ref 50–65)
PDW BLD-RTO: 14.2 % (ref 11.5–14.5)
PERFORMED ON: ABNORMAL
PERFORMED ON: NORMAL
PLATELET # BLD: 183 K/UL (ref 130–400)
PMV BLD AUTO: 10.5 FL (ref 9.4–12.3)
POTASSIUM REFLEX MAGNESIUM: 4.2 MMOL/L (ref 3.5–5)
PROTHROMBIN TIME: 21.3 SEC (ref 12–14.6)
RBC # BLD: 4.82 M/UL (ref 4.2–5.4)
SODIUM BLD-SCNC: 147 MMOL/L (ref 136–145)
TOTAL PROTEIN: 5.7 G/DL (ref 6.6–8.7)
WBC # BLD: 5.9 K/UL (ref 4.8–10.8)

## 2021-11-22 PROCEDURE — 6370000000 HC RX 637 (ALT 250 FOR IP): Performed by: PSYCHIATRY & NEUROLOGY

## 2021-11-22 PROCEDURE — 1180000000 HC REHAB R&B

## 2021-11-22 PROCEDURE — 82947 ASSAY GLUCOSE BLOOD QUANT: CPT

## 2021-11-22 PROCEDURE — 82140 ASSAY OF AMMONIA: CPT

## 2021-11-22 PROCEDURE — 6370000000 HC RX 637 (ALT 250 FOR IP): Performed by: NURSE PRACTITIONER

## 2021-11-22 PROCEDURE — 97535 SELF CARE MNGMENT TRAINING: CPT

## 2021-11-22 PROCEDURE — 85610 PROTHROMBIN TIME: CPT

## 2021-11-22 PROCEDURE — 36415 COLL VENOUS BLD VENIPUNCTURE: CPT

## 2021-11-22 PROCEDURE — 85025 COMPLETE CBC W/AUTO DIFF WBC: CPT

## 2021-11-22 PROCEDURE — 97116 GAIT TRAINING THERAPY: CPT

## 2021-11-22 PROCEDURE — 92526 ORAL FUNCTION THERAPY: CPT

## 2021-11-22 PROCEDURE — 80053 COMPREHEN METABOLIC PANEL: CPT

## 2021-11-22 PROCEDURE — 99232 SBSQ HOSP IP/OBS MODERATE 35: CPT | Performed by: PSYCHIATRY & NEUROLOGY

## 2021-11-22 PROCEDURE — 97129 THER IVNTJ 1ST 15 MIN: CPT

## 2021-11-22 PROCEDURE — 70450 CT HEAD/BRAIN W/O DYE: CPT

## 2021-11-22 PROCEDURE — 97130 THER IVNTJ EA ADDL 15 MIN: CPT

## 2021-11-22 RX ORDER — WARFARIN SODIUM 3 MG/1
6 TABLET ORAL
Status: COMPLETED | OUTPATIENT
Start: 2021-11-22 | End: 2021-11-22

## 2021-11-22 RX ADMIN — DOCUSATE SODIUM 100 MG: 100 CAPSULE, LIQUID FILLED ORAL at 07:55

## 2021-11-22 RX ADMIN — SENNOSIDES AND DOCUSATE SODIUM 1 TABLET: 50; 8.6 TABLET ORAL at 07:55

## 2021-11-22 RX ADMIN — SENNOSIDES AND DOCUSATE SODIUM 1 TABLET: 50; 8.6 TABLET ORAL at 20:36

## 2021-11-22 RX ADMIN — DEXTROSE 15 G: 15 GEL ORAL at 07:30

## 2021-11-22 RX ADMIN — FLUTICASONE PROPIONATE 1 SPRAY: 50 SPRAY, METERED NASAL at 07:55

## 2021-11-22 RX ADMIN — WARFARIN SODIUM 6 MG: 3 TABLET ORAL at 17:05

## 2021-11-22 RX ADMIN — ATORVASTATIN CALCIUM 20 MG: 20 TABLET, FILM COATED ORAL at 07:54

## 2021-11-22 RX ADMIN — CETIRIZINE HYDROCHLORIDE 5 MG: 10 TABLET, FILM COATED ORAL at 07:54

## 2021-11-22 ASSESSMENT — PAIN SCALES - WONG BAKER
WONGBAKER_NUMERICALRESPONSE: 0
WONGBAKER_NUMERICALRESPONSE: 0

## 2021-11-22 ASSESSMENT — PAIN SCALES - GENERAL
PAINLEVEL_OUTOF10: 0
PAINLEVEL_OUTOF10: 0

## 2021-11-22 NOTE — PROGRESS NOTES
Assisted patient with lunch she was able to swallow her food but refused to eat all of her meal. When asked if she wanted anymore to eat she shook her head no. She seems drowsy and when try to assess her eyes close she closes them shut. Accu check was 79 bp 129/85, respirations 12 even no distress noted, heart rate 58, o2 sat 98 percent. Informed Dr. Rina Pride.

## 2021-11-22 NOTE — PATIENT CARE CONFERENCE
[facility] ACUTE INPATIENT REHABILITATION  TEAM CONFERENCE NOTE    Date: 2021  Patient Name: Benoit Acuna        MRN: [id number]    : 1933  (80 y.o.)  Gender: female      Diagnosis: Acute ischemic stroke, L MCA infarct, R sided involvement       PHYSICAL THERAPY  STRENGTH  Strength RLE  Comment: grossly assessed 3+/5  Strength LLE  Comment: grossly assessed 4/5  ROM  AROM RLE (degrees)  RLE AROM: WFL  AROM LLE (degrees)  LLE AROM : WFL  BED MOBILITY  Bed Mobility  Bridging: Supervision  Rolling: Stand by assistance  Supine to Sit: Stand by assistance  Sit to Supine: Stand by assistance  Scooting: Stand by assistance  Comment: pt understood direction well, immediately fell asleep once supine   TRANSFERS  Transfers  Sit to Stand: Minimal Assistance  Stand to sit: Minimal Assistance  Bed to Chair: Minimal assistance, Moderate assistance  Stand Pivot Transfers: Moderate Assistance  Squat Pivot Transfers: Minimal Assistance  Lateral Transfers:  Moderate Assistance, Maximum Assistance (squat pivot to [facility] on R side)  Comment: used so milesseu to transfer from [facility] to [facility] so that we could tend to toileting hygiene with less difficulty  WHEELCHAIR PROPULSION     AMBULATION  Ambulation 1  Surface: level tile  Device: Rollator  Other Apparatus: Wheelchair follow  Assistance: Minimal assistance  Quality of Gait: ant shifted cog with tendency to push rollator too far ahead, leans to R also  Gait Deviations: Decreased step length (see quality of gt)  Distance: 76'  Comments: turning to L with less difficulty  STAIRS     GOALS:  Short term goals  Time Frame for Short term goals: 1-2 weeks  Short term goal 1: bed mobility Mod I   Short term goal 2: transfers with AD SBA  Short term goal 3: ambulation 50ft with AD Joshua  Short term goal 4: navigate 4 step with handrails Joshua  Short term goal 5: WC mobility 75ft CGA    Long term goals  Time Frame for Long term goals : 3-4 weeks  Long term goal 1: bed mobility Indp  Long term goal 2: transfers with AD Mod I  Long term goal 3: ambulation 150 ft with AD SBA  Long term goal 4: navigate 4 step with hand rail SBA  Long term goal 5: WC mobility 150 ft SBA    ASSESSMENT:  Assessment: pt needs frequent vc's and tactile cues to stay on task with ex; reporting fatigue with gt but denies need to sit down initially    SPEECH THERAPY      OCCUPATIONAL THERAPY    CURRENT IRF-CORTES SCORES  Eating: CARE Score: 1  Comment: Pt. very lethargic, pt. unable to bring utensil to mouth, vc and tactile cues for alertness to chew and swallow food. Oral Hygiene: CARE Score: 4  Comment: vc     Toileting: CARE Score: 1  Comment: Pt is incontinent of bladder      Shower/Bathe: CARE Score: 3  Comment: max cues        Upper Body Dressing: CARE Score: 3         Lower Body Dressing: CARE Score: 2          Footwear: CARE Score: 2          Toilet Transfers: CARE Score: 3          Picking Up Object:  CARE Score: 1          UE Functioning:  Hx of multiple recent strokes, B involvement with decreased strength RUE and LUE inattention and apraxia      Pain Assessment:  Pain Level: 0       STGs:  Short term goals  Time Frame for Short term goals: 1 week  Short term goal 1: Mod A with clothing management/hygiene for toileting. Short term goal 2: Mod A with LB dressing, AE PRN. Short term goal 3: Mod A with donning/doffing socks and shoes, AE PRN. Short term goal 4: Min A with toilet transfers. Short term goal 5: Min A with bathing hygiene. LTGs:  Long term goals  Time Frame for Long term goals : 3-4 weeks  Long term goal 1: CGA with clothing management/hygiene for toileting. Long term goal 2: Min A with LB dressing, AE PRN. Long term goal 3: Min A with donning/doffing socks and shoes, AE PRN. Long term goal 4: CGA with toilet transfers. Long term goal 5: Supervision with bathing hygiene. Long term goals 6: Patient/family will verbalize DME needs.     Assessment:  Performance deficits / Impairments: Decreased functional mobility , Decreased ADL status, Decreased ROM, Decreased strength, Decreased safe awareness, Decreased cognition, Decreased endurance, Decreased high-level IADLs, Decreased balance, Decreased coordination, Decreased vision/visual deficit                  NUTRITION  Current Wt: Weight: 135 lb 6 oz (61.4 kg) / Body mass index is 21.85 kg/m². Admission Wt: Admission Body Weight: 126 lb (57.2 kg)  Oral Diet Orders: Soft/Bited-Sized  Oral Nutrition Supplement (ONS) Orders: Ensure Enlive/Plus TID; HS snack    Pt asleep at time of visit. Po intake remains >50% when alert and fed, few meals <50% likely d/t drowsiness/fatigue. Noted elevated Na- encourage fluid intake, several low gluc ()- started HS snack, and increased ONS from BID to TID. Please see nutrition note for details. NURSING    Wounds/Incisions/Ulcers: Buttocks heal with scab in place and zinc cream applied. Scattered bruising. Shane Scale Score: 16    Pain: No voiced c/o pain    Consultations/Labs/X-rays:   Routine labs on Monday and Thursdays and Daily PT/INR    Family Education: Need to make contact with family to initiate education prior to discharge. Fall Risk:  Mckeon Score: 80    Fall in the last week? No falls this hospital stay. Other Nursing Issues:   Patient is Alert and Oriented to person. Incontinent of bowel and bladder with last BM on 11/21/21 and uses pure wick at night. Accu checks bid and runs between 50-60 with no insulin coverage. O2 at 2L/NC prn. Patient is on Dysphagia Soft diet and is feeder and takes meds crushed in apple sauce or pudding. SOCIAL WORK/CASE MANAGEMENT  Assessment: Pleasant, Son is very involved-trying to keep her in home setting, he is building new home that will have a suite for her but at present his home is next door, he works from home but her needs exceed this set up.     Discharge Plan   Estimated Length of Stay: 11/29/21  Destination: skilled nursing facility    Pass: No    Services at Discharge: SNF Physical Therapy, Occupational Therapy, Speech Therapy and Nursing daily    Equipment at Discharge:not addressed d/t discharge plans of skilled nursing facility    Progress made in the prior week:  1. Improved distance of ambulation  2. Increased initiation of self care  3. SBA with grooming  4.  5.      Goals for following week:  1. Erect posture while ambulating 25 ft cga  2. Self feed with cues only  3.   4.   5.     Factors facilitating achievement of predicted outcomes: Family support, Cooperative and Pleasant    Barriers to the achievement of predicted outcomes: Pain, Confusion, Cognitive deficit, Impulsivity, Limited safety awareness, Communication deficit, Decreased endurance, Decreased sensation, Decreased proprioception, Upper extremity weakness and Lower extremity weakness    Team Members Present at Conference:  : Rachel Nolan 23   Occupational Therapist: Davida Wellington OTR/L  Physical Therapist: Lee Liu PT,DPT  Speech Therapist: Sandra Marques [address], [id number] [facility]  Nurse: Tj Carlin RN,BSN   Nurse Manager:  Tj Carlin RN, BSN  Dietitian:  Dorothea Arcos, MS, RD, LD  Rehab Director:  Jovan Upton I approve the established interdisciplinary plan of care as documented within the medical record of Richardson Cosby.

## 2021-11-22 NOTE — PLAN OF CARE
Problem: Falls - Risk of:  Goal: Will remain free from falls  Description: Will remain free from falls  11/22/2021 0857 by Mono Styles RN  Outcome: Ongoing  11/21/2021 2340 by Juaquin Montenegro RN  Outcome: Ongoing  11/21/2021 2339 by Juaquin Montenegro RN  Outcome: Ongoing  Goal: Absence of physical injury  Description: Absence of physical injury  11/22/2021 0857 by Mono Styles RN  Outcome: Ongoing  11/21/2021 2340 by Juaquin Montenegro RN  Outcome: Ongoing  11/21/2021 2339 by Juaquin Montenegro RN  Outcome: Ongoing     Problem: Confusion - Acute:  Goal: Absence of continued neurological deterioration signs and symptoms  Description: Absence of continued neurological deterioration signs and symptoms  11/22/2021 0857 by Mono Styles RN  Outcome: Ongoing  11/21/2021 2340 by Juaquin Montenegro RN  Outcome: Ongoing  11/21/2021 2339 by Juaquin Montenegro RN  Outcome: Ongoing  Goal: Mental status will be restored to baseline  Description: Mental status will be restored to baseline  11/22/2021 0857 by Mono Styles RN  Outcome: Ongoing  11/21/2021 2340 by Juaquin Montenegro RN  Outcome: Ongoing  11/21/2021 2339 by Juaquin Montenegro RN  Outcome: Ongoing     Problem: Discharge Planning:  Goal: Ability to perform activities of daily living will improve  Description: Ability to perform activities of daily living will improve  11/22/2021 0857 by Mono Styles RN  Outcome: Ongoing  11/21/2021 2340 by Juaquin Montenegro RN  Outcome: Ongoing  11/21/2021 2339 by Juaquin Montenegro RN  Outcome: Ongoing  Goal: Participates in care planning  Description: Participates in care planning  11/22/2021 0857 by Mono Styles RN  Outcome: Ongoing  11/21/2021 2340 by Juaquin Montenegro RN  Outcome: Ongoing  11/21/2021 2339 by Juaquin Montenegro RN  Outcome: Ongoing     Problem: Injury - Risk of, Physical Injury:  Goal: Will remain free from falls  Description: Will remain free from falls  11/22/2021 0857 RN  Outcome: Ongoing  11/21/2021 2339 by Edith Hernandez RN  Outcome: Ongoing  Goal: Decrease in sensory misperception frequency  Description: Decrease in sensory misperception frequency  11/22/2021 0857 by Maksim Leija RN  Outcome: Ongoing  11/21/2021 2340 by Edith Hernandez RN  Outcome: Ongoing  11/21/2021 2339 by Edith Hernandez RN  Outcome: Ongoing  Goal: Able to refrain from responding to false sensory perceptions  Description: Able to refrain from responding to false sensory perceptions  11/22/2021 0857 by Maksim Leija RN  Outcome: Ongoing  11/21/2021 2340 by Edith Hernandez RN  Outcome: Ongoing  11/21/2021 2339 by Edith Hernandez RN  Outcome: Ongoing  Goal: Demonstrates accurate environmental perceptions  Description: Demonstrates accurate environmental perceptions  11/22/2021 0857 by Maksim Leija RN  Outcome: Ongoing  11/21/2021 2340 by Edith Hernandez RN  Outcome: Ongoing  11/21/2021 2339 by Edith Hernandez RN  Outcome: Ongoing  Goal: Able to distinguish between reality-based and nonreality-based thinking  Description: Able to distinguish between reality-based and nonreality-based thinking  11/22/2021 0857 by Maksim Leija RN  Outcome: Ongoing  11/21/2021 2340 by Edith Hernandez RN  Outcome: Ongoing  11/21/2021 2339 by Edith Hernandez RN  Outcome: Ongoing  Goal: Able to interrupt nonreality-based thinking  Description: Able to interrupt nonreality-based thinking  11/22/2021 0857 by Maksim Leija RN  Outcome: Ongoing  11/21/2021 2340 by Edith Hernandez RN  Outcome: Ongoing  11/21/2021 2339 by Edith Hernandez RN  Outcome: Ongoing     Problem: Sleep Pattern Disturbance:  Goal: Appears well-rested  Description: Appears well-rested  11/22/2021 0857 by Maksim Leija RN  Outcome: Ongoing  11/21/2021 2340 by dEith Hernandez RN  Outcome: Ongoing  11/21/2021 2339 by Edith Hernandez RN  Outcome: Ongoing     Problem: IP BLADDER/VOIDING  Goal: LTG - patient will complete bladder elimination  11/22/2021 0857 by Maksim Leija RN  Outcome: Ongoing  11/21/2021 2340 by Edith Hernandez RN  Outcome: Ongoing  11/21/2021 2339 by Edith Hernandez RN  Outcome: Ongoing  Goal: LTG - Patient will utilize adaptive techniques/equipment to complete bladder elimination  11/22/2021 0857 by Maksim Leija RN  Outcome: Ongoing  11/21/2021 2340 by Edith Hernandez RN  Outcome: Ongoing  11/21/2021 2339 by Edith Hernandez RN  Outcome: Ongoing  Goal: LTG - patient will achieve acceptable level of continence  11/22/2021 0857 by Maksim Leija RN  Outcome: Ongoing  11/21/2021 2340 by Edith Hernandez RN  Outcome: Ongoing  11/21/2021 2339 by Edith Hernandez RN  Outcome: Ongoing  Goal: STG - Patient demonstrates ability to take care of indwelling catheter  11/22/2021 0857 by Maksim Leija RN  Outcome: Ongoing  11/21/2021 2340 by Edith Hernandez RN  Outcome: Ongoing  11/21/2021 2339 by Edith Hernandez RN  Outcome: Ongoing  Goal: STG - patient demonstrates self-cath technique using clean technique and care of the catheter  11/22/2021 0857 by Maksim Leija RN  Outcome: Ongoing  11/21/2021 2340 by Edith Hernandez RN  Outcome: Ongoing  11/21/2021 2339 by Edith Hernandez RN  Outcome: Ongoing  Goal: STG - Patient demonstrates no accidents  11/22/2021 0857 by Maksim Leija RN  Outcome: Ongoing  11/21/2021 2340 by Edith Hernandez RN  Outcome: Ongoing  11/21/2021 2339 by Edith Hernandez RN  Outcome: Ongoing  Goal: STG - Patient will state signs and symptoms of UTI  11/22/2021 0857 by Maksim Leija RN  Outcome: Ongoing  11/21/2021 2340 by Edith Hernandez RN  Outcome: Ongoing  11/21/2021 2339 by Edith Hernandez RN  Outcome: Ongoing  Goal: STG - patient will be able to empty bladder  11/22/2021 0857 by Maksim Leija RN  Outcome: Ongoing  11/21/2021 2340 by Edith Hernandez RN  Outcome: Ongoing  11/21/2021 2339 by Cyndy Barrera Cehly Lainez RN  Outcome: Ongoing  Goal: STG - Patient demonstrates understanding of self catheterization schedule by completing task on time  11/22/2021 0857 by Jarek Donis RN  Outcome: Ongoing  11/21/2021 2340 by Racquel Rodriguez RN  Outcome: Ongoing  11/21/2021 2339 by Racquel Rodriguez RN  Outcome: Ongoing  Goal: STG - patient participates in bladder program by expressing need to void  11/22/2021 0857 by Jarek Donis RN  Outcome: Ongoing  11/21/2021 2340 by Racquel Rodriguez RN  Outcome: Ongoing  11/21/2021 2339 by Racquel Rodriguez RN  Outcome: Ongoing  Goal: STG - Patient verbalizes understanding of catheter care indwelling/intermittent  11/22/2021 0857 by Jarek Donis RN  Outcome: Ongoing  11/21/2021 2340 by Racquel Rodriguez RN  Outcome: Ongoing  11/21/2021 2339 by Racquel Rodriguez RN  Outcome: Ongoing  Goal: STG - patient participates in bladder program by adhering to implemented toileting schedule  11/22/2021 0857 by Jarek Donis RN  Outcome: Ongoing  11/21/2021 2340 by Racquel Rodriguez RN  Outcome: Ongoing  11/21/2021 2339 by Racquel Rodriguez RN  Outcome: Ongoing     Problem: IP BOWEL ELIMINATION  Goal: LTG - patient will utilize adaptive techniques/equipment to complete bowel elimination  11/22/2021 0857 by Jarek Donis RN  Outcome: Ongoing  11/21/2021 2340 by Racquel Rodriguez RN  Outcome: Ongoing  11/21/2021 2339 by Racquel Rodriguez RN  Outcome: Ongoing  Goal: LTG - patient will have regular and routine bowel evacuation  11/22/2021 0857 by Jarek Donis RN  Outcome: Ongoing  11/21/2021 2340 by Racquel Rodriguez RN  Outcome: Ongoing  11/21/2021 2339 by Racquel Rodriguez RN  Outcome: Ongoing  Goal: STG - patient will be accident free  11/22/2021 0857 by Jarek Donis RN  Outcome: Ongoing  11/21/2021 2340 by Racquel Rodriguez RN  Outcome: Ongoing  11/21/2021 2339 by Racquel Rodriguez RN  Outcome: Ongoing  Goal: STG - Patient demonstrates care and management of ostomy bag  11/22/2021 0857 by Leon Hooker RN  Outcome: Ongoing  11/21/2021 2340 by Lula Self RN  Outcome: Ongoing  11/21/2021 2339 by Lula Self RN  Outcome: Ongoing  Goal: STG - Patient participates in bowel management program  11/22/2021 0857 by Leon Hooker RN  Outcome: Ongoing  11/21/2021 2340 by Lula Self RN  Outcome: Ongoing  11/21/2021 2339 by Lula Self RN  Outcome: Ongoing  Goal: STG - patient maintains skin integrity  11/22/2021 0857 by Leon Hooker RN  Outcome: Ongoing  11/21/2021 2340 by Lula Self RN  Outcome: Ongoing  11/21/2021 2339 by Lula Self RN  Outcome: Ongoing  Goal: STG - Patient will verbalize signs and symptoms of constipation and how to prevent/alleviate  11/22/2021 0857 by Leon Hooker RN  Outcome: Ongoing  11/21/2021 2340 by Lula Self RN  Outcome: Ongoing  11/21/2021 2339 by Lula Self RN  Outcome: Ongoing  Goal: STG - patient will be continent of stool  11/22/2021 0857 by Leon Hooker RN  Outcome: Ongoing  11/21/2021 2340 by Lula Self RN  Outcome: Ongoing  11/21/2021 2339 by Lula Self RN  Outcome: Ongoing  Goal: STG - Patient completes digital stimulation technique  11/22/2021 0857 by Leon Hooker RN  Outcome: Ongoing  11/21/2021 2340 by Lula Self RN  Outcome: Ongoing  11/21/2021 2339 by Lula Self RN  Outcome: Ongoing  Goal: STG - Patient verbalizes knowledge about relationship between diet, fluid intake, activity and medication on constipation  11/22/2021 0857 by Leon Hooker RN  Outcome: Ongoing  11/21/2021 2340 by Lula Self RN  Outcome: Ongoing  11/21/2021 2339 by Lula Self RN  Outcome: Ongoing     Problem: IP BREATHING  Goal: LTG - patient will mobilize secretions and maintain airway  11/22/2021 0857 by Leon Hooker RN  Outcome: Ongoing  11/21/2021 2340 by Lula Self RN  Outcome: Ongoing  11/21/2021 2339 by Kayy Louie RN  Outcome: Ongoing  Goal: LTG - Patient/caregiver will demonstrate/perform proper techniques to maintain patent airway  11/22/2021 0857 by Radha Ortiz RN  Outcome: Ongoing  11/21/2021 2340 by Kayy Louie RN  Outcome: Ongoing  11/21/2021 2339 by Kayy Louie RN  Outcome: Ongoing  Goal: LTG - patient/caregiver will demonstrate/perform improved or stable self care abilities within physical limitations  11/22/2021 0857 by Rahda Ortiz RN  Outcome: Ongoing  11/21/2021 2340 by Kayy Louie RN  Outcome: Ongoing  11/21/2021 2339 by Kayy Louie RN  Outcome: Ongoing  Goal: STG - Patient/caregiver will maintain patent airway  11/22/2021 0857 by aRdha Ortiz RN  Outcome: Ongoing  11/21/2021 2340 by Kayy Louie RN  Outcome: Ongoing  11/21/2021 2339 by Kayy Louie RN  Outcome: Ongoing  Goal: STG - respiratory rate and effort will be within normal limits for the patient  11/22/2021 0857 by Radha Ortiz RN  Outcome: Ongoing  11/21/2021 2340 by Kayy Louie RN  Outcome: Ongoing  11/21/2021 2339 by Kayy Louie RN  Outcome: Ongoing  Goal: STG - patient/caregiver will be able to verbalize oxygen safety precautions  11/22/2021 0857 by Radha Ortiz RN  Outcome: Ongoing  11/21/2021 2340 by Kayy Louie RN  Outcome: Ongoing  11/21/2021 2339 by Kayy Louie RN  Outcome: Ongoing  Goal: STG - Patient/caregiver demonstrates correct suctioning technique  11/22/2021 0857 by Radha Ortiz RN  Outcome: Ongoing  11/21/2021 2340 by Kayy Louie RN  Outcome: Ongoing  11/21/2021 2339 by Kayy Louie RN  Outcome: Ongoing  Goal: STG - patient will utilize incentive spirometer  11/22/2021 0857 by Radha Ortiz RN  Outcome: Ongoing  11/21/2021 2340 by Kayy Louie RN  Outcome: Ongoing  11/21/2021 2339 by Kayy Louie, RN  Outcome: Ongoing  Goal: STG - Patient performs or directs assisted coughing  11/22/2021 0857 by Johnathan Morales RN  Outcome: Ongoing  11/21/2021 2340 by Krishan Purcell RN  Outcome: Ongoing  11/21/2021 2339 by Krishan Purcell RN  Outcome: Ongoing  Goal: STG - patient can administer MDI's  11/22/2021 0857 by Johnathan Morales RN  Outcome: Ongoing  11/21/2021 2340 by Krishan Purcell RN  Outcome: Ongoing  11/21/2021 2339 by Krishan Purcell RN  Outcome: Ongoing  Goal: STG - Patient can utilize incentive spirometer  11/22/2021 0857 by Johnathan Morales RN  Outcome: Ongoing  11/21/2021 2340 by Krishan Purcell RN  Outcome: Ongoing  11/21/2021 2339 by Krishan Purcell RN  Outcome: Ongoing  Goal: STG - family can complete suctioning  11/22/2021 0857 by Johnathan Morales RN  Outcome: Ongoing  11/21/2021 2340 by Krishan Purcell RN  Outcome: Ongoing  11/21/2021 2339 by Krishan Purcell RN  Outcome: Ongoing     Problem: NUTRITION  Goal: Patient maintains adequate hydration  11/22/2021 0857 by Johnathan Morales RN  Outcome: Ongoing  11/21/2021 2340 by Krishan Purcell RN  Outcome: Ongoing  11/21/2021 2339 by Krishan Purcell RN  Outcome: Ongoing  Goal: Patient maintains weight  11/22/2021 0857 by Johnathan Morales RN  Outcome: Ongoing  11/21/2021 2340 by Krishan Purcell RN  Outcome: Ongoing  11/21/2021 2339 by Krishan Purcell RN  Outcome: Ongoing  Goal: Patient/Family demonstrates understanding of diet  11/22/2021 0857 by Johnathan Morales RN  Outcome: Ongoing  11/21/2021 2340 by Krishan Purcell RN  Outcome: Ongoing  11/21/2021 2339 by Krishan Purcell RN  Outcome: Ongoing  Goal: Patient/Family independently completes tube feeding  11/22/2021 0857 by Johnathan Morales RN  Outcome: Ongoing  11/21/2021 2340 by Krishan Purcell RN  Outcome: Ongoing  11/21/2021 2339 by Krishan Hummer, RN  Outcome: Ongoing  Goal: Patient will have no more than 5 lb weight change during LOS  11/22/2021 0857 by Johnathan Morales RN  Outcome: Ongoing  11/21/2021 2340 by Simone Ortiz RN  Outcome: Ongoing  11/21/2021 2339 by Simone Ortiz RN  Outcome: Ongoing  Goal: Patient will utilize adaptive techniques to administer nutrition  11/22/2021 0857 by Jean Jean Baptiste RN  Outcome: Ongoing  11/21/2021 2340 by Simone Ortiz RN  Outcome: Ongoing  11/21/2021 2339 by Simone Ortiz RN  Outcome: Ongoing  Goal: Patient will verbalize dietary restrictions  11/22/2021 0857 by Jean Jean Baptiste RN  Outcome: Ongoing  11/21/2021 2340 by Simone Ortiz RN  Outcome: Ongoing  11/21/2021 2339 by Simone Ortiz RN  Outcome: Ongoing     Problem: SAFETY  Goal: LTG - patient will adhere to hip precautions during ADL's and transfers  11/22/2021 0857 by Jean Jean Baptiste RN  Outcome: Ongoing  11/21/2021 2340 by Simone Otriz RN  Outcome: Ongoing  11/21/2021 2339 by Simone Ortiz RN  Outcome: Ongoing  Goal: LTG - Patient will demonstrate safety requirements appropriate to situation/environment  11/22/2021 0857 by Jean Jean Baptiste RN  Outcome: Ongoing  11/21/2021 2340 by Simone Ortiz RN  Outcome: Ongoing  11/21/2021 2339 by Simone Ortiz RN  Outcome: Ongoing  Goal: LTG - patient will utilize safety techniques  11/22/2021 0857 by Jean Jean Baptiste RN  Outcome: Ongoing  11/21/2021 2340 by Simone Ortiz RN  Outcome: Ongoing  11/21/2021 2339 by Simone Ortiz RN  Outcome: Ongoing  Goal: STG - patient locks brakes on wheelchair  11/22/2021 0857 by Jean Jean Baptiste RN  Outcome: Ongoing  11/21/2021 2340 by Simone Ortiz RN  Outcome: Ongoing  11/21/2021 2339 by Simone Ortiz RN  Outcome: Ongoing  Goal: STG - Patient uses call light consistently to request assistance with transfers  11/22/2021 0857 by Jean Jean Baptiste RN  Outcome: Ongoing  11/21/2021 2340 by Simone Ortiz RN  Outcome: Ongoing  11/21/2021 2339 by Simone Ortiz RN  Outcome: Ongoing  Goal: STG - patient uses gait belt during all transfers  11/22/2021 0857 by Jean Jean Baptiste RN  Outcome: Ongoing  11/21/2021 2340 by Simone Ortiz RN  Outcome: Ongoing  11/21/2021 2339 by Simone Ortiz RN  Outcome: Ongoing  Goal: Free from accidental physical injury  11/22/2021 0857 by Jean Jean Baptiste RN  Outcome: Ongoing  11/21/2021 2340 by Simone Ortiz RN  Outcome: Ongoing  11/21/2021 2339 by Simone Ortiz RN  Outcome: Ongoing  Goal: Free from intentional harm  11/22/2021 0857 by Jean Jean Baptiste RN  Outcome: Ongoing  11/21/2021 2340 by Simone Ortiz RN  Outcome: Ongoing  11/21/2021 2339 by Simone Ortiz RN  Outcome: Ongoing     Problem: SKIN INTEGRITY  Goal: LTG - Patient will be free from infection  11/22/2021 0857 by Jean Jean Baptiste RN  Outcome: Ongoing  11/21/2021 2340 by Simone Ortiz RN  Outcome: Ongoing  11/21/2021 2339 by Simone Ortiz RN  Outcome: Ongoing  Goal: LTG - patient will maintain/improve skin integrity through proper skin care techniques  11/22/2021 0857 by Jean Jean Baptiste RN  Outcome: Ongoing  11/21/2021 2340 by Simone Ortiz RN  Outcome: Ongoing  11/21/2021 2339 by Simone Ortiz RN  Outcome: Ongoing  Goal: LTG - Patient will demonstrate appropriate pressure relief techniques  11/22/2021 0857 by Jean Jean Baptiste RN  Outcome: Ongoing  11/21/2021 2340 by Simone Ortiz RN  Outcome: Ongoing  11/21/2021 2339 by Simone Ortiz RN  Outcome: Ongoing  Goal: LTG - patient will demonstrate appropriate skin care techniques  11/22/2021 0857 by Jean Jean Baptiste RN  Outcome: Ongoing  11/21/2021 2340 by Simone Ortiz RN  Outcome: Ongoing  11/21/2021 2339 by Simone Ortiz RN  Outcome: Ongoing  Goal: LTG - Patient will be free from infection  11/22/2021 0857 by Jean Jean Baptiste RN  Outcome: Ongoing  11/21/2021 2340 by Simone Ortiz RN  Outcome: Ongoing  11/21/2021 2339 by Simone Starch, RN  Outcome: Ongoing  Goal: STG - Patient demonstrates skin care/treatment/dressing change  11/22/2021 0857 by Filippo Guadarrama RN  Outcome: Ongoing  11/21/2021 2340 by Darshana Welch RN  Outcome: Ongoing  11/21/2021 2339 by Darshana Welch RN  Outcome: Ongoing  Goal: STG - patient will maintain good skin integrity  11/22/2021 0857 by Filippo Guadarrama RN  Outcome: Ongoing  11/21/2021 2340 by Darshana Welch RN  Outcome: Ongoing  11/21/2021 2339 by Darshana Welch RN  Outcome: Ongoing  Goal: STG - Patient exhibits signs of wound healing.   11/22/2021 0857 by Filippo Guadarrama RN  Outcome: Ongoing  11/21/2021 2340 by Darshana Welch RN  Outcome: Ongoing  11/21/2021 2339 by Darshana Welch RN  Outcome: Ongoing  Goal: STG - patient demonstrates pressure reduction techniques  11/22/2021 0857 by Filippo Guadarrama RN  Outcome: Ongoing  11/21/2021 2340 by Darshana Welch RN  Outcome: Ongoing  11/21/2021 2339 by Darshana Welch RN  Outcome: Ongoing  Goal: STG - Patient demonstrates preventative skin care measures  11/22/2021 0857 by Filippo Guadarrama RN  Outcome: Ongoing  11/21/2021 2340 by Darshana Welch RN  Outcome: Ongoing  11/21/2021 2339 by Darshana Welch RN  Outcome: Ongoing  Goal: Skin integrity is maintained or improved  11/22/2021 0857 by Filippo Guadarrama RN  Outcome: Ongoing  11/21/2021 2340 by Darshana Welch RN  Outcome: Ongoing  11/21/2021 2339 by Darshana Welch RN  Outcome: Ongoing     Problem: PAIN  Goal: LTG - Patient will manage pain with the appropriate technique/Intervention  11/22/2021 0857 by Filippo Guadarrama RN  Outcome: Ongoing  11/21/2021 2340 by Darshana Welch RN  Outcome: Ongoing  11/21/2021 2339 by Darshana Welch RN  Outcome: Ongoing  Goal: LTG - Patient will demonstrate intervention for managing pain  11/22/2021 0857 by Filippo Guadarrama RN  Outcome: Ongoing  11/21/2021 2340 by Darshana Welch RN  Outcome: Ongoing  11/21/2021 2339 by Darshana Welch RN  Outcome: Ongoing  Goal: STG - Patient will reduce or eliminate use of analgesics  11/22/2021 0857 by Giorgio Arceo RN  Outcome: Ongoing  11/21/2021 2340 by Katia Pinto RN  Outcome: Ongoing  11/21/2021 2339 by Katia Pinto RN  Outcome: Ongoing  Goal: STG - pain is manageable through therapies  11/22/2021 0857 by Giorgio Arceo RN  Outcome: Ongoing  11/21/2021 2340 by Katia Pinto RN  Outcome: Ongoing  11/21/2021 2339 by Katia Pinto RN  Outcome: Ongoing  Goal: STG - Patient will verbalize an acceptable level of pain  11/22/2021 0857 by Giorgio Arceo RN  Outcome: Ongoing  11/21/2021 2340 by Katia Pinto RN  Outcome: Ongoing  11/21/2021 2339 by Katia Pinto RN  Outcome: Ongoing  Goal: STG - patients pain is managed to allow active participation in daily activities  11/22/2021 0857 by Giorgio Arceo RN  Outcome: Ongoing  11/21/2021 2340 by Katia Pinto RN  Outcome: Ongoing  11/21/2021 2339 by Katia Pinto RN  Outcome: Ongoing  Goal: STG - Patient will increase activity level  11/22/2021 0857 by Giorgio Arceo RN  Outcome: Ongoing  11/21/2021 2340 by Katia Pinto RN  Outcome: Ongoing  11/21/2021 2339 by Katia Pinto RN  Outcome: Ongoing  Goal: STG - patient verbalizes a reduction in pain level  11/22/2021 0857 by Giorgio Arceo RN  Outcome: Ongoing  11/21/2021 2340 by Katia Pinto RN  Outcome: Ongoing  11/21/2021 2339 by Katia Pinto RN  Outcome: Ongoing  Goal: Patient's pain/discomfort is manageable  11/22/2021 0857 by Giorgio Arceo RN  Outcome: Ongoing  11/21/2021 2340 by Katia Pinto RN  Outcome: Ongoing  11/21/2021 2339 by Katia Pinto RN  Outcome: Ongoing

## 2021-11-22 NOTE — PROGRESS NOTES
11/22/21 1000   Restrictions/Precautions   Restrictions/Precautions Fall Risk   Required Braces or Orthoses? No   Lower Extremity Weight Bearing Restrictions   Right Lower Extremity Weight Bearing Weight Bearing As Tolerated   Left Lower Extremity Weight Bearing Weight Bearing As Tolerated   Pain Screening   Patient Currently in Pain Denies   Pain Assessment   Pain Assessment Faces   Nguyen-Baker Pain Rating 0   Patient's Stated Pain Goal No pain   Pain Level 0   Vital Signs   /76   BP Location Right upper arm   Bed Mobility   Sit to Supine Stand by assistance   Comment pt understood direction well, immediately fell asleep once supine    Transfers   Lateral Transfers Dependent/Total   Comment d/t difficulty with cues and instruction, pt would mstart marching or reaching out with both hands. pt total asssist to get to bed    Ambulation   Ambulation? No   Conditions Requiring Skilled Therapeutic Intervention   Assessment pt presents to todays session with difficulty following directions, was lethargic and finger tips showed low blood perfusion. pts BP and O2 sats appeared normal on BP machine but d/t pts functional presentation, the call was made to put pt back to bed. According to RN this am pts blood pressure readings were low which may be the cause of change.  we will follow up this pm and attempt to ambulate this pm.

## 2021-11-22 NOTE — PROGRESS NOTES
[facility]  INPATIENT SPEECH THERAPY  [facility] 8 REHAB UNIT            TIME   Time In: 0800  Time Out: 0900  Minutes: 60       [x]Daily Note  []Progress Note    Date: 2021  Patient Name: Ghazala Lizarraga        MRN: [id number]    Account #: [de-identified]  : 1933  (80 y.o.)  Gender: female   Primary Provider: Mariana Alberts MD  Swallowing Status/Diet: Soft and bite sized diet, pureed meats, thin liquids      PATIENT DIAGNOSIS(ES):    Diagnosis: Acute ischemic stroke, L MCA infarct, R sided involvement      Additional Pertinent Hx: hx CVAs, stress incontinence     RESTRICTIONS/PRECAUTIONS:    Restrictions/Precautions  Restrictions/Precautions: Fall Risk, Weight Bearing, Swallowing Modified Diet  Required Braces or Orthoses?: No  Position Activity Restriction  Other position/activity restrictions: Dysphagia-soft and bite sized               Subjective: The patient was much more drowsy today than previous sessions. Her son had also reported decreased alertness yesterday. Objective: Today, the therapist fed her breakfast. Some minimal oral residue and right buccal cavity pocketing was noted. This was cleared with liquid wash. She required verbal and tactile cues to remain alert throughout the meal. She did exhibit delayed pharyngeal swallow initiation and decreased hyolaryngeal elevation. No overt s/s of aspiration was observed with sips of thin liquids or bites of soft foods. She did tolerate the pureed meats well today. No audible congestion was noted at the bedside. She was unable to answer any orientation or demographic information questions this date. She did answer most questions with \"yes and no. \"          Recommended Diet and Intervention  Diet Solids Recommendation: Dysphagia Soft and Bite-Sized with pureed meats (Dysphagia III)  Liquid Consistency Recommendation: Thin liquids  Recommended Form of Meds: Crushed in puree as able  Therapeutic Interventions: Mendelsohn; Diet tolerance monitoring; Oral care; Therapeutic PO trials with SLP; Oral motor exercises; Tongue base strengthening; Patient/Family education; Effortful swallow; Pharyngeal exercises; Yanet     Compensatory Swallowing Strategies  Compensatory Swallowing Strategies: Upright as possible for all oral intake; Alternate solids and liquids; Remain upright for 30-45 minutes after meals; Check for pocketing of food on the Right; Swallow 2 times per bite/sip; Small bites/sips; External pacing                 SHORT TERM GOAL #1:  Goal 1: The patient will follow two step commands with minimal cueing and 100% accuracy. SHORT TERM GOAL #2:  Goal 2: The patient will complete naming tasks (picture, object) with minimal cueing and 100% accuracy. SHORT TERM GOAL #3:  Goal 3: The patient will complete concrete divergent naming tasks with minimal cueing and 100% accuracy. SHORT TERM GOAL #4:  Goal 4: The patient will complete oral motor exercises to improve lingual and labial strength and range of motion as well as improve speech intelligibility. SHORT TERM GOAL #5:  Goal 5: The patient will utilize dysarthria strategies (slow rate, increased volume, over exaggeration of words, increased breath support) during structured tasks and during conversations with minimal cueing. Swallowing Short Term Goals  Short-term Goals  Goal 1: The patient will complete the yanet (tongue hold) technique to improve base of tongue retraction and base of tongue to pharyngeal wall approximation with 90% accuracy and minimal verbal cues. Goal 2: The patient will complete the effortful swallow maneuver to improve hyolaryngeal elevation, tongue base retraction, and vocal fold closure with 90% accuracy and minimal verbal cues. Goal 3: Staff/caregivers will complete daily oral hygiene to prevent aspiration of bacteria laden secretions.   Goal 4: The patient will tolerate a dysphagia soft and bite sized diet with nectar (mildly thick) liquids with minimal overt s/s of aspiration. Comprehension: 3 - Patient understands basic needs 50-74% of the time  Expression: 3 - Expresses basic ideas/needs 50-74% of the time  Social Interaction: 4 - Patient appropriate 75-90%+ of the time  Problem Solving: 3 - Patient solves simple/routine tasks 50%-74%  Memory: 3 - Patient remembers 50%-74% of the time    ASSESSMENT:  Assessment: []Progressing towards goals          []Not Progressing towards goals    Patient Tolerance of Treatment:   []Tolerated well []Tolerated fair []Required rest breaks [x]Fatigued    Education:  Learner:  [x]Patient          []Significant Other          [x]Other  Education provided regarding:  [x]Goals and POC   [x]Diet and swallowing precautions    []Home Exercise Program  [x]Progress and/or discharge information  Method of Education:  []Discussion          []Demonstration          []Handout          []Other  Evaluation of Education:   []Verbalized understanding   []Demonstrates without assistance  []Demonstrates with assistance  [x]Needs further instruction     []No evidence of learning                  []No family present      Plan: [x]Continue with current plan of care    []Modify current plan of care as follows:    []Discharge patient    Discharge Location:    Services/Supervision Recommended:      [x]Patient continues to require treatment by a licensed therapist to address functional deficits as outlined in the established plan of care.               Electronically Signed By:  Mari Briscoe M.S., CCC-SLP  11/22/2021,8:44 AM.

## 2021-11-22 NOTE — PROGRESS NOTES
Patient:   Archana Glaser  MR#:    [id number]   Room:    0826/826-01   YOB: 1933  Date of Progress Note: 11/22/2021  Time of Note                           8:43 AM  Consulting Physician:   Monica Crump M.D. Attending Physician:  Monica Crump MD       CHIEF COMPLAINT: Right-sided weakness with dysphagia     subjective  This 80 y.o. female  with history of 3 strokes since March 2021, stress incontinence, arthritis, blood clots and hyperlipidemia. She presented to [facility] ER on 11/6/21 after being found on the floor at home by her son. She was confused, had abnormal speech and right sided weakness. CT of head was negative for acute changes. CTA was also negative. She was admitted to the Hospitalist service with consult for neurology. MRI done revealed an acute lacunar infarct within the left thalamus and multiple Multifocal old ischemic change in both cerebral hemispheres and within the right side of the cerebellum. She had been placed on Plavix and statin after her last stroke. She was seen by Dr. Trish Goins and not felt to be a candidate for TPA d/t being out of the time frame and no evidence of thrombus on CTA. Dr. Trish Goins felt strokes were most likely cardioembolic. Plavix was discontinued and she was started on Coumadin with Lovenox for bridging. Echo done showed Bubble study with evidence of small right-to-left shunting with Valsalva consistent with possible ASD/PFO. Cardiology was consulted for possible PFO closure. Patient/family do not wish to have any invasive measure. Prefer to continue with medical management with anticoagulation. Patient is a DNR. She was evaluated by SPT for swallow and aphasia. She was initially made NPO but has now been started on a mechanical soft diet with nectar thick liquids. She continues to have right sided weakness and is participating in both PT/OT. No complaint this morning although reportedly drowsy at times. Blood sugar 41 today.   REVIEW OF SYSTEMS:  Patient is aphasic      PHYSICAL EXAM:  BP (!) 165/85   Pulse 69   Temp 97.5 °F (36.4 °C)   Resp 18   Ht 5' 6\" (1.676 m)   Wt 135 lb 6 oz (61.4 kg)   SpO2 98%   BMI 21.85 kg/m²     Constitutional: she appears well-developed and well-nourished. Eyes  conjunctiva normal.  Pupils react to light  Ear, nose, throat -hearing intact to voice. No scars, masses, or lesions over external nose or ears, no atrophy of tongue  Neck-symmetric, no masses noted, no jugular vein distension  Respiration- chest wall appears symmetric, good expansion,   normal effort without use of accessory muscles  Cardiovascular- RRR  Musculoskeletal  no significant wasting of muscles noted, no bony deformities, gait no gross ataxia  Extremities-no clubbing, cyanosis or edema  Skin  warm, dry, and intact. No rash, erythema, or pallor. Psychiatric  mood, affect, and behavior appear normal.      Neurology  NEUROLOGICAL EXAM:  Awake, alert, fluent oriented to [facility].  Attention and concentration appear appropriate  Speech shows dysarthria       Cranial Nerve Exam   CN II- Visual fields show an apparent left homonymous hemianopsia  CN III, IV,VI-EOMI, No nystagmus, conjugate eye movements, no ptosis  CN VII-right facial droop  CN VIII-Hearing intact        Motor Exam  Relatively normal throughout with significant atrophy in the hands       Tremors- no tremors in hands or head noted    Coordination  Clumsiness in the bilateral upper extremities           Nursing/pcp notes, imaging,labs and vitals reviewed.      PT,OT and/or speech notes reviewed    Lab Results   Component Value Date    WBC 5.9 11/22/2021    HGB 14.3 11/22/2021    HCT 47.2 (H) 11/22/2021    MCV 97.9 11/22/2021     11/22/2021     Lab Results   Component Value Date     (H) 11/22/2021    K 4.2 11/22/2021     11/22/2021    CO2 28 11/22/2021    BUN 34 (H) 11/22/2021    CREATININE 0.6 11/22/2021    GLUCOSE 95 11/22/2021    CALCIUM 10.5 (H) 11/22/2021    PROT 5.7 (L) 11/22/2021    LABALBU 3.8 11/22/2021    BILITOT 0.6 11/22/2021    ALKPHOS 90 11/22/2021    AST 24 11/22/2021    ALT 20 11/22/2021    LABGLOM >60 11/22/2021    GFRAA >59 11/22/2021     Lab Results   Component Value Date    INR 1.86 (H) 11/22/2021    INR 2.37 (H) 11/21/2021    INR 2.48 (H) 11/20/2021   No results found for: PHENYTOIN, ESR, CRP        11/19/21 0900   Restrictions/Precautions   Restrictions/Precautions Fall Risk   Position Activity Restriction   Other position/activity restrictions Dysphagia-soft and bite sized, NTL, water in between meals   General   Additional Pertinent Hx CVA; palliative care pt   Diagnosis Acute ischemic B CVA; aphasia; AMS; hemiplegia affecting dominant side   ADL   Grooming Setup; Verbal cueing  (Ind with oral hygiene)   UE Bathing Minimal assistance   Balance   Standing Balance Contact guard assistance   Standing Balance   Time 5 mins   Activity 1 hand table top task   Functional Mobility   Functional - Mobility Device 4-Wheeled Walker   Assist Level Minimal assistance   Functional Mobility Comments short distance   Type of ROM/Therapeutic Exercise   Type of ROM/Therapeutic Exercise AROM   Exercises   Grasp/Release RUE for midline crossing act   Perception   Unilateral Attention Cues to attend left visual field; Cues to maintain midline in sitting; Cues to maintain midline in standing; Cues to attend to left side of body   Assessment   Performance deficits / Impairments Decreased functional mobility ; Decreased ADL status; Decreased ROM;  Decreased strength; Decreased safe awareness; Decreased cognition; Decreased endurance; Decreased high-level IADLs; Decreased balance; Decreased coordination; Decreased vision/visual deficit   Assessment Improved initiation of self care (joaquín grooming)  this session   Treatment Diagnosis B CVAs/R hemiparesis   Timed Code Treatment Minutes 60 Minutes   Safety Devices   Safety Devices in place Yes   Type of devices Left in chair; Call light within reach; Chair alarm in place                      11/19/21 1300   Restrictions/Precautions   Restrictions/Precautions Fall Risk   Required Braces or Orthoses? No   Lower Extremity Weight Bearing Restrictions   Right Lower Extremity Weight Bearing Weight Bearing As Tolerated   Left Lower Extremity Weight Bearing Weight Bearing As Tolerated   Pain Screening   Patient Currently in Pain No   Bed Mobility   Rolling Stand by assistance   Supine to Sit Stand by assistance   Sit to Supine Stand by assistance   Scooting Stand by assistance   Comment cues for direction    Transfers   Sit to Stand Minimal Assistance   Stand to sit Minimal Assistance   Lateral Transfers Minimal Assistance   Comment assist to turn squat pivot to her right on to bedside commode    Ambulation   Ambulation? No   Balance   Posture Fair   Sitting - Static Fair; +   Sitting - Dynamic Fair; +   Standing - Static Fair; +   Standing - Dynamic Fair; -   Exercises   Heelslides x10   Hip Abduction x10   Other exercises   Other exercises? Yes   Other exercises 1 sitting dynamic balance exercises translation of objects to both R and L    Other exercises 2 standing dynamic exercise translation of items to both R and L   Conditions Requiring Skilled Therapeutic Intervention   Assessment pt performed various dynamic balance exercises in sitting and standing. pt presents with fair balance in both settings able to identify various objects presented to her and translating them to the opposite side while maintaining trunk balance. pt demosntrates with inattention to her left side unless maximally cued to look that way. after a few trials pt able to consistently translate objects from her left to right.           Objective:  She did attempt to complete orientation questions but could not recall the year, month, or her location.      She was unable to state her date of birth or age.  She was able to recall family members names without cueing.     She did follow simple one step commands at 35% this date.      She is able to communicate her immediate wants and needs. She is able to answer yes/no questions reliably this date.      Reviewed all swallowing precautions and she was able to recall a few of these immediately after education.      She was downgraded to pureed meats. Due to poor sensation and awareness, recommend she continue these for safety. She has exhibited coughing with ground and chopped meats.     She continues to exhibit inattention and visual deficits. She continues to require verbal and tactile cues to locate items on her tray table. Reminders were provided for her to utilize her call light. This was placed in her lap. RECORD REVIEW: Previous medical records, medications were reviewed at today's visit    IMPRESSION:   1. 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 Suspected to be cardioembolic. Plavix changed to Coumadin. Lovenox discontinued due to therapeutic INR. PT/OT/SLP  2. DVT prophylaxisCoumadin  3. Hyperlipidemiacontinue statin  4. GIbowel regimen  5. Bacteruria with unremarkable u/a. Suspect contaminant. No Rx for now. 6.  Hypoglycemiareplace.       ELOS:11/28. Suspect snf.   Restaff on Wednesday

## 2021-11-22 NOTE — PROGRESS NOTES
11/22/21 1345   Restrictions/Precautions   Restrictions/Precautions Fall Risk   Required Braces or Orthoses? No   Lower Extremity Weight Bearing Restrictions   Right Lower Extremity Weight Bearing Weight Bearing As Tolerated   Left Lower Extremity Weight Bearing Weight Bearing As Tolerated   Conditions Requiring Skilled Therapeutic Intervention   Assessment session dc'd d/t pt very lethargic, unable to follow basic commands and answer questions at this time.  RN made aware and states following up with

## 2021-11-22 NOTE — PROGRESS NOTES
Occupational Therapy  Facility/Department: [facility] 8 REHAB UNIT  Daily Treatment Note  NAME: Joon Riggs  : 1933  MRN: [id number]    Date of Service: 2021    Discharge Recommendations:  Continue to assess pending progress       Assessment   Decreased functional mobility ; Decreased ADL status; Decreased ROM; Decreased strength; Decreased safe awareness; Decreased cognition; Decreased endurance; Decreased high-level IADLs; Decreased balance; Decreased coordination; Decreased vision/visual deficit  Notified nursing of  needing mepilex dressing changed on buttocks due to bladder incontinence---nursing recommending using zinc paste versus dressing due to bladder incontinence. Zinc paste used versus new dressing. Patient Diagnosis(es): There were no encounter diagnoses. has a past medical history of Arthritis, CAD (coronary artery disease), Hx of blood clots, Hyperlipidemia, Palliative care patient, and Stroke ([facility] [address] has a past surgical history that includes Leg Surgery. Restrictions  Restrictions/Precautions  Restrictions/Precautions: Fall Risk  Required Braces or Orthoses?: No  Lower Extremity Weight Bearing Restrictions  Right Lower Extremity Weight Bearing: Weight Bearing As Tolerated  Left Lower Extremity Weight Bearing: Weight Bearing As Tolerated  Position Activity Restriction  Other position/activity restrictions: Dysphagia-soft and bite sized, NTL, water in between meals  Subjective   Pain Assessment  Pain Assessment: Faces  Pain Level: 0  Pre Treatment Pain Screening  Pain at present: 0  Scale Used:  Faces  Vital Signs  Patient Currently in Pain: Denies   Objective    Balance  Sitting Balance: Contact guard assistance (EOB---tends to lean backwards with dynamic acts)  Bed mobility  Supine to Sit: Stand by assistance (with bedrail and cues)  Transfers  Stand Step Transfers: Contact guard assistance  Sit to stand: Minimal assistance  Transfer Comments: max vc and some physical cueing for sequencing of transfer      11/22/21 0900   Oral Hygiene   Assistance Needed Supervision or touching assistance   Comment vc   CARE Score 4   Shower/Bathe Self   Assistance Needed Partial/moderate assistance   Comment max cues   CARE Score 3   Upper Body Dressing   Assistance Needed Partial/moderate assistance   CARE Score 3   Lower Body Dressing   Assistance Needed Substantial/maximal assistance   CARE Score 2   Putting On/Taking Off Footwear   Assistance Needed Substantial/maximal assistance   CARE Score 2     Plan   Plan  Specific instructions for Next Treatment: BITS  Current Treatment Recommendations: Strengthening, ROM, Safety Education & Training, Balance Training, Patient/Caregiver Education & Training, Self-Care / ADL, Cognitive/Perceptual Training, Functional Mobility Training, Equipment Evaluation, Education, & procurement, Home Management Training, Endurance Training, Cognitive Reorientation  G-Code     OutComes Score                                                  AM-PAC Score             Goals  Short term goals  Time Frame for Short term goals: 1 week  Short term goal 1: Mod A with clothing management/hygiene for toileting. Short term goal 2: Mod A with LB dressing, AE PRN. Short term goal 3: Mod A with donning/doffing socks and shoes, AE PRN. Short term goal 4: Min A with toilet transfers. Short term goal 5: Min A with bathing hygiene. Long term goals  Time Frame for Long term goals : 3-4 weeks  Long term goal 1: CGA with clothing management/hygiene for toileting. Long term goal 2: Min A with LB dressing, AE PRN. Long term goal 3: Min A with donning/doffing socks and shoes, AE PRN. Long term goal 4: CGA with toilet transfers. Long term goal 5: Supervision with bathing hygiene. Long term goals 6: Patient/family will verbalize DME needs. Patient Goals   Patient goals : Return home with assist from her son.        Therapy Time   Individual Concurrent Group Co-treatment   Time In 0900 Time Out 1000         Minutes 60         Timed Code Treatment Minutes: 60 Minutes       Electronically signed by Minoo Traylor OT on 11/22/2021 at 3:53 PM

## 2021-11-22 NOTE — PROGRESS NOTES
Clinical Pharmacy Note    Warfarin consult follow-up    Recent Labs     11/22/21  0451   INR 1.86*     Recent Labs     11/20/21  0507 11/21/21  0453 11/22/21  0451   HGB 14.1 14.0 14.3   HCT 46.6 46.2 47.2*    181 183       Significant Drug-Drug Interactions:  New warfarin drug-drug interactions: none  Discontinued drug-drug interactions: none    Date INR Warfarin Dose   11/06/21 1.01 ---   11/07/21 --- 5 mg   11/08/21 1.04  5 mg    11/09/21  1.30 5 mg   11/10/21 1.74 5 mg    11/11/21  2.44 2.5 mg    11/12/21 2.02 5 mg    11/13/21  1.97 5 mg   11/14/21  2.44  4 mg    11/15/21 2.58 4 mg    11/16/21 2.35  5 mg    11/17/21 2.30  5 mg    11/18/21 2.40 4 mg    11/19/21 2.57  4 mg    11/20/21 2.48  4 mg   11/21/21 2.37 5 mg    11/22/21 1.86   6 mg             Notes: Will give warfarin 6 mg once this evening. Daily PT/INR until stable within therapeutic range.      Electronically signed by Ruma Holliday [facility] on 11/22/2021 at 11:21 AM

## 2021-11-23 LAB
ALBUMIN SERPL-MCNC: 3.8 G/DL (ref 3.5–5.2)
ALP BLD-CCNC: 91 U/L (ref 35–104)
ALT SERPL-CCNC: 20 U/L (ref 5–33)
ANION GAP SERPL CALCULATED.3IONS-SCNC: 8 MMOL/L (ref 7–19)
AST SERPL-CCNC: 23 U/L (ref 5–32)
BASOPHILS ABSOLUTE: 0 K/UL (ref 0–0.2)
BASOPHILS RELATIVE PERCENT: 0.7 % (ref 0–1)
BILIRUB SERPL-MCNC: 0.8 MG/DL (ref 0.2–1.2)
BUN BLDV-MCNC: 34 MG/DL (ref 8–23)
CALCIUM SERPL-MCNC: 10.5 MG/DL (ref 8.8–10.2)
CHLORIDE BLD-SCNC: 110 MMOL/L (ref 98–111)
CO2: 29 MMOL/L (ref 22–29)
CREAT SERPL-MCNC: 0.5 MG/DL (ref 0.5–0.9)
EOSINOPHILS ABSOLUTE: 0.2 K/UL (ref 0–0.6)
EOSINOPHILS RELATIVE PERCENT: 3.1 % (ref 0–5)
GFR AFRICAN AMERICAN: >59
GFR NON-AFRICAN AMERICAN: >60
GLUCOSE BLD-MCNC: 56 MG/DL (ref 70–99)
GLUCOSE BLD-MCNC: 88 MG/DL (ref 74–109)
GLUCOSE BLD-MCNC: 94 MG/DL (ref 70–99)
HCT VFR BLD CALC: 45.2 % (ref 37–47)
HEMOGLOBIN: 13.8 G/DL (ref 12–16)
IMMATURE GRANULOCYTES #: 0 K/UL
INR BLD: 1.64 (ref 0.88–1.18)
LYMPHOCYTES ABSOLUTE: 1.5 K/UL (ref 1.1–4.5)
LYMPHOCYTES RELATIVE PERCENT: 26.6 % (ref 20–40)
MCH RBC QN AUTO: 29.2 PG (ref 27–31)
MCHC RBC AUTO-ENTMCNC: 30.5 G/DL (ref 33–37)
MCV RBC AUTO: 95.6 FL (ref 81–99)
MONOCYTES ABSOLUTE: 0.4 K/UL (ref 0–0.9)
MONOCYTES RELATIVE PERCENT: 8 % (ref 0–10)
NEUTROPHILS ABSOLUTE: 3.4 K/UL (ref 1.5–7.5)
NEUTROPHILS RELATIVE PERCENT: 61.2 % (ref 50–65)
PDW BLD-RTO: 14.3 % (ref 11.5–14.5)
PERFORMED ON: ABNORMAL
PERFORMED ON: NORMAL
PLATELET # BLD: 189 K/UL (ref 130–400)
PMV BLD AUTO: 9.8 FL (ref 9.4–12.3)
POTASSIUM SERPL-SCNC: 4.4 MMOL/L (ref 3.5–5)
PROTHROMBIN TIME: 19.4 SEC (ref 12–14.6)
RBC # BLD: 4.73 M/UL (ref 4.2–5.4)
SODIUM BLD-SCNC: 147 MMOL/L (ref 136–145)
TOTAL PROTEIN: 5.6 G/DL (ref 6.6–8.7)
WBC # BLD: 5.5 K/UL (ref 4.8–10.8)

## 2021-11-23 PROCEDURE — 36415 COLL VENOUS BLD VENIPUNCTURE: CPT

## 2021-11-23 PROCEDURE — 99232 SBSQ HOSP IP/OBS MODERATE 35: CPT | Performed by: PSYCHIATRY & NEUROLOGY

## 2021-11-23 PROCEDURE — 97129 THER IVNTJ 1ST 15 MIN: CPT

## 2021-11-23 PROCEDURE — 97116 GAIT TRAINING THERAPY: CPT

## 2021-11-23 PROCEDURE — 6370000000 HC RX 637 (ALT 250 FOR IP): Performed by: PSYCHIATRY & NEUROLOGY

## 2021-11-23 PROCEDURE — 1180000000 HC REHAB R&B

## 2021-11-23 PROCEDURE — 6370000000 HC RX 637 (ALT 250 FOR IP): Performed by: NURSE PRACTITIONER

## 2021-11-23 PROCEDURE — 85025 COMPLETE CBC W/AUTO DIFF WBC: CPT

## 2021-11-23 PROCEDURE — 97535 SELF CARE MNGMENT TRAINING: CPT

## 2021-11-23 PROCEDURE — 97130 THER IVNTJ EA ADDL 15 MIN: CPT

## 2021-11-23 PROCEDURE — 85610 PROTHROMBIN TIME: CPT

## 2021-11-23 PROCEDURE — 97530 THERAPEUTIC ACTIVITIES: CPT

## 2021-11-23 PROCEDURE — 80053 COMPREHEN METABOLIC PANEL: CPT

## 2021-11-23 PROCEDURE — 97110 THERAPEUTIC EXERCISES: CPT

## 2021-11-23 PROCEDURE — 95819 EEG AWAKE AND ASLEEP: CPT

## 2021-11-23 PROCEDURE — 82947 ASSAY GLUCOSE BLOOD QUANT: CPT

## 2021-11-23 PROCEDURE — 95816 EEG AWAKE AND DROWSY: CPT | Performed by: PSYCHIATRY & NEUROLOGY

## 2021-11-23 PROCEDURE — 92526 ORAL FUNCTION THERAPY: CPT

## 2021-11-23 RX ORDER — MODAFINIL 100 MG/1
100 TABLET ORAL DAILY
Status: DISCONTINUED | OUTPATIENT
Start: 2021-11-23 | End: 2021-11-29 | Stop reason: HOSPADM

## 2021-11-23 RX ADMIN — CETIRIZINE HYDROCHLORIDE 5 MG: 10 TABLET, FILM COATED ORAL at 08:13

## 2021-11-23 RX ADMIN — SENNOSIDES AND DOCUSATE SODIUM 1 TABLET: 50; 8.6 TABLET ORAL at 20:59

## 2021-11-23 RX ADMIN — SENNOSIDES AND DOCUSATE SODIUM 1 TABLET: 50; 8.6 TABLET ORAL at 08:13

## 2021-11-23 RX ADMIN — ATORVASTATIN CALCIUM 20 MG: 20 TABLET, FILM COATED ORAL at 08:13

## 2021-11-23 RX ADMIN — WARFARIN SODIUM 7.5 MG: 2.5 TABLET ORAL at 17:24

## 2021-11-23 RX ADMIN — FLUTICASONE PROPIONATE 1 SPRAY: 50 SPRAY, METERED NASAL at 08:12

## 2021-11-23 ASSESSMENT — PAIN SCALES - GENERAL
PAINLEVEL_OUTOF10: 0
PAINLEVEL_OUTOF10: 0

## 2021-11-23 NOTE — PROGRESS NOTES
Physical Therapy  Name: Jose Pickett  MRN:  [id number]  Date of service:  11/23/2021 11/23/21 0900   Subjective   Subjective Pt alert and awake; agreeable to therapy and following commands well   General Comment   Comments pt in bed to start; reports \"about to pee in my pants\" sitting on eob   Pain Screening   Patient Currently in Pain No   Bed Mobility   Supine to Sit Stand by assistance   Transfers   Sit to Stand Minimal Assistance   Stand to sit Minimal Assistance   Lateral Transfers Moderate Assistance; Maximum Assistance  (squat pivot to [facility] on R side)   Comment used so stedy to transfer from [facility] to [facility] so that we could tend to toileting hygiene with less difficulty   Ambulation   Ambulation?  Yes   WB Status WBAT   Ambulation 1   Surface level tile   Device Rollator   Distance 20'x 4 working on shorter distance destination in  mind   Comments turning to L with less difficulty   Balance   Comments worked on static standing in [employer]; stood for a few minutes with good upright posture during cleaup and duration of so stedy transfer (did not use paddles to lean against)   Exercises   Hip Flexion x10 ea   Knee Long Arc Quad x10 ea   Comments sitting ex in chair   Conditions Requiring Skilled Therapeutic Intervention   Assessment worked on multiple transfers and shorter distance gt; pt is more alert today and doing better with upright posture     Electronically signed by Audrey Sanders PTA on 11/23/2021 at 10:11 AM

## 2021-11-23 NOTE — PROGRESS NOTES
Clinical Pharmacy Note    Warfarin consult follow-up    Recent Labs     11/23/21  0641   INR 1.64*     Recent Labs     11/21/21  0453 11/22/21  0451 11/23/21  0641   HGB 14.0 14.3 13.8   HCT 46.2 47.2* 45.2    183 189       Significant Drug-Drug Interactions:  New warfarin drug-drug interactions: none  Discontinued drug-drug interactions: none    Date INR Warfarin Dose   11/06/21 1.01 ---   11/07/21 --- 5 mg   11/08/21 1.04  5 mg    11/09/21  1.30 5 mg   11/10/21 1.74 5 mg    11/11/21  2.44 2.5 mg    11/12/21 2.02 5 mg    11/13/21  1.97 5 mg   11/14/21  2.44  4 mg    11/15/21 2.58 4 mg    11/16/21 2.35  5 mg    11/17/21 2.30  5 mg    11/18/21 2.40 4 mg    11/19/21 2.57  4 mg    11/20/21 2.48  4 mg   11/21/21 2.37 5 mg    11/22/21 1.86   6 mg   11/23/21   1.64 7.5 mg        Notes:   Coumadin 7.5 mg po X1 tonight. Daily PT/INR until stable within therapeutic range.      Electronically signed by Kathie Tolentino [facility] on 11/23/2021 at 11:33 AM

## 2021-11-23 NOTE — PROGRESS NOTES
Physical Therapy  Name: Fernando Romo  MRN:  [id number]  Date of service:  11/23/2021 11/23/21 1319   Restrictions/Precautions   Restrictions/Precautions Fall Risk   Subjective   Subjective Pt sleeping but aroused easily and agreeable   General Comment   Comments in recliner upon arrival   Transfers   Sit to Stand Minimal Assistance   Stand to sit Minimal Assistance   Bed to Chair Minimal assistance; Moderate assistance   Ambulation   Ambulation?  Yes   WB Status WBAT   Ambulation 1   Surface level tile   Device Rollator   Assistance Minimal assistance   Quality of Gait ant shifted cog with tendency to push rollator too far ahead, leans to R also   Gait Deviations Decreased step length  (see quality of gt)   Distance 75'   Exercises   Hip Flexion x 2/10 with 1 1/2# wt B LE   Hip Abduction x 20 ADD sets with ball; x 20 ABD unresisted AA   Knee Long Arc Quad x 2/10 with 1 1/2# wt B   Comments sitting ex in [facility] (difficulty following directions for some ex-->AAROM)   Conditions Requiring Skilled Therapeutic Intervention   Assessment pt needs frequent vc's and tactile cues to stay on task with ex; reporting fatigue with gt but denies need to sit down initially       Electronically signed by Rola Brooke PTA on 11/23/2021 at 3:39 PM

## 2021-11-23 NOTE — PLAN OF CARE
Nutrition Problem #1: Severe malnutrition, In context of chronic, non-illness related  Intervention: Food and/or Nutrient Delivery: Continue Current Diet, Modify Oral Nutrition Supplement, Snacks (Comment) (HS snack- yogurt and fruit)  Nutritional Goals: Po intake >50% meals. wt stable.     Problem: Nutrition  Goal: Optimal nutrition therapy  Outcome: Ongoing

## 2021-11-23 NOTE — PLAN OF CARE
Problem: Falls - Risk of:  Goal: Will remain free from falls  Description: Will remain free from falls  Outcome: Ongoing  Goal: Absence of physical injury  Description: Absence of physical injury  Outcome: Ongoing     Problem: Confusion - Acute:  Goal: Absence of continued neurological deterioration signs and symptoms  Description: Absence of continued neurological deterioration signs and symptoms  Outcome: Ongoing  Goal: Mental status will be restored to baseline  Description: Mental status will be restored to baseline  Outcome: Ongoing     Problem: Discharge Planning:  Goal: Ability to perform activities of daily living will improve  Description: Ability to perform activities of daily living will improve  Outcome: Ongoing  Goal: Participates in care planning  Description: Participates in care planning  Outcome: Ongoing     Problem: Mood - Altered:  Goal: Mood stable  Description: Mood stable  Outcome: Ongoing  Goal: Absence of abusive behavior  Description: Absence of abusive behavior  Outcome: Ongoing  Goal: Verbalizations of feeling emotionally comfortable while being cared for will increase  Description: Verbalizations of feeling emotionally comfortable while being cared for will increase  Outcome: Ongoing     Problem: IP BLADDER/VOIDING  Goal: LTG - patient will complete bladder elimination  Outcome: Ongoing  Goal: LTG - Patient will utilize adaptive techniques/equipment to complete bladder elimination  Outcome: Ongoing  Goal: LTG - patient will achieve acceptable level of continence  Outcome: Ongoing  Goal: STG - Patient demonstrates ability to take care of indwelling catheter  Outcome: Ongoing  Goal: STG - patient demonstrates self-cath technique using clean technique and care of the catheter  Outcome: Ongoing  Goal: STG - Patient demonstrates no accidents  Outcome: Ongoing  Goal: STG - Patient will state signs and symptoms of UTI  Outcome: Ongoing  Goal: STG - patient will be able to empty bladder  Outcome: Ongoing  Goal: STG - Patient demonstrates understanding of self catheterization schedule by completing task on time  Outcome: Ongoing  Goal: STG - patient participates in bladder program by expressing need to void  Outcome: Ongoing  Goal: STG - Patient verbalizes understanding of catheter care indwelling/intermittent  Outcome: Ongoing  Goal: STG - patient participates in bladder program by adhering to implemented toileting schedule  Outcome: Ongoing     Problem: IP BOWEL ELIMINATION  Goal: LTG - patient will utilize adaptive techniques/equipment to complete bowel elimination  Outcome: Ongoing  Goal: LTG - patient will have regular and routine bowel evacuation  Outcome: Ongoing  Goal: STG - patient will be accident free  Outcome: Ongoing  Goal: STG - Patient demonstrates care and management of ostomy bag  Outcome: Ongoing  Goal: STG - Patient participates in bowel management program  Outcome: Ongoing  Goal: STG - patient maintains skin integrity  Outcome: Ongoing  Goal: STG - Patient will verbalize signs and symptoms of constipation and how to prevent/alleviate  Outcome: Ongoing  Goal: STG - patient will be continent of stool  Outcome: Ongoing  Goal: STG - Patient completes digital stimulation technique  Outcome: Ongoing  Goal: STG - Patient verbalizes knowledge about relationship between diet, fluid intake, activity and medication on constipation  Outcome: Ongoing

## 2021-11-23 NOTE — PROGRESS NOTES
Occupational Therapy     11/23/21 1100   General   Additional Pertinent Hx CVA; palliative care pt   Subjective   Subjective Difficulty rousing pt. at the start of the tx session, worsening lethargy as session progressed. Pain Assessment   Patient Currently in Pain No   Pain Assessment 0-10   Pain Level 0   Balance   Sitting Balance Contact guard assistance   Standing Balance Minimal assistance   Transfers   Stand Step Transfers Minimal assistance  (Recliner>w/c, used rollator, vc and tactile cues.)   Stand Pivot Transfers Moderate assistance  (Verbal and tactile cues, w/c>recliner, pt. very lethargic.)   Sit to stand Moderate assistance   Stand to sit Minimal assistance   Toilet Transfers   Toilet - Technique Stand step   Equipment Used Grab bars   Toilet Transfer Minimal assistance; Moderate assistance   Assessment   Performance deficits / Impairments Decreased functional mobility ; Decreased ADL status; Decreased ROM; Decreased strength; Decreased safe awareness; Decreased cognition; Decreased endurance; Decreased high-level IADLs; Decreased balance; Decreased coordination; Decreased vision/visual deficit   Treatment Diagnosis B CVAs/R hemiparesis   Timed Code Treatment Minutes 60 Minutes   Activity Tolerance   Activity Tolerance Patient limited by fatigue   Safety Devices   Safety Devices in place Yes   Type of devices Call light within reach; Chair alarm in place   Plan   Current Treatment Recommendations Strengthening; ROM; Safety Education & Training; Balance Training; Patient/Caregiver Education & Training; Self-Care / ADL; Cognitive/Perceptual Training; Functional Mobility Training; Equipment Evaluation, Education, & procurement; Home Management Training; Endurance Training; Cognitive Reorientation      11/23/21 1100   Eating   Assistance Needed Dependent   Comment Pt. very lethargic, pt. unable to bring utensil to mouth, vc and tactile cues for alertness to chew and swallow food.    CARE Score 1

## 2021-11-23 NOTE — PROGRESS NOTES
Nutrition Assessment     Type and Reason for Visit: Reassess    Nutrition Recommendations/Plan:   Elevated Na- encourage fluid intake   Low gluc ()- started HS snack  Increased ONS from BID to TID. Nutrition Assessment:  Pt asleep at time of visit. Po intake remains >50% when alert and fed, few meals <50% likely d/t drowsiness/fatigue. Noted elevated Na and several low gluc (). Malnutrition Assessment:  Malnutrition Status: Severe malnutrition    Estimated Daily Nutrient Needs:  Energy (kcal): 9688-4528 kcals/day; Weight Used for Energy Requirements:  Current (30-35 kcals/kg)     Protein (g): 57-68 g/pro/day; Weight Used for Protein Requirements:  Current (1.0-1.2 g/kg)        Fluid (ml/day): 2156-4343 mL/fluid/day; Weight Used for Fluid Requirements:  1 ml/kcal      Nutrition Related Findings: severe orbital, temporal, buccal, and clavical wasting observed. Current Nutrition Therapies:    ADULT ORAL NUTRITION SUPPLEMENT; Breakfast, Dinner, Lunch; Standard High Calorie/High Protein Oral Supplement  ADULT DIET; Dysphagia - Soft and Bite Sized  ADULT ORAL NUTRITION SUPPLEMENT; Dinner; Snack; HS snack- yogurt and diced fruit sent with dinner    Anthropometric Measures:  · Height: 5' 6\" (167.6 cm)  · Current Body Wt: 135 lb (61.2 kg)   · BMI: 21.8    Nutrition Interventions:   Food and/or Nutrient Delivery:  Continue Current Diet, Modify Oral Nutrition Supplement, Snacks (Comment) (HS snack- yogurt and fruit)   Coordination of Nutrition Care:  Continue to monitor while inpatient    Goals:  Po intake >50% meals. wt stable. Nutrition Monitoring and Evaluation:   Food/Nutrient Intake Outcomes:  Diet Advancement/Tolerance, Food and Nutrient Intake, Supplement Intake  Physical Signs/Symptoms Outcomes:  Biochemical Data, Chewing or Swallowing, Nutrition Focused Physical Findings, Weight     Discharge Planning:     Too soon to determine     Electronically signed by Lidia Homans, MS, RD, LD on 11/23/21 at 1:03 PM [facility]    Contact: [phone]

## 2021-11-23 NOTE — PROGRESS NOTES
Patient:   Elizabeth Carroll  MR#:    [id number]   Room:    26/826-01   YOB: 1933  Date of Progress Note: 11/23/2021  Time of Note                           8:44 AM  Consulting Physician:   Quinten Milian M.D. Attending Physician:  Quinten Milian MD       CHIEF COMPLAINT: Right-sided weakness with dysphagia     subjective  This 80 y.o. female  with history of 3 strokes since March 2021, stress incontinence, arthritis, blood clots and hyperlipidemia. She presented to [facility] on 11/6/21 after being found on the floor at home by her son. She was confused, had abnormal speech and right sided weakness. CT of head was negative for acute changes. CTA was also negative. She was admitted to the Hospitalist service with consult for neurology. MRI done revealed an acute lacunar infarct within the left thalamus and multiple Multifocal old ischemic change in both cerebral hemispheres and within the right side of the cerebellum. She had been placed on Plavix and statin after her last stroke. She was seen by Dr. Rai Drummond and not felt to be a candidate for TPA d/t being out of the time frame and no evidence of thrombus on CTA. Dr. Rai Drummond felt strokes were most likely cardioembolic. Plavix was discontinued and she was started on Coumadin with Lovenox for bridging. Echo done showed Bubble study with evidence of small right-to-left shunting with Valsalva consistent with possible ASD/PFO. Cardiology was consulted for possible PFO closure. Patient/family do not wish to have any invasive measure. Prefer to continue with medical management with anticoagulation. Patient is a DNR. She was evaluated by SPT for swallow and aphasia. She was initially made NPO but has now been started on a mechanical soft diet with nectar thick liquids. She continues to have right sided weakness and is participating in both PT/OT. No complaint today. Was lethargic and sleepy most of yesterday.   CT of the head along with routine blood work and ammonia level were normal.  She is on no sedating medications. REVIEW OF SYSTEMS:  Patient is aphasic      PHYSICAL EXAM:  /85   Pulse 58   Temp 95.5 °F (35.3 °C) (Temporal)   Resp 16   Ht 5' 6\" (1.676 m)   Wt 135 lb 6 oz (61.4 kg)   SpO2 97%   BMI 21.85 kg/m²     Constitutional: she appears well-developed and well-nourished. Eyes  conjunctiva normal.  Pupils react to light  Ear, nose, throat -hearing intact to voice. No scars, masses, or lesions over external nose or ears, no atrophy of tongue  Neck-symmetric, no masses noted, no jugular vein distension  Respiration- chest wall appears symmetric, good expansion,   normal effort without use of accessory muscles  Cardiovascular- RRR  Musculoskeletal  no significant wasting of muscles noted, no bony deformities, gait no gross ataxia  Extremities-no clubbing, cyanosis or edema  Skin  warm, dry, and intact. No rash, erythema, or pallor. Psychiatric  mood, affect, and behavior appear normal.      Neurology  NEUROLOGICAL EXAM:  Awake, alert, fluent oriented to [facility].  Attention and concentration appear appropriate  Speech shows dysarthria       Cranial Nerve Exam   CN II- Visual fields show an apparent left homonymous hemianopsia  CN III, IV,VI-EOMI, No nystagmus, conjugate eye movements, no ptosis  CN VII-right facial droop  CN VIII-Hearing intact        Motor Exam  Relatively normal throughout with significant atrophy in the hands       Tremors- no tremors in hands or head noted    Coordination  Clumsiness in the bilateral upper extremities           Nursing/pcp notes, imaging,labs and vitals reviewed.      PT,OT and/or speech notes reviewed    Lab Results   Component Value Date    WBC 5.5 11/23/2021    HGB 13.8 11/23/2021    HCT 45.2 11/23/2021    MCV 95.6 11/23/2021     11/23/2021     Lab Results   Component Value Date     (H) 11/23/2021    K 4.4 11/23/2021     11/23/2021    CO2 29 11/23/2021    BUN 34 (H) 11/23/2021 CREATININE 0.5 11/23/2021    GLUCOSE 88 11/23/2021    CALCIUM 10.5 (H) 11/23/2021    PROT 5.6 (L) 11/23/2021    LABALBU 3.8 11/23/2021    BILITOT 0.8 11/23/2021    ALKPHOS 91 11/23/2021    AST 23 11/23/2021    ALT 20 11/23/2021    LABGLOM >60 11/23/2021    GFRAA >59 11/23/2021     Lab Results   Component Value Date    INR 1.64 (H) 11/23/2021    INR 1.86 (H) 11/22/2021    INR 2.37 (H) 11/21/2021   No results found for: PHENYTOIN, ESR, CRP      11/22/21 1345   Restrictions/Precautions   Restrictions/Precautions Fall Risk   Required Braces or Orthoses? No   Lower Extremity Weight Bearing Restrictions   Right Lower Extremity Weight Bearing Weight Bearing As Tolerated   Left Lower Extremity Weight Bearing Weight Bearing As Tolerated   Conditions Requiring Skilled Therapeutic Intervention   Assessment session dc'd d/t pt very lethargic, unable to follow basic commands and answer questions at this time. RN made aware and states following up with Dr. Eliel Piper    Balance  Sitting Balance: Contact guard assistance (EOB---tends to lean backwards with dynamic acts)  Bed mobility  Supine to Sit: Stand by assistance (with bedrail and cues)  Transfers  Stand Step Transfers: Contact guard assistance  Sit to stand: Minimal assistance  Transfer Comments: max vc and some physical cueing for sequencing of transfer       11/22/21 0900   Oral Hygiene   Assistance Needed Supervision or touching assistance   Comment vc   CARE Score 4   Shower/Bathe Self   Assistance Needed Partial/moderate assistance   Comment max cues   CARE Score 3   Upper Body Dressing   Assistance Needed Partial/moderate assistance   CARE Score 3   Lower Body Dressing   Assistance Needed Substantial/maximal assistance   CARE Score 2   Putting On/Taking Off Footwear   Assistance Needed Substantial/maximal assistance   CARE Score 2        Objective: Today, the therapist fed her breakfast. Some minimal oral residue and right buccal cavity pocketing was noted.  This was cleared with liquid wash. She required verbal and tactile cues to remain alert throughout the meal. She did exhibit delayed pharyngeal swallow initiation and decreased hyolaryngeal elevation. No overt s/s of aspiration was observed with sips of thin liquids or bites of soft foods. She did tolerate the pureed meats well today. No audible congestion was noted at the bedside.     She was unable to answer any orientation or demographic information questions this date. She did answer most questions with \"yes and no. \"              Recommended Diet and Intervention  Diet Solids Recommendation: Dysphagia Soft and Bite-Sized with pureed meats (Dysphagia III)  Liquid Consistency Recommendation: Thin liquids  Recommended Form of Meds: Crushed in puree as able  Therapeutic Interventions: Mendelsohn; Diet tolerance monitoring; Oral care; Therapeutic PO trials with SLP; Oral motor exercises; Tongue base strengthening; Patient/Family education; Effortful swallow; Pharyngeal exercises; Yanet     Compensatory Swallowing Strategies  Compensatory Swallowing Strategies: Upright as possible for all oral intake; Alternate solids and liquids; Remain upright for 30-45 minutes after meals; Check for pocketing of food on the Right; Swallow 2 times per bite/sip; Small bites/sips; External pacing           RECORD REVIEW: Previous medical records, medications were reviewed at today's visit    IMPRESSION:   1. 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 Suspected to be cardioembolic. Plavix changed to Coumadin. Lovenox discontinued due to therapeutic INR. PT/OT/SLP  2. DVT prophylaxisCoumadin  3. Hyperlipidemiacontinue statin  4. GIbowel regimen  5. Bacteruria with unremarkable u/a. Suspect contaminant. No Rx for now. 6.  Hypoglycemiareplaced. 7.  Excessive daytime sleepiness. Repeat CT showing no change. Ammonia level normal.  Check urinalysis and EEG. Add a.m. stimulant. ELOS:11/28. Suspect snf.   Restaff in a.m.

## 2021-11-24 LAB
ALBUMIN SERPL-MCNC: 3.7 G/DL (ref 3.5–5.2)
ALP BLD-CCNC: 85 U/L (ref 35–104)
ALT SERPL-CCNC: 20 U/L (ref 5–33)
ANION GAP SERPL CALCULATED.3IONS-SCNC: 7 MMOL/L (ref 7–19)
AST SERPL-CCNC: 25 U/L (ref 5–32)
BASOPHILS ABSOLUTE: 0 K/UL (ref 0–0.2)
BASOPHILS RELATIVE PERCENT: 0.6 % (ref 0–1)
BILIRUB SERPL-MCNC: 0.7 MG/DL (ref 0.2–1.2)
BUN BLDV-MCNC: 37 MG/DL (ref 8–23)
CALCIUM SERPL-MCNC: 10.4 MG/DL (ref 8.8–10.2)
CHLORIDE BLD-SCNC: 111 MMOL/L (ref 98–111)
CO2: 29 MMOL/L (ref 22–29)
CREAT SERPL-MCNC: 0.6 MG/DL (ref 0.5–0.9)
EOSINOPHILS ABSOLUTE: 0.2 K/UL (ref 0–0.6)
EOSINOPHILS RELATIVE PERCENT: 3.4 % (ref 0–5)
GFR AFRICAN AMERICAN: >59
GFR NON-AFRICAN AMERICAN: >60
GLUCOSE BLD-MCNC: 101 MG/DL (ref 70–99)
GLUCOSE BLD-MCNC: 39 MG/DL (ref 70–99)
GLUCOSE BLD-MCNC: 44 MG/DL (ref 70–99)
GLUCOSE BLD-MCNC: 58 MG/DL (ref 70–99)
GLUCOSE BLD-MCNC: 58 MG/DL (ref 70–99)
GLUCOSE BLD-MCNC: 63 MG/DL (ref 70–99)
GLUCOSE BLD-MCNC: 87 MG/DL (ref 74–109)
GLUCOSE BLD-MCNC: 91 MG/DL (ref 70–99)
GLUCOSE BLD-MCNC: 92 MG/DL (ref 70–99)
GLUCOSE BLD-MCNC: 99 MG/DL (ref 74–109)
HCT VFR BLD CALC: 45.1 % (ref 37–47)
HEMOGLOBIN: 13.4 G/DL (ref 12–16)
IMMATURE GRANULOCYTES #: 0 K/UL
INR BLD: 1.67 (ref 0.88–1.18)
LYMPHOCYTES ABSOLUTE: 1.3 K/UL (ref 1.1–4.5)
LYMPHOCYTES RELATIVE PERCENT: 25.4 % (ref 20–40)
MCH RBC QN AUTO: 28.9 PG (ref 27–31)
MCHC RBC AUTO-ENTMCNC: 29.7 G/DL (ref 33–37)
MCV RBC AUTO: 97.2 FL (ref 81–99)
MONOCYTES ABSOLUTE: 0.3 K/UL (ref 0–0.9)
MONOCYTES RELATIVE PERCENT: 6.3 % (ref 0–10)
NEUTROPHILS ABSOLUTE: 3.2 K/UL (ref 1.5–7.5)
NEUTROPHILS RELATIVE PERCENT: 63.9 % (ref 50–65)
PDW BLD-RTO: 14.4 % (ref 11.5–14.5)
PERFORMED ON: ABNORMAL
PERFORMED ON: NORMAL
PERFORMED ON: NORMAL
PLATELET # BLD: 179 K/UL (ref 130–400)
PMV BLD AUTO: 10.1 FL (ref 9.4–12.3)
POTASSIUM SERPL-SCNC: 4.5 MMOL/L (ref 3.5–5)
PROTHROMBIN TIME: 19.6 SEC (ref 12–14.6)
RBC # BLD: 4.64 M/UL (ref 4.2–5.4)
SODIUM BLD-SCNC: 147 MMOL/L (ref 136–145)
TOTAL PROTEIN: 5.6 G/DL (ref 6.6–8.7)
WBC # BLD: 4.9 K/UL (ref 4.8–10.8)

## 2021-11-24 PROCEDURE — 6370000000 HC RX 637 (ALT 250 FOR IP): Performed by: PSYCHIATRY & NEUROLOGY

## 2021-11-24 PROCEDURE — 99233 SBSQ HOSP IP/OBS HIGH 50: CPT | Performed by: PSYCHIATRY & NEUROLOGY

## 2021-11-24 PROCEDURE — 97110 THERAPEUTIC EXERCISES: CPT

## 2021-11-24 PROCEDURE — 82947 ASSAY GLUCOSE BLOOD QUANT: CPT

## 2021-11-24 PROCEDURE — 6370000000 HC RX 637 (ALT 250 FOR IP): Performed by: NURSE PRACTITIONER

## 2021-11-24 PROCEDURE — 36415 COLL VENOUS BLD VENIPUNCTURE: CPT

## 2021-11-24 PROCEDURE — 85610 PROTHROMBIN TIME: CPT

## 2021-11-24 PROCEDURE — 6360000002 HC RX W HCPCS: Performed by: PSYCHIATRY & NEUROLOGY

## 2021-11-24 PROCEDURE — 1180000000 HC REHAB R&B

## 2021-11-24 PROCEDURE — 97535 SELF CARE MNGMENT TRAINING: CPT

## 2021-11-24 PROCEDURE — 97116 GAIT TRAINING THERAPY: CPT

## 2021-11-24 PROCEDURE — 97530 THERAPEUTIC ACTIVITIES: CPT

## 2021-11-24 PROCEDURE — 80053 COMPREHEN METABOLIC PANEL: CPT

## 2021-11-24 PROCEDURE — 85025 COMPLETE CBC W/AUTO DIFF WBC: CPT

## 2021-11-24 PROCEDURE — 92526 ORAL FUNCTION THERAPY: CPT

## 2021-11-24 PROCEDURE — 97129 THER IVNTJ 1ST 15 MIN: CPT

## 2021-11-24 PROCEDURE — 97130 THER IVNTJ EA ADDL 15 MIN: CPT

## 2021-11-24 RX ORDER — WARFARIN SODIUM 3 MG/1
6 TABLET ORAL
Status: COMPLETED | OUTPATIENT
Start: 2021-11-24 | End: 2021-11-24

## 2021-11-24 RX ADMIN — SENNOSIDES AND DOCUSATE SODIUM 1 TABLET: 50; 8.6 TABLET ORAL at 21:10

## 2021-11-24 RX ADMIN — SENNOSIDES AND DOCUSATE SODIUM 1 TABLET: 50; 8.6 TABLET ORAL at 09:11

## 2021-11-24 RX ADMIN — MODAFINIL 100 MG: 100 TABLET ORAL at 09:11

## 2021-11-24 RX ADMIN — ATORVASTATIN CALCIUM 20 MG: 20 TABLET, FILM COATED ORAL at 09:12

## 2021-11-24 RX ADMIN — DEXTROSE 15 G: 15 GEL ORAL at 11:36

## 2021-11-24 RX ADMIN — ENOXAPARIN SODIUM 60 MG: 100 INJECTION SUBCUTANEOUS at 21:10

## 2021-11-24 RX ADMIN — DEXTROSE 15 G: 15 GEL ORAL at 12:24

## 2021-11-24 RX ADMIN — DOCUSATE SODIUM 100 MG: 100 CAPSULE, LIQUID FILLED ORAL at 09:11

## 2021-11-24 RX ADMIN — DEXTROSE 15 G: 15 GEL ORAL at 11:40

## 2021-11-24 RX ADMIN — DEXTROSE 15 G: 15 GEL ORAL at 12:01

## 2021-11-24 RX ADMIN — ENOXAPARIN SODIUM 60 MG: 100 INJECTION SUBCUTANEOUS at 09:12

## 2021-11-24 RX ADMIN — WARFARIN SODIUM 6 MG: 3 TABLET ORAL at 17:25

## 2021-11-24 NOTE — PROGRESS NOTES
Occupational Therapy     11/24/21 1100   Restrictions/Precautions   Restrictions/Precautions Fall Risk   Position Activity Restriction   Other position/activity restrictions Dysphagia-soft and bite sized, NTL, water in between meals   Subjective   Subjective Pt with decreased alertness at beginning of session. Attempted to stand x 2 occ and unable (R LE would not maintain contact on the floor), pt assisted with Jose Kinney to recliner. Nursing checked blood sugar and level was in the 40s. ADL   Feeding Moderate assistance   Grooming Setup  (oral care)   Functional Mobility   Functional Mobility Comments so stesue   Bed mobility   Supine to Sit Moderate assistance  (d/t decreased alertness)   Assessment   Performance deficits / Impairments Decreased functional mobility ; Decreased ADL status; Decreased ROM;  Decreased strength; Decreased safe awareness; Decreased cognition; Decreased endurance; Decreased high-level IADLs; Decreased balance; Decreased coordination; Decreased vision/visual deficit   Treatment Diagnosis B CVAs/R hemiparesis   Timed Code Treatment Minutes 60 Minutes   Activity Tolerance   Activity Tolerance Patient limited by fatigue; Treatment limited secondary to medical complications (free text)   Activity Tolerance Low blood sugar levels   Safety Devices   Safety Devices in place Yes   Type of devices Chair alarm in place; Call light within reach  (in recliner)

## 2021-11-24 NOTE — PLAN OF CARE
Problem: Falls - Risk of:  Goal: Will remain free from falls  Description: Will remain free from falls  Outcome: Ongoing  Goal: Absence of physical injury  Description: Absence of physical injury  Outcome: Ongoing     Problem: Confusion - Acute:  Goal: Absence of continued neurological deterioration signs and symptoms  Description: Absence of continued neurological deterioration signs and symptoms  Outcome: Ongoing  Goal: Mental status will be restored to baseline  Description: Mental status will be restored to baseline  Outcome: Ongoing     Problem: Discharge Planning:  Goal: Ability to perform activities of daily living will improve  Description: Ability to perform activities of daily living will improve  Outcome: Ongoing  Goal: Participates in care planning  Description: Participates in care planning  Outcome: Ongoing     Problem: Mood - Altered:  Goal: Mood stable  Description: Mood stable  Outcome: Ongoing  Goal: Absence of abusive behavior  Description: Absence of abusive behavior  Outcome: Ongoing  Goal: Verbalizations of feeling emotionally comfortable while being cared for will increase  Description: Verbalizations of feeling emotionally comfortable while being cared for will increase  Outcome: Ongoing     Problem: Sleep Pattern Disturbance:  Goal: Appears well-rested  Description: Appears well-rested  Outcome: Ongoing     Problem: IP BLADDER/VOIDING  Goal: LTG - patient will complete bladder elimination  Outcome: Ongoing  Goal: LTG - Patient will utilize adaptive techniques/equipment to complete bladder elimination  Outcome: Ongoing  Goal: LTG - patient will achieve acceptable level of continence  Outcome: Ongoing  Goal: STG - Patient demonstrates ability to take care of indwelling catheter  Outcome: Ongoing  Goal: STG - patient demonstrates self-cath technique using clean technique and care of the catheter  Outcome: Ongoing  Goal: STG - Patient demonstrates no accidents  Outcome: Ongoing  Goal: STG - Patient will state signs and symptoms of UTI  Outcome: Ongoing  Goal: STG - patient will be able to empty bladder  Outcome: Ongoing  Goal: STG - Patient demonstrates understanding of self catheterization schedule by completing task on time  Outcome: Ongoing  Goal: STG - patient participates in bladder program by expressing need to void  Outcome: Ongoing  Goal: STG - Patient verbalizes understanding of catheter care indwelling/intermittent  Outcome: Ongoing  Goal: STG - patient participates in bladder program by adhering to implemented toileting schedule  Outcome: Ongoing     Problem: IP BOWEL ELIMINATION  Goal: LTG - patient will utilize adaptive techniques/equipment to complete bowel elimination  Outcome: Ongoing  Goal: LTG - patient will have regular and routine bowel evacuation  Outcome: Ongoing  Goal: STG - patient will be accident free  Outcome: Ongoing  Goal: STG - Patient demonstrates care and management of ostomy bag  Outcome: Ongoing  Goal: STG - Patient participates in bowel management program  Outcome: Ongoing  Goal: STG - patient maintains skin integrity  Outcome: Ongoing  Goal: STG - Patient will verbalize signs and symptoms of constipation and how to prevent/alleviate  Outcome: Ongoing  Goal: STG - patient will be continent of stool  Outcome: Ongoing  Goal: STG - Patient completes digital stimulation technique  Outcome: Ongoing  Goal: STG - Patient verbalizes knowledge about relationship between diet, fluid intake, activity and medication on constipation  Outcome: Ongoing     Problem: IP BREATHING  Goal: LTG - patient will mobilize secretions and maintain airway  Outcome: Ongoing  Goal: LTG - Patient/caregiver will demonstrate/perform proper techniques to maintain patent airway  Outcome: Ongoing  Goal: LTG - patient/caregiver will demonstrate/perform improved or stable self care abilities within physical limitations  Outcome: Ongoing  Goal: STG - Patient/caregiver will maintain patent airway  Outcome: Ongoing  Goal: STG - respiratory rate and effort will be within normal limits for the patient  Outcome: Ongoing  Goal: STG - patient/caregiver will be able to verbalize oxygen safety precautions  Outcome: Ongoing  Goal: STG - Patient/caregiver demonstrates correct suctioning technique  Outcome: Ongoing  Goal: STG - patient will utilize incentive spirometer  Outcome: Ongoing  Goal: STG - Patient performs or directs assisted coughing  Outcome: Ongoing  Goal: STG - patient can administer MDI's  Outcome: Ongoing  Goal: STG - Patient can utilize incentive spirometer  Outcome: Ongoing  Goal: STG - family can complete suctioning  Outcome: Ongoing

## 2021-11-24 NOTE — PROGRESS NOTES
Name: Briana Dubose  MRN: [id number]  Date of Service:  11/24/2021 11/24/21 1325   Restrictions/Precautions   Restrictions/Precautions Fall Risk   Required Braces or Orthoses? No   Lower Extremity Weight Bearing Restrictions   Right Lower Extremity Weight Bearing Weight Bearing As Tolerated   Left Lower Extremity Weight Bearing Weight Bearing As Tolerated   Position Activity Restriction   Other position/activity restrictions Dysphagia-soft and bite sized, NTL, water in between meals   General   Chart Reviewed Yes   Additional Pertinent Hx hx CVAs, stress incontinenc   Response To Previous Treatment Not applicable   Family / Caregiver Present No   Referring Practitioner Sana Reece MD   Subjective   Subjective pt sitting in recliner, agrees to participate in therapy. KYLE Romo and lab services available initially. Pain Screening   Patient Currently in Pain Denies   Bed Mobility   Bridging Supervision   Rolling Stand by assistance; Supervision   Supine to Sit Minimal assistance  (Getting BLE started out of bed and assisting trunk )   Sit to Supine Stand by assistance   Scooting Stand by assistance; Supervision  (On EOB and up in bed (minimally))   Transfers   Sit to Stand Minimal Assistance; Moderate Assistance  ([place]oa A initially, rest Min A from varying surfaces )   Stand to sit Minimal Assistance   Lateral Transfers Contact guard assistance  (w/c<>EOB from L )   Comment v/c's and assistance for BUE placement   Ambulation   Ambulation?  Yes   WB Status WBAT   Ambulation 1   Surface level tile   Device Rolling Walker   Assistance Minimal assistance   Quality of Gait ant shifted cog , tends to lean to R, forward lean also    Gait Deviations Slow Cyn; Decreased step length; Decreased step height   Distance 8'   Comments turning to L to sit in w/c   Ambulation 2   Surface - 2 level tile   Device 2 Rollator   Other Apparatus 2 Wheelchair follow   Assistance 2 Minimal assistance   Quality of Gait 2 Straight path, excessive forward lean, veers to R at times    Gait Deviations Slow Cyn; Decreased step length; Decreased step height; Deviated path   Distance 50'    Comments gradual decreased step length with fatigue    Patient Goals    Patient goals  pts son woul like pt to get back to PLOF in order to be functional in home again    Short term goals   Time Frame for Short term goals 1-2 weeks   Short term goal 1 bed mobility Mod I    Short term goal 2 transfers with AD SBA   Short term goal 3 ambulation 50ft with AD Joshua   Short term goal 4 navigate 4 step with handrails Joshua   Short term goal 5 WC mobility 75ft CGA   Long term goals   Time Frame for Long term goals  3-4 weeks   Long term goal 1 bed mobility Indp   Long term goal 2 transfers with AD Mod I   Long term goal 3 ambulation 150 ft with AD SBA   Long term goal 4 navigate 4 step with hand rail SBA   Long term goal 5 WC mobility 150 ft SBA   Conditions Requiring Skilled Therapeutic Intervention   Body structures, Functions, Activity limitations Decreased functional mobility ; Decreased strength; Decreased endurance; Decreased balance; Decreased coordination; Decreased posture   Assessment Pt requires intermittent v/c's for correct technqiue during sit<>stand, amb, TF, and bed mobility. Pt presents to tx alert this afternoon, she is attentive to v/c's and responds accordingly. Pt responses are sometimes delayed. History hx CVAs, stress incontinenc   Treatment Initiated  transfers, bed mobility, ther ex   Discharge Recommendations Continue to assess pending progress; 24 hour supervision or assist; Patient would benefit from continued therapy after discharge   Activity Tolerance   Activity Tolerance Patient limited by endurance;  Patient Tolerated treatment well; Patient limited by fatigue   Plan   Times per week 5-6x   Plan weeks 3--4 weeks   Specific instructions for Next Treatment tfs with or without AD and initiate ambulation    Current Treatment Recommendations Strengthening; Balance Training; Functional Mobility Training; Transfer Training; Wheelchair Mobility Training; Gait Training; Stair training; Safety Education & Training; Patient/Caregiver Education & Training   Plan Comment cont.  PT with static sitting and progress to dynamic sitting tasks and standing with SS   Safety Devices   Type of devices Patient at risk for falls; Gait belt; Left in chair; Chair alarm in place; Call light within reach  (In recliner )   PT Whiteboard Notes   Therapy Whiteboard RE 11/21 L MCA CVA, DOUBLE, SS         Electronically signed by Consuelo Mcclendon PTA on 11/24/2021 at 4:44 PM

## 2021-11-24 NOTE — PLAN OF CARE
Problem: Falls - Risk of:  Goal: Will remain free from falls  Description: Will remain free from falls  11/24/2021 1059 by Kirsten Marc RN  Outcome: Ongoing  11/23/2021 2351 by Karon Henderson RN  Outcome: Ongoing   Up with 1 assist

## 2021-11-24 NOTE — PROGRESS NOTES
Patient:   Saray Welsh  MR#:    [id number]   Room:    [facility]/826-01   YOB: 1933  Date of Progress Note: 11/24/2021  Time of Note                           8:30 AM  Consulting Physician:   Jazmyn Davies M.D. Attending Physician:  Jazmyn aDvies MD       CHIEF COMPLAINT: Right-sided weakness with dysphagia     subjective  This 80 y.o. female  with history of 3 strokes since March 2021, stress incontinence, arthritis, blood clots and hyperlipidemia. She presented to [facility] ER on 11/6/21 after being found on the floor at home by her son. She was confused, had abnormal speech and right sided weakness. CT of head was negative for acute changes. CTA was also negative. She was admitted to the Hospitalist service with consult for neurology. MRI done revealed an acute lacunar infarct within the left thalamus and multiple Multifocal old ischemic change in both cerebral hemispheres and within the right side of the cerebellum. She had been placed on Plavix and statin after her last stroke. She was seen by Dr. Marina Corona and not felt to be a candidate for TPA d/t being out of the time frame and no evidence of thrombus on CTA. Dr. Marina Corona felt strokes were most likely cardioembolic. Plavix was discontinued and she was started on Coumadin with Lovenox for bridging. Echo done showed Bubble study with evidence of small right-to-left shunting with Valsalva consistent with possible ASD/PFO. Cardiology was consulted for possible PFO closure. Patient/family do not wish to have any invasive measure. Prefer to continue with medical management with anticoagulation. Patient is a DNR. She was evaluated by SPT for swallow and aphasia. She was initially made NPO but has now been started on a mechanical soft diet with nectar thick liquids. She continues to have right sided weakness and is participating in both PT/OT. No complaint this am.  Lethargic intermittently.   REVIEW OF SYSTEMS:  Patient is aphasic      PHYSICAL EXAM:  BP (!) 150/88 Pulse 52   Temp 97.2 °F (36.2 °C) (Temporal)   Resp 16   Ht 5' 6\" (1.676 m)   Wt 135 lb 6 oz (61.4 kg)   SpO2 92%   BMI 21.85 kg/m²     Constitutional: she appears well-developed and well-nourished. Eyes  conjunctiva normal.  Pupils react to light  Ear, nose, throat -hearing intact to voice. No scars, masses, or lesions over external nose or ears, no atrophy of tongue  Neck-symmetric, no masses noted, no jugular vein distension  Respiration- chest wall appears symmetric, good expansion,   normal effort without use of accessory muscles  Cardiovascular- RRR  Musculoskeletal  no significant wasting of muscles noted, no bony deformities, gait no gross ataxia  Extremities-no clubbing, cyanosis or edema  Skin  warm, dry, and intact. No rash, erythema, or pallor. Psychiatric  mood, affect, and behavior appear normal.      Neurology  NEUROLOGICAL EXAM:  Awake, alert, fluent oriented to [facility].  Attention and concentration appear appropriate  Speech shows dysarthria       Cranial Nerve Exam   CN II- Visual fields show an apparent left homonymous hemianopsia  CN III, IV,VI-EOMI, No nystagmus, conjugate eye movements, no ptosis  CN VII-right facial droop  CN VIII-Hearing intact        Motor Exam  Relatively normal throughout with significant atrophy in the hands       Tremors- no tremors in hands or head noted    Coordination  Clumsiness in the bilateral upper extremities           Nursing/pcp notes, imaging,labs and vitals reviewed.      PT,OT and/or speech notes reviewed    Lab Results   Component Value Date    WBC 4.9 11/24/2021    HGB 13.4 11/24/2021    HCT 45.1 11/24/2021    MCV 97.2 11/24/2021     11/24/2021     Lab Results   Component Value Date     (H) 11/24/2021    K 4.5 11/24/2021     11/24/2021    CO2 29 11/24/2021    BUN 37 (H) 11/24/2021    CREATININE 0.6 11/24/2021    GLUCOSE 87 11/24/2021    CALCIUM 10.4 (H) 11/24/2021    PROT 5.6 (L) 11/24/2021    LABALBU 3.7 11/24/2021    BILITOT 0.7 11/24/2021    ALKPHOS 85 11/24/2021    AST 25 11/24/2021    ALT 20 11/24/2021    LABGLOM >60 11/24/2021    GFRAA >59 11/24/2021     Lab Results   Component Value Date    INR 1.67 (H) 11/24/2021    INR 1.64 (H) 11/23/2021    INR 1.86 (H) 11/22/2021   No results found for: PHENYTOIN, ESR, CRP      PHYSICAL THERAPY  STRENGTH  Strength RLE  Comment: grossly assessed 3+/5  Strength LLE  Comment: grossly assessed 4/5  ROM  AROM RLE (degrees)  RLE AROM: WFL  AROM LLE (degrees)  LLE AROM : WFL  BED MOBILITY  Bed Mobility  Bridging: Supervision  Rolling: Stand by assistance  Supine to Sit: Stand by assistance  Sit to Supine: Stand by assistance  Scooting: Stand by assistance  Comment: pt understood direction well, immediately fell asleep once supine   TRANSFERS  Transfers  Sit to Stand: Minimal Assistance  Stand to sit: Minimal Assistance  Bed to Chair: Minimal assistance, Moderate assistance  Stand Pivot Transfers: Moderate Assistance  Squat Pivot Transfers: Minimal Assistance  Lateral Transfers:  Moderate Assistance, Maximum Assistance (squat pivot to [facility] on R side)  Comment: used so jdsue to transfer from [facility] to [facility] so that we could tend to toileting hygiene with less difficulty  WHEELCHAIR PROPULSION  AMBULATION  Ambulation 1  Surface: level tile  Device: Rollator  Other Apparatus: Wheelchair follow  Assistance: Minimal assistance  Quality of Gait: ant shifted cog with tendency to push rollator too far ahead, leans to R also  Gait Deviations: Decreased step length (see quality of gt)  Distance: 76'  Comments: turning to L with less difficulty  STAIRS  GOALS:  Short term goals  Time Frame for Short term goals: 1-2 weeks  Short term goal 1: bed mobility Mod I   Short term goal 2: transfers with AD SBA  Short term goal 3: ambulation 50ft with AD Joshua  Short term goal 4: navigate 4 step with handrails Joshua  Short term goal 5: WC mobility 75ft CGA     Long term goals  Time Frame for Long term goals : 3-4 weeks  Long term goal 1: bed mobility Indp  Long term goal 2: transfers with AD Mod I  Long term goal 3: ambulation 150 ft with AD SBA  Long term goal 4: navigate 4 step with hand rail SBA  Long term goal 5: WC mobility 150 ft SBA     ASSESSMENT:  Assessment: pt needs frequent vc's and tactile cues to stay on task with ex; reporting fatigue with gt but denies need to sit down initially     SPEECH THERAPY        OCCUPATIONAL THERAPY     CURRENT IRF-CORTES SCORES  Eating: CARE Score: 1  Comment: Pt. very lethargic, pt. unable to bring utensil to mouth, vc and tactile cues for alertness to chew and swallow food.     Oral Hygiene: CARE Score: 4  Comment: vc      Toileting: CARE Score: 1  Comment: Pt is incontinent of bladder      Shower/Bathe: CARE Score: 3  Comment: max cues        Upper Body Dressing: CARE Score: 3          Lower Body Dressing: CARE Score: 2           Footwear: CARE Score: 2           Toilet Transfers: CARE Score: 3           Picking Up Object:  CARE Score: 1           UE Functioning:  Hx of multiple recent strokes, B involvement with decreased strength RUE and LUE inattention and apraxia        Pain Assessment:  Pain Level: 0     STGs:  Short term goals  Time Frame for Short term goals: 1 week  Short term goal 1: Mod A with clothing management/hygiene for toileting. Short term goal 2: Mod A with LB dressing, AE PRN. Short term goal 3: Mod A with donning/doffing socks and shoes, AE PRN. Short term goal 4: Min A with toilet transfers. Short term goal 5: Min A with bathing hygiene.     LTGs:  Long term goals  Time Frame for Long term goals : 3-4 weeks  Long term goal 1: CGA with clothing management/hygiene for toileting. Long term goal 2: Min A with LB dressing, AE PRN. Long term goal 3: Min A with donning/doffing socks and shoes, AE PRN. Long term goal 4: CGA with toilet transfers. Long term goal 5: Supervision with bathing hygiene.   Long term goals 6: Patient/family will verbalize DME

## 2021-11-24 NOTE — PROGRESS NOTES
[facility]  INPATIENT SPEECH THERAPY  [facility] 8 REHAB UNIT          TIME   Time In: 0700  Time Out: 0800  Minutes: 60       [x]Daily Note  []Progress Note    Date: 2021  Patient Name: Jp Chowdary        MRN: [id number]    Account #: [de-identified]  : 1933  (80 y.o.)  Gender: female   Primary Provider: Kylah Gray MD  Swallowing Status/Diet: Soft and bite sized, pureed meats, thin liquids      PATIENT DIAGNOSIS(ES):    Diagnosis: Acute ischemic stroke, L MCA infarct, R sided involvement      Additional Pertinent Hx: hx CVAs, stress incontinence     RESTRICTIONS/PRECAUTIONS:    Restrictions/Precautions  Restrictions/Precautions: Fall Risk, Weight Bearing, Swallowing Modified Diet  Required Braces or Orthoses?: No  Position Activity Restriction  Other position/activity restrictions: Dysphagia-soft and bite sized              Subjective:   She was still drowsy this date. Oral hygiene with oral swabs was completed. Therapist washed her face to attempt to stimulate her. Objective:  She did accept two orange juices to help raise her blood sugar as it was low this morning. She also accepted a few sips of coffee. She did answer yes/no questions this date appropriately. She did not answer questions with full phrases or sentences today. Did discuss her status with her neurologist.He has ordered a CT/Head and an EEG. CT/Head:      Impression   Impression:    1. Temporal evolution of nonhemorrhagic right thalamic lacunar   infarct. 2. Old cortical infarcts right frontal and left occipital lobe as well   as the right cerebellar hemisphere with moderate to severe chronic   ischemic white matter changes.                      Signed by Dr Stanton Bernstein           Therapist was unable to feed her this date as her breakfast tray had not yet arrived. Yesterday, she did require constant cues to clear the oral cavity and plenty of liquids were offered to help clear the oral cavity.     No overt s/s of aspiration observed with liquids. Clear voicing was noted following all sips of thin liquids. Recommended Diet and Intervention  Diet Solids Recommendation: Dysphagia Soft and Bite-Sized with pureed meats (Dysphagia III)  Liquid Consistency Recommendation: Thin liquids  Recommended Form of Meds: Crushed in puree as able  Therapeutic Interventions: Mendelsohn; Diet tolerance monitoring; Oral care; Therapeutic PO trials with SLP; Oral motor exercises; Tongue base strengthening; Patient/Family education; Effortful swallow; Pharyngeal exercises; Yanet     Compensatory Swallowing Strategies  Compensatory Swallowing Strategies: Upright as possible for all oral intake; Alternate solids and liquids; Remain upright for 30-45 minutes after meals; Check for pocketing of food on the Right; Swallow 2 times per bite/sip; Small bites/sips; External pacing              SHORT TERM GOAL #1:  Goal 1: The patient will follow two step commands with minimal cueing and 100% accuracy. SHORT TERM GOAL #2:  Goal 2: The patient will complete naming tasks (picture, object) with minimal cueing and 100% accuracy. SHORT TERM GOAL #3:  Goal 3: The patient will complete concrete divergent naming tasks with minimal cueing and 100% accuracy. SHORT TERM GOAL #4:  Goal 4: The patient will complete oral motor exercises to improve lingual and labial strength and range of motion as well as improve speech intelligibility. SHORT TERM GOAL #5:  Goal 5: The patient will utilize dysarthria strategies (slow rate, increased volume, over exaggeration of words, increased breath support) during structured tasks and during conversations with minimal cueing. Swallowing Short Term Goals  Short-term Goals  Goal 1: The patient will complete the yanet (tongue hold) technique to improve base of tongue retraction and base of tongue to pharyngeal wall approximation with 90% accuracy and minimal verbal cues.   Goal 2: The patient will complete the effortful swallow maneuver to improve hyolaryngeal elevation, tongue base retraction, and vocal fold closure with 90% accuracy and minimal verbal cues. Goal 3: Staff/caregivers will complete daily oral hygiene to prevent aspiration of bacteria laden secretions. Goal 4: The patient will tolerate a dysphagia soft and bite sized diet with nectar (mildly thick) liquids with minimal overt s/s of aspiration. Comprehension: 3 - Patient understands basic needs 50-74% of the time  Expression: 3 - Expresses basic ideas/needs 50-74% of the time  Social Interaction: 4 - Patient appropriate 75-90%+ of the time  Problem Solving: 3 - Patient solves simple/routine tasks 50%-74%  Memory: 3 - Patient remembers 50%-74% of the time    ASSESSMENT:    Patient Tolerance of Treatment:   []Tolerated well []Tolerated fair [x]Required rest breaks [x]Fatigued    Education:  Learner:  [x]Patient          []Significant Other          [x]Other  Education provided regarding:  [x]Goals and POC   [x]Diet and swallowing precautions    []Home Exercise Program  [x]Progress and/or discharge information  Method of Education:  [x]Discussion          []Demonstration          []Handout          []Other  Evaluation of Education:   [x]Verbalized understanding   []Demonstrates without assistance  []Demonstrates with assistance  []Needs further instruction     []No evidence of learning                  []No family present      Plan: [x]Continue with current plan of care    []Modify current plan of care as follows:    []Discharge patient    Discharge Location:    Services/Supervision Recommended:      [x]Patient continues to require treatment by a licensed therapist to address functional deficits as outlined in the established plan of care.                         Electronically Signed By:  Carmen Schwarz M.S. CCC-SLP

## 2021-11-24 NOTE — PROGRESS NOTES
[facility]  INPATIENT SPEECH THERAPY  [facility] 8 REHAB UNIT  TIME   Time In: 0700  Time Out: 0800  Minutes: 60       [x]Daily Note  []Progress Note    Date: 2021  Patient Name: Samantha Posadas        MRN: [id number]    Account #: [de-identified]  : 1933  (80 y.o.)  Gender: female   Primary Provider: Reji Domingo MD  Swallowing Status/Diet: Soft and bite sized, pureed meats, thin liquids      PATIENT DIAGNOSIS(ES):    Diagnosis: Acute ischemic stroke, L MCA infarct, R sided involvement      Additional Pertinent Hx: hx CVAs, stress incontinence     RESTRICTIONS/PRECAUTIONS:    Restrictions/Precautions  Restrictions/Precautions: Fall Risk, Weight Bearing, Swallowing Modified Diet  Required Braces or Orthoses?: No  Position Activity Restriction  Other position/activity restrictions: Dysphagia-soft and bite sized              Subjective:   She was drowsy this date, but did keep her eyes open for most of today's session. Oral hygiene with oral swabs and a toothbrush was completed. Objective: Today she did answer questions with phrases and sentences. Verbalizations were improved today, but she is still not communicating at the level she was last week. She did answer all questions appropriately. She did answer simple demographic information questions correctly this date. She accepted orange juice to help raise her blood sugar as it was low this morning. She also accepted a few sips of coffee. Her breakfast tray had not yet arrived, so the therapist was not able to feed her. Do recommend that staff check the oral cavity prior to lowering the head of the bed. Staff should use oral swabs to help clear the buccal cavity. She will need to continue to be a total feed. No overt s/s of aspiration observed with thin liquids via cup and straw. Clear voicing was noted following all sips of thin liquids.     Speech will continue to assess her safety with oral intake and provide therapy for cognition, receptive language, and expressive language. Recommended Diet and Intervention  Diet Solids Recommendation: Dysphagia Soft and Bite-Sized with pureed meats (Dysphagia III)  Liquid Consistency Recommendation: Thin liquids  Recommended Form of Meds: Crushed in puree as able  Therapeutic Interventions: Mendelsohn; Diet tolerance monitoring; Oral care; Therapeutic PO trials with SLP; Oral motor exercises; Tongue base strengthening; Patient/Family education; Effortful swallow; Pharyngeal exercises; Yanet     Compensatory Swallowing Strategies  Compensatory Swallowing Strategies: Upright as possible for all oral intake; Alternate solids and liquids; Remain upright for 30-45 minutes after meals; Check for pocketing of food on the Right; Swallow 2 times per bite/sip; Small bites/sips; External pacing                  SHORT TERM GOAL #1:  Goal 1: The patient will follow two step commands with minimal cueing and 100% accuracy. SHORT TERM GOAL #2:  Goal 2: The patient will complete naming tasks (picture, object) with minimal cueing and 100% accuracy. SHORT TERM GOAL #3:  Goal 3: The patient will complete concrete divergent naming tasks with minimal cueing and 100% accuracy. SHORT TERM GOAL #4:  Goal 4: The patient will complete oral motor exercises to improve lingual and labial strength and range of motion as well as improve speech intelligibility. SHORT TERM GOAL #5:  Goal 5: The patient will utilize dysarthria strategies (slow rate, increased volume, over exaggeration of words, increased breath support) during structured tasks and during conversations with minimal cueing. Swallowing Short Term Goals  Short-term Goals  Goal 1: The patient will complete the yanet (tongue hold) technique to improve base of tongue retraction and base of tongue to pharyngeal wall approximation with 90% accuracy and minimal verbal cues.   Goal 2: The patient will complete the effortful swallow maneuver to improve hyolaryngeal elevation, tongue base retraction, and vocal fold closure with 90% accuracy and minimal verbal cues. Goal 3: Staff/caregivers will complete daily oral hygiene to prevent aspiration of bacteria laden secretions. Goal 4: The patient will tolerate a dysphagia soft and bite sized diet with nectar (mildly thick) liquids with minimal overt s/s of aspiration. Comprehension: 3 - Patient understands basic needs 50-74% of the time  Expression: 3 - Expresses basic ideas/needs 50-74% of the time  Social Interaction: 4 - Patient appropriate 75-90%+ of the time  Problem Solving: 3 - Patient solves simple/routine tasks 50%-74%  Memory: 3 - Patient remembers 50%-74% of the time    ASSESSMENT:    Patient Tolerance of Treatment:   [x]Tolerated well []Tolerated fair []Required rest breaks []Fatigued    Education:  Learner:  [x]Patient          []Significant Other          []Other  Education provided regarding:  [x]Goals and POC   []Diet and swallowing precautions    []Home Exercise Program  []Progress and/or discharge information  Method of Education:  [x]Discussion          []Demonstration          []Handout          []Other  Evaluation of Education:   [x]Verbalized understanding   []Demonstrates without assistance  []Demonstrates with assistance  []Needs further instruction     []No evidence of learning                  []No family present      Plan: [x]Continue with current plan of care    []Modify current plan of care as follows:    []Discharge patient    Discharge Location:    Services/Supervision Recommended:      [x]Patient continues to require treatment by a licensed therapist to address functional deficits as outlined in the established plan of care.     Electronically Signed By:  True Sherman M.S., CCC-SLP  11/24/2021,8:32 AM.

## 2021-11-24 NOTE — PROGRESS NOTES
Clinical Pharmacy Note    Warfarin consult follow-up    Recent Labs     11/24/21  0645   INR 1.67*     Recent Labs     11/22/21  0451 11/23/21  0641 11/24/21  0645   HGB 14.3 13.8 13.4   HCT 47.2* 45.2 45.1    189 179       Significant Drug-Drug Interactions:  New warfarin drug-drug interactions: none  Discontinued drug-drug interactions: none    Date INR Warfarin Dose   11/06/21 1.01 ---   11/07/21 --- 5 mg   11/08/21 1.04  5 mg    11/09/21  1.30 5 mg   11/10/21 1.74 5 mg    11/11/21  2.44 2.5 mg    11/12/21 2.02 5 mg    11/13/21  1.97 5 mg   11/14/21  2.44  4 mg    11/15/21 2.58 4 mg    11/16/21 2.35  5 mg    11/17/21 2.30  5 mg    11/18/21 2.40 4 mg    11/19/21 2.57  4 mg    11/20/21 2.48  4 mg   11/21/21 2.37 5 mg    11/22/21 1.86   6 mg   11/23/21   1.64 7.5 mg    11/24/21 1.67 6 mg               Notes:  Agree with bridging with Lovenox as seems patient may not be getting all of her Coumadin dose due to buccal cavity pocketing noted by speech. Coumadin 6mg po X1 tonight. Daily PT/INR until stable within therapeutic range.      Electronically signed by DINORA Disla [facility] on 11/24/2021 at 9:51 AM

## 2021-11-25 LAB
ALBUMIN SERPL-MCNC: 3.7 G/DL (ref 3.5–5.2)
ALP BLD-CCNC: 87 U/L (ref 35–104)
ALT SERPL-CCNC: 24 U/L (ref 5–33)
ANION GAP SERPL CALCULATED.3IONS-SCNC: 10 MMOL/L (ref 7–19)
AST SERPL-CCNC: 25 U/L (ref 5–32)
BASOPHILS ABSOLUTE: 0 K/UL (ref 0–0.2)
BASOPHILS RELATIVE PERCENT: 0.7 % (ref 0–1)
BILIRUB SERPL-MCNC: 0.8 MG/DL (ref 0.2–1.2)
BUN BLDV-MCNC: 40 MG/DL (ref 8–23)
CALCIUM SERPL-MCNC: 10.2 MG/DL (ref 8.8–10.2)
CHLORIDE BLD-SCNC: 107 MMOL/L (ref 98–111)
CO2: 27 MMOL/L (ref 22–29)
CREAT SERPL-MCNC: 0.5 MG/DL (ref 0.5–0.9)
EOSINOPHILS ABSOLUTE: 0.2 K/UL (ref 0–0.6)
EOSINOPHILS RELATIVE PERCENT: 3.7 % (ref 0–5)
GFR AFRICAN AMERICAN: >59
GFR NON-AFRICAN AMERICAN: >60
GLUCOSE BLD-MCNC: 100 MG/DL (ref 70–99)
GLUCOSE BLD-MCNC: 81 MG/DL (ref 70–99)
GLUCOSE BLD-MCNC: 82 MG/DL (ref 74–109)
HCT VFR BLD CALC: 43.5 % (ref 37–47)
HEMOGLOBIN: 13 G/DL (ref 12–16)
IMMATURE GRANULOCYTES #: 0 K/UL
INR BLD: 2.29 (ref 0.88–1.18)
LYMPHOCYTES ABSOLUTE: 1.7 K/UL (ref 1.1–4.5)
LYMPHOCYTES RELATIVE PERCENT: 30.6 % (ref 20–40)
MCH RBC QN AUTO: 29 PG (ref 27–31)
MCHC RBC AUTO-ENTMCNC: 29.9 G/DL (ref 33–37)
MCV RBC AUTO: 96.9 FL (ref 81–99)
MONOCYTES ABSOLUTE: 0.4 K/UL (ref 0–0.9)
MONOCYTES RELATIVE PERCENT: 7.6 % (ref 0–10)
NEUTROPHILS ABSOLUTE: 3.1 K/UL (ref 1.5–7.5)
NEUTROPHILS RELATIVE PERCENT: 57 % (ref 50–65)
PDW BLD-RTO: 14.4 % (ref 11.5–14.5)
PERFORMED ON: ABNORMAL
PERFORMED ON: NORMAL
PLATELET # BLD: 186 K/UL (ref 130–400)
PMV BLD AUTO: 10.2 FL (ref 9.4–12.3)
POTASSIUM REFLEX MAGNESIUM: 4.5 MMOL/L (ref 3.5–5)
PROTHROMBIN TIME: 24.9 SEC (ref 12–14.6)
RBC # BLD: 4.49 M/UL (ref 4.2–5.4)
SODIUM BLD-SCNC: 144 MMOL/L (ref 136–145)
TOTAL PROTEIN: 5.3 G/DL (ref 6.6–8.7)
WBC # BLD: 5.4 K/UL (ref 4.8–10.8)

## 2021-11-25 PROCEDURE — 6370000000 HC RX 637 (ALT 250 FOR IP): Performed by: PSYCHIATRY & NEUROLOGY

## 2021-11-25 PROCEDURE — 1180000000 HC REHAB R&B

## 2021-11-25 PROCEDURE — 80053 COMPREHEN METABOLIC PANEL: CPT

## 2021-11-25 PROCEDURE — 36415 COLL VENOUS BLD VENIPUNCTURE: CPT

## 2021-11-25 PROCEDURE — 6360000002 HC RX W HCPCS: Performed by: PSYCHIATRY & NEUROLOGY

## 2021-11-25 PROCEDURE — 82947 ASSAY GLUCOSE BLOOD QUANT: CPT

## 2021-11-25 PROCEDURE — 6370000000 HC RX 637 (ALT 250 FOR IP): Performed by: NURSE PRACTITIONER

## 2021-11-25 PROCEDURE — 85610 PROTHROMBIN TIME: CPT

## 2021-11-25 PROCEDURE — 85025 COMPLETE CBC W/AUTO DIFF WBC: CPT

## 2021-11-25 RX ORDER — WARFARIN SODIUM 3 MG/1
6 TABLET ORAL
Status: COMPLETED | OUTPATIENT
Start: 2021-11-25 | End: 2021-11-25

## 2021-11-25 RX ADMIN — MODAFINIL 100 MG: 100 TABLET ORAL at 08:04

## 2021-11-25 RX ADMIN — FLUTICASONE PROPIONATE 1 SPRAY: 50 SPRAY, METERED NASAL at 08:03

## 2021-11-25 RX ADMIN — WARFARIN SODIUM 6 MG: 3 TABLET ORAL at 17:30

## 2021-11-25 RX ADMIN — DOCUSATE SODIUM 100 MG: 100 CAPSULE, LIQUID FILLED ORAL at 08:03

## 2021-11-25 RX ADMIN — SENNOSIDES AND DOCUSATE SODIUM 1 TABLET: 50; 8.6 TABLET ORAL at 08:03

## 2021-11-25 RX ADMIN — ATORVASTATIN CALCIUM 20 MG: 20 TABLET, FILM COATED ORAL at 08:03

## 2021-11-25 RX ADMIN — ENOXAPARIN SODIUM 60 MG: 100 INJECTION SUBCUTANEOUS at 08:02

## 2021-11-25 NOTE — PLAN OF CARE
Problem: Falls - Risk of:  Goal: Will remain free from falls  Description: Will remain free from falls  Outcome: Ongoing  Goal: Absence of physical injury  Description: Absence of physical injury  Outcome: Ongoing     Problem: Discharge Planning:  Goal: Ability to perform activities of daily living will improve  Description: Ability to perform activities of daily living will improve  Outcome: Ongoing  Goal: Participates in care planning  Description: Participates in care planning  Outcome: Ongoing     Problem: Injury - Risk of, Physical Injury:  Goal: Will remain free from falls  Description: Will remain free from falls  Outcome: Ongoing  Goal: Absence of physical injury  Description: Absence of physical injury  Outcome: Ongoing     Problem: Mood - Altered:  Goal: Mood stable  Description: Mood stable  Outcome: Ongoing  Goal: Absence of abusive behavior  Description: Absence of abusive behavior  Outcome: Ongoing  Goal: Verbalizations of feeling emotionally comfortable while being cared for will increase  Description: Verbalizations of feeling emotionally comfortable while being cared for will increase  Outcome: Ongoing     Problem: Sensory Perception - Impaired:  Goal: Demonstrations of improved sensory functioning will increase  Description: Demonstrations of improved sensory functioning will increase  Outcome: Ongoing  Goal: Decrease in sensory misperception frequency  Description: Decrease in sensory misperception frequency  Outcome: Ongoing  Goal: Able to refrain from responding to false sensory perceptions  Description: Able to refrain from responding to false sensory perceptions  Outcome: Ongoing  Goal: Demonstrates accurate environmental perceptions  Description: Demonstrates accurate environmental perceptions  Outcome: Ongoing  Goal: Able to distinguish between reality-based and nonreality-based thinking  Description: Able to distinguish between reality-based and nonreality-based thinking  Outcome: Ongoing  Goal: Able to interrupt nonreality-based thinking  Description: Able to interrupt nonreality-based thinking  Outcome: Ongoing     Problem: Sleep Pattern Disturbance:  Goal: Appears well-rested  Description: Appears well-rested  Outcome: Ongoing     Problem: IP BOWEL ELIMINATION  Goal: LTG - patient will utilize adaptive techniques/equipment to complete bowel elimination  Outcome: Ongoing  Goal: LTG - patient will have regular and routine bowel evacuation  Outcome: Ongoing  Goal: STG - patient will be accident free  Outcome: Ongoing  Goal: STG - Patient demonstrates care and management of ostomy bag  Outcome: Ongoing  Goal: STG - Patient participates in bowel management program  Outcome: Ongoing  Goal: STG - patient maintains skin integrity  Outcome: Ongoing  Goal: STG - Patient will verbalize signs and symptoms of constipation and how to prevent/alleviate  Outcome: Ongoing  Goal: STG - patient will be continent of stool  Outcome: Ongoing  Goal: STG - Patient completes digital stimulation technique  Outcome: Ongoing  Goal: STG - Patient verbalizes knowledge about relationship between diet, fluid intake, activity and medication on constipation  Outcome: Ongoing     Problem: IP BREATHING  Goal: LTG - patient will mobilize secretions and maintain airway  Outcome: Ongoing  Goal: LTG - Patient/caregiver will demonstrate/perform proper techniques to maintain patent airway  Outcome: Ongoing  Goal: LTG - patient/caregiver will demonstrate/perform improved or stable self care abilities within physical limitations  Outcome: Ongoing  Goal: STG - Patient/caregiver will maintain patent airway  Outcome: Ongoing  Goal: STG - respiratory rate and effort will be within normal limits for the patient  Outcome: Ongoing  Goal: STG - patient/caregiver will be able to verbalize oxygen safety precautions  Outcome: Ongoing  Goal: STG - Patient/caregiver demonstrates correct suctioning technique  Outcome: Ongoing  Goal: STG - patient will utilize incentive spirometer  Outcome: Ongoing  Goal: STG - Patient performs or directs assisted coughing  Outcome: Ongoing  Goal: STG - patient can administer MDI's  Outcome: Ongoing  Goal: STG - Patient can utilize incentive spirometer  Outcome: Ongoing  Goal: STG - family can complete suctioning  Outcome: Ongoing

## 2021-11-25 NOTE — PROGRESS NOTES
Clinical Pharmacy Note     Warfarin consult follow-up         Recent Labs     11/25/21  0632   INR 2.29*            Recent Labs     11/23/21  0641 11/24/21  0645 11/25/21  0632   HGB 13.8 13.4 13.0   HCT 45.2 45.1 43.5    179 186         Significant Drug-Drug Interactions:  New warfarin drug-drug interactions: none  Discontinued drug-drug interactions: none     Date INR Warfarin Dose   11/06/21 1.01 ---   11/07/21 --- 5 mg   11/08/21 1.04  5 mg    11/09/21  1.30 5 mg   11/10/21 1.74 5 mg    11/11/21  2.44 2.5 mg    11/12/21 2.02 5 mg    11/13/21  1.97 5 mg   11/14/21  2.44  4 mg    11/15/21 2.58 4 mg    11/16/21 2.35  5 mg    11/17/21 2.30  5 mg    11/18/21 2.40 4 mg    11/19/21 2.57  4 mg    11/20/21 2.48  4 mg   11/21/21 2.37 5 mg    11/22/21 1.86   6 mg   11/23/21   1.64 7.5 mg    11/24/21 1.67 6 mg    11/25/21  2.29 6 mg                 Notes:  Give Coumadin 6mg po X1 tonight. Dc Lovenox as INR is greater than 2 per administration instructions for this Lovenox order. Daily PT/INR until stable within therapeutic range.      Electronically signed by Marialuisa Tucker [facility] on 11/25/2021 at 1:55 PM

## 2021-11-25 NOTE — PLAN OF CARE
[id number] by Karon Henderson RN  Outcome: Ongoing  11/25/2021 0229 by Karon Henderson RN  Outcome: Ongoing     Problem: Sensory Perception - Impaired:  Goal: Demonstrations of improved sensory functioning will increase  Description: Demonstrations of improved sensory functioning will increase  11/25/2021 0233 by Karon Henderson RN  Outcome: Ongoing  11/25/2021 0229 by Karon Henderson RN  Outcome: Ongoing  Goal: Decrease in sensory misperception frequency  Description: Decrease in sensory misperception frequency  11/25/2021 0233 by Karon Henderson RN  Outcome: Ongoing  11/25/2021 0229 by Karon Henderson RN  Outcome: Ongoing  Goal: Able to refrain from responding to false sensory perceptions  Description: Able to refrain from responding to false sensory perceptions  11/25/2021 0233 by Karon Henderson RN  Outcome: Ongoing  11/25/2021 0229 by Karon Henderson RN  Outcome: Ongoing  Goal: Demonstrates accurate environmental perceptions  Description: Demonstrates accurate environmental perceptions  11/25/2021 0233 by Karon Henderson RN  Outcome: Ongoing  11/25/2021 0229 by Karon Henderson RN  Outcome: Ongoing  Goal: Able to distinguish between reality-based and nonreality-based thinking  Description: Able to distinguish between reality-based and nonreality-based thinking  11/25/2021 0233 by Karon Henderson RN  Outcome: Ongoing  11/25/2021 0229 by Karon Henderson RN  Outcome: Ongoing  Goal: Able to interrupt nonreality-based thinking  Description: Able to interrupt nonreality-based thinking  11/25/2021 0233 by Karon Henderson RN  Outcome: Ongoing  11/25/2021 0229 by Karon Henderson RN  Outcome: Ongoing     Problem: Sleep Pattern Disturbance:  Goal: Appears well-rested  Description: Appears well-rested  11/25/2021 0233 by Karon Henderson RN  Outcome: Ongoing  11/25/2021 0229 by Karon Henderson RN  Outcome: Ongoing     Problem: IP BLADDER/VOIDING  Goal: LTG - patient will complete bladder elimination  11/25/2021 0233 by Karon Henderson RN  Outcome: Ongoing  11/25/2021 0229 by Karon Henderson RN  Outcome: Ongoing  Goal: LTG - Patient will utilize adaptive techniques/equipment to complete bladder elimination  11/25/2021 0233 by Paz Teague, RN  Outcome: Ongoing  11/25/2021 0229 by Paz Teague, RN  Outcome: Ongoing  Goal: LTG - patient will achieve acceptable level of continence  11/25/2021 0233 by Paz Teague, RN  Outcome: Ongoing  11/25/2021 0229 by Paz Teague, RN  Outcome: Ongoing  Goal: STG - Patient demonstrates ability to take care of indwelling catheter  11/25/2021 0233 by Paz Teague, RN  Outcome: Ongoing  11/25/2021 0229 by Paz Teague, RN  Outcome: Ongoing  Goal: STG - patient demonstrates self-cath technique using clean technique and care of the catheter  11/25/2021 0233 by Paz Teague, RN  Outcome: Ongoing  11/25/2021 0229 by Paz Teague, RN  Outcome: Ongoing  Goal: STG - Patient demonstrates no accidents  11/25/2021 0233 by Paz Teauge, RN  Outcome: Ongoing  11/25/2021 0229 by Paz Teague, RN  Outcome: Ongoing  Goal: STG - Patient will state signs and symptoms of UTI  11/25/2021 0233 by Paz Teague, RN  Outcome: Ongoing  11/25/2021 0229 by Paz Teague, RN  Outcome: Ongoing  Goal: STG - patient will be able to empty bladder  11/25/2021 0233 by Paz Teague, RN  Outcome: Ongoing  11/25/2021 0229 by Paz Teague, RN  Outcome: Ongoing  Goal: STG - Patient demonstrates understanding of self catheterization schedule by completing task on time  11/25/2021 0233 by Paz Teague, RN  Outcome: Ongoing  11/25/2021 0229 by Paz Teague, RN  Outcome: Ongoing  Goal: STG - patient participates in bladder program by expressing need to void  11/25/2021 0233 by Paz Teague, RN  Outcome: Ongoing  11/25/2021 0229 by Paz Teague, RN  Outcome: Ongoing  Goal: STG - Patient verbalizes understanding of catheter care indwelling/intermittent  11/25/2021 0233 by Paz Teague, RN  Outcome: Ongoing  11/25/2021 0229 by Paz Teague, RN  Outcome: Ongoing  Goal: STG - patient participates in bladder program by adhering to implemented toileting schedule  11/25/2021 0233 by Paz Teague RN  Outcome: Ongoing  11/25/2021 0229 by Paz Teague RN  Outcome: Ongoing     Problem: IP BOWEL ELIMINATION  Goal: LTG - patient will utilize adaptive techniques/equipment to complete bowel elimination  11/25/2021 0233 by Paz Teague, RN  Outcome: Ongoing  11/25/2021 0229 by Paz Teague, RN  Outcome: Ongoing  Goal: LTG - patient will have regular and routine bowel evacuation  11/25/2021 0233 by Paz Teague, RN  Outcome: Ongoing  11/25/2021 0229 by Paz Teague RN  Outcome: Ongoing  Goal: STG - patient will be accident free  11/25/2021 0233 by aPz Teague, RN  Outcome: Ongoing  11/25/2021 0229 by Paz Teague RN  Outcome: Ongoing  Goal: STG - Patient demonstrates care and management of ostomy bag  11/25/2021 0233 by Paz Teague, RN  Outcome: Ongoing  11/25/2021 0229 by Paz Teague RN  Outcome: Ongoing  Goal: STG - Patient participates in bowel management program  11/25/2021 0233 by Paz Teague, RN  Outcome: Ongoing  11/25/2021 0229 by Paz Teague RN  Outcome: Ongoing  Goal: STG - patient maintains skin integrity  11/25/2021 0233 by Paz Teague, RN  Outcome: Ongoing  11/25/2021 0229 by Paz Teague RN  Outcome: Ongoing  Goal: STG - Patient will verbalize signs and symptoms of constipation and how to prevent/alleviate  11/25/2021 0233 by Paz Teague, RN  Outcome: Ongoing  11/25/2021 0229 by Paz Teague, RN  Outcome: Ongoing  Goal: STG - patient will be continent of stool  11/25/2021 0233 by Paz Teague, RN  Outcome: Ongoing  11/25/2021 0229 by Paz Teague RN  Outcome: Ongoing  Goal: STG - Patient completes digital stimulation technique  11/25/2021 0233 by Paz Teague, RN  Outcome: Ongoing  11/25/2021 0229 by Paz Teague RN  Outcome: Ongoing  Goal: STG - Patient verbalizes knowledge about relationship between diet, fluid intake, activity and medication on constipation  11/25/2021 0233 by Paz Teague, RN  Outcome: Ongoing  11/25/2021 0229 by Therisa Broad, RN  Outcome: Ongoing     Problem: IP BREATHING  Goal: LTG - patient will mobilize secretions and maintain airway  11/25/2021 0233 by Marla Sow, RN  Outcome: Ongoing  11/25/2021 0229 by Marla Sow RN  Outcome: Ongoing  Goal: LTG - Patient/caregiver will demonstrate/perform proper techniques to maintain patent airway  11/25/2021 0233 by Marla Sow, RN  Outcome: Ongoing  11/25/2021 0229 by Marla Sow, RN  Outcome: Ongoing  Goal: LTG - patient/caregiver will demonstrate/perform improved or stable self care abilities within physical limitations  11/25/2021 0233 by Marla Sow, RN  Outcome: Ongoing  11/25/2021 0229 by Marla Sow, RN  Outcome: Ongoing  Goal: STG - Patient/caregiver will maintain patent airway  11/25/2021 0233 by Marla Sow, RN  Outcome: Ongoing  11/25/2021 0229 by Marla Sow RN  Outcome: Ongoing  Goal: STG - respiratory rate and effort will be within normal limits for the patient  11/25/2021 0233 by Marla Sow, RN  Outcome: Ongoing  11/25/2021 0229 by Marla Sow, RN  Outcome: Ongoing  Goal: STG - patient/caregiver will be able to verbalize oxygen safety precautions  11/25/2021 0233 by Marla Sow, RN  Outcome: Ongoing  11/25/2021 0229 by Marla Sow RN  Outcome: Ongoing  Goal: STG - Patient/caregiver demonstrates correct suctioning technique  11/25/2021 0233 by Marla Sow, RN  Outcome: Ongoing  11/25/2021 0229 by Marla Sow RN  Outcome: Ongoing  Goal: STG - patient will utilize incentive spirometer  11/25/2021 0233 by Marla Sow, RN  Outcome: Ongoing  11/25/2021 0229 by Marla Sow, RN  Outcome: Ongoing  Goal: STG - Patient performs or directs assisted coughing  11/25/2021 0233 by Marla Sow, RN  Outcome: Ongoing  11/25/2021 0229 by Marla Sow RN  Outcome: Ongoing  Goal: STG - patient can administer MDI's  11/25/2021 0233 by Marla Sow, RN  Outcome: Ongoing  11/25/2021 0229 by Marla Sow RN  Outcome: Ongoing  Goal: STG - Patient can utilize incentive spirometer  11/25/2021 0233 by Marla Sow RN  Outcome: Ongoing  11/25/2021 0229 by Obdulia Schafer KYLE Barber  Outcome: Ongoing  Goal: STG - family can complete suctioning  11/25/2021 0233 by Lore Fontaine RN  Outcome: Ongoing  11/25/2021 0229 by Lore Fontaine RN  Outcome: Ongoing

## 2021-11-26 LAB
ALBUMIN SERPL-MCNC: 3.5 G/DL (ref 3.5–5.2)
ALP BLD-CCNC: 87 U/L (ref 35–104)
ALT SERPL-CCNC: 22 U/L (ref 5–33)
ANION GAP SERPL CALCULATED.3IONS-SCNC: 9 MMOL/L (ref 7–19)
AST SERPL-CCNC: 23 U/L (ref 5–32)
BASOPHILS ABSOLUTE: 0 K/UL (ref 0–0.2)
BASOPHILS RELATIVE PERCENT: 0.8 % (ref 0–1)
BILIRUB SERPL-MCNC: 0.7 MG/DL (ref 0.2–1.2)
BUN BLDV-MCNC: 32 MG/DL (ref 8–23)
CALCIUM SERPL-MCNC: 10.3 MG/DL (ref 8.8–10.2)
CHLORIDE BLD-SCNC: 108 MMOL/L (ref 98–111)
CO2: 27 MMOL/L (ref 22–29)
CREAT SERPL-MCNC: 0.6 MG/DL (ref 0.5–0.9)
EOSINOPHILS ABSOLUTE: 0.2 K/UL (ref 0–0.6)
EOSINOPHILS RELATIVE PERCENT: 3.8 % (ref 0–5)
GFR AFRICAN AMERICAN: >59
GFR NON-AFRICAN AMERICAN: >60
GLUCOSE BLD-MCNC: 120 MG/DL (ref 70–99)
GLUCOSE BLD-MCNC: 71 MG/DL (ref 70–99)
GLUCOSE BLD-MCNC: 81 MG/DL (ref 70–99)
GLUCOSE BLD-MCNC: 85 MG/DL (ref 74–109)
HCT VFR BLD CALC: 43.8 % (ref 37–47)
HEMOGLOBIN: 13.2 G/DL (ref 12–16)
IMMATURE GRANULOCYTES #: 0 K/UL
INR BLD: 2.19 (ref 0.88–1.18)
LYMPHOCYTES ABSOLUTE: 1.6 K/UL (ref 1.1–4.5)
LYMPHOCYTES RELATIVE PERCENT: 31.5 % (ref 20–40)
MCH RBC QN AUTO: 28.8 PG (ref 27–31)
MCHC RBC AUTO-ENTMCNC: 30.1 G/DL (ref 33–37)
MCV RBC AUTO: 95.6 FL (ref 81–99)
MONOCYTES ABSOLUTE: 0.4 K/UL (ref 0–0.9)
MONOCYTES RELATIVE PERCENT: 7.6 % (ref 0–10)
NEUTROPHILS ABSOLUTE: 2.8 K/UL (ref 1.5–7.5)
NEUTROPHILS RELATIVE PERCENT: 55.9 % (ref 50–65)
PDW BLD-RTO: 14.2 % (ref 11.5–14.5)
PERFORMED ON: ABNORMAL
PERFORMED ON: NORMAL
PERFORMED ON: NORMAL
PLATELET # BLD: 184 K/UL (ref 130–400)
PMV BLD AUTO: 9.9 FL (ref 9.4–12.3)
POTASSIUM SERPL-SCNC: 4.5 MMOL/L (ref 3.5–5)
PROTHROMBIN TIME: 24.2 SEC (ref 12–14.6)
RBC # BLD: 4.58 M/UL (ref 4.2–5.4)
SODIUM BLD-SCNC: 144 MMOL/L (ref 136–145)
TOTAL PROTEIN: 5.3 G/DL (ref 6.6–8.7)
WBC # BLD: 5 K/UL (ref 4.8–10.8)

## 2021-11-26 PROCEDURE — 97130 THER IVNTJ EA ADDL 15 MIN: CPT

## 2021-11-26 PROCEDURE — 99232 SBSQ HOSP IP/OBS MODERATE 35: CPT | Performed by: PSYCHIATRY & NEUROLOGY

## 2021-11-26 PROCEDURE — 97116 GAIT TRAINING THERAPY: CPT

## 2021-11-26 PROCEDURE — 1180000000 HC REHAB R&B

## 2021-11-26 PROCEDURE — 97110 THERAPEUTIC EXERCISES: CPT

## 2021-11-26 PROCEDURE — 85610 PROTHROMBIN TIME: CPT

## 2021-11-26 PROCEDURE — 97129 THER IVNTJ 1ST 15 MIN: CPT

## 2021-11-26 PROCEDURE — 36415 COLL VENOUS BLD VENIPUNCTURE: CPT

## 2021-11-26 PROCEDURE — 6370000000 HC RX 637 (ALT 250 FOR IP): Performed by: PSYCHIATRY & NEUROLOGY

## 2021-11-26 PROCEDURE — 92526 ORAL FUNCTION THERAPY: CPT

## 2021-11-26 PROCEDURE — 85025 COMPLETE CBC W/AUTO DIFF WBC: CPT

## 2021-11-26 PROCEDURE — 80053 COMPREHEN METABOLIC PANEL: CPT

## 2021-11-26 PROCEDURE — 97535 SELF CARE MNGMENT TRAINING: CPT

## 2021-11-26 PROCEDURE — 82947 ASSAY GLUCOSE BLOOD QUANT: CPT

## 2021-11-26 PROCEDURE — 6370000000 HC RX 637 (ALT 250 FOR IP): Performed by: NURSE PRACTITIONER

## 2021-11-26 RX ORDER — WARFARIN SODIUM 3 MG/1
6 TABLET ORAL
Status: COMPLETED | OUTPATIENT
Start: 2021-11-26 | End: 2021-11-26

## 2021-11-26 RX ADMIN — SENNOSIDES AND DOCUSATE SODIUM 1 TABLET: 50; 8.6 TABLET ORAL at 21:10

## 2021-11-26 RX ADMIN — SENNOSIDES AND DOCUSATE SODIUM 1 TABLET: 50; 8.6 TABLET ORAL at 08:35

## 2021-11-26 RX ADMIN — ATORVASTATIN CALCIUM 20 MG: 20 TABLET, FILM COATED ORAL at 08:35

## 2021-11-26 RX ADMIN — FLUTICASONE PROPIONATE 1 SPRAY: 50 SPRAY, METERED NASAL at 08:34

## 2021-11-26 RX ADMIN — MODAFINIL 100 MG: 100 TABLET ORAL at 08:35

## 2021-11-26 RX ADMIN — WARFARIN SODIUM 6 MG: 3 TABLET ORAL at 17:04

## 2021-11-26 ASSESSMENT — PAIN SCALES - GENERAL
PAINLEVEL_OUTOF10: 0

## 2021-11-26 NOTE — PROGRESS NOTES
[facility]  INPATIENT SPEECH THERAPY  [facility] 8 REHAB UNIT  TIME   Time In: 0800  Time Out: 0900  Minutes: 60       [x]Daily Note  []Progress Note    Date: 2021  Patient Name: Will Cassidy        MRN: [id number]    Account #: [de-identified]  : 1933  (80 y.o.)  Gender: female   Primary Provider: Maty Yepez MD   Swallowing Status/Diet: Soft and bite sized, pureed meats, thin liquids        PATIENT DIAGNOSIS(ES):    Diagnosis: Acute ischemic stroke, L MCA infarct, R sided involvement      Additional Pertinent Hx: hx CVAs, stress incontinence     RESTRICTIONS/PRECAUTIONS:    Restrictions/Precautions  Restrictions/Precautions: Fall Risk, Weight Bearing, Swallowing Modified Diet  Required Braces or Orthoses?: No  Position Activity Restriction  Other position/activity restrictions: Dysphagia-soft and bite sized          Subjective:  She was much more alert today. Her son was present today. Objective:  Skilled education provided regarding swallowing anatomy and physiology. Discussed rationale for current diet and swallowing precautions. Her neurologist reported he placed her on a stimulant. She was able to keep her eyes open throughout the entire session this date. She was also able to hold the coffee cup and place it back down on the tray table without dropping it. Discussed days of decreased alertness and need for total feed. Did discuss importance of checking the oral cavity for food/residue after each meals. Did provide education regarding swish/swallow and/or using oral swabs after meals to clear oral residue and prevent food accumulation in the buccal cavity. No overt s/s of aspiration observed this date with thin liquids via cup or straw or with soft foods. Recommend continuing pureed meats for safety. She was able to answer demographic information questions. She was also able to answer temporal orientation questions without difficulty.      Her son did mention that she receives snacks at home. One snack that she receives every day is small pieces of a plain milk chocolate jamie bar mixed with peanut butter. Recommended Diet and Intervention  Diet Solids Recommendation: Dysphagia Soft and Bite-Sized with pureed meats (Dysphagia III)  Liquid Consistency Recommendation: Thin liquids  Recommended Form of Meds: Crushed in puree as able  Therapeutic Interventions: Mendelsohn; Diet tolerance monitoring; Oral care; Therapeutic PO trials with SLP; Oral motor exercises; Tongue base strengthening; Patient/Family education; Effortful swallow; Pharyngeal exercises; Yanet     Compensatory Swallowing Strategies  Compensatory Swallowing Strategies: Upright as possible for all oral intake; Alternate solids and liquids; Remain upright for 30-45 minutes after meals; Check for pocketing of food on the Right; Swallow 2 times per bite/sip; Small bites/sips; External pacing                  SHORT TERM GOAL #1:  Goal 1: The patient will follow two step commands with minimal cueing and 100% accuracy. SHORT TERM GOAL #2:  Goal 2: The patient will complete naming tasks (picture, object) with minimal cueing and 100% accuracy. SHORT TERM GOAL #3:  Goal 3: The patient will complete concrete divergent naming tasks with minimal cueing and 100% accuracy. SHORT TERM GOAL #4:  Goal 4: The patient will complete oral motor exercises to improve lingual and labial strength and range of motion as well as improve speech intelligibility. SHORT TERM GOAL #5:  Goal 5: The patient will utilize dysarthria strategies (slow rate, increased volume, over exaggeration of words, increased breath support) during structured tasks and during conversations with minimal cueing. Swallowing Short Term Goals  Short-term Goals  Goal 1: The patient will complete the yanet (tongue hold) technique to improve base of tongue retraction and base of tongue to pharyngeal wall approximation with 90% accuracy and minimal verbal cues.   Goal 2: The patient will complete the effortful swallow maneuver to improve hyolaryngeal elevation, tongue base retraction, and vocal fold closure with 90% accuracy and minimal verbal cues. Goal 3: Staff/caregivers will complete daily oral hygiene to prevent aspiration of bacteria laden secretions. Goal 4: The patient will tolerate a dysphagia soft and bite sized diet with nectar (mildly thick) liquids with minimal overt s/s of aspiration. Comprehension: 3 - Patient understands basic needs 50-74% of the time  Expression: 3 - Expresses basic ideas/needs 50-74% of the time  Social Interaction: 4 - Patient appropriate 75-90%+ of the time  Problem Solving: 3 - Patient solves simple/routine tasks 50%-74%  Memory: 3 - Patient remembers 50%-74% of the time    ASSESSMENT:  Assessment: [x]Progressing towards goals          []Not Progressing towards goals    Patient Tolerance of Treatment:   [x]Tolerated well []Tolerated fair []Required rest breaks []Fatigued    Education:  Learner:  [x]Patient          []Significant Other          [x]Other  Education provided regarding:  [x]Goals and POC   [x]Diet and swallowing precautions    []Home Exercise Program  [x]Progress and/or discharge information  Method of Education:  [x]Discussion          []Demonstration          []Handout          []Other  Evaluation of Education:   [x]Verbalized understanding   []Demonstrates without assistance  []Demonstrates with assistance  []Needs further instruction     []No evidence of learning                  []No family present      Plan: [x]Continue with current plan of care    []Modify current plan of care as follows:    []Discharge patient    Discharge Location:    Services/Supervision Recommended:      [x]Patient continues to require treatment by a licensed therapist to address functional deficits as outlined in the established plan of care.               Electronically Signed By:  Elie Lindo M.S., CCC-SLP  11/26/2021,11:28 AM.

## 2021-11-26 NOTE — PLAN OF CARE
Problem: Falls - Risk of:  Goal: Will remain free from falls  Description: Will remain free from falls  11/25/2021 2229 by Krishan Purcell RN  Outcome: Ongoing  11/25/2021 1600 by Ambar Caban RN  Outcome: Ongoing  Goal: Absence of physical injury  Description: Absence of physical injury  11/25/2021 2229 by Krishan Purcell RN  Outcome: Ongoing  11/25/2021 1600 by Ambar Caban RN  Outcome: Ongoing     Problem: Confusion - Acute:  Goal: Absence of continued neurological deterioration signs and symptoms  Description: Absence of continued neurological deterioration signs and symptoms  11/25/2021 2229 by Krishan Purcell RN  Outcome: Ongoing  11/25/2021 1600 by Ambar Caban RN  Outcome: Ongoing  Goal: Mental status will be restored to baseline  Description: Mental status will be restored to baseline  11/25/2021 2229 by Krishan Purcell RN  Outcome: Ongoing  11/25/2021 1600 by Ambar Caban RN  Outcome: Ongoing     Problem: Discharge Planning:  Goal: Ability to perform activities of daily living will improve  Description: Ability to perform activities of daily living will improve  11/25/2021 2229 by Krishan Purcell RN  Outcome: Ongoing  11/25/2021 1600 by Ambar Caban RN  Outcome: Ongoing  Goal: Participates in care planning  Description: Participates in care planning  11/25/2021 2229 by Krishan Purcell RN  Outcome: Ongoing  11/25/2021 1600 by Ambar Caban RN  Outcome: Ongoing

## 2021-11-26 NOTE — PROGRESS NOTES
Name: Fernando Romo  MRN: [id number]  Date of Service:  11/26/2021 11/26/21 1027   Restrictions/Precautions   Restrictions/Precautions Fall Risk   Required Braces or Orthoses? No   Lower Extremity Weight Bearing Restrictions   Right Lower Extremity Weight Bearing Weight Bearing As Tolerated   Left Lower Extremity Weight Bearing Weight Bearing As Tolerated   Position Activity Restriction   Other position/activity restrictions Dysphagia-soft and bite sized, NTL, water in between meals   General   Chart Reviewed Yes   Additional Pertinent Hx hx CVAs, stress incontinenc   Response To Previous Treatment Not applicable   Family / Caregiver Present Yes   Referring Practitioner Berenice Rivers MD   Subjective   Subjective Pt laying in bed, agrees to participate in therapy. RN and pt son also present. Pain Screening   Patient Currently in Pain Denies   Bed Mobility   Rolling Supervision   Supine to Sit Minimal assistance   Scooting Supervision   Transfers   Sit to Stand Contact guard assistance; Minimal Assistance   Stand to sit Contact guard assistance   Stand Pivot Transfers Contact guard assistance  (From L )   Car Transfer Contact guard assistance  (Max v/c's for BUE placement and sequencing of TF )   Ambulation   Ambulation?  Yes   WB Status WBAT   Ambulation 1   Surface level tile   Device Rollator   Other Apparatus Wheelchair follow   Assistance Minimal assistance; Contact guard assistance   Quality of Gait ant shifted cog , tends to lean to R, forward lean also, excessive IR and some BLE drag of RLE    Gait Deviations Slow Cyn; Decreased step length; Decreased step height   Distance 50'   Comments 1 L and 1 R turn    Exercises   Knee Long Arc Quad X 10   Ankle Pumps PF X 10   (Pt has difficulty performing active DF )   Comments Sitting BLE ther-ex   (max v/c's)   Patient Goals    Patient goals  pts son woul like pt to get back to PLOF in order to be functional in home again    Short term goals   Time Frame for Short term goals 1-2 weeks   Short term goal 1 bed mobility Mod I    Short term goal 2 transfers with AD SBA   Short term goal 3 ambulation 50ft with AD Joshua   Short term goal 4 navigate 4 step with handrails Joshua   Short term goal 5 WC mobility 75ft CGA   Long term goals   Time Frame for Long term goals  3-4 weeks   Long term goal 1 bed mobility Indp   Long term goal 2 transfers with AD Mod I   Long term goal 3 ambulation 150 ft with AD SBA   Long term goal 4 navigate 4 step with hand rail SBA   Long term goal 5 WC mobility 150 ft SBA   Conditions Requiring Skilled Therapeutic Intervention   Body structures, Functions, Activity limitations Decreased functional mobility ; Decreased strength; Decreased endurance; Decreased balance; Decreased coordination; Decreased posture   Assessment Focus of tx FTD with pt son, he reports understanding. He also reports pt will be d/c to [facility] in [facility] on Monday. Pt reponds well to v/c's and instructions this AM, delayed respones at times. History hx CVAs, stress incontinenc   Barriers to Learning confusion   Treatment Initiated  transfers, bed mobility, ther ex   Discharge Recommendations Continue to assess pending progress; 24 hour supervision or assist; Patient would benefit from continued therapy after discharge   Activity Tolerance   Activity Tolerance Patient Tolerated treatment well; Patient limited by fatigue   Plan   Times per week 5-6x   Plan weeks 3--4 weeks   Specific instructions for Next Treatment tfs with or without AD and initiate ambulation    Current Treatment Recommendations Strengthening; Balance Training; Functional Mobility Training; Transfer Training; Wheelchair Mobility Training; Gait Training; Stair training; Safety Education & Training; Patient/Caregiver Education & Training   Plan Comment cont.  PT with static sitting and progress to dynamic sitting tasks and standing with SS   Safety Devices   Type of devices Patient at risk for falls; Gait belt  (In w/c, in OT gym )   PT Whiteboard Notes   Therapy Whiteboard RE 11/21 L MCA CVA, DOUBLE, SS         Electronically signed by Rodrigo Neely PTA on 11/26/2021 at 12:44 PM

## 2021-11-26 NOTE — PROGRESS NOTES
Patient:   Nita Harris  MR#:    [id number]   Room:    0826/826-01   YOB: 1933  Date of Progress Note: 11/26/2021  Time of Note                           11:33 AM  Consulting Physician:   Mily Ray M.D. Attending Physician:  Mily Ray MD       CHIEF COMPLAINT: Right-sided weakness with dysphagia     subjective  This 80 y.o. female  with history of 3 strokes since March 2021, stress incontinence, arthritis, blood clots and hyperlipidemia. She presented to [facility] ER on 11/6/21 after being found on the floor at home by her son. She was confused, had abnormal speech and right sided weakness. CT of head was negative for acute changes. CTA was also negative. She was admitted to the Hospitalist service with consult for neurology. MRI done revealed an acute lacunar infarct within the left thalamus and multiple Multifocal old ischemic change in both cerebral hemispheres and within the right side of the cerebellum. She had been placed on Plavix and statin after her last stroke. She was seen by Dr. Gary Estrada and not felt to be a candidate for TPA d/t being out of the time frame and no evidence of thrombus on CTA. Dr. Gary Estrada felt strokes were most likely cardioembolic. Plavix was discontinued and she was started on Coumadin with Lovenox for bridging. Echo done showed Bubble study with evidence of small right-to-left shunting with Valsalva consistent with possible ASD/PFO. Cardiology was consulted for possible PFO closure. Patient/family do not wish to have any invasive measure. Prefer to continue with medical management with anticoagulation. Patient is a DNR. She was evaluated by SPT for swallow and aphasia. She was initially made NPO but has now been started on a mechanical soft diet with nectar thick liquids. She continues to have right sided weakness and is participating in both PT/OT. No complaints today.   Lethargy improved  REVIEW OF SYSTEMS:  Patient is aphasic      PHYSICAL EXAM:  BP (!) 156/82   Pulse 52   Temp 98.4 °F (36.9 °C)   Resp 16   Ht 5' 6\" (1.676 m)   Wt 135 lb 6 oz (61.4 kg)   SpO2 95%   BMI 21.85 kg/m²     Constitutional: she appears well-developed and well-nourished. Eyes  conjunctiva normal.  Pupils react to light  Ear, nose, throat -hearing intact to voice. No scars, masses, or lesions over external nose or ears, no atrophy of tongue  Neck-symmetric, no masses noted, no jugular vein distension  Respiration- chest wall appears symmetric, good expansion,   normal effort without use of accessory muscles  Cardiovascular- RRR  Musculoskeletal  no significant wasting of muscles noted, no bony deformities, gait no gross ataxia  Extremities-no clubbing, cyanosis or edema  Skin  warm, dry, and intact. No rash, erythema, or pallor. Psychiatric  mood, affect, and behavior appear normal.      Neurology  NEUROLOGICAL EXAM:  Awake, alert, fluent oriented to [facility].  Attention and concentration appear appropriate  Speech shows dysarthria       Cranial Nerve Exam   CN II- Visual fields show an apparent left homonymous hemianopsia  CN III, IV,VI-EOMI, No nystagmus, conjugate eye movements, no ptosis  CN VII-right facial droop  CN VIII-Hearing intact        Motor Exam  Relatively normal throughout with significant atrophy in the hands       Tremors- no tremors in hands or head noted    Coordination  Clumsiness in the bilateral upper extremities           Nursing/pcp notes, imaging,labs and vitals reviewed.      PT,OT and/or speech notes reviewed    Lab Results   Component Value Date    WBC 5.0 11/26/2021    HGB 13.2 11/26/2021    HCT 43.8 11/26/2021    MCV 95.6 11/26/2021     11/26/2021     Lab Results   Component Value Date     11/26/2021    K 4.5 11/26/2021     11/26/2021    CO2 27 11/26/2021    BUN 32 (H) 11/26/2021    CREATININE 0.6 11/26/2021    GLUCOSE 85 11/26/2021    CALCIUM 10.3 (H) 11/26/2021    PROT 5.3 (L) 11/26/2021    LABALBU 3.5 11/26/2021    BILITOT 0.7 11/26/2021    ALKPHOS 87 11/26/2021    AST 23 11/26/2021    ALT 22 11/26/2021    LABGLOM >60 11/26/2021    GFRAA >59 11/26/2021     Lab Results   Component Value Date    INR 2.19 (H) 11/26/2021    INR 2.29 (H) 11/25/2021    INR 1.67 (H) 11/24/2021   No results found for: PHENYTOIN, ESR, CRP      PHYSICAL THERAPY  STRENGTH  Strength RLE  Comment: grossly assessed 3+/5  Strength LLE  Comment: grossly assessed 4/5  ROM  AROM RLE (degrees)  RLE AROM: WFL  AROM LLE (degrees)  LLE AROM : WFL  BED MOBILITY  Bed Mobility  Bridging: Supervision  Rolling: Stand by assistance  Supine to Sit: Stand by assistance  Sit to Supine: Stand by assistance  Scooting: Stand by assistance  Comment: pt understood direction well, immediately fell asleep once supine   TRANSFERS  Transfers  Sit to Stand: Minimal Assistance  Stand to sit: Minimal Assistance  Bed to Chair: Minimal assistance, Moderate assistance  Stand Pivot Transfers: Moderate Assistance  Squat Pivot Transfers: Minimal Assistance  Lateral Transfers:  Moderate Assistance, Maximum Assistance (squat pivot to BSC on R side)  Comment: used so jddy to transfer from bs to R Neema [address] 23 so that we could tend to toileting hygiene with less difficulty  WHEELCHAIR PROPULSION  AMBULATION  Ambulation 1  Surface: level tile  Device: Rollator  Other Apparatus: Wheelchair follow  Assistance: Minimal assistance  Quality of Gait: ant shifted cog with tendency to push rollator too far ahead, leans to R also  Gait Deviations: Decreased step length (see quality of gt)  Distance: 76'  Comments: turning to L with less difficulty  STAIRS  GOALS:  Short term goals  Time Frame for Short term goals: 1-2 weeks  Short term goal 1: bed mobility Mod I   Short term goal 2: transfers with AD SBA  Short term goal 3: ambulation 50ft with AD Joshua  Short term goal 4: navigate 4 step with handrails Joshua  Short term goal 5: WC mobility 75ft CGA     Long term goals  Time Frame for Long term goals : 3-4 weeks  Long term goal 1: bed mobility Indp  Long term goal 2: transfers with AD Mod I  Long term goal 3: ambulation 150 ft with AD SBA  Long term goal 4: navigate 4 step with hand rail SBA  Long term goal 5: WC mobility 150 ft SBA     ASSESSMENT:  Assessment: pt needs frequent vc's and tactile cues to stay on task with ex; reporting fatigue with gt but denies need to sit down initially     SPEECH THERAPY        OCCUPATIONAL THERAPY     CURRENT IRF-CORTES SCORES  Eating: CARE Score: 1  Comment: Pt. very lethargic, pt. unable to bring utensil to mouth, vc and tactile cues for alertness to chew and swallow food.     Oral Hygiene: CARE Score: 4  Comment: vc      Toileting: CARE Score: 1  Comment: Pt is incontinent of bladder      Shower/Bathe: CARE Score: 3  Comment: max cues        Upper Body Dressing: CARE Score: 3          Lower Body Dressing: CARE Score: 2           Footwear: CARE Score: 2           Toilet Transfers: CARE Score: 3           Picking Up Object:  CARE Score: 1           UE Functioning:  Hx of multiple recent strokes, B involvement with decreased strength RUE and LUE inattention and apraxia        Pain Assessment:  Pain Level: 0     STGs:  Short term goals  Time Frame for Short term goals: 1 week  Short term goal 1: Mod A with clothing management/hygiene for toileting. Short term goal 2: Mod A with LB dressing, AE PRN. Short term goal 3: Mod A with donning/doffing socks and shoes, AE PRN. Short term goal 4: Min A with toilet transfers. Short term goal 5: Min A with bathing hygiene.     LTGs:  Long term goals  Time Frame for Long term goals : 3-4 weeks  Long term goal 1: CGA with clothing management/hygiene for toileting. Long term goal 2: Min A with LB dressing, AE PRN. Long term goal 3: Min A with donning/doffing socks and shoes, AE PRN. Long term goal 4: CGA with toilet transfers. Long term goal 5: Supervision with bathing hygiene.   Long term goals 6: Patient/family will verbalize DME needs.     Assessment:  Performance deficits / Impairments: Decreased functional mobility , Decreased ADL status, Decreased ROM, Decreased strength, Decreased safe awareness, Decreased cognition, Decreased endurance, Decreased high-level IADLs, Decreased balance, Decreased coordination, Decreased vision/visual deficit                        NUTRITION  Current Wt: Weight: 135 lb 6 oz (61.4 kg) / Body mass index is 21.85 kg/m². Admission Wt: Admission Body Weight: 126 lb (57.2 kg)  Oral Diet Orders: Soft/Bited-Sized  Oral Nutrition Supplement (ONS) Orders: Ensure Enlive/Plus TID; HS snack     Pt asleep at time of visit. Po intake remains >50% when alert and fed, few meals <50% likely d/t drowsiness/fatigue. Noted elevated Na- encourage fluid intake, several low gluc ()- started HS snack, and increased ONS from BID to TID. Please see nutrition note for details.              RECORD REVIEW: Previous medical records, medications were reviewed at today's visit    IMPRESSION:   1. 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 Suspected to be cardioembolic. Plavix changed to Coumadin. Lovenox until therapeutic INR. PT/OT/SLP  2. DVT prophylaxisCoumadin  3. Hyperlipidemiacontinue statin  4. GIbowel regimen  5. Bacteruria with unremarkable u/a. Suspect contaminant. Repeat negative  6. Hypoglycemiareplaced. 7.  Excessive daytime sleepiness. Repeat CT showing no change. Ammonia level normal.  negative urinalysis and EEG. Improved with Provigil. .   Frequent meals    Long talk with son. No changes made today.   ELOS: SNF on monday

## 2021-11-26 NOTE — PROGRESS NOTES
Nutrition Assessment     Type and Reason for Visit: Reassess    Nutrition Assessment:  Pt improving from nutritional standpoint AEB po 75% meals and ONS. Pt sitting up eating at time of visit, reports good appetite. Will continue to follow and monitor. Malnutrition Assessment:  Malnutrition Status: Severe malnutrition    Estimated Daily Nutrient Needs:  Energy (kcal): 8062-1697 kcals/day; Weight Used for Energy Requirements:  Current (30-35 kcals/kg)     Protein (g): 57-68 g/pro/day; Weight Used for Protein Requirements:  Current (1.0-1.2 g/kg)        Fluid (ml/day): 4725-1600 mL/fluid/day; Weight Used for Fluid Requirements:  1 ml/kcal      Nutrition Related Findings: severe orbital, temporal, buccal, and clavical wasting observed. Current Nutrition Therapies:    ADULT ORAL NUTRITION SUPPLEMENT; Breakfast, Dinner, Lunch; Standard High Calorie/High Protein Oral Supplement  ADULT ORAL NUTRITION SUPPLEMENT; Dinner; Snack; HS snack- yogurt and diced fruit sent with dinner  ADULT DIET; Dysphagia - Soft and Bite Sized    Anthropometric Measures:  · Height: 5' 6\" (167.6 cm)  · Current Body Wt: 135 lb (61.2 kg)   · BMI: 21.8    Nutrition Diagnosis:   · Severe malnutrition, In context of chronic, non-illness related related to cognitive or neurological impairment as evidenced by severe muscle loss, severe loss of subcutaneous fat      Nutrition Interventions:   Food and/or Nutrient Delivery:  Continue Current Diet, Continue Oral Nutrition Supplement  Nutrition Education/Counseling:  Education not indicated   Coordination of Nutrition Care:  Continue to monitor while inpatient    Goals:  Po intake >50% meals. wt stable.        Nutrition Monitoring and Evaluation:   Behavioral-Environmental Outcomes:  None Identified   Food/Nutrient Intake Outcomes:  Diet Advancement/Tolerance, Food and Nutrient Intake, Supplement Intake  Physical Signs/Symptoms Outcomes:  Biochemical Data, Chewing or Swallowing, Nutrition Focused Physical Findings, Weight     Discharge Planning:     Too soon to determine     Electronically signed by Surekha Vegas MS, RD, LD on 11/26/21 at 1:28 PM CST    Contact: [phone]

## 2021-11-26 NOTE — PROGRESS NOTES
Clinical Pharmacy Note    Warfarin consult follow-up    Recent Labs     11/26/21  0607   INR 2.19*     Recent Labs     11/24/21  0645 11/25/21  0632 11/26/21  0607   HGB 13.4 13.0 13.2   HCT 45.1 43.5 43.8    186 184       Significant Drug-Drug Interactions:  New warfarin drug-drug interactions: None  Discontinued drug-drug interactions: None    Date INR Warfarin Dose   11/06/21 1.01 ---   11/07/21 --- 5 mg   11/08/21 1.04  5 mg    11/09/21  1.30 5 mg   11/10/21 1.74 5 mg    11/11/21  2.44 2.5 mg    11/12/21 2.02 5 mg    11/13/21  1.97 5 mg   11/14/21  2.44  4 mg    11/15/21 2.58 4 mg    11/16/21 2.35  5 mg    11/17/21 2.30  5 mg    11/18/21 2.40 4 mg    11/19/21 2.57  4 mg    11/20/21 2.48  4 mg   11/21/21 2.37 5 mg    11/22/21 1.86   6 mg   11/23/21   1.64 7.5 mg    11/24/21 1.67 6 mg    11/25/21  2.29 6 mg   11/26/21   2.19  6 mg                       Notes:      Give Coumadin 6mg po X1 tonight                 Daily PT/INR until stable within therapeutic range.      Electronically signed by Yony High RPh, FARHAN, 11/26/2021,12:27 PM

## 2021-11-26 NOTE — PROGRESS NOTES
Occupational Therapy     11/26/21 1100   General   Diagnosis Acute ischemic B CVA; aphasia; AMS; hemiplegia affecting dominant side   Pain Assessment   Patient Currently in Pain No   Pain Assessment 0-10   Pain Level 0   ADL   Feeding Minimal assistance; Verbal cueing; Adaptive utensils (comment)  (Pt. son educated on adaptive techs and modifications for increased independence during task.)   Toileting Maximum assistance   Balance   Sitting Balance Stand by assistance   Standing Balance Contact guard assistance   Functional Mobility   Functional - Mobility Device 4-Wheeled Walker   Activity To/from bathroom   Assist Level Minimal assistance   Transfers   Stand Step Transfers Minimal assistance   Sit to stand Minimal assistance; Contact guard assistance   Stand to sit Minimal assistance   Toilet Transfers   Toilet - Technique Stand step   Equipment Used Grab bars   Toilet Transfer Minimal assistance   Tub Transfers   Tub - Transfer From Other  (Rollator)   Tub - Transfer Type To and From   Tub - Transfer To Transfer tub bench   Tub - Technique Ambulating   Tub Transfers Minimal assistance   Assessment   Performance deficits / Impairments Decreased functional mobility ; Decreased ADL status; Decreased ROM; Decreased strength; Decreased safe awareness; Decreased cognition; Decreased endurance; Decreased high-level IADLs; Decreased balance; Decreased coordination; Decreased vision/visual deficit   Assessment Pt. son present for FTD, educated on transfers within the bathroom, adaptive techs for eating ADL, safety during functional mobility, and DME needs. Pt. son demonstrated good understanding of education. Pt. would benefit from continued skilled therapy to address deficits and increase independence.    Treatment Diagnosis B CVAs/R hemiparesis   Timed Code Treatment Minutes 60 Minutes   Activity Tolerance   Activity Tolerance Patient Tolerated treatment well   Safety Devices   Safety Devices in place Yes   Type of devices Call light within reach; Chair alarm in place  (With son.)   Plan   Current Treatment Recommendations Strengthening; ROM; Safety Education & Training; Balance Training; Patient/Caregiver Education & Training; Self-Care / ADL; Cognitive/Perceptual Training; Functional Mobility Training; Equipment Evaluation, Education, & procurement; Home Management Training; Endurance Training; Cognitive Reorientation      11/26/21 1100   Eating   Assistance Needed Partial/moderate assistance   Comment Min A, utilized built up handle on utensil, hand over hand to initiate scooping and switching between food and drink. CARE Score 3   6596 Parallel [place] assistance   Comment Pt. able to assist pulling clothing up/down this tx session, required assistance for hygiene.    CARE Score 2

## 2021-11-26 NOTE — PROCEDURES
Eleni Yanez 78, [address] [facility]                          ELECTROENCEPHALOGRAM REPORT    PATIENT NAME: Narciso Nichols                     :        1933  MED REC NO:   [id number]                              ROOM:       [facility]  ACCOUNT NO:   [de-identified]                           ADMIT DATE: 2021  PROVIDER:     Prabhakar Zhu MD    DATE OF EE2021    REASON FOR TEST:  Intermittent confusion with a history of recent  stroke. DESCRIPTION:  The waking background consists of a rhythmic and symmetric  fairly well-formed and regulated posterior dominant 7 Hz activity at  best.  Frequent triphasic activity is noted in the bifrontal regions. No clear epileptiform activity nor any focal lateralizing slowing was  noted. Hyperventilation not performed. Photic stimulation produced no  abnormalities. IMPRESSION:  Abnormal EEG due to mild diffuse background slowing with  intermittent triphasic waves suggestive of an underlying encephalopathy,  which may be toxic/metabolic versus other. Correlate clinically.         Ludin Ram MD    D: 2021 10:33:30      T: 2021 10:37:14     [email or web address]  Job#: [id number]     Doc#: [id number]    CC:

## 2021-11-27 LAB
ALBUMIN SERPL-MCNC: 3.6 G/DL (ref 3.5–5.2)
ALP BLD-CCNC: 82 U/L (ref 35–104)
ALT SERPL-CCNC: 21 U/L (ref 5–33)
ANION GAP SERPL CALCULATED.3IONS-SCNC: 8 MMOL/L (ref 7–19)
AST SERPL-CCNC: 22 U/L (ref 5–32)
BASOPHILS ABSOLUTE: 0 K/UL (ref 0–0.2)
BASOPHILS RELATIVE PERCENT: 0.6 % (ref 0–1)
BILIRUB SERPL-MCNC: 0.6 MG/DL (ref 0.2–1.2)
BUN BLDV-MCNC: 37 MG/DL (ref 8–23)
CALCIUM SERPL-MCNC: 10 MG/DL (ref 8.8–10.2)
CHLORIDE BLD-SCNC: 108 MMOL/L (ref 98–111)
CO2: 25 MMOL/L (ref 22–29)
CREAT SERPL-MCNC: 0.5 MG/DL (ref 0.5–0.9)
EOSINOPHILS ABSOLUTE: 0.2 K/UL (ref 0–0.6)
EOSINOPHILS RELATIVE PERCENT: 3.7 % (ref 0–5)
GFR AFRICAN AMERICAN: >59
GFR NON-AFRICAN AMERICAN: >60
GLUCOSE BLD-MCNC: 76 MG/DL (ref 70–99)
GLUCOSE BLD-MCNC: 88 MG/DL (ref 74–109)
GLUCOSE BLD-MCNC: 99 MG/DL (ref 70–99)
HCT VFR BLD CALC: 42.8 % (ref 37–47)
HEMOGLOBIN: 13.2 G/DL (ref 12–16)
IMMATURE GRANULOCYTES #: 0 K/UL
INR BLD: 2.1 (ref 0.88–1.18)
LYMPHOCYTES ABSOLUTE: 1.5 K/UL (ref 1.1–4.5)
LYMPHOCYTES RELATIVE PERCENT: 30 % (ref 20–40)
MCH RBC QN AUTO: 29.4 PG (ref 27–31)
MCHC RBC AUTO-ENTMCNC: 30.8 G/DL (ref 33–37)
MCV RBC AUTO: 95.3 FL (ref 81–99)
MONOCYTES ABSOLUTE: 0.4 K/UL (ref 0–0.9)
MONOCYTES RELATIVE PERCENT: 8.2 % (ref 0–10)
NEUTROPHILS ABSOLUTE: 2.9 K/UL (ref 1.5–7.5)
NEUTROPHILS RELATIVE PERCENT: 57.1 % (ref 50–65)
PDW BLD-RTO: 14.3 % (ref 11.5–14.5)
PERFORMED ON: NORMAL
PERFORMED ON: NORMAL
PLATELET # BLD: 169 K/UL (ref 130–400)
PMV BLD AUTO: 10 FL (ref 9.4–12.3)
POTASSIUM SERPL-SCNC: 4.3 MMOL/L (ref 3.5–5)
PROTHROMBIN TIME: 23.4 SEC (ref 12–14.6)
RBC # BLD: 4.49 M/UL (ref 4.2–5.4)
SODIUM BLD-SCNC: 141 MMOL/L (ref 136–145)
TOTAL PROTEIN: 5.1 G/DL (ref 6.6–8.7)
WBC # BLD: 5.1 K/UL (ref 4.8–10.8)

## 2021-11-27 PROCEDURE — 99232 SBSQ HOSP IP/OBS MODERATE 35: CPT | Performed by: PSYCHIATRY & NEUROLOGY

## 2021-11-27 PROCEDURE — 85610 PROTHROMBIN TIME: CPT

## 2021-11-27 PROCEDURE — 36415 COLL VENOUS BLD VENIPUNCTURE: CPT

## 2021-11-27 PROCEDURE — 80053 COMPREHEN METABOLIC PANEL: CPT

## 2021-11-27 PROCEDURE — 85025 COMPLETE CBC W/AUTO DIFF WBC: CPT

## 2021-11-27 PROCEDURE — 6370000000 HC RX 637 (ALT 250 FOR IP): Performed by: NURSE PRACTITIONER

## 2021-11-27 PROCEDURE — 1180000000 HC REHAB R&B

## 2021-11-27 PROCEDURE — 82947 ASSAY GLUCOSE BLOOD QUANT: CPT

## 2021-11-27 PROCEDURE — 6370000000 HC RX 637 (ALT 250 FOR IP): Performed by: PSYCHIATRY & NEUROLOGY

## 2021-11-27 RX ADMIN — ATORVASTATIN CALCIUM 20 MG: 20 TABLET, FILM COATED ORAL at 07:54

## 2021-11-27 RX ADMIN — FLUTICASONE PROPIONATE 1 SPRAY: 50 SPRAY, METERED NASAL at 07:54

## 2021-11-27 RX ADMIN — DOCUSATE SODIUM 100 MG: 100 CAPSULE, LIQUID FILLED ORAL at 07:54

## 2021-11-27 RX ADMIN — WARFARIN SODIUM 7.5 MG: 2.5 TABLET ORAL at 17:29

## 2021-11-27 RX ADMIN — MODAFINIL 100 MG: 100 TABLET ORAL at 07:54

## 2021-11-27 RX ADMIN — SENNOSIDES AND DOCUSATE SODIUM 1 TABLET: 50; 8.6 TABLET ORAL at 20:29

## 2021-11-27 RX ADMIN — SENNOSIDES AND DOCUSATE SODIUM 1 TABLET: 50; 8.6 TABLET ORAL at 07:53

## 2021-11-27 NOTE — PROGRESS NOTES
Clinical Pharmacy Note    Warfarin consult follow-up    Recent Labs     11/27/21  0529   INR 2.10*     Recent Labs     11/25/21  0632 11/26/21  0607 11/27/21  0529   HGB 13.0 13.2 13.2   HCT 43.5 43.8 42.8    184 169       Significant Drug-Drug Interactions:  New warfarin drug-drug interactions: none  Discontinued drug-drug interactions: none    Date INR Warfarin Dose   11/06/21 1.01 ---   11/07/21 --- 5 mg   11/08/21 1.04  5 mg    11/09/21  1.30 5 mg   11/10/21 1.74 5 mg    11/11/21  2.44 2.5 mg    11/12/21 2.02 5 mg    11/13/21  1.97 5 mg   11/14/21  2.44  4 mg    11/15/21 2.58 4 mg    11/16/21 2.35  5 mg    11/17/21 2.30  5 mg    11/18/21 2.40 4 mg    11/19/21 2.57  4 mg    11/20/21 2.48  4 mg   11/21/21 2.37 5 mg    11/22/21 1.86   6 mg   11/23/21   1.64 7.5 mg    11/24/21 1.67 6 mg    11/25/21  2.29 6 mg   11/26/21  2.19  6 mg    11/27/21 2.10 7.5 mg                    Notes:                   Give coumadin 7.5mg x 1 dose tonight. Daily PT/INR until stable within therapeutic range.      KAI SCOTT PHARM D, 11/27/2021, 10:21 AM

## 2021-11-27 NOTE — PROGRESS NOTES
Patient:   Mora Klinefelter  MR#:    [id number]   Room:    26/826-01   YOB: 1933  Date of Progress Note: 11/27/2021  Time of Note                           11:48 AM  Consulting Physician:   Brandi Hanley M.D. Attending Physician:  Brandi Hanley MD       CHIEF COMPLAINT: Right-sided weakness with dysphagia     subjective  This 80 y.o. female  with history of 3 strokes since March 2021, stress incontinence, arthritis, blood clots and hyperlipidemia. She presented to [facility] ER on 11/6/21 after being found on the floor at home by her son. She was confused, had abnormal speech and right sided weakness. CT of head was negative for acute changes. CTA was also negative. She was admitted to the Hospitalist service with consult for neurology. MRI done revealed an acute lacunar infarct within the left thalamus and multiple Multifocal old ischemic change in both cerebral hemispheres and within the right side of the cerebellum. She had been placed on Plavix and statin after her last stroke. She was seen by Dr. Nixon Durant and not felt to be a candidate for TPA d/t being out of the time frame and no evidence of thrombus on CTA. Dr. Nixon Durant felt strokes were most likely cardioembolic. Plavix was discontinued and she was started on Coumadin with Lovenox for bridging. Echo done showed Bubble study with evidence of small right-to-left shunting with Valsalva consistent with possible ASD/PFO. Cardiology was consulted for possible PFO closure. Patient/family do not wish to have any invasive measure. Prefer to continue with medical management with anticoagulation. Patient is a DNR. She was evaluated by SPT for swallow and aphasia. She was initially made NPO but has now been started on a mechanical soft diet with nectar thick liquids. She continues to have right sided weakness and is participating in both PT/OT.   No complaints this morning  REVIEW OF SYSTEMS:  Patient is aphasic      PHYSICAL EXAM:  BP (!) 142/83   Pulse 51   Temp 96.2 °F (35.7 °C) (Temporal)   Resp 18   Ht 5' 6\" (1.676 m)   Wt 133 lb 3 oz (60.4 kg)   SpO2 97%   BMI 21.50 kg/m²     Constitutional: she appears well-developed and well-nourished. Eyes  conjunctiva normal.  Pupils react to light  Ear, nose, throat -hearing intact to voice. No scars, masses, or lesions over external nose or ears, no atrophy of tongue  Neck-symmetric, no masses noted, no jugular vein distension  Respiration- chest wall appears symmetric, good expansion,   normal effort without use of accessory muscles  Cardiovascular- RRR  Musculoskeletal  no significant wasting of muscles noted, no bony deformities, gait no gross ataxia  Extremities-no clubbing, cyanosis or edema  Skin  warm, dry, and intact. No rash, erythema, or pallor. Psychiatric  mood, affect, and behavior appear normal.      Neurology  NEUROLOGICAL EXAM:  Awake, alert, fluent oriented to [facility]  Attention and concentration appear appropriate  Speech shows dysarthria       Cranial Nerve Exam   CN II- Visual fields show an apparent left homonymous hemianopsia  CN III, IV,VI-EOMI, No nystagmus, conjugate eye movements, no ptosis  CN VII-right facial droop  CN VIII-Hearing intact        Motor Exam  Relatively normal throughout with significant atrophy in the hands       Tremors- no tremors in hands or head noted    Coordination  Clumsiness in the bilateral upper extremities           Nursing/pcp notes, imaging,labs and vitals reviewed.      PT,OT and/or speech notes reviewed    Lab Results   Component Value Date    WBC 5.1 11/27/2021    HGB 13.2 11/27/2021    HCT 42.8 11/27/2021    MCV 95.3 11/27/2021     11/27/2021     Lab Results   Component Value Date     11/27/2021    K 4.3 11/27/2021     11/27/2021    CO2 25 11/27/2021    BUN 37 (H) 11/27/2021    CREATININE 0.5 11/27/2021    GLUCOSE 88 11/27/2021    CALCIUM 10.0 11/27/2021    PROT 5.1 (L) 11/27/2021    LABALBU 3.6 11/27/2021    BILITOT 0.6 11/27/2021    ALKPHOS 82 11/27/2021    AST 22 11/27/2021    ALT 21 11/27/2021    LABGLOM >60 11/27/2021    GFRAA >59 11/27/2021     Lab Results   Component Value Date    INR 2.10 (H) 11/27/2021    INR 2.19 (H) 11/26/2021    INR 2.29 (H) 11/25/2021   No results found for: PHENYTOIN, ESR, CRP        11/26/21 1027   Restrictions/Precautions   Restrictions/Precautions Fall Risk   Required Braces or Orthoses? No   Lower Extremity Weight Bearing Restrictions   Right Lower Extremity Weight Bearing Weight Bearing As Tolerated   Left Lower Extremity Weight Bearing Weight Bearing As Tolerated   Position Activity Restriction   Other position/activity restrictions Dysphagia-soft and bite sized, NTL, water in between meals   General   Chart Reviewed Yes   Additional Pertinent Hx hx CVAs, stress incontinenc   Response To Previous Treatment Not applicable   Family / Caregiver Present Yes   Referring Practitioner Raven Renner MD   Subjective   Subjective Pt laying in bed, agrees to participate in therapy. RN and pt son also present. Pain Screening   Patient Currently in Pain Denies   Bed Mobility   Rolling Supervision   Supine to Sit Minimal assistance   Scooting Supervision   Transfers   Sit to Stand Contact guard assistance; Minimal Assistance   Stand to sit Contact guard assistance   Stand Pivot Transfers Contact guard assistance  (From L )   Car Transfer Contact guard assistance  (Max v/c's for BUE placement and sequencing of TF )   Ambulation   Ambulation?  Yes   WB Status WBAT   Ambulation 1   Surface level tile   Device Rollator   Other Apparatus Wheelchair follow   Assistance Minimal assistance; Contact guard assistance   Quality of Gait ant shifted cog , tends to lean to R, forward lean also, excessive IR and some BLE drag of RLE    Gait Deviations Slow Cyn; Decreased step length; Decreased step height   Distance 50'   Comments 1 L and 1 R turn    Exercises   Knee Long Arc Quad X 10   Ankle Pumps PF X 10   (Pt has difficulty performing active DF )   Comments Sitting BLE ther-ex   (max v/c's)   Patient Goals    Patient goals  pts son woul like pt to get back to PLOF in order to be functional in home again    Short term goals   Time Frame for Short term goals 1-2 weeks   Short term goal 1 bed mobility Mod I    Short term goal 2 transfers with AD SBA   Short term goal 3 ambulation 50ft with AD Joshua   Short term goal 4 navigate 4 step with handrails Joshua   Short term goal 5 WC mobility 75ft CGA   Long term goals   Time Frame for Long term goals  3-4 weeks   Long term goal 1 bed mobility Indp   Long term goal 2 transfers with AD Mod I   Long term goal 3 ambulation 150 ft with AD SBA   Long term goal 4 navigate 4 step with hand rail SBA   Long term goal 5 WC mobility 150 ft SBA   Conditions Requiring Skilled Therapeutic Intervention   Body structures, Functions, Activity limitations Decreased functional mobility ; Decreased strength; Decreased endurance; Decreased balance; Decreased coordination; Decreased posture   Assessment Focus of tx FTD with pt son, he reports understanding. He also reports pt will be d/c to [facility] in [address] on Monday. Pt reponds well to v/c's and instructions this AM, delayed respones at times. History hx CVAs, stress incontinenc   Barriers to Learning confusion   Treatment Initiated  transfers, bed mobility, ther ex   Discharge Recommendations Continue to assess pending progress; 24 hour supervision or assist; Patient would benefit from continued therapy after discharge   Activity Tolerance   Activity Tolerance Patient Tolerated treatment well; Patient limited by fatigue   Plan   Times per week 5-6x   Plan weeks 3--4 weeks   Specific instructions for Next Treatment tfs with or without AD and initiate ambulation    Current Treatment Recommendations Strengthening; Balance Training; Functional Mobility Training; Transfer Training; Wheelchair Mobility Training; Gait Training; Stair training;  Safety Education & Training; Patient/Caregiver Education & Training   Plan Comment cont. PT with static sitting and progress to dynamic sitting tasks and standing with SS   Safety Devices   Type of devices Patient at risk for falls; Gait belt  (In w/c, in OT gym )   PT Whiteboard Notes   Therapy Whiteboard RE 11/21 L MCA CVA, DOUBLE, SS             11/26/21 1100   General   Diagnosis Acute ischemic B CVA; aphasia; AMS; hemiplegia affecting dominant side   Pain Assessment   Patient Currently in Pain No   Pain Assessment 0-10   Pain Level 0   ADL   Feeding Minimal assistance; Verbal cueing; Adaptive utensils (comment)  (Pt. son educated on adaptive techs and modifications for increased independence during task.)   Toileting Maximum assistance   Balance   Sitting Balance Stand by assistance   Standing Balance Contact guard assistance   Functional Mobility   Functional - Mobility Device 4-Wheeled Walker   Activity To/from bathroom   Assist Level Minimal assistance   Transfers   Stand Step Transfers Minimal assistance   Sit to stand Minimal assistance; Contact guard assistance   Stand to sit Minimal assistance   Toilet Transfers   Toilet - Technique Stand step   Equipment Used Grab bars   Toilet Transfer Minimal assistance   Tub Transfers   Tub - Transfer From Other  (Rollator)   Tub - Transfer Type To and From   Tub - Transfer To Transfer tub bench   Tub - Technique Ambulating   Tub Transfers Minimal assistance   Assessment   Performance deficits / Impairments Decreased functional mobility ; Decreased ADL status; Decreased ROM; Decreased strength; Decreased safe awareness; Decreased cognition; Decreased endurance; Decreased high-level IADLs; Decreased balance; Decreased coordination; Decreased vision/visual deficit   Assessment Pt. son present for FTD, educated on transfers within the bathroom, adaptive techs for eating ADL, safety during functional mobility, and DME needs. Pt. son demonstrated good understanding of education. Pt. would benefit from continued skilled therapy to address deficits and increase independence. Treatment Diagnosis B CVAs/R hemiparesis   Timed Code Treatment Minutes 60 Minutes   Activity Tolerance   Activity Tolerance Patient Tolerated treatment well   Safety Devices   Safety Devices in place Yes   Type of devices Call light within reach; Chair alarm in place  (With son.)   Plan   Current Treatment Recommendations Strengthening; ROM; Safety Education & Training; Balance Training; Patient/Caregiver Education & Training; Self-Care / ADL; Cognitive/Perceptual Training; Functional Mobility Training; Equipment Evaluation, Education, & procurement; Home Management Training; Endurance Training; Cognitive Reorientation        11/26/21 1100   Eating   Assistance Needed Partial/moderate assistance   Comment Min A, utilized built up handle on utensil, hand over hand to initiate scooping and switching between food and drink. CARE Score 3   8929 Parallel [place] assistance   Comment Pt. able to assist pulling clothing up/down this tx session, required assistance for hygiene. CARE Score 2                            RECORD REVIEW: Previous medical records, medications were reviewed at today's visit    IMPRESSION:   1. 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 Suspected to be cardioembolic. Plavix changed to Coumadin. Lovenox until therapeutic INR. PT/OT/SLP  2. DVT prophylaxisCoumadin  3. Hyperlipidemiacontinue statin  4. GIbowel regimen  5. Bacteruria with unremarkable u/a. Suspect contaminant. Repeat negative  6. Hypoglycemiareplaced. 7.  Excessive daytime sleepiness. Repeat CT showing no change. Ammonia level normal.  negative urinalysis and EEG. Improved with Provigil. .   Frequent meals    Continue current treatment  ELOS: SNF on monday

## 2021-11-27 NOTE — PLAN OF CARE
Problem: Falls - Risk of:  Goal: Will remain free from falls  Description: Will remain free from falls  Outcome: Ongoing   Up with 2 assist

## 2021-11-28 LAB
ALBUMIN SERPL-MCNC: 3.5 G/DL (ref 3.5–5.2)
ALP BLD-CCNC: 85 U/L (ref 35–104)
ALT SERPL-CCNC: 27 U/L (ref 5–33)
ANION GAP SERPL CALCULATED.3IONS-SCNC: 10 MMOL/L (ref 7–19)
AST SERPL-CCNC: 28 U/L (ref 5–32)
BACTERIA: ABNORMAL /HPF
BASOPHILS ABSOLUTE: 0 K/UL (ref 0–0.2)
BASOPHILS RELATIVE PERCENT: 0.8 % (ref 0–1)
BILIRUB SERPL-MCNC: 0.5 MG/DL (ref 0.2–1.2)
BILIRUBIN URINE: NEGATIVE
BLOOD, URINE: NEGATIVE
BUN BLDV-MCNC: 41 MG/DL (ref 8–23)
CALCIUM SERPL-MCNC: 10.7 MG/DL (ref 8.8–10.2)
CHLORIDE BLD-SCNC: 110 MMOL/L (ref 98–111)
CLARITY: ABNORMAL
CO2: 28 MMOL/L (ref 22–29)
COLOR: YELLOW
CREAT SERPL-MCNC: 0.7 MG/DL (ref 0.5–0.9)
CRYSTALS, UA: ABNORMAL /HPF
EOSINOPHILS ABSOLUTE: 0.2 K/UL (ref 0–0.6)
EOSINOPHILS RELATIVE PERCENT: 3.5 % (ref 0–5)
EPITHELIAL CELLS, UA: 1 /HPF (ref 0–5)
GFR AFRICAN AMERICAN: >59
GFR NON-AFRICAN AMERICAN: >60
GLUCOSE BLD-MCNC: 61 MG/DL (ref 70–99)
GLUCOSE BLD-MCNC: 93 MG/DL (ref 74–109)
GLUCOSE BLD-MCNC: 94 MG/DL (ref 70–99)
GLUCOSE URINE: NEGATIVE MG/DL
HCT VFR BLD CALC: 44.6 % (ref 37–47)
HEMOGLOBIN: 13.3 G/DL (ref 12–16)
HYALINE CASTS: 0 /HPF (ref 0–8)
IMMATURE GRANULOCYTES #: 0 K/UL
INR BLD: 2.12 (ref 0.88–1.18)
KETONES, URINE: NEGATIVE MG/DL
LEUKOCYTE ESTERASE, URINE: ABNORMAL
LYMPHOCYTES ABSOLUTE: 1.4 K/UL (ref 1.1–4.5)
LYMPHOCYTES RELATIVE PERCENT: 26.9 % (ref 20–40)
MCH RBC QN AUTO: 29 PG (ref 27–31)
MCHC RBC AUTO-ENTMCNC: 29.8 G/DL (ref 33–37)
MCV RBC AUTO: 97.4 FL (ref 81–99)
MONOCYTES ABSOLUTE: 0.4 K/UL (ref 0–0.9)
MONOCYTES RELATIVE PERCENT: 7.9 % (ref 0–10)
NEUTROPHILS ABSOLUTE: 3.2 K/UL (ref 1.5–7.5)
NEUTROPHILS RELATIVE PERCENT: 60.5 % (ref 50–65)
NITRITE, URINE: POSITIVE
PDW BLD-RTO: 14.4 % (ref 11.5–14.5)
PERFORMED ON: ABNORMAL
PERFORMED ON: NORMAL
PH UA: 6.5 (ref 5–8)
PLATELET # BLD: 164 K/UL (ref 130–400)
PMV BLD AUTO: 9.8 FL (ref 9.4–12.3)
POTASSIUM SERPL-SCNC: 4.6 MMOL/L (ref 3.5–5)
PROTEIN UA: NEGATIVE MG/DL
PROTHROMBIN TIME: 23.6 SEC (ref 12–14.6)
RBC # BLD: 4.58 M/UL (ref 4.2–5.4)
RBC UA: 1 /HPF (ref 0–4)
SODIUM BLD-SCNC: 148 MMOL/L (ref 136–145)
SPECIFIC GRAVITY UA: 1.02 (ref 1–1.03)
TOTAL PROTEIN: 5.8 G/DL (ref 6.6–8.7)
UROBILINOGEN, URINE: 0.2 E.U./DL
WBC # BLD: 5.2 K/UL (ref 4.8–10.8)
WBC UA: 0 /HPF (ref 0–5)

## 2021-11-28 PROCEDURE — 82947 ASSAY GLUCOSE BLOOD QUANT: CPT

## 2021-11-28 PROCEDURE — 80053 COMPREHEN METABOLIC PANEL: CPT

## 2021-11-28 PROCEDURE — 36415 COLL VENOUS BLD VENIPUNCTURE: CPT

## 2021-11-28 PROCEDURE — 85025 COMPLETE CBC W/AUTO DIFF WBC: CPT

## 2021-11-28 PROCEDURE — 85610 PROTHROMBIN TIME: CPT

## 2021-11-28 PROCEDURE — 6370000000 HC RX 637 (ALT 250 FOR IP): Performed by: NURSE PRACTITIONER

## 2021-11-28 PROCEDURE — 1180000000 HC REHAB R&B

## 2021-11-28 PROCEDURE — 81001 URINALYSIS AUTO W/SCOPE: CPT

## 2021-11-28 PROCEDURE — 6370000000 HC RX 637 (ALT 250 FOR IP): Performed by: PSYCHIATRY & NEUROLOGY

## 2021-11-28 RX ORDER — WARFARIN SODIUM 3 MG/1
6 TABLET ORAL
Status: COMPLETED | OUTPATIENT
Start: 2021-11-28 | End: 2021-11-28

## 2021-11-28 RX ADMIN — MODAFINIL 100 MG: 100 TABLET ORAL at 07:57

## 2021-11-28 RX ADMIN — SENNOSIDES AND DOCUSATE SODIUM 1 TABLET: 50; 8.6 TABLET ORAL at 07:57

## 2021-11-28 RX ADMIN — ATORVASTATIN CALCIUM 20 MG: 20 TABLET, FILM COATED ORAL at 07:57

## 2021-11-28 RX ADMIN — SENNOSIDES AND DOCUSATE SODIUM 1 TABLET: 50; 8.6 TABLET ORAL at 20:25

## 2021-11-28 RX ADMIN — DEXTROSE 15 G: 15 GEL ORAL at 07:56

## 2021-11-28 RX ADMIN — WARFARIN SODIUM 6 MG: 3 TABLET ORAL at 17:26

## 2021-11-28 NOTE — PLAN OF CARE
Problem: Falls - Risk of:  Goal: Will remain free from falls  Description: Will remain free from falls  11/28/2021 1123 by Urszula Fields RN  Outcome: Ongoing  11/27/2021 2342 by Placido Azul RN  Outcome: Ongoing   Up with 2 assist

## 2021-11-28 NOTE — PLAN OF CARE
Problem: Falls - Risk of:  Goal: Will remain free from falls  11/27/2021 2342 by Rodri Juan RN  Outcome: Ongoing  11/27/2021 1038 by Kiana Nicole RN  Outcome: Ongoing  Goal: Absence of physical injury  11/27/2021 2342 by Rodri Juan RN  Outcome: Ongoing  11/27/2021 1038 by Kiana Nicole RN  Outcome: Ongoing     Problem: Confusion - Acute:  Goal: Absence of continued neurological deterioration signs and symptoms  11/27/2021 2342 by Rodri Juan RN  Outcome: Ongoing  11/27/2021 1038 by Kiana Nicole RN  Outcome: Ongoing  Goal: Mental status will be restored to baseline  11/27/2021 2342 by Rodri Juan RN  Outcome: Ongoing  11/27/2021 1038 by Kiana Nicole RN  Outcome: Ongoing     Problem: Discharge Planning:  Goal: Ability to perform activities of daily living will improve  11/27/2021 2342 by Rodri Juan RN  Outcome: Ongoing  11/27/2021 1038 by Kiana Nicole RN  Outcome: Ongoing  Goal: Participates in care planning  11/27/2021 2342 by Rodri Juan RN  Outcome: Ongoing  11/27/2021 1038 by Kiana Nicole RN  Outcome: Ongoing     Problem: Injury - Risk of, Physical Injury:  Goal: Will remain free from falls  11/27/2021 2342 by Rodri Juan RN  Outcome: Ongoing  11/27/2021 1038 by Kiana Nicole RN  Outcome: Ongoing  Goal: Absence of physical injury  11/27/2021 2342 by Rodri Juan RN  Outcome: Ongoing  11/27/2021 1038 by Kiana Nicole RN  Outcome: Ongoing     Problem: Mood - Altered:  Goal: Mood stable  11/27/2021 2342 by Rodri Juan RN  Outcome: Ongoing  11/27/2021 1038 by Kiana Nicole RN  Outcome: Ongoing  Goal: Absence of abusive behavior  11/27/2021 2342 by Rodri Juan RN  Outcome: Ongoing  11/27/2021 1038 by Kiana Nicole RN  Outcome: Ongoing  Goal: Verbalizations of feeling emotionally comfortable while being cared for will increase  11/27/2021 2342 by Rodri Juan RN  Outcome: Ongoing  11/27/2021 1038 by Kiana Nicole, RN  Outcome: Ongoing     Problem: Psychomotor Activity - Altered:  Goal: Absence of psychomotor disturbance signs and symptoms  11/27/2021 2342 by Yesenia Figueroa RN  Outcome: Ongoing  11/27/2021 1038 by Mily Loja RN  Outcome: Ongoing     Problem: Sensory Perception - Impaired:  Goal: Demonstrations of improved sensory functioning will increase  11/27/2021 2342 by Yesenia Figueroa RN  Outcome: Ongoing  11/27/2021 1038 by Mily Loja RN  Outcome: Ongoing  Goal: Decrease in sensory misperception frequency  11/27/2021 2342 by Yesenia Figueroa RN  Outcome: Ongoing  11/27/2021 1038 by Mily Loja RN  Outcome: Ongoing  Goal: Able to refrain from responding to false sensory perceptions  11/27/2021 2342 by Yesenia Figueroa RN  Outcome: Ongoing  11/27/2021 1038 by Mily Loja RN  Outcome: Ongoing  Goal: Demonstrates accurate environmental perceptions  11/27/2021 2342 by Yesenia Figueroa RN  Outcome: Ongoing  11/27/2021 1038 by Mily Loja RN  Outcome: Ongoing  Goal: Able to distinguish between reality-based and nonreality-based thinking  11/27/2021 2342 by Yesenia Figueroa RN  Outcome: Ongoing  11/27/2021 1038 by Mily Loja RN  Outcome: Ongoing  Goal: Able to interrupt nonreality-based thinking  11/27/2021 2342 by Yesenia Figueroa RN  Outcome: Ongoing  11/27/2021 1038 by Mily Loja RN  Outcome: Ongoing     Problem: Sleep Pattern Disturbance:  Goal: Appears well-rested  11/27/2021 2342 by Yesenia Figueroa RN  Outcome: Ongoing  11/27/2021 1038 by Mily Loja RN  Outcome: Ongoing     Problem: IP BLADDER/VOIDING  Goal: LTG - patient will complete bladder elimination  11/27/2021 2342 by Yesenia Figueroa RN  Outcome: Ongoing  11/27/2021 1038 by Mily Loja RN  Outcome: Ongoing  Goal: LTG - Patient will utilize adaptive techniques/equipment to complete bladder elimination  11/27/2021 2342 by Yesenia Figueroa RN  Outcome: Ongoing  11/27/2021 1038 by Mily Loja RN  Outcome: Ongoing  Goal: LTG - patient will achieve acceptable level of continence  11/27/2021 2342 by Lore Cadena, RN  Outcome: Ongoing  11/27/2021 1038 by Tiesha Galindo RN  Outcome: Ongoing  Goal: STG - Patient demonstrates ability to take care of indwelling catheter  11/27/2021 2342 by Lore Cadena, RN  Outcome: Ongoing  11/27/2021 1038 by Tiesha Galindo RN  Outcome: Ongoing  Goal: STG - patient demonstrates self-cath technique using clean technique and care of the catheter  11/27/2021 2342 by Lore Cadena, RN  Outcome: Ongoing  11/27/2021 1038 by Tiesha Galindo RN  Outcome: Ongoing  Goal: STG - Patient demonstrates no accidents  11/27/2021 2342 by Lore Cadena, RN  Outcome: Ongoing  11/27/2021 1038 by Tiesha Galindo RN  Outcome: Ongoing  Goal: STG - Patient will state signs and symptoms of UTI  11/27/2021 2342 by Lore Cadena, RN  Outcome: Ongoing  11/27/2021 1038 by Tiesha Galindo RN  Outcome: Ongoing  Goal: STG - patient will be able to empty bladder  11/27/2021 2342 by Lore Cadena, RN  Outcome: Ongoing  11/27/2021 1038 by Tiesha Galindo RN  Outcome: Ongoing  Goal: STG - Patient demonstrates understanding of self catheterization schedule by completing task on time  11/27/2021 2342 by Lore Cadena, RN  Outcome: Ongoing  11/27/2021 1038 by Tiesha Galindo RN  Outcome: Ongoing  Goal: STG - patient participates in bladder program by expressing need to void  11/27/2021 2342 by Lore Cadena, RN  Outcome: Ongoing  11/27/2021 1038 by Tiesha Galindo RN  Outcome: Ongoing  Goal: STG - Patient verbalizes understanding of catheter care indwelling/intermittent  11/27/2021 2342 by Lore Cadena, RN  Outcome: Ongoing  11/27/2021 1038 by Tiesha Galindo RN  Outcome: Ongoing  Goal: STG - patient participates in bladder program by adhering to implemented toileting schedule  11/27/2021 2342 by Lore Cadena, RN  Outcome: Ongoing  11/27/2021 1038 by Tiesha Galindo RN  Outcome: Ongoing     Problem: IP BOWEL ELIMINATION  Goal: LTG - patient will utilize adaptive techniques/equipment to complete bowel elimination  11/27/2021 2342 by Erica Tao RN  Outcome: Ongoing  11/27/2021 1038 by Asim Blair RN  Outcome: Ongoing  Goal: LTG - patient will have regular and routine bowel evacuation  11/27/2021 2342 by Erica Tao RN  Outcome: Ongoing  11/27/2021 1038 by Asim Blair RN  Outcome: Ongoing  Goal: STG - patient will be accident free  11/27/2021 2342 by Erica Tao RN  Outcome: Ongoing  11/27/2021 1038 by Asim Blair RN  Outcome: Ongoing  Goal: STG - Patient demonstrates care and management of ostomy bag  11/27/2021 2342 by Erica Tao RN  Outcome: Ongoing  11/27/2021 1038 by Asim Blair RN  Outcome: Ongoing  Goal: STG - Patient participates in bowel management program  11/27/2021 2342 by Erica Tao RN  Outcome: Ongoing  11/27/2021 1038 by Asim Blair RN  Outcome: Ongoing  Goal: STG - patient maintains skin integrity  11/27/2021 2342 by Erica Tao RN  Outcome: Ongoing  11/27/2021 1038 by Asim Blair RN  Outcome: Ongoing  Goal: STG - Patient will verbalize signs and symptoms of constipation and how to prevent/alleviate  11/27/2021 2342 by Erica Tao RN  Outcome: Ongoing  11/27/2021 1038 by Asim Blair RN  Outcome: Ongoing  Goal: STG - patient will be continent of stool  11/27/2021 2342 by Erica Tao RN  Outcome: Ongoing  11/27/2021 1038 by Asim Blair RN  Outcome: Ongoing  Goal: STG - Patient completes digital stimulation technique  11/27/2021 2342 by Erica Tao RN  Outcome: Ongoing  11/27/2021 1038 by Asim Blair RN  Outcome: Ongoing  Goal: STG - Patient verbalizes knowledge about relationship between diet, fluid intake, activity and medication on constipation  11/27/2021 2342 by Erica Tao RN  Outcome: Ongoing  11/27/2021 1038 by Asim Blair RN  Outcome: Ongoing Problem: IP BREATHING  Goal: LTG - patient will mobilize secretions and maintain airway  11/27/2021 2342 by Ron Hassan RN  Outcome: Ongoing  11/27/2021 1038 by Mary Ann Nevarez RN  Outcome: Ongoing  Goal: LTG - Patient/caregiver will demonstrate/perform proper techniques to maintain patent airway  11/27/2021 2342 by Ron Hassan RN  Outcome: Ongoing  11/27/2021 1038 by Mary Ann Nevarez RN  Outcome: Ongoing  Goal: LTG - patient/caregiver will demonstrate/perform improved or stable self care abilities within physical limitations  11/27/2021 2342 by Ron Hassan RN  Outcome: Ongoing  11/27/2021 1038 by Mary Ann Nevarez RN  Outcome: Ongoing  Goal: STG - Patient/caregiver will maintain patent airway  11/27/2021 2342 by Ron Hassan RN  Outcome: Ongoing  11/27/2021 1038 by Mary Ann Nevarez RN  Outcome: Ongoing  Goal: STG - respiratory rate and effort will be within normal limits for the patient  11/27/2021 2342 by Ron Hassan RN  Outcome: Ongoing  11/27/2021 1038 by Mary Ann Nevarez RN  Outcome: Ongoing  Goal: STG - patient/caregiver will be able to verbalize oxygen safety precautions  11/27/2021 2342 by Ron Hassna RN  Outcome: Ongoing  11/27/2021 1038 by Mary Ann Nevarez RN  Outcome: Ongoing  Goal: STG - Patient/caregiver demonstrates correct suctioning technique  11/27/2021 2342 by Ron Hassan RN  Outcome: Ongoing  11/27/2021 1038 by Mary Ann Nevarez RN  Outcome: Ongoing  Goal: STG - patient will utilize incentive spirometer  11/27/2021 2342 by Ron Hassan RN  Outcome: Ongoing  11/27/2021 1038 by Mary Ann Nevarez RN  Outcome: Ongoing  Goal: STG - Patient performs or directs assisted coughing  11/27/2021 2342 by Ron Hassan RN  Outcome: Ongoing  11/27/2021 1038 by Mary Ann Nevarez RN  Outcome: Ongoing  Goal: STG - patient can administer MDI's  11/27/2021 6442 by Ron Hassan RN  Outcome: Ongoing  11/27/2021 1038 by Mary Ann Nevarez RN  Outcome: Ongoing  Goal: STG - Patient can utilize incentive spirometer  11/27/2021 2342 by Babs Miller RN  Outcome: Ongoing  11/27/2021 1038 by Julio Penaloza RN  Outcome: Ongoing  Goal: STG - family can complete suctioning  11/27/2021 2342 by Babs Miller RN  Outcome: Ongoing  11/27/2021 1038 by Julio Penaloza RN  Outcome: Ongoing     Problem: NUTRITION  Description: Altered Nutrition and Hydration  Goal: Patient maintains adequate hydration  11/27/2021 2342 by Babs Miller RN  Outcome: Ongoing  11/27/2021 1038 by Julio Penaloza RN  Outcome: Ongoing  Goal: Patient maintains weight  11/27/2021 2342 by Babs Miller RN  Outcome: Ongoing  11/27/2021 1038 by Julio Penaloza RN  Outcome: Ongoing  Goal: Patient/Family demonstrates understanding of diet  11/27/2021 2342 by Babs Miller RN  Outcome: Ongoing  11/27/2021 1038 by Julio Penaloza RN  Outcome: Ongoing  Goal: Patient/Family independently completes tube feeding  11/27/2021 2342 by Babs Miller RN  Outcome: Ongoing  11/27/2021 1038 by Julio Penaloza RN  Outcome: Ongoing  Goal: Patient will have no more than 5 lb weight change during LOS  11/27/2021 2342 by Babs Miller RN  Outcome: Ongoing  11/27/2021 1038 by Julio Penaloza RN  Outcome: Ongoing  Goal: Patient will utilize adaptive techniques to administer nutrition  11/27/2021 2342 by Babs Miller RN  Outcome: Ongoing  11/27/2021 1038 by Julio Penaloza RN  Outcome: Ongoing  Goal: Patient will verbalize dietary restrictions  11/27/2021 2342 by Babs Miller RN  Outcome: Ongoing  11/27/2021 1038 by Julio Penaloza RN  Outcome: Ongoing     Problem: SAFETY  Goal: LTG - patient will adhere to hip precautions during ADL's and transfers  11/27/2021 2342 by Babs Miller RN  Outcome: Ongoing  11/27/2021 1038 by Julio Penaloza RN  Outcome: Ongoing  Goal: LTG - Patient will demonstrate safety requirements appropriate to situation/environment  11/27/2021 2342 by Babs Miller RN  Outcome: Ongoing  11/27/2021 1038 by Primo Hobbs RN  Outcome: Ongoing  Goal: LTG - patient will utilize safety techniques  11/27/2021 2342 by Jose Beck RN  Outcome: Ongoing  11/27/2021 1038 by Primo Hobbs RN  Outcome: Ongoing  Goal: STG - patient locks brakes on wheelchair  11/27/2021 2342 by Jose Beck RN  Outcome: Ongoing  11/27/2021 1038 by Primo Hobbs RN  Outcome: Ongoing  Goal: STG - Patient uses call light consistently to request assistance with transfers  11/27/2021 2342 by Jose Beck RN  Outcome: Ongoing  11/27/2021 1038 by Primo Hobbs RN  Outcome: Ongoing  Goal: STG - patient uses gait belt during all transfers  11/27/2021 2342 by Jose Beck RN  Outcome: Ongoing  11/27/2021 1038 by Primo Hobbs RN  Outcome: Ongoing  Goal: Free from accidental physical injury  11/27/2021 2342 by Jose Beck RN  Outcome: Ongoing  11/27/2021 1038 by Primo Hobbs RN  Outcome: Ongoing  Goal: Free from intentional harm  11/27/2021 2342 by Jose Beck RN  Outcome: Ongoing  11/27/2021 1038 by Primo Hobbs RN  Outcome: Ongoing     Problem: SKIN INTEGRITY  Goal: LTG - Patient will be free from infection  11/27/2021 2342 by Jose Beck RN  Outcome: Ongoing  11/27/2021 1038 by Primo Hobbs RN  Outcome: Ongoing  Goal: LTG - patient will maintain/improve skin integrity through proper skin care techniques  11/27/2021 2342 by Jose Beck RN  Outcome: Ongoing  11/27/2021 1038 by Primo Hobbs RN  Outcome: Ongoing  Goal: LTG - Patient will demonstrate appropriate pressure relief techniques  11/27/2021 2342 by Jose Beck RN  Outcome: Ongoing  11/27/2021 1038 by Primo Hobbs RN  Outcome: Ongoing  Goal: LTG - patient will demonstrate appropriate skin care techniques  11/27/2021 2342 by Jose Beck RN  Outcome: Ongoing  11/27/2021 1038 by Primo Hobbs RN  Outcome: Ongoing  Goal: LTG - Patient will be free from infection  11/27/2021 2342 by Wolf Traore RN  Outcome: Ongoing  11/27/2021 1038 by Emeli Ruiz RN  Outcome: Ongoing  Goal: STG - Patient demonstrates skin care/treatment/dressing change  11/27/2021 2342 by Wolf Traore RN  Outcome: Ongoing  11/27/2021 1038 by Emeli Ruiz RN  Outcome: Ongoing  Goal: STG - patient will maintain good skin integrity  11/27/2021 2342 by Wolf Traore RN  Outcome: Ongoing  11/27/2021 1038 by Emeli Ruiz RN  Outcome: Ongoing  Goal: STG - Patient exhibits signs of wound healing.   11/27/2021 2342 by Wolf Traore RN  Outcome: Ongoing  11/27/2021 1038 by Emeli Ruiz RN  Outcome: Ongoing  Goal: STG - patient demonstrates pressure reduction techniques  11/27/2021 2342 by Wolf Traore RN  Outcome: Ongoing  11/27/2021 1038 by Emeli Ruiz RN  Outcome: Ongoing  Goal: STG - Patient demonstrates preventative skin care measures  11/27/2021 2342 by Wolf Traore RN  Outcome: Ongoing  11/27/2021 1038 by Emeli Ruiz RN  Outcome: Ongoing  Goal: Skin integrity is maintained or improved  11/27/2021 2342 by Wolf Traore RN  Outcome: Ongoing  11/27/2021 1038 by Emeli Ruiz RN  Outcome: Ongoing     Problem: PAIN  Goal: LTG - Patient will manage pain with the appropriate technique/Intervention  11/27/2021 2342 by Wolf Traore RN  Outcome: Ongoing  11/27/2021 1038 by Emeli Ruiz RN  Outcome: Ongoing  Goal: LTG - Patient will demonstrate intervention for managing pain  11/27/2021 2342 by Wolf Traore RN  Outcome: Ongoing  11/27/2021 1038 by Emeli Ruiz RN  Outcome: Ongoing  Goal: STG - Patient will reduce or eliminate use of analgesics  11/27/2021 2342 by Wolf Traore RN  Outcome: Ongoing  11/27/2021 1038 by Emeli Ruiz RN  Outcome: Ongoing  Goal: STG - pain is manageable through therapies  11/27/2021 2342 by Wolf Traore RN  Outcome: Ongoing  11/27/2021 1038 by Emeli Ruiz RN  Outcome: Ongoing  Goal: STG - Patient will verbalize an acceptable level of pain  11/27/2021 2342 by Vale Ugalde RN  Outcome: Ongoing  11/27/2021 1038 by Kylah Lara RN  Outcome: Ongoing  Goal: STG - patients pain is managed to allow active participation in daily activities  11/27/2021 2342 by Vale Ugalde RN  Outcome: Ongoing  11/27/2021 1038 by Kylah Lara RN  Outcome: Ongoing  Goal: STG - Patient will increase activity level  11/27/2021 2342 by Vale Ugalde RN  Outcome: Ongoing  11/27/2021 1038 by Kylah Lara RN  Outcome: Ongoing  Goal: STG - patient verbalizes a reduction in pain level  11/27/2021 2342 by Vale Ugalde RN  Outcome: Ongoing  11/27/2021 1038 by Kylah Lara RN  Outcome: Ongoing  Goal: Patient's pain/discomfort is manageable  11/27/2021 2342 by Vale Ugalde RN  Outcome: Ongoing  11/27/2021 1038 by Kylah Lara RN  Outcome: Ongoing

## 2021-11-28 NOTE — PROGRESS NOTES
Clinical Pharmacy Note    Warfarin consult follow-up    Recent Labs     11/28/21  0534   INR 2.12*     Recent Labs     11/26/21  0607 11/27/21  0529 11/28/21  0534   HGB 13.2 13.2 13.3   HCT 43.8 42.8 44.6    169 164       Significant Drug-Drug Interactions:  New warfarin drug-drug interactions: none  Discontinued drug-drug interactions: none    Date INR Warfarin Dose   11/06/21 1.01 ---   11/07/21 --- 5 mg   11/08/21 1.04  5 mg    11/09/21  1.30 5 mg   11/10/21 1.74 5 mg    11/11/21  2.44 2.5 mg    11/12/21 2.02 5 mg    11/13/21  1.97 5 mg   11/14/21  2.44  4 mg    11/15/21 2.58 4 mg    11/16/21 2.35  5 mg    11/17/21 2.30  5 mg    11/18/21 2.40 4 mg    11/19/21 2.57  4 mg    11/20/21 2.48  4 mg   11/21/21 2.37 5 mg    11/22/21 1.86   6 mg   11/23/21   1.64 7.5 mg    11/24/21 1.67 6 mg    11/25/21  2.29 6 mg   11/26/21  2.19  6 mg    11/27/21 2.10 7.5 mg    11/28/21 2.12  6 mg                      Notes: Will give warfarin 6 mg once this evening. Daily PT/INR until stable within therapeutic range.      Electronically signed by Mahin Lima [address] [facility] on 11/28/2021 at 7:43 AM

## 2021-11-28 NOTE — PROGRESS NOTES
Am accucheck 61, patient is alert and getting ready to eat breakfast. 1 tube glucose gel given and patient fed breakfast

## 2021-11-29 VITALS
BODY MASS INDEX: 21.39 KG/M2 | RESPIRATION RATE: 20 BRPM | HEART RATE: 52 BPM | DIASTOLIC BLOOD PRESSURE: 90 MMHG | OXYGEN SATURATION: 94 % | TEMPERATURE: 96.4 F | SYSTOLIC BLOOD PRESSURE: 134 MMHG | HEIGHT: 66 IN | WEIGHT: 133.1 LBS

## 2021-11-29 LAB
ALBUMIN SERPL-MCNC: 3.3 G/DL (ref 3.5–5.2)
ALP BLD-CCNC: 87 U/L (ref 35–104)
ALT SERPL-CCNC: 29 U/L (ref 5–33)
ANION GAP SERPL CALCULATED.3IONS-SCNC: 7 MMOL/L (ref 7–19)
AST SERPL-CCNC: 30 U/L (ref 5–32)
BASOPHILS ABSOLUTE: 0 K/UL (ref 0–0.2)
BASOPHILS RELATIVE PERCENT: 0.7 % (ref 0–1)
BILIRUB SERPL-MCNC: 0.6 MG/DL (ref 0.2–1.2)
BUN BLDV-MCNC: 33 MG/DL (ref 8–23)
CALCIUM SERPL-MCNC: 10.2 MG/DL (ref 8.8–10.2)
CHLORIDE BLD-SCNC: 107 MMOL/L (ref 98–111)
CO2: 28 MMOL/L (ref 22–29)
CREAT SERPL-MCNC: 0.6 MG/DL (ref 0.5–0.9)
EOSINOPHILS ABSOLUTE: 0.3 K/UL (ref 0–0.6)
EOSINOPHILS RELATIVE PERCENT: 4.6 % (ref 0–5)
GFR AFRICAN AMERICAN: >59
GFR NON-AFRICAN AMERICAN: >60
GLUCOSE BLD-MCNC: 76 MG/DL (ref 70–99)
GLUCOSE BLD-MCNC: 88 MG/DL (ref 74–109)
HCT VFR BLD CALC: 43.9 % (ref 37–47)
HEMOGLOBIN: 13.2 G/DL (ref 12–16)
IMMATURE GRANULOCYTES #: 0 K/UL
INR BLD: 2.24 (ref 0.88–1.18)
LYMPHOCYTES ABSOLUTE: 1.5 K/UL (ref 1.1–4.5)
LYMPHOCYTES RELATIVE PERCENT: 27.2 % (ref 20–40)
MCH RBC QN AUTO: 29.2 PG (ref 27–31)
MCHC RBC AUTO-ENTMCNC: 30.1 G/DL (ref 33–37)
MCV RBC AUTO: 97.1 FL (ref 81–99)
MONOCYTES ABSOLUTE: 0.4 K/UL (ref 0–0.9)
MONOCYTES RELATIVE PERCENT: 8.1 % (ref 0–10)
NEUTROPHILS ABSOLUTE: 3.2 K/UL (ref 1.5–7.5)
NEUTROPHILS RELATIVE PERCENT: 59.2 % (ref 50–65)
PDW BLD-RTO: 14.6 % (ref 11.5–14.5)
PERFORMED ON: NORMAL
PLATELET # BLD: 177 K/UL (ref 130–400)
PMV BLD AUTO: 10.3 FL (ref 9.4–12.3)
POTASSIUM REFLEX MAGNESIUM: 5 MMOL/L (ref 3.5–5)
PROTHROMBIN TIME: 24.6 SEC (ref 12–14.6)
RBC # BLD: 4.52 M/UL (ref 4.2–5.4)
SARS-COV-2, NAAT: NOT DETECTED
SODIUM BLD-SCNC: 142 MMOL/L (ref 136–145)
TOTAL PROTEIN: 5.2 G/DL (ref 6.6–8.7)
WBC # BLD: 5.4 K/UL (ref 4.8–10.8)

## 2021-11-29 PROCEDURE — 36415 COLL VENOUS BLD VENIPUNCTURE: CPT

## 2021-11-29 PROCEDURE — 80053 COMPREHEN METABOLIC PANEL: CPT

## 2021-11-29 PROCEDURE — 6370000000 HC RX 637 (ALT 250 FOR IP): Performed by: PSYCHIATRY & NEUROLOGY

## 2021-11-29 PROCEDURE — 82947 ASSAY GLUCOSE BLOOD QUANT: CPT

## 2021-11-29 PROCEDURE — 92526 ORAL FUNCTION THERAPY: CPT

## 2021-11-29 PROCEDURE — 99239 HOSP IP/OBS DSCHRG MGMT >30: CPT | Performed by: PSYCHIATRY & NEUROLOGY

## 2021-11-29 PROCEDURE — 6370000000 HC RX 637 (ALT 250 FOR IP): Performed by: NURSE PRACTITIONER

## 2021-11-29 PROCEDURE — 85025 COMPLETE CBC W/AUTO DIFF WBC: CPT

## 2021-11-29 PROCEDURE — 87635 SARS-COV-2 COVID-19 AMP PRB: CPT

## 2021-11-29 PROCEDURE — 85610 PROTHROMBIN TIME: CPT

## 2021-11-29 RX ORDER — FLUTICASONE PROPIONATE 50 MCG
1 SPRAY, SUSPENSION (ML) NASAL DAILY
Qty: 16 G | Refills: 3
Start: 2021-11-29

## 2021-11-29 RX ORDER — MODAFINIL 100 MG/1
100 TABLET ORAL DAILY
Qty: 30 TABLET | Refills: 0 | Status: SHIPPED | OUTPATIENT
Start: 2021-11-29 | End: 2021-12-29

## 2021-11-29 RX ORDER — WARFARIN SODIUM 6 MG/1
6 TABLET ORAL DAILY
Qty: 30 TABLET | Refills: 11
Start: 2021-11-29

## 2021-11-29 RX ADMIN — MODAFINIL 100 MG: 100 TABLET ORAL at 08:20

## 2021-11-29 RX ADMIN — ATORVASTATIN CALCIUM 20 MG: 20 TABLET, FILM COATED ORAL at 08:20

## 2021-11-29 NOTE — DISCHARGE SUMMARY
Physical Therapy Discharge Note  DATE:  2021  NAME:  Harleen Haywood  :  1933  (88 y.o.,female)  MRN:  [id number]    HEIGHT:  Height: 5' 6\" (167.6 cm)  WEIGHT:  Weight: 133 lb 1.6 oz (60.4 kg)    PATIENT DIAGNOSIS(ES):    Diagnosis: Acute ischemic stroke, L MCA infarct, R sided involvement     Additional Pertinent Hx: hx CVAs, stress incontinenc  RESTRICTIONS/PRECAUTIONS:    Restrictions/Precautions  Restrictions/Precautions: Fall Risk  Required Braces or Orthoses?: No  Position Activity Restriction  Other position/activity restrictions: Dysphagia-soft and bite sized, NTL, water in between meals  OVERALL  ORIENTATION STATUS:     PAIN:  Pain Level: 0                    NEUROLOGICAL                    Motor Control  Gross Motor: B UE acts     STRENGTH  Strength RLE  Comment: grossly assessed 3+/5  Strength LLE  Comment: grossly assessed 4/5  ROM  AROM RLE (degrees)  RLE AROM: WFL  AROM LLE (degrees)  LLE AROM : WFL  POSTURE/BALANCE  Balance  Posture: Fair  Sitting - Static: Fair, +  Sitting - Dynamic: Fair, +  Standing - Static: Fair, +  Standing - Dynamic: Fair, -  Comments: worked on static standing in [employer]; stood for a few minutes with good upright posture during cleaup and duration of so stedy transfer (did not use paddles to lean against)       ACTIVITY TOLERANCE  Activity Tolerance  Activity Tolerance: Patient Tolerated treatment well, Patient limited by fatigue      BED MOBILITY  Bed Mobility  Bridging: Supervision  Rolling: Supervision  Supine to Sit: Stand by assistance  Sit to Supine: Stand by assistance  Scooting: Supervision  Comment: pt understood direction well, immediately fell asleep once supine   TRANSFERS  Sit to Stand: Contact guard assistance, Minimal Assistance      Bed to Chair: Minimal assistance, Moderate assistance  Car Transfer: Contact guard assistance (Max v/c's for BUE placement and sequencing of TF )     WHEELCHAIR PROPULSION 1   Unable to perform  AMBULATION 1  Ambulation 1  Surface: level tile  Device: Rollator  Other Apparatus: Wheelchair follow  Assistance: Minimal assistance, Contact guard assistance  Quality of Gait: ant shifted cog , tends to lean to R, forward lean also, excessive IR and some BLE drag of RLE   Gait Deviations: Slow Cyn, Decreased step length, Decreased step height  Distance: 50'  Comments: 1 L and 1 R turn   STAIRS   Unable to perform    GOALS:  Short term goals  Time Frame for Short term goals: 1-2 weeks  Short term goal 1: bed mobility Mod I   Short term goal 2: transfers with AD SBA  Short term goal 3: ambulation 50ft with AD Joshua  Short term goal 4: navigate 4 step with handrails Joshua  Short term goal 5: WC mobility 75ft CGA    Long term goals  Time Frame for Long term goals : 3-4 weeks  Long term goal 1: bed mobility Indp  Long term goal 2: transfers with AD Mod I  Long term goal 3: ambulation 150 ft with AD SBA  Long term goal 4: navigate 4 step with hand rail SBA  Long term goal 5: WC mobility 150 ft SBA  HOME LIVING:  Type of Home: House  Home Layout: One level (ramps inside the house to get to different rooms )  Home Access: Stairs to enter with rails  Entrance Stairs - Number of Steps: 1  Entrance Stairs - Rails: Left (grab bar to enter)  ASSESSMENT (IMPAIRMENTS/BARRIERS): Body structures, Functions, Activity limitations: Decreased functional mobility , Decreased strength, Decreased endurance, Decreased balance, Decreased coordination, Decreased posture  Assessment: Focus of tx FTD with pt son, he reports understanding. He also reports pt will be d/c to [facility] on Monday. Pt reponds well to v/c's and instructions this AM, delayed respones at times.   Activity Tolerance: Patient Tolerated treatment well, Patient limited by fatigue  Specific instructions for Next Treatment: tfs with or without AD and initiate ambulation   PLAN:  Discharge Recommendations: [facility]:  RETURN TO PRIOR LEVEL OF FUNCTION       IRF/CORTES  Roll Left and Right  Assistance Needed: Supervision or touching assistance  CARE Score: 4  Discharge Goal: Independent    Sit to Lying  Assistance Needed: Supervision or touching assistance  CARE Score: 4  Discharge Goal: Independent    Lying to Sitting on Side of Bed  Assistance Needed: Supervision or touching assistance  CARE Score: 4  Discharge Goal: Independent    Sit to Stand  Assistance Needed: Partial/moderate assistance  CARE Score: 3  Discharge Goal: Independent    Chair/Bed-to-Chair Transfer  Assistance Needed: Partial/moderate assistance  CARE Score: 3  Discharge Goal: Independent    Car Transfer  Assistance Needed: Partial/moderate assistance  Reason if not Attempted: Not attempted due to medical condition or safety concerns  CARE Score: 3  Discharge Goal: Supervision or touching assistance    Walk 10 Feet  Assistance Needed: Partial/moderate assistance  Reason if not Attempted: Not attempted due to medical condition or safety concerns  CARE Score: 3  Discharge Goal: Independent    Walk 50 Feet with Two Turns  Assistance Needed: Partial/moderate assistance  Reason if not Attempted: Not attempted due to medical condition or safety concerns  CARE Score: 3  Discharge Goal: Independent    Walk 150 Feet  Reason if not Attempted: Not attempted due to medical condition or safety concerns  CARE Score: 88  Discharge Goal: Supervision or touching assistance    Walking 10 Feet on Uneven Surfaces  Reason if not Attempted: Not attempted due to medical condition or safety concerns  CARE Score: 88  Discharge Goal: Not Attempted    1 Step (Curb)  Reason if not Attempted: Not attempted due to medical condition or safety concerns  CARE Score: 88  Discharge Goal: Not Attempted    4 Steps  Reason if not Attempted: Not attempted due to medical condition or safety concerns  CARE Score: 88  Discharge Goal: Supervision or touching assistance    12 Steps  Reason if not Attempted: Not attempted due to medical condition or safety concerns  CARE Score: 88  Discharge Goal: Not Attempted    Wheel 50 Feet with Two Turns  Reason if not Attempted: Not attempted due to medical condition or safety concerns  CARE Score: 88  Discharge Goal: Independent    Wheel 150 Feet  Reason if not Attempted: Not attempted due to medical condition or safety concerns  CARE Score: 88  Discharge Goal: Independent      Electronically signed by Kristin Anderson PT on 11/29/21 at 10:02 AM CST

## 2021-11-29 NOTE — PROGRESS NOTES
[facility]  INPATIENT SPEECH THERAPY      TIME   Time In: 0800  Time Out: 1493  Minutes: 15       [x]Daily Note  []Progress Note    Date: 2021  Patient Name: Briana Dubose        MRN: [id number]    Account #: [de-identified]  : 1933  (80 y.o.)  Gender: female   Primary Provider: Gracy Rivera MD  Swallowing Status/Diet: Dysphagia soft and bite sized, pureed meats, thin liquids    PATIENT DIAGNOSIS(ES):    Diagnosis: Acute ischemic stroke, L MCA infarct, R sided involvement      Additional Pertinent Hx: hx CVAs, stress incontinence     RESTRICTIONS/PRECAUTIONS:    Restrictions/Precautions  Restrictions/Precautions: Fall Risk, Weight Bearing, Swallowing Modified Diet  Required Braces or Orthoses?: No  Position Activity Restriction  Other position/activity restrictions: Dysphagia-soft and bite sized               Subjective: The patient was upright in bed. She was cooperative with all tasks. Her son was present this morning. Objective: The patient's son was present this morning. He was feeding her breakfast. She was able to hold her coffee cup and bottle of ensure with minimal assistance. No overt s/s of aspiration observed with pureed meats, soft foods or thin liquids. She did not have any oral residue or buccal cavity pocketing after breakfast this morning. Reviewed all swallowing precautions and diet recommendations with her son. She will continue to require total feed at the SNF. She is recommended to remain on her current diet. Staff will need to check the oral cavity for residue. Recommended Diet and Intervention  Diet Solids Recommendation: Dysphagia Soft and Bite-Sized with pureed meats (Dysphagia III)  Liquid Consistency Recommendation: Thin liquids  Recommended Form of Meds: Crushed in puree as able  Therapeutic Interventions: Mendelsohn; Diet tolerance monitoring; Oral care; Therapeutic PO trials with SLP; Oral motor exercises;  Tongue base strengthening; Patient/Family education; Effortful swallow; Pharyngeal exercises; Yanet     Compensatory Swallowing Strategies  Compensatory Swallowing Strategies: Upright as possible for all oral intake; Alternate solids and liquids; Remain upright for 30-45 minutes after meals; Check for pocketing of food on the Right; Swallow 2 times per bite/sip; Small bites/sips; External pacing                  SHORT TERM GOAL #1:  Goal 1: The patient will follow two step commands with minimal cueing and 100% accuracy. SHORT TERM GOAL #2:  Goal 2: The patient will complete naming tasks (picture, object) with minimal cueing and 100% accuracy. SHORT TERM GOAL #3:  Goal 3: The patient will complete concrete divergent naming tasks with minimal cueing and 100% accuracy. SHORT TERM GOAL #4:  Goal 4: The patient will complete oral motor exercises to improve lingual and labial strength and range of motion as well as improve speech intelligibility. SHORT TERM GOAL #5:  Goal 5: The patient will utilize dysarthria strategies (slow rate, increased volume, over exaggeration of words, increased breath support) during structured tasks and during conversations with minimal cueing. Swallowing Short Term Goals  Short-term Goals  Goal 1: The patient will complete the yanet (tongue hold) technique to improve base of tongue retraction and base of tongue to pharyngeal wall approximation with 90% accuracy and minimal verbal cues. Goal 2: The patient will complete the effortful swallow maneuver to improve hyolaryngeal elevation, tongue base retraction, and vocal fold closure with 90% accuracy and minimal verbal cues. Goal 3: Staff/caregivers will complete daily oral hygiene to prevent aspiration of bacteria laden secretions. Goal 4: The patient will tolerate a dysphagia soft and bite sized diet with nectar (mildly thick) liquids with minimal overt s/s of aspiration.     Comprehension: 3 - Patient understands basic needs 50-74% of the time  Expression: 3 -

## 2021-11-29 NOTE — DISCHARGE SUMMARY
Neurology Discharge Summary     Patient Identification:  Arden Mcclellan is a 80 y.o. female. :  1933  Admit Date:  2021  Discharge date : 21   Attending Provider: Gabriele Comer MD     Account Number: [de-identified]                                   Admission Diagnoses:   Acute ischemic stroke [facility]) [I63.9]    Discharge Diagnoses: Active Problems:    Acute ischemic stroke [facility])  Resolved Problems:    * No resolved hospital problems. *      Discharge Medications:    Current Discharge Medication List           Details   warfarin (COUMADIN) 6 MG tablet Take 1 tablet by mouth daily  Qty: 30 tablet, Refills: 11      fluticasone (FLONASE) 50 MCG/ACT nasal spray 1 spray by Each Nostril route daily  Qty: 16 g, Refills: 3      modafinil (PROVIGIL) 100 MG tablet Take 1 tablet by mouth daily for 30 days. Qty: 30 tablet, Refills: 0    Associated Diagnoses: Acute ischemic stroke ([facility] [address]              Details   atorvastatin (LIPITOR) 20 MG tablet Take 20 mg by mouth daily      Digestive Enzymes CAPS Take 220 mg by mouth daily      Bacillus Coagulans-Inulin (PROBIOTIC) 1-250 BILLION-MG CAPS Take 1 capsule by mouth           Current Discharge Medication List      STOP taking these medications       clopidogrel (PLAVIX) 75 MG tablet Comments:   Reason for Stopping:         potassium chloride (MICRO-K) 10 MEQ extended release capsule Comments:   Reason for Stopping:                 Consults:   none    Hospital Course: The patient did relatively well during her stay in the rehab unit. INR remained therapeutic. Urinalysis and repeat CT scan of the head unremarkable. The patient had some excessive lethargy at times with a negative work-up including ammonia level and EEG. She was placed on Provigil 100 mg every morning with good results. She also had occasional hypoglycemia but is on no hypoglycemic medications. Small frequent meals seem to correct this. She had no medical complications otherwise.   Disposition upon dischargeimproved        Discharge Instructions     Patient Instructions: To a non-[facility] facility  Therapy orders: PT, OT and SLP   Discharge lab work: none  Code status: DNR-CC   Activity: activity as tolerated  Diet: ADULT ORAL NUTRITION SUPPLEMENT; Breakfast, Dinner, Lunch; Standard High Calorie/High Protein Oral Supplement  ADULT ORAL NUTRITION SUPPLEMENT; Dinner; Snack; HS snack- yogurt and diced fruit sent with dinner  ADULT DIET;  Dysphagia - Soft and Bite Sized    Wound Care: none needed  Equipment: as per therapy      Randell Camacho MD, MD    At least 35 minutes were spent in discharging the patient

## 2021-11-29 NOTE — PLAN OF CARE
Problem: Falls - Risk of:  Goal: Will remain free from falls  11/29/2021 0037 by Lyle Valerio RN  Outcome: Ongoing  11/28/2021 1123 by Ashanti Griggs RN  Outcome: Ongoing  Goal: Absence of physical injury  11/29/2021 0037 by Lyle Valerio RN  Outcome: Ongoing  11/28/2021 1123 by Ashanti Griggs RN  Outcome: Ongoing     Problem: Confusion - Acute:  Goal: Absence of continued neurological deterioration signs and symptoms  11/29/2021 0037 by Lyle Valerio RN  Outcome: Ongoing  11/28/2021 1123 by Ashanti Griggs RN  Outcome: Ongoing  Goal: Mental status will be restored to baseline  11/29/2021 0037 by Lyle Valerio RN  Outcome: Ongoing  11/28/2021 1123 by Ashanti Griggs RN  Outcome: Ongoing     Problem: Discharge Planning:  Goal: Ability to perform activities of daily living will improve  11/29/2021 0037 by Lyle Valerio RN  Outcome: Ongoing  11/28/2021 1123 by Ashanti Griggs RN  Outcome: Ongoing  Goal: Participates in care planning  11/29/2021 0037 by Lyle Valerio RN  Outcome: Ongoing  11/28/2021 1123 by Ashanti Griggs RN  Outcome: Ongoing     Problem: Injury - Risk of, Physical Injury:  Goal: Will remain free from falls  11/29/2021 0037 by Lyle Valerio RN  Outcome: Ongoing  11/28/2021 1123 by Ashanti Griggs RN  Outcome: Ongoing  Goal: Absence of physical injury  11/29/2021 0037 by Lyle Valerio RN  Outcome: Ongoing  11/28/2021 1123 by Ashanti Griggs RN  Outcome: Ongoing     Problem: Mood - Altered:  Goal: Mood stable  11/29/2021 0037 by Lyle Valerio RN  Outcome: Ongoing  11/28/2021 1123 by Ashanti Griggs RN  Outcome: Ongoing  Goal: Absence of abusive behavior  11/29/2021 0037 by Lyle Valerio RN  Outcome: Ongoing  11/28/2021 1123 by Ashanti Griggs RN  Outcome: Ongoing  Goal: Verbalizations of feeling emotionally comfortable while being cared for will increase  11/29/2021 0037 by Lyle Valerio RN  Outcome: Ongoing  11/28/2021 1123 by Ashanti Griggs RN  Outcome: Ongoing     Problem: Psychomotor Activity - Altered:  Goal: Absence of psychomotor disturbance signs and symptoms  11/29/2021 0037 by Jp Chamberlain RN  Outcome: Ongoing  11/28/2021 1123 by Jong Thomas RN  Outcome: Ongoing     Problem: Sensory Perception - Impaired:  Goal: Demonstrations of improved sensory functioning will increase  11/29/2021 0037 by Jp Chamberlain RN  Outcome: Ongoing  11/28/2021 1123 by Jong Thomas RN  Outcome: Ongoing  Goal: Decrease in sensory misperception frequency  11/29/2021 0037 by Jp Chamberlain RN  Outcome: Ongoing  11/28/2021 1123 by Jong Thomas RN  Outcome: Ongoing  Goal: Able to refrain from responding to false sensory perceptions  11/29/2021 0037 by Jp Chamberlain RN  Outcome: Ongoing  11/28/2021 1123 by Jong Thomas RN  Outcome: Ongoing  Goal: Demonstrates accurate environmental perceptions  11/29/2021 0037 by Jp Chamberlain RN  Outcome: Ongoing  11/28/2021 1123 by Jong Thomas RN  Outcome: Ongoing  Goal: Able to distinguish between reality-based and nonreality-based thinking  11/29/2021 0037 by Jp Chamberlain RN  Outcome: Ongoing  11/28/2021 1123 by Jong Thomas RN  Outcome: Ongoing  Goal: Able to interrupt nonreality-based thinking  11/29/2021 0037 by Jp Chamberlain RN  Outcome: Ongoing  11/28/2021 1123 by Jong Thomas RN  Outcome: Ongoing     Problem: Sleep Pattern Disturbance:  Goal: Appears well-rested  11/29/2021 0037 by Jp Chamberlain RN  Outcome: Ongoing  11/28/2021 1123 by Jong Thomas RN  Outcome: Ongoing     Problem: IP BLADDER/VOIDING  Goal: LTG - patient will complete bladder elimination  11/29/2021 0037 by Jp Chamberlain RN  Outcome: Ongoing  11/28/2021 1123 by Jong Thomas RN  Outcome: Ongoing  Goal: LTG - Patient will utilize adaptive techniques/equipment to complete bladder elimination  11/29/2021 0037 by Jp Chamberlain RN  Outcome: Ongoing  11/28/2021 1123 by Jong Thomas RN  Outcome: Ongoing  Goal: LTG - patient will achieve acceptable level of continence  11/29/2021 0037 by Love Pantoja RN  Outcome: Ongoing  11/28/2021 1123 by Arabella Lewis RN  Outcome: Ongoing  Goal: STG - Patient demonstrates ability to take care of indwelling catheter  11/29/2021 0037 by Love Pantoja RN  Outcome: Ongoing  11/28/2021 1123 by Arabella Lewis RN  Outcome: Ongoing  Goal: STG - patient demonstrates self-cath technique using clean technique and care of the catheter  11/29/2021 0037 by Love Pantoja RN  Outcome: Ongoing  11/28/2021 1123 by Arabella Lewis RN  Outcome: Ongoing  Goal: STG - Patient demonstrates no accidents  11/29/2021 0037 by Love Pantoja RN  Outcome: Ongoing  11/28/2021 1123 by Arabella Lewis RN  Outcome: Ongoing  Goal: STG - Patient will state signs and symptoms of UTI  11/29/2021 0037 by Love Pantoja RN  Outcome: Ongoing  11/28/2021 1123 by Arabella Lewis RN  Outcome: Ongoing  Goal: STG - patient will be able to empty bladder  11/29/2021 0037 by Love Pantoja RN  Outcome: Ongoing  11/28/2021 1123 by Arabella Lewis RN  Outcome: Ongoing  Goal: STG - Patient demonstrates understanding of self catheterization schedule by completing task on time  11/29/2021 0037 by Love Pantoja RN  Outcome: Ongoing  11/28/2021 1123 by Arabella Lewis RN  Outcome: Ongoing  Goal: STG - patient participates in bladder program by expressing need to void  11/29/2021 0037 by Love Pantoja RN  Outcome: Ongoing  11/28/2021 1123 by Arabella Lewis RN  Outcome: Ongoing  Goal: STG - Patient verbalizes understanding of catheter care indwelling/intermittent  11/29/2021 0037 by Love Pantoja RN  Outcome: Ongoing  11/28/2021 1123 by Arabella Lewis RN  Outcome: Ongoing  Goal: STG - patient participates in bladder program by adhering to implemented toileting schedule  11/29/2021 0037 by Love Pantoja RN  Outcome: Ongoing  11/28/2021 1123 by Arabella Lewis RN  Outcome: Ongoing     Problem: IP BOWEL ELIMINATION  Goal: LTG - patient will utilize adaptive techniques/equipment to complete bowel elimination  11/29/2021 0037 by Wolf Traore RN  Outcome: Ongoing  11/28/2021 1123 by Emeli Ruiz RN  Outcome: Ongoing  Goal: LTG - patient will have regular and routine bowel evacuation  11/29/2021 0037 by Wolf Traore RN  Outcome: Ongoing  11/28/2021 1123 by Emeli Ruiz RN  Outcome: Ongoing  Goal: STG - patient will be accident free  11/29/2021 0037 by Wolf Traore RN  Outcome: Ongoing  11/28/2021 1123 by Emeli Ruiz RN  Outcome: Ongoing  Goal: STG - Patient demonstrates care and management of ostomy bag  11/29/2021 0037 by Wolf Traore RN  Outcome: Ongoing  11/28/2021 1123 by Emeli Ruiz RN  Outcome: Ongoing  Goal: STG - Patient participates in bowel management program  11/29/2021 0037 by Wolf Traore RN  Outcome: Ongoing  11/28/2021 1123 by Emeli Ruiz RN  Outcome: Ongoing  Goal: STG - patient maintains skin integrity  11/29/2021 0037 by Wolf Traore RN  Outcome: Ongoing  11/28/2021 1123 by Emeli Ruiz RN  Outcome: Ongoing  Goal: STG - Patient will verbalize signs and symptoms of constipation and how to prevent/alleviate  11/29/2021 0037 by Wolf Traore RN  Outcome: Ongoing  11/28/2021 1123 by Emeli Ruiz RN  Outcome: Ongoing  Goal: STG - patient will be continent of stool  11/29/2021 0037 by Wolf Traore RN  Outcome: Ongoing  11/28/2021 1123 by Emeli Ruiz RN  Outcome: Ongoing  Goal: STG - Patient completes digital stimulation technique  11/29/2021 0037 by Wolf Traore RN  Outcome: Ongoing  11/28/2021 1123 by Emeli Ruiz RN  Outcome: Ongoing  Goal: STG - Patient verbalizes knowledge about relationship between diet, fluid intake, activity and medication on constipation  11/29/2021 0037 by Wolf Traore RN  Outcome: Ongoing  11/28/2021 1123 by Emeli Ruiz RN  Outcome: Ongoing Problem: IP BREATHING  Goal: LTG - patient will mobilize secretions and maintain airway  11/29/2021 0037 by Yana Patton RN  Outcome: Ongoing  11/28/2021 1123 by Yane Núñez RN  Outcome: Ongoing  Goal: LTG - Patient/caregiver will demonstrate/perform proper techniques to maintain patent airway  11/29/2021 0037 by Yana Patton RN  Outcome: Ongoing  11/28/2021 1123 by Yane Núñez RN  Outcome: Ongoing  Goal: LTG - patient/caregiver will demonstrate/perform improved or stable self care abilities within physical limitations  11/29/2021 0037 by Yana Patton RN  Outcome: Ongoing  11/28/2021 1123 by Yane Núñez RN  Outcome: Ongoing  Goal: STG - Patient/caregiver will maintain patent airway  11/29/2021 0037 by Yana Patton RN  Outcome: Ongoing  11/28/2021 1123 by Yane Núñez RN  Outcome: Ongoing  Goal: STG - respiratory rate and effort will be within normal limits for the patient  11/29/2021 0037 by Yana Patton RN  Outcome: Ongoing  11/28/2021 1123 by Yane Núñez RN  Outcome: Ongoing  Goal: STG - patient/caregiver will be able to verbalize oxygen safety precautions  11/29/2021 0037 by Yana Patton RN  Outcome: Ongoing  11/28/2021 1123 by Yane Núñez RN  Outcome: Ongoing  Goal: STG - Patient/caregiver demonstrates correct suctioning technique  11/29/2021 0037 by Yana Patton RN  Outcome: Ongoing  11/28/2021 1123 by Yane Núñez RN  Outcome: Ongoing  Goal: STG - patient will utilize incentive spirometer  11/29/2021 0037 by Yana Patton RN  Outcome: Ongoing  11/28/2021 1123 by aYne Núñez RN  Outcome: Ongoing  Goal: STG - Patient performs or directs assisted coughing  11/29/2021 0037 by Yana Patton RN  Outcome: Ongoing  11/28/2021 1123 by Yane Núñez RN  Outcome: Ongoing  Goal: STG - patient can administer MDI's  11/29/2021 0037 by Yana Patton RN  Outcome: Ongoing  11/28/2021 1123 by Yane Núñez RN  Outcome: Ongoing  Goal: STG - Patient can utilize incentive spirometer  11/29/2021 0037 by Rodri Juan RN  Outcome: Ongoing  11/28/2021 1123 by Kiana Nicole RN  Outcome: Ongoing  Goal: STG - family can complete suctioning  11/29/2021 0037 by Rodri Juan RN  Outcome: Ongoing  11/28/2021 1123 by Kiana Nicole RN  Outcome: Ongoing     Problem: NUTRITION  Description: Altered Nutrition and Hydration  Goal: Patient maintains adequate hydration  11/29/2021 0037 by Rodri Juan RN  Outcome: Ongoing  11/28/2021 1123 by Kiana Nicole RN  Outcome: Ongoing  Goal: Patient maintains weight  11/29/2021 0037 by Rodri Juan RN  Outcome: Ongoing  11/28/2021 1123 by Kiana Nicole RN  Outcome: Ongoing  Goal: Patient/Family demonstrates understanding of diet  11/29/2021 0037 by Rodri Juan RN  Outcome: Ongoing  11/28/2021 1123 by Kiana Nicole RN  Outcome: Ongoing  Goal: Patient/Family independently completes tube feeding  11/29/2021 0037 by Rodri Juan RN  Outcome: Ongoing  11/28/2021 1123 by Kiana Nicole RN  Outcome: Ongoing  Goal: Patient will have no more than 5 lb weight change during LOS  11/29/2021 0037 by Rodri Juan RN  Outcome: Ongoing  11/28/2021 1123 by Kiana Nicole RN  Outcome: Ongoing  Goal: Patient will utilize adaptive techniques to administer nutrition  11/29/2021 0037 by Rodri Juan RN  Outcome: Ongoing  11/28/2021 1123 by Kiana Nicole RN  Outcome: Ongoing  Goal: Patient will verbalize dietary restrictions  11/29/2021 0037 by Rodri Juan RN  Outcome: Ongoing  11/28/2021 1123 by Kiana Nicole RN  Outcome: Ongoing     Problem: SAFETY  Goal: LTG - patient will adhere to hip precautions during ADL's and transfers  11/29/2021 0037 by Rodri Juan RN  Outcome: Ongoing  11/28/2021 1123 by Kiana Nicole RN  Outcome: Ongoing  Goal: LTG - Patient will demonstrate safety requirements appropriate to situation/environment  11/29/2021 0037 by Rodri Juan RN  Outcome: Ongoing  11/28/2021 1123 by Den Chatterjee RN  Outcome: Ongoing  Goal: LTG - patient will utilize safety techniques  11/29/2021 0037 by Nicklas Lefort, RN  Outcome: Ongoing  11/28/2021 1123 by Den Chatterjee RN  Outcome: Ongoing  Goal: STG - patient locks brakes on wheelchair  11/29/2021 0037 by Nicklas Lefort, RN  Outcome: Ongoing  11/28/2021 1123 by Den Chatterjee RN  Outcome: Ongoing  Goal: STG - Patient uses call light consistently to request assistance with transfers  11/29/2021 0037 by Nicklas Lefort, RN  Outcome: Ongoing  11/28/2021 1123 by Den Chatterjee RN  Outcome: Ongoing  Goal: STG - patient uses gait belt during all transfers  11/29/2021 0037 by Nicklas Lefort, RN  Outcome: Ongoing  11/28/2021 1123 by Den Chatterjee RN  Outcome: Ongoing  Goal: Free from accidental physical injury  11/29/2021 0037 by Nicklas Lefort, RN  Outcome: Ongoing  11/28/2021 1123 by Den Chatterjee RN  Outcome: Ongoing  Goal: Free from intentional harm  11/29/2021 0037 by Nicklas Lefort, RN  Outcome: Ongoing  11/28/2021 1123 by Den Chatterjee RN  Outcome: Ongoing     Problem: SKIN INTEGRITY  Goal: LTG - Patient will be free from infection  11/29/2021 0037 by Nicklas Lefort, RN  Outcome: Ongoing  11/28/2021 1123 by Den Chatterjee RN  Outcome: Ongoing  Goal: LTG - patient will maintain/improve skin integrity through proper skin care techniques  11/29/2021 0037 by Nicklas Lefort, RN  Outcome: Ongoing  11/28/2021 1123 by Den Chatterjee RN  Outcome: Ongoing  Goal: LTG - Patient will demonstrate appropriate pressure relief techniques  11/29/2021 0037 by Nicklas Lefort, RN  Outcome: Ongoing  11/28/2021 1123 by Den Chatterjee RN  Outcome: Ongoing  Goal: LTG - patient will demonstrate appropriate skin care techniques  11/29/2021 0037 by Nicklas Lefort, RN  Outcome: Ongoing  11/28/2021 1123 by Crenshaw Dunks, RN  Outcome: Ongoing  Goal: LTG - Patient will be free from infection  11/29/2021 0037 by Kathrin eJan RN  Outcome: Ongoing  11/28/2021 1123 by Kyara Mcclure RN  Outcome: Ongoing  Goal: STG - Patient demonstrates skin care/treatment/dressing change  11/29/2021 0037 by Kathrin Jean RN  Outcome: Ongoing  11/28/2021 1123 by Kyara Mcclure RN  Outcome: Ongoing  Goal: STG - patient will maintain good skin integrity  11/29/2021 0037 by Kathrin Jean RN  Outcome: Ongoing  11/28/2021 1123 by Kyara Mcclure RN  Outcome: Ongoing  Goal: STG - Patient exhibits signs of wound healing.   11/29/2021 0037 by Kathrin Jean RN  Outcome: Ongoing  11/28/2021 1123 by Kyara Mcclure RN  Outcome: Ongoing  Goal: STG - patient demonstrates pressure reduction techniques  11/29/2021 0037 by Kathrin Jean RN  Outcome: Ongoing  11/28/2021 1123 by Kyara Mcclure RN  Outcome: Ongoing  Goal: STG - Patient demonstrates preventative skin care measures  11/29/2021 0037 by Kathrin Jean RN  Outcome: Ongoing  11/28/2021 1123 by Kyara Mcclure RN  Outcome: Ongoing  Goal: Skin integrity is maintained or improved  11/29/2021 0037 by Kathrin Jean RN  Outcome: Ongoing  11/28/2021 1123 by Kyara Mcclure RN  Outcome: Ongoing     Problem: PAIN  Goal: LTG - Patient will manage pain with the appropriate technique/Intervention  11/29/2021 0037 by Kathrin Jean RN  Outcome: Ongoing  11/28/2021 1123 by Kyara Mcclure RN  Outcome: Ongoing  Goal: LTG - Patient will demonstrate intervention for managing pain  11/29/2021 0037 by Kathrin Jean RN  Outcome: Ongoing  11/28/2021 1123 by Kyara Mcclure RN  Outcome: Ongoing  Goal: STG - Patient will reduce or eliminate use of analgesics  11/29/2021 0037 by Kathrin Jean RN  Outcome: Ongoing  11/28/2021 1123 by Kyara Mcclure RN  Outcome: Ongoing  Goal: STG - pain is manageable through therapies  11/29/2021 0037 by Kathrin Jean RN  Outcome: Ongoing  11/28/2021 1123 by Cloteal Ren, RN  Outcome: Ongoing  Goal: STG - Patient will verbalize an acceptable level of pain  11/29/2021 0037 by Landry Mcdaniels RN  Outcome: Ongoing  11/28/2021 1123 by Vashti Desai RN  Outcome: Ongoing  Goal: STG - patients pain is managed to allow active participation in daily activities  11/29/2021 0037 by Landry Mcdaniels RN  Outcome: Ongoing  11/28/2021 1123 by Vashti Desai RN  Outcome: Ongoing  Goal: STG - Patient will increase activity level  11/29/2021 0037 by Landry Mcdaniels RN  Outcome: Ongoing  11/28/2021 1123 by Vashti Desai RN  Outcome: Ongoing  Goal: STG - patient verbalizes a reduction in pain level  11/29/2021 0037 by Landry Mcdaniels RN  Outcome: Ongoing  11/28/2021 1123 by Vashti Desai RN  Outcome: Ongoing  Goal: Patient's pain/discomfort is manageable  11/29/2021 0037 by Landry Mcdaniels RN  Outcome: Ongoing  11/28/2021 1123 by Vashti Desai RN  Outcome: Ongoing

## 2021-11-29 NOTE — PATIENT CARE CONFERENCE
[address] [facility] ACUTE INPATIENT REHABILITATION  TEAM CONFERENCE NOTE    Date: 2021  Patient Name: Eric Brand        MRN: [id number]    : 1933  (80 y.o.)  Gender: female   Referring Practitioner: Khadra Burroughs MD  Diagnosis: Acute ischemic stroke, L MCA infarct, R sided involvement       PHYSICAL THERAPY      SPEECH THERAPY      OCCUPATIONAL THERAPY    CURRENT IRF-CORTES SCORES  Eating: CARE Score: 3  Comment: Min A, utilized built up handle on utensil, hand over hand to initiate scooping and switching between food and drink. Oral Hygiene: CARE Score: 4  Comment: vc     Toileting: CARE Score: 2  Comment: Pt. able to assist pulling clothing up/down this tx session, required assistance for hygiene. Shower/Bathe: CARE Score: 3  Comment: max cues        Upper Body Dressing: CARE Score: 3         Lower Body Dressing: CARE Score: 2          Footwear: CARE Score: 2          Toilet Transfers: CARE Score: 3  Comment: Min A. Picking Up Object:  CARE Score: 1          UE Functioning:  ***    Pain Assessment:  Pain Level: 0       STGs:  Short term goals  Time Frame for Short term goals: 1 week  Short term goal 1: Mod A with clothing management/hygiene for toileting. Short term goal 2: Mod A with LB dressing, AE PRN. Short term goal 3: Mod A with donning/doffing socks and shoes, AE PRN. Short term goal 4: Min A with toilet transfers. Short term goal 5: Min A with bathing hygiene. LTGs:  Long term goals  Time Frame for Long term goals : 3-4 weeks  Long term goal 1: CGA with clothing management/hygiene for toileting. Long term goal 2: Min A with LB dressing, AE PRN. Long term goal 3: Min A with donning/doffing socks and shoes, AE PRN. Long term goal 4: CGA with toilet transfers. Long term goal 5: Supervision with bathing hygiene. Long term goals 6: Patient/family will verbalize DME needs.     Assessment:  Performance deficits / Impairments: Decreased functional mobility , Decreased ADL status, Decreased ROM, Decreased strength, Decreased safe awareness, Decreased cognition, Decreased endurance, Decreased high-level IADLs, Decreased balance, Decreased coordination, Decreased vision/visual deficit                  NUTRITION  Current Wt: Weight: 133 lb 1.6 oz (60.4 kg) / Body mass index is 21.48 kg/m². Admission Wt: Admission Body Weight: 126 lb (57.2 kg)  Oral Diet Orders:     Oral Nutrition Supplement (ONS) Orders:    Please see nutrition note for details. NURSING    Wounds/Incisions/Ulcers: {Blank single:41426::\"No skin issues identified\",\"Incision healing well\",\"Incision concerning for ***\",\"Wounds present ***\"}     Shane Scale Score: 17    Pain: {Blank single:::\"No pain concerns to address\",\"Patient's pain is currently controlled with ***\",\"Patient's pain currently uncontrolled and adjustments will be made as follows: ***\"}    Consultations/Labs/X-rays:     Family Education: {Blank single:::\"Family available and participating in education \",\"No family available for education\",\"Need to make contact with family to initiate education\"}    Fall Risk:  Mckeon Score: [de-identified]    Fall in the last week?       Other Nursing Issues:         SOCIAL WORK/CASE MANAGEMENT  Assessment:    Discharge Plan   Estimated Length of Stay: {TIME; DAYS WEEKS DKVZKB:99342}  Destination: {Settin}    Pass: {YES/NO:13134}    [facility]: [facility]    Equipment at Discharge: {EQUIPMENT:591134424}    Progress made in the prior week:  1.  2.  3.  4.  5.      Goals for following week:  1.  2.   3.   4.   5.     Factors facilitating achievement of predicted outcomes: {Patient Strengths:943381151}    Barriers to the achievement of predicted outcomes: {BARRIERS:123149933}    Team Members Present at Conference:  : [facility]   Occupational Therapist: {ambiguous abbreviation:9809056::\"Netta Arceo, Cleve Nalini OTR/L\"}  Physical Therapist: {ambiguous abbreviation:3779381::\"Shen Turcios Chetan PT,DPT\",\"Benito Melendrez PT\"}  Speech Therapist: {ambiguous abbreviation:6494355::\"N/A\",\"Verónica Butler, MS, Merypalma Dublin, MS,CCC-SLP\",\"Dayana Gresham, MS, CCC-SLP\"}  Nurse: {ambiguous abbreviation:1319345::\"Patricia Salcido, RN,BSN\",\"Kasey [place]ΕΙΑ, RN\",\"Lizet Deutsch, [address] [place] [address] Rd,[address], RN\",\"Vandana Quiroz RN\",\"Nohemy Elias, RN\"}   Nurse Manager:  Ama Pollock RN, BSN  Dietitian:  {ambiguous abbreviation:4970164::\"Sarah Mercedes, MS, RD, LD\",\"Terry Lee, MS RD, LD\"}  Rehab Director:  Baron Lefort approve the established interdisciplinary plan of care as documented within the medical record of Jj Pop.

## 2021-11-29 NOTE — DISCHARGE SUMMARY
Occupational Therapy Discharge Summary         Date: 2021  Patient Name: Tess Alicea        MRN: [id number]    : 1933  (80 y.o.)  Gender: female   Referring Practitioner: Jen Andres MD  Diagnosis: Acute ischemic stroke, L MCA infarct, R sided involvement   Restrictions/Precautions  Restrictions/Precautions: Fall Risk  Required Braces or Orthoses?: No      Discharge Date: 2021    UE Functioning:  LUE: AROM 120 degrees  RUE: 100 degrees     Home Management:  Functional Mobility  Functional - Mobility Device: 4-Wheeled Walker  Activity: To/from bathroom  Assist Level: Minimal assistance      Adaptive Equipment/DME Status:  Home Equipment: 4 wheeled walker, Grab bars, Wheelchair-manual (Has w/c, but doesn't use)    Pain Assessment:  Pain Level: 0       Remaining Problems:  Decreased functional mobility ; Decreased ADL status; Decreased ROM; Decreased strength; Decreased safe awareness; Decreased cognition; Decreased endurance; Decreased high-level IADLs; Decreased balance; Decreased coordination; Decreased vision/visual deficit    STGs:  Short term goals  Time Frame for Short term goals: 1 week  Short term goal 1: Mod A with clothing management/hygiene for toileting. Short term goal 2: Mod A with LB dressing, AE PRN. Short term goal 3: Mod A with donning/doffing socks and shoes, AE PRN. Short term goal 4: Min A with toilet transfers. Short term goal 5: Min A with bathing hygiene. 0/5 Short Term Goals    LTGs:  Long term goals  Time Frame for Long term goals : 3-4 weeks  Long term goal 1: CGA with clothing management/hygiene for toileting. Long term goal 2: Min A with LB dressing, AE PRN. Long term goal 3: Min A with donning/doffing socks and shoes, AE PRN. Long term goal 4: CGA with toilet transfers. Long term goal 5: Supervision with bathing hygiene. Long term goals 6: Patient/family will verbalize DME needs.   0/6 Long Term Goals    Discharge Setting and Recommendations:  Decreased

## 2021-12-02 NOTE — PROGRESS NOTES
[address] [facility] Speech Therapy                Discharge Summary      Date: 2021  Patient Name: Mora Klinefelter        MRN: [id number]    Account #: [de-identified]  : 1933  (80 y.o.)  Gender: female   Discharge date : 21   Swallowing Status/Diet: Dysphagia soft and bite sized, pureed meats, thin liquids     Admission Diagnoses:   Acute ischemic stroke [facility]) [I63.9]     Discharge Diagnoses: Active Problems:    Acute ischemic stroke [facility])            The patient's son was present. He was feeding her breakfast. She was able to hold her coffee cup and bottle of ensure with minimal assistance.      No overt s/s of aspiration observed with pureed meats, soft foods or thin liquids. She did not have any oral residue or buccal cavity pocketing after breakfast this morning.     Reviewed all swallowing precautions and diet recommendations with her son.      She will continue to require total feed at the SNF. She is recommended to remain on her current diet. Staff will need to check the oral cavity for residue.     Recommended Diet and Intervention  Diet Solids Recommendation: Dysphagia Soft and Bite-Sized with pureed meats (Dysphagia III)  Liquid Consistency Recommendation: Thin liquids  Recommended Form of Meds: Crushed in puree as able  Therapeutic Interventions: Mendelsohn; Diet tolerance monitoring; Oral care; Therapeutic PO trials with SLP; Oral motor exercises; Tongue base strengthening; Patient/Family education; Effortful swallow; Pharyngeal exercises; Yanet     Compensatory Swallowing Strategies  Compensatory Swallowing Strategies: Upright as possible for all oral intake; Alternate solids and liquids; Remain upright for 30-45 minutes after meals; Check for pocketing of food on the Right; Swallow 2 times per bite/sip; Small bites/sips;  External pacing                    SHORT TERM GOAL #1:  Goal 1: The patient will follow two step commands with minimal cueing and 100% accuracy. Not Met     SHORT TERM GOAL #2:  Goal 2: The patient will complete naming tasks (picture, object) with minimal cueing and 100% accuracy. Met     SHORT TERM GOAL #3:  Goal 3: The patient will complete concrete divergent naming tasks with minimal cueing and 100% accuracy. Not Met     SHORT TERM GOAL #4:  Goal 4: The patient will complete oral motor exercises to improve lingual and labial strength and range of motion as well as improve speech intelligibility. Met     SHORT TERM GOAL #5:  Goal 5: The patient will utilize dysarthria strategies (slow rate, increased volume, over exaggeration of words, increased breath support) during structured tasks and during conversations with minimal cueing. Not Met     Goal 6: The patient will answer temporal and spatial orientation questions with 100% accuracy and minimal cues. Met    Goal 7: She will answer demographic information questions with minimal cueing. Met        Swallowing Short Term Goals  Short-term Goals  Goal 1: The patient will complete the soco (tongue hold) technique to improve base of tongue retraction and base of tongue to pharyngeal wall approximation with 90% accuracy and minimal verbal cues. Not Met  Goal 2: The patient will complete the effortful swallow maneuver to improve hyolaryngeal elevation, tongue base retraction, and vocal fold closure with 90% accuracy and minimal verbal cues. Not Met  Goal 3: Staff/caregivers will complete daily oral hygiene to prevent aspiration of bacteria laden secretions. Met  Goal 4: The patient will tolerate a dysphagia soft and bite sized diet with nectar (mildly thick) liquids with minimal overt s/s of aspiration. Met    Long Term Goals: The patient will maintain adequate hydration/nutrition with optimum safety and efficiency of swallowing function with P.O. intake without overt signs and symptoms of aspiration for the highest appropriate diet level.  Met     The patient will develop functional, cognitive-linguistic-based skills and utilize compensatory strategies to  communicate wants and needs effectively to different conversational partners, maintain safety and  participate socially in functional living environment.  Not Met                 Goals Met: ___6___ STG's     _____1___  LTG's      Plan:  Discharge patient: yes             Discharge Location: [facility]             Further Speech treatment/recommendations: continue speech therapy services at [facility]          Electronically Signed By:  Lalit aDvis M.S., CCC-SLP  12/2/2021,10:47 AM.

## 2023-01-26 NOTE — PLAN OF CARE
Problem: Falls - Risk of:  Goal: Will remain free from falls  Description: Will remain free from falls  11/25/2021 1600 by Rick Triana RN  Outcome: Ongoing  11/25/2021 0233 by Fatou Hernandez RN  Outcome: Ongoing  11/25/2021 0229 by Fatou Hernandez RN  Outcome: Ongoing  Goal: Absence of physical injury  Description: Absence of physical injury  11/25/2021 1600 by Rick Triana RN  Outcome: Ongoing  11/25/2021 0233 by Fatou Hernandez RN  Outcome: Ongoing  11/25/2021 0229 by Fatou Hernandez RN  Outcome: Ongoing [address] INTERNAL MEDICINE AND MEDICATION MANAGEMENT  [facility] Lai  Dept: [phone]  Dept Fax: [phone]: [phone]     Visit Date:  1/26/2023    Patient:  Андрей Lujan  YOB: 1990    HPI:     Chief Complaint   Patient presents with    Drug Problem     Keon Simons presents today for medical evaluation of severe opioid use disorder     He is a routine MAT patient of Dr. Rl Browne. Last seen him 4 weeks ago     Has been clean over 2 years     Denies any use     Urine positive for buprenorphine and THC     Urges and cravings controlled with suboxone 12 mg daily      5 kids at home- in [place]     Medications    Current Outpatient Medications:     buprenorphine-naloxone (SUBOXONE) 12-3 MG sublingual film, Place 1 Film under the tongue daily for 28 days. Max Daily Amount: 1 Film, Disp: 28 Film, Rfl: 0    The patient has No Known Allergies. Past Medical History  Keon Simons  has no past medical history on file. Past Surgical History  The patient  has no past surgical history on file. Family History  This patient's family history is not on file. Social History  Keon Simons  reports that he has been smoking cigarettes. He has been smoking an average of 1 pack per day. He has never used smokeless tobacco. He reports that he does not currently use alcohol. He reports that he does not use drugs.     Health Maintenance:    Health Maintenance   Topic Date Due    Flu vaccine (1) 01/26/2024 (Originally 8/1/2022)    Pneumococcal 0-64 years Vaccine (1 - PCV) 05/06/2024 (Originally 2/2/1996)    COVID-19 Vaccine (1) 05/06/2024 (Originally 1990)    Depression Screen  10/06/2023    DTaP/Tdap/Td vaccine (6 - Td or Tdap) 02/07/2031    Hib vaccine  Completed    Hepatitis C screen  Completed    Hepatitis A vaccine  Aged Out    Meningococcal (ACWY) vaccine  Aged Out    Varicella vaccine  Discontinued    HIV screen  Discontinued Subjective:      Review of Systems   Constitutional:  Negative for chills, fatigue and fever. HENT:  Negative for congestion, rhinorrhea, sinus pressure, sinus pain, sore throat, tinnitus and trouble swallowing. Eyes:  Negative for photophobia and visual disturbance. Respiratory:  Negative for cough, shortness of breath and wheezing. Cardiovascular:  Negative for chest pain, palpitations and leg swelling. Gastrointestinal:  Negative for abdominal distention, abdominal pain, blood in stool, constipation, diarrhea, nausea and vomiting. Endocrine: Negative for polydipsia, polyphagia and polyuria. Genitourinary:  Negative for difficulty urinating, dysuria, frequency, hematuria and urgency. Musculoskeletal:  Negative for arthralgias and myalgias. Skin:  Negative for rash and wound. Neurological:  Negative for dizziness, light-headedness and headaches. Psychiatric/Behavioral:  Negative for dysphoric mood and sleep disturbance. The patient is not nervous/anxious. Objective:     /61 (Site: Right Lower Arm, Position: Sitting, Cuff Size: Medium Adult)   Pulse 67   Ht 6' 1\" (1.854 m)   Wt 166 lb (75.3 kg)   BMI 21.90 kg/m²     Physical Exam  Vitals reviewed. Constitutional:       General: He is not in acute distress. Appearance: Normal appearance. He is not ill-appearing. HENT:      Head: Normocephalic and atraumatic. Right Ear: External ear normal.      Left Ear: External ear normal.      Nose: Nose normal. No congestion or rhinorrhea. Mouth/Throat:      Mouth: Mucous membranes are moist.   Eyes:      Extraocular Movements: Extraocular movements intact. Conjunctiva/sclera: Conjunctivae normal.      Pupils: Pupils are equal, round, and reactive to light. Pulmonary:      Effort: Pulmonary effort is normal. No respiratory distress. Musculoskeletal:         General: Normal range of motion. Cervical back: Normal range of motion and neck supple.       Right lower leg: No edema.      Left lower leg: No edema.   Skin:     General: Skin is warm and dry.   Neurological:      General: No focal deficit present.      Mental Status: He is alert and oriented to person, place, and time.   Psychiatric:         Mood and Affect: Mood normal.         Behavior: Behavior normal.         Thought Content: Thought content normal.         Judgment: Judgment normal.       Labs Reviewed 1/26/2023:    Lab Results   Component Value Date    WBC 6.3 05/25/2022    HGB 13.6 (L) 05/25/2022    HCT 40.9 (L) 05/25/2022     (L) 05/25/2022    ALT 18 05/25/2022    AST 22 05/25/2022     05/25/2022    K 4.5 05/25/2022     05/25/2022    CREATININE 1.0 05/25/2022    BUN 20 05/25/2022    CO2 28 05/25/2022    TSH 1.330 05/25/2022       Assessment/Plan      1. Severe opioid use disorder (HCC)    - POCT Rapid Drug Screen  - buprenorphine-naloxone (SUBOXONE) 12-3 MG sublingual film; Place 1 Film under the tongue daily for 28 days. Max Daily Amount: 1 Film  Dispense: 28 Film; Refill: 0    2. Severe opioid dependence on maintenance therapy (HCC)    - OARRS reviewed, no discrepancies  - Narcan at home  - Encouraged to attend NA/AA meetings and/or arrange for individualized counseling      Return in about 4 weeks (around 2/23/2023).    Patient given educational materials - see patient instructions.  Discussed use, benefit, and side effects of prescribed medications.  All patient questions answered.  Pt voiced understanding.  Reviewed health maintenance.       Electronically signed DOMINIC Hagen CNP on 1/26/23 at 9:10 AM EST

## 2023-08-24 NOTE — PLAN OF CARE
Problem: Falls - Risk of:  Goal: Will remain free from falls  Description: Will remain free from falls  11/10/2021 1200 by Leon Hooker RN  Outcome: Ongoing  11/10/2021 0113 by Ethan Oates RN  Outcome: Ongoing  Goal: Absence of physical injury  Description: Absence of physical injury  11/10/2021 1200 by Leon Hooker RN  Outcome: Ongoing  11/10/2021 0113 by Ethan Oates RN  Outcome: Ongoing     Problem: Confusion - Acute:  Goal: Absence of continued neurological deterioration signs and symptoms  Description: Absence of continued neurological deterioration signs and symptoms  11/10/2021 1200 by Leon Hooker RN  Outcome: Ongoing  11/10/2021 0113 by Ethan Oates RN  Outcome: Ongoing  Goal: Mental status will be restored to baseline  Description: Mental status will be restored to baseline  11/10/2021 1200 by Leon Hooker RN  Outcome: Ongoing  11/10/2021 0113 by Ethan Oates RN  Outcome: Ongoing     Problem: Discharge Planning:  Goal: Ability to perform activities of daily living will improve  Description: Ability to perform activities of daily living will improve  11/10/2021 1200 by Leon Hooker RN  Outcome: Ongoing  11/10/2021 0113 by Ethan Oates RN  Outcome: Ongoing  Goal: Participates in care planning  Description: Participates in care planning  11/10/2021 1200 by Leon Hooker RN  Outcome: Ongoing  11/10/2021 0113 by Ethan Oates RN  Outcome: Ongoing     Problem: Injury - Risk of, Physical Injury:  Goal: Will remain free from falls  Description: Will remain free from falls  11/10/2021 1200 by Leon Hooker RN  Outcome: Ongoing  11/10/2021 0113 by Ethan Oates RN  Outcome: Ongoing  Goal: Absence of physical injury  Description: Absence of physical injury  11/10/2021 1200 by Leon Hooker RN  Outcome: Ongoing  11/10/2021 0113 by Ethan Oates RN  Outcome: Ongoing     Problem: Mood - Altered:  Goal: Mood stable  Description: Mood stable  11/10/2021 1200 by Leon Hooker RN  Outcome: Ongoing  11/10/2021 0113 by Ethan Oates RN  Outcome: Ongoing  Goal: Absence of abusive behavior  Description: Absence of abusive behavior  11/10/2021 1200 by Leon Hooker RN  Outcome: Ongoing  11/10/2021 0113 by Ethan Oates RN  Outcome: Ongoing  Goal: Verbalizations of feeling emotionally comfortable while being cared for will increase  Description: Verbalizations of feeling emotionally comfortable while being cared for will increase  11/10/2021 1200 by Leon Hooker RN  Outcome: Ongoing  11/10/2021 0113 by Ethan Oates RN  Outcome: Ongoing     Problem: Psychomotor Activity - Altered:  Goal: Absence of psychomotor disturbance signs and symptoms  Description: Absence of psychomotor disturbance signs and symptoms  11/10/2021 1200 by Leon Hooker RN  Outcome: Ongoing  11/10/2021 0113 by Ethan Oates RN  Outcome: Ongoing     Problem: Sensory Perception - Impaired:  Goal: Demonstrations of improved sensory functioning will increase  Description: Demonstrations of improved sensory functioning will increase  11/10/2021 1200 by Leon Hooker RN  Outcome: Ongoing  11/10/2021 0113 by Ethan Oates RN  Outcome: Ongoing  Goal: Decrease in sensory misperception frequency  Description: Decrease in sensory misperception frequency  11/10/2021 1200 by Leon Hooker RN  Outcome: Ongoing  11/10/2021 0113 by Ethan Oates RN  Outcome: Ongoing  Goal: Able to refrain from responding to false sensory perceptions  Description: Able to refrain from responding to false sensory perceptions  11/10/2021 1200 by Leon Hooker RN  Outcome: Ongoing  11/10/2021 0113 by Ethan Oates RN  Outcome: Ongoing  Goal: Demonstrates accurate environmental perceptions  Description: Demonstrates accurate environmental perceptions  11/10/2021 1200 by Leon Hooker RN  Outcome: Ongoing  11/10/2021 0113 by Ethan Oates RN  Outcome: Ongoing  Goal: Able to distinguish between reality-based and nonreality-based thinking  Description: Able to [facility] 22-24)       Key: MOD A=moderate assistance, IND/MOD I=independent/modified independent  - Generalized Current Level of Function     - Current Self-Cares: 22       Scoring Key= >21 Modified Independent; 20-21 Supervision; 18-19 Minimal assist; 13-18 Moderate assist; 9-12 Max assist; <9 Total assist       • Clinical decision making: Low - Patient has few limitations (1-3), comorbidities and/or complexities, as noted in problem focused assessment noted above, that impact their occupational profile.  Resulting in few treatment options and no task modification consistent with low clinical decision making complexity.    PLAN (while hospitalized)  Suggestions for next session  as indicated: PT/OT Mobility Equipment for Discharge: walker  PT/OT ADL Equipment for Discharge: None      Documented in the chart in the following areas: Assessment/Plan.    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    Patient at End of Session:   Location: in chair  Safety measures: alarm system in place/re-engaged and call light within reach  Handoff to: nurse and family/caregiver      Therapy procedure time and total treatment time can be found documented on the Time Entry flowsheet   distinguish between reality-based and nonreality-based thinking  11/10/2021 1200 by Rani Keys RN  Outcome: Ongoing  11/10/2021 0113 by Shanel Friedman RN  Outcome: Ongoing  Goal: Able to interrupt nonreality-based thinking  Description: Able to interrupt nonreality-based thinking  11/10/2021 1200 by Rani Keys RN  Outcome: Ongoing  11/10/2021 0113 by Shanel Friedman RN  Outcome: Ongoing     Problem: Sleep Pattern Disturbance:  Goal: Appears well-rested  Description: Appears well-rested  11/10/2021 1200 by Rani Keys RN  Outcome: Ongoing  11/10/2021 0113 by Shanel Friedman RN  Outcome: Ongoing     Problem: IP BLADDER/VOIDING  Goal: LTG - patient will complete bladder elimination  11/10/2021 1200 by Rani Keys RN  Outcome: Ongoing  11/10/2021 0113 by Shanel Friedman RN  Outcome: Ongoing  Goal: LTG - Patient will utilize adaptive techniques/equipment to complete bladder elimination  11/10/2021 1200 by Rani Keys RN  Outcome: Ongoing  11/10/2021 0113 by Shanel Friedman RN  Outcome: Ongoing  Goal: LTG - patient will achieve acceptable level of continence  11/10/2021 1200 by Rani Keys RN  Outcome: Ongoing  11/10/2021 0113 by Shanel Friedman RN  Outcome: Ongoing  Goal: STG - Patient demonstrates ability to take care of indwelling catheter  11/10/2021 1200 by Rani Keys RN  Outcome: Ongoing  11/10/2021 0113 by Shanel Friedman RN  Outcome: Ongoing  Goal: STG - patient demonstrates self-cath technique using clean technique and care of the catheter  11/10/2021 1200 by Rani Keys RN  Outcome: Ongoing  11/10/2021 0113 by Shanel Friedman RN  Outcome: Ongoing  Goal: STG - Patient demonstrates no accidents  11/10/2021 1200 by Rani Keys RN  Outcome: Ongoing  11/10/2021 0113 by Shanel Friedman RN  Outcome: Ongoing  Goal: STG - Patient will state signs and symptoms of UTI  11/10/2021 1200 by Rani Keys RN  Outcome: Ongoing  11/10/2021 0113 by Derryl Aw, RN  Outcome: Ongoing  Goal: STG - patient will be able to empty bladder  11/10/2021 1200 by Earlene Valdovinos RN  Outcome: Ongoing  11/10/2021 0113 by Mark Gastelum RN  Outcome: Ongoing  Goal: STG - Patient demonstrates understanding of self catheterization schedule by completing task on time  11/10/2021 1200 by Earlene Valdovinos RN  Outcome: Ongoing  11/10/2021 0113 by Mark Gastelum RN  Outcome: Ongoing  Goal: STG - patient participates in bladder program by expressing need to void  11/10/2021 1200 by Earlene Valdovinos RN  Outcome: Ongoing  11/10/2021 0113 by Mark Gsatelum RN  Outcome: Ongoing  Goal: STG - Patient verbalizes understanding of catheter care indwelling/intermittent  11/10/2021 1200 by Earlene Valdovinos RN  Outcome: Ongoing  11/10/2021 0113 by Mark Gastelum RN  Outcome: Ongoing  Goal: STG - patient participates in bladder program by adhering to implemented toileting schedule  11/10/2021 1200 by Earlene Valdovinos RN  Outcome: Ongoing  11/10/2021 0113 by Mark Gastelum RN  Outcome: Ongoing     Problem: IP BOWEL ELIMINATION  Goal: LTG - patient will utilize adaptive techniques/equipment to complete bowel elimination  11/10/2021 1200 by aErlene Valdovinos RN  Outcome: Ongoing  11/10/2021 0113 by Mark Gastelum RN  Outcome: Ongoing  Goal: LTG - patient will have regular and routine bowel evacuation  11/10/2021 1200 by Earlene Valdovinos RN  Outcome: Ongoing  11/10/2021 0113 by Mark Gastelum RN  Outcome: Ongoing  Goal: STG - patient will be accident free  11/10/2021 1200 by Earlene Valdovinos RN  Outcome: Ongoing  11/10/2021 0113 by Mark Gastelum RN  Outcome: Ongoing  Goal: STG - Patient demonstrates care and management of ostomy bag  11/10/2021 1200 by Earlene Valdovinos RN  Outcome: Ongoing  11/10/2021 0113 by Mark Gastelum RN  Outcome: Ongoing  Goal: STG - Patient participates in bowel management program  11/10/2021 1200 by Earlene Valdovinos RN  Outcome: Ongoing  11/10/2021 0113 by Mark Gastelum RN  Outcome: Ongoing  Goal: STG - patient maintains skin integrity  11/10/2021 1200 by Gentry Duff Eliza Damon RN  Outcome: Ongoing  11/10/2021 0113 by Amanda Gallegos RN  Outcome: Ongoing  Goal: STG - Patient will verbalize signs and symptoms of constipation and how to prevent/alleviate  11/10/2021 1200 by Mohini Geller RN  Outcome: Ongoing  11/10/2021 0113 by Amanda Gallegos RN  Outcome: Ongoing  Goal: STG - patient will be continent of stool  11/10/2021 1200 by Mohini Geller RN  Outcome: Ongoing  11/10/2021 0113 by Amanda Gallegos RN  Outcome: Ongoing  Goal: STG - Patient completes digital stimulation technique  11/10/2021 1200 by Mohini Geller RN  Outcome: Ongoing  11/10/2021 0113 by Amanda Gallegos RN  Outcome: Ongoing  Goal: STG - Patient verbalizes knowledge about relationship between diet, fluid intake, activity and medication on constipation  11/10/2021 1200 by Mohini Geller RN  Outcome: Ongoing  11/10/2021 0113 by Amanda Gallegos RN  Outcome: Ongoing     Problem: IP BREATHING  Goal: LTG - patient will mobilize secretions and maintain airway  11/10/2021 1200 by Mohini Geller RN  Outcome: Ongoing  11/10/2021 0113 by Amanda Gallegos RN  Outcome: Ongoing  Goal: LTG - Patient/caregiver will demonstrate/perform proper techniques to maintain patent airway  11/10/2021 1200 by Mohini Geller RN  Outcome: Ongoing  11/10/2021 0113 by Amanda Gallegos RN  Outcome: Ongoing  Goal: LTG - patient/caregiver will demonstrate/perform improved or stable self care abilities within physical limitations  11/10/2021 1200 by Mohini Geller RN  Outcome: Ongoing  11/10/2021 0113 by Amanda Gallegos RN  Outcome: Ongoing  Goal: STG - Patient/caregiver will maintain patent airway  11/10/2021 1200 by Mohini Geller RN  Outcome: Ongoing  11/10/2021 0113 by Amanda Gallegos RN  Outcome: Ongoing  Goal: STG - respiratory rate and effort will be within normal limits for the patient  11/10/2021 1200 by Mohini Geller RN  Outcome: Ongoing  11/10/2021 0113 by Amanda Gallegos RN  Outcome: Ongoing  Goal: STG - patient/caregiver will be able to verbalize oxygen safety precautions  11/10/2021 1200 by Anabell Wong RN  Outcome: Ongoing  11/10/2021 0113 by Danette Garcia RN  Outcome: Ongoing  Goal: STG - Patient/caregiver demonstrates correct suctioning technique  11/10/2021 1200 by Anabell Wong RN  Outcome: Ongoing  11/10/2021 0113 by Danette Garcia RN  Outcome: Ongoing  Goal: STG - patient will utilize incentive spirometer  11/10/2021 1200 by Anabell Wong RN  Outcome: Ongoing  11/10/2021 0113 by Danette Garcia RN  Outcome: Ongoing  Goal: STG - Patient performs or directs assisted coughing  11/10/2021 1200 by Anabell Wong RN  Outcome: Ongoing  11/10/2021 0113 by Danette Garcia RN  Outcome: Ongoing  Goal: STG - patient can administer MDI's  11/10/2021 1200 by Anabell Wong RN  Outcome: Ongoing  11/10/2021 0113 by Danette Garcia RN  Outcome: Ongoing  Goal: STG - Patient can utilize incentive spirometer  11/10/2021 1200 by Anabell Wong RN  Outcome: Ongoing  11/10/2021 0113 by Danette Garcia RN  Outcome: Ongoing  Goal: STG - family can complete suctioning  11/10/2021 1200 by Anabell Wong RN  Outcome: Ongoing  11/10/2021 0113 by Danette Garcia RN  Outcome: Ongoing     Problem: NUTRITION  Goal: Patient maintains adequate hydration  11/10/2021 1200 by Anabell Wong RN  Outcome: Ongoing  11/10/2021 0113 by Danette Garcia RN  Outcome: Ongoing  Goal: Patient maintains weight  11/10/2021 1200 by Anabell Wong RN  Outcome: Ongoing  11/10/2021 0113 by Danette Garcia RN  Outcome: Ongoing  Goal: Patient/Family demonstrates understanding of diet  11/10/2021 1200 by Anabell Wong RN  Outcome: Ongoing  11/10/2021 0113 by Danette Garcia RN  Outcome: Ongoing  Goal: Patient/Family independently completes tube feeding  11/10/2021 1200 by Anabell Wong RN  Outcome: Ongoing  11/10/2021 0113 by Danette Garcia RN  Outcome: Ongoing  Goal: Patient will have no more than 5 lb weight change during LOS  11/10/2021 1200 by Anabell Wong RN  Outcome: Ongoing  11/10/2021 0113 by Danette Garcia, RN  Outcome: Ongoing  Goal: Patient will utilize adaptive techniques to administer nutrition  11/10/2021 1200 by Debbie Patel RN  Outcome: Ongoing  11/10/2021 0113 by Lore Fontaine RN  Outcome: Ongoing  Goal: Patient will verbalize dietary restrictions  11/10/2021 1200 by Debbie Patel, RN  Outcome: Ongoing  11/10/2021 0113 by Lore Fontaine RN  Outcome: Ongoing     Problem: SAFETY  Goal: LTG - patient will adhere to hip precautions during ADL's and transfers  11/10/2021 1200 by Debbie Patel, RN  Outcome: Ongoing  11/10/2021 0113 by Lore Fontaine RN  Outcome: Ongoing  Goal: LTG - Patient will demonstrate safety requirements appropriate to situation/environment  11/10/2021 1200 by Debbie Patel, RN  Outcome: Ongoing  11/10/2021 0113 by Lore Fontaine RN  Outcome: Ongoing  Goal: LTG - patient will utilize safety techniques  11/10/2021 1200 by Debbie Patel, RN  Outcome: Ongoing  11/10/2021 0113 by Lore Fontaine RN  Outcome: Ongoing  Goal: STG - patient locks brakes on wheelchair  11/10/2021 1200 by Debbie Patel, RN  Outcome: Ongoing  11/10/2021 0113 by Lore Fontaine RN  Outcome: Ongoing  Goal: STG - Patient uses call light consistently to request assistance with transfers  11/10/2021 1200 by Debbie Patel, RN  Outcome: Ongoing  11/10/2021 0113 by Lore Fontaine RN  Outcome: Ongoing  Goal: STG - patient uses gait belt during all transfers  11/10/2021 1200 by Debbie Patel, RN  Outcome: Ongoing  11/10/2021 0113 by Lore Fontaine RN  Outcome: Ongoing  Goal: Free from accidental physical injury  11/10/2021 1200 by Debbei Patel, RN  Outcome: Ongoing  11/10/2021 0113 by Lore Fontaine RN  Outcome: Ongoing  Goal: Free from intentional harm  11/10/2021 1200 by Debbie Patel, RN  Outcome: Ongoing  11/10/2021 0113 by Lore Fontaine RN  Outcome: Ongoing     Problem: SKIN INTEGRITY  Goal: LTG - Patient will be free from infection  11/10/2021 1200 by Debbie Patel, RN  Outcome: Ongoing  11/10/2021 0113 by Lore Fontaine RN  Outcome: Ongoing  Goal: LTG - patient will maintain/improve skin integrity through proper skin care techniques  11/10/2021 1200 by Blas Perez RN  Outcome: Ongoing  11/10/2021 0113 by Felisa Alvarado RN  Outcome: Ongoing  Goal: LTG - Patient will demonstrate appropriate pressure relief techniques  11/10/2021 1200 by Blas Perez RN  Outcome: Ongoing  11/10/2021 0113 by Felisa Alvarado RN  Outcome: Ongoing  Goal: LTG - patient will demonstrate appropriate skin care techniques  11/10/2021 1200 by Blas Perez RN  Outcome: Ongoing  11/10/2021 0113 by Felisa Alvarado RN  Outcome: Ongoing  Goal: LTG - Patient will be free from infection  11/10/2021 1200 by Blas Perez RN  Outcome: Ongoing  11/10/2021 0113 by Felisa Alvarado RN  Outcome: Ongoing  Goal: STG - Patient demonstrates skin care/treatment/dressing change  11/10/2021 1200 by Blas Perez RN  Outcome: Ongoing  11/10/2021 0113 by Felisa Alvarado RN  Outcome: Ongoing  Goal: STG - patient will maintain good skin integrity  11/10/2021 1200 by Blas Perez RN  Outcome: Ongoing  11/10/2021 0113 by Felisa Alvarado RN  Outcome: Ongoing  Goal: STG - Patient exhibits signs of wound healing.   11/10/2021 1200 by Blas Perez RN  Outcome: Ongoing  11/10/2021 0113 by Felisa Alvarado RN  Outcome: Ongoing  Goal: STG - patient demonstrates pressure reduction techniques  11/10/2021 1200 by Blas Perez RN  Outcome: Ongoing  11/10/2021 0113 by Felisa Alvarado RN  Outcome: Ongoing  Goal: STG - Patient demonstrates preventative skin care measures  11/10/2021 1200 by Blas Perez RN  Outcome: Ongoing  11/10/2021 0113 by Felisa Alvarado RN  Outcome: Ongoing  Goal: Skin integrity is maintained or improved  11/10/2021 1200 by Blas Perez RN  Outcome: Ongoing  11/10/2021 0113 by Felisa Alvarado RN  Outcome: Ongoing     Problem: PAIN  Goal: LTG - Patient will manage pain with the appropriate technique/Intervention  11/10/2021 1200 by Blas Perez RN  Outcome: Ongoing  11/10/2021 0113 by Carmen Augustin KYLE Barber  Outcome: Ongoing  Goal: LTG - Patient will demonstrate intervention for managing pain  11/10/2021 1200 by Simone Armenta RN  Outcome: Ongoing  11/10/2021 0113 by Clovis Liz RN  Outcome: Ongoing  Goal: STG - Patient will reduce or eliminate use of analgesics  11/10/2021 1200 by Simone Armenta RN  Outcome: Ongoing  11/10/2021 0113 by Clovis Liz RN  Outcome: Ongoing  Goal: STG - pain is manageable through therapies  11/10/2021 1200 by Simone Armenta RN  Outcome: Ongoing  11/10/2021 0113 by Clovis Liz RN  Outcome: Ongoing  Goal: STG - Patient will verbalize an acceptable level of pain  11/10/2021 1200 by Simone Armenta RN  Outcome: Ongoing  11/10/2021 0113 by Clovis Liz RN  Outcome: Ongoing  Goal: STG - patients pain is managed to allow active participation in daily activities  11/10/2021 1200 by Simone Armenta RN  Outcome: Ongoing  11/10/2021 0113 by Clovis Liz RN  Outcome: Ongoing  Goal: STG - Patient will increase activity level  11/10/2021 1200 by Simone Armenta RN  Outcome: Ongoing  11/10/2021 0113 by Clovis Liz RN  Outcome: Ongoing  Goal: STG - patient verbalizes a reduction in pain level  11/10/2021 1200 by Simone Armenta RN  Outcome: Ongoing  11/10/2021 0113 by Clovis Liz RN  Outcome: Ongoing  Goal: Patient's pain/discomfort is manageable  11/10/2021 1200 by Simone Armenta RN  Outcome: Ongoing  11/10/2021 0113 by Clovis Liz RN  Outcome: Ongoing

## (undated) DEVICE — GLV SURG BIOGEL LTX PF 6 1/2

## (undated) DEVICE — BONE TAMP KIT KPX203PB FF X2 20/3 1 STP: Brand: KYPHOPAK™ TRAY

## (undated) DEVICE — SPK10277 JACKSON/PRO-AXIS KIT: Brand: SPK10277 JACKSON/PRO-AXIS KIT

## (undated) DEVICE — CONTAINER,SPECIMEN,OR STERILE,4OZ: Brand: MEDLINE

## (undated) DEVICE — CONN FLX BREATHE CIRCT

## (undated) DEVICE — PK KYPHOPLASTY 30

## (undated) DEVICE — VAGINAL PREP TRAY: Brand: MEDLINE INDUSTRIES, INC.

## (undated) DEVICE — SUT MNCRYL 4/0 PS2 27IN UD MCP426H

## (undated) DEVICE — CVR UNIV C/ARM

## (undated) DEVICE — GLV SURG BIOGEL LTX PF 8

## (undated) DEVICE — DEV CUT BIOP BONE KYPHX

## (undated) DEVICE — TOWEL,OR,DSP,ST,BLUE,STD,4/PK,20PK/CS: Brand: MEDLINE

## (undated) DEVICE — PK TURNOVER RM ADV